# Patient Record
Sex: MALE | Race: WHITE | NOT HISPANIC OR LATINO
[De-identification: names, ages, dates, MRNs, and addresses within clinical notes are randomized per-mention and may not be internally consistent; named-entity substitution may affect disease eponyms.]

---

## 2017-05-26 ENCOUNTER — APPOINTMENT (OUTPATIENT)
Dept: PULMONOLOGY | Facility: CLINIC | Age: 77
End: 2017-05-26

## 2017-05-26 VITALS
HEART RATE: 75 BPM | WEIGHT: 174 LBS | HEIGHT: 65 IN | BODY MASS INDEX: 28.99 KG/M2 | DIASTOLIC BLOOD PRESSURE: 80 MMHG | OXYGEN SATURATION: 95 % | SYSTOLIC BLOOD PRESSURE: 134 MMHG

## 2017-05-26 DIAGNOSIS — R06.09 OTHER FORMS OF DYSPNEA: ICD-10-CM

## 2017-05-26 DIAGNOSIS — J84.9 INTERSTITIAL PULMONARY DISEASE, UNSPECIFIED: ICD-10-CM

## 2017-05-26 DIAGNOSIS — J98.4 OTHER DISORDERS OF LUNG: ICD-10-CM

## 2017-06-25 PROBLEM — R06.09 DOE (DYSPNEA ON EXERTION): Status: ACTIVE | Noted: 2017-06-25

## 2017-06-25 PROBLEM — J98.4 RESTRICTIVE LUNG DISEASE: Status: ACTIVE | Noted: 2017-06-25

## 2017-06-29 ENCOUNTER — OTHER (OUTPATIENT)
Age: 77
End: 2017-06-29

## 2017-08-22 ENCOUNTER — APPOINTMENT (OUTPATIENT)
Dept: PULMONOLOGY | Facility: CLINIC | Age: 77
End: 2017-08-22

## 2017-10-31 ENCOUNTER — INPATIENT (INPATIENT)
Facility: HOSPITAL | Age: 77
LOS: 34 days | Discharge: HOME | DRG: 207 | End: 2017-12-05
Attending: STUDENT IN AN ORGANIZED HEALTH CARE EDUCATION/TRAINING PROGRAM | Admitting: STUDENT IN AN ORGANIZED HEALTH CARE EDUCATION/TRAINING PROGRAM
Payer: MEDICARE

## 2017-10-31 VITALS
HEART RATE: 72 BPM | SYSTOLIC BLOOD PRESSURE: 104 MMHG | RESPIRATION RATE: 16 BRPM | TEMPERATURE: 98 F | OXYGEN SATURATION: 95 % | DIASTOLIC BLOOD PRESSURE: 69 MMHG

## 2017-10-31 DIAGNOSIS — Z98.89 OTHER SPECIFIED POSTPROCEDURAL STATES: Chronic | ICD-10-CM

## 2017-10-31 LAB
ALBUMIN SERPL ELPH-MCNC: 3.4 G/DL — SIGNIFICANT CHANGE UP (ref 3.3–5)
ALP SERPL-CCNC: 96 U/L — SIGNIFICANT CHANGE UP (ref 40–120)
ALT FLD-CCNC: 227 U/L RC — HIGH (ref 10–45)
ANION GAP SERPL CALC-SCNC: 16 MMOL/L — SIGNIFICANT CHANGE UP (ref 5–17)
ANISOCYTOSIS BLD QL: SLIGHT — SIGNIFICANT CHANGE UP
APPEARANCE UR: CLEAR — SIGNIFICANT CHANGE UP
AST SERPL-CCNC: 174 U/L — HIGH (ref 10–40)
BASE EXCESS BLDV CALC-SCNC: 1.7 MMOL/L — SIGNIFICANT CHANGE UP (ref -2–2)
BASE EXCESS BLDV CALC-SCNC: 2.4 MMOL/L — HIGH (ref -2–2)
BASO STIPL BLD QL SMEAR: SLIGHT — SIGNIFICANT CHANGE UP
BASOPHILS # BLD AUTO: 0 K/UL — SIGNIFICANT CHANGE UP (ref 0–0.2)
BASOPHILS NFR BLD AUTO: 1.4 % — SIGNIFICANT CHANGE UP (ref 0–2)
BILIRUB SERPL-MCNC: 1.2 MG/DL — SIGNIFICANT CHANGE UP (ref 0.2–1.2)
BILIRUB UR-MCNC: NEGATIVE — SIGNIFICANT CHANGE UP
BUN SERPL-MCNC: 26 MG/DL — HIGH (ref 7–23)
CA-I SERPL-SCNC: 1.12 MMOL/L — SIGNIFICANT CHANGE UP (ref 1.12–1.3)
CA-I SERPL-SCNC: 1.18 MMOL/L — SIGNIFICANT CHANGE UP (ref 1.12–1.3)
CALCIUM SERPL-MCNC: 9.6 MG/DL — SIGNIFICANT CHANGE UP (ref 8.4–10.5)
CHLORIDE BLDV-SCNC: 93 MMOL/L — LOW (ref 96–108)
CHLORIDE BLDV-SCNC: 98 MMOL/L — SIGNIFICANT CHANGE UP (ref 96–108)
CHLORIDE SERPL-SCNC: 91 MMOL/L — LOW (ref 96–108)
CO2 BLDV-SCNC: 29 MMOL/L — SIGNIFICANT CHANGE UP (ref 22–30)
CO2 BLDV-SCNC: 30 MMOL/L — SIGNIFICANT CHANGE UP (ref 22–30)
CO2 SERPL-SCNC: 28 MMOL/L — SIGNIFICANT CHANGE UP (ref 22–31)
COLOR SPEC: YELLOW — SIGNIFICANT CHANGE UP
CREAT SERPL-MCNC: 1.05 MG/DL — SIGNIFICANT CHANGE UP (ref 0.5–1.3)
DIFF PNL FLD: NEGATIVE — SIGNIFICANT CHANGE UP
EOSINOPHIL # BLD AUTO: 0 K/UL — SIGNIFICANT CHANGE UP (ref 0–0.5)
EOSINOPHIL NFR BLD AUTO: 1.1 % — SIGNIFICANT CHANGE UP (ref 0–6)
GAS PNL BLDV: 132 MMOL/L — LOW (ref 136–145)
GAS PNL BLDV: 135 MMOL/L — LOW (ref 136–145)
GAS PNL BLDV: SIGNIFICANT CHANGE UP
GLUCOSE BLDV-MCNC: 115 MG/DL — HIGH (ref 70–99)
GLUCOSE BLDV-MCNC: 190 MG/DL — HIGH (ref 70–99)
GLUCOSE SERPL-MCNC: 194 MG/DL — HIGH (ref 70–99)
GLUCOSE UR QL: NEGATIVE — SIGNIFICANT CHANGE UP
HCO3 BLDV-SCNC: 28 MMOL/L — SIGNIFICANT CHANGE UP (ref 21–29)
HCO3 BLDV-SCNC: 29 MMOL/L — SIGNIFICANT CHANGE UP (ref 21–29)
HCT VFR BLD CALC: 37 % — LOW (ref 39–50)
HCT VFR BLDA CALC: 33 % — LOW (ref 39–50)
HCT VFR BLDA CALC: 38 % — LOW (ref 39–50)
HGB BLD CALC-MCNC: 10.7 G/DL — LOW (ref 13–17)
HGB BLD CALC-MCNC: 12.4 G/DL — LOW (ref 13–17)
HGB BLD-MCNC: 12.3 G/DL — LOW (ref 13–17)
HYPOCHROMIA BLD QL: SLIGHT — SIGNIFICANT CHANGE UP
KETONES UR-MCNC: ABNORMAL
LACTATE BLDV-MCNC: 4.7 MMOL/L — CRITICAL HIGH (ref 0.7–2)
LACTATE BLDV-MCNC: 7.1 MMOL/L — CRITICAL HIGH (ref 0.7–2)
LEUKOCYTE ESTERASE UR-ACNC: NEGATIVE — SIGNIFICANT CHANGE UP
LYMPHOCYTES # BLD AUTO: 0.4 K/UL — LOW (ref 1–3.3)
LYMPHOCYTES # BLD AUTO: 25.4 % — SIGNIFICANT CHANGE UP (ref 13–44)
MACROCYTES BLD QL: SIGNIFICANT CHANGE UP
MCHC RBC-ENTMCNC: 33.3 GM/DL — SIGNIFICANT CHANGE UP (ref 32–36)
MCHC RBC-ENTMCNC: 36 PG — HIGH (ref 27–34)
MCV RBC AUTO: 108 FL — HIGH (ref 80–100)
MICROCYTES BLD QL: SLIGHT — SIGNIFICANT CHANGE UP
MONOCYTES # BLD AUTO: 0.1 K/UL — SIGNIFICANT CHANGE UP (ref 0–0.9)
MONOCYTES NFR BLD AUTO: 4.8 % — SIGNIFICANT CHANGE UP (ref 2–14)
NEUTROPHILS # BLD AUTO: 0.9 K/UL — LOW (ref 1.8–7.4)
NEUTROPHILS NFR BLD AUTO: 67.4 % — SIGNIFICANT CHANGE UP (ref 43–77)
NITRITE UR-MCNC: NEGATIVE — SIGNIFICANT CHANGE UP
NT-PROBNP SERPL-SCNC: 380 PG/ML — HIGH (ref 0–300)
PCO2 BLDV: 51 MMHG — HIGH (ref 35–50)
PCO2 BLDV: 55 MMHG — HIGH (ref 35–50)
PH BLDV: 7.34 — LOW (ref 7.35–7.45)
PH BLDV: 7.35 — SIGNIFICANT CHANGE UP (ref 7.35–7.45)
PH UR: 6.5 — SIGNIFICANT CHANGE UP (ref 5–8)
PLAT MORPH BLD: NORMAL — SIGNIFICANT CHANGE UP
PLATELET # BLD AUTO: 254 K/UL — SIGNIFICANT CHANGE UP (ref 150–400)
PO2 BLDV: 26 MMHG — SIGNIFICANT CHANGE UP (ref 25–45)
PO2 BLDV: 34 MMHG — SIGNIFICANT CHANGE UP (ref 25–45)
POIKILOCYTOSIS BLD QL AUTO: SLIGHT — SIGNIFICANT CHANGE UP
POLYCHROMASIA BLD QL SMEAR: SLIGHT — SIGNIFICANT CHANGE UP
POTASSIUM BLDV-SCNC: 4.5 MMOL/L — SIGNIFICANT CHANGE UP (ref 3.5–5)
POTASSIUM BLDV-SCNC: 5.3 MMOL/L — HIGH (ref 3.5–5)
POTASSIUM SERPL-MCNC: 5.6 MMOL/L — HIGH (ref 3.5–5.3)
POTASSIUM SERPL-SCNC: 5.6 MMOL/L — HIGH (ref 3.5–5.3)
PROT SERPL-MCNC: 6.9 G/DL — SIGNIFICANT CHANGE UP (ref 6–8.3)
PROT UR-MCNC: SIGNIFICANT CHANGE UP
RBC # BLD: 3.43 M/UL — LOW (ref 4.2–5.8)
RBC # FLD: 16 % — HIGH (ref 10.3–14.5)
RBC BLD AUTO: ABNORMAL
SAO2 % BLDV: 46 % — LOW (ref 67–88)
SAO2 % BLDV: 61 % — LOW (ref 67–88)
SODIUM SERPL-SCNC: 135 MMOL/L — SIGNIFICANT CHANGE UP (ref 135–145)
SP GR SPEC: >1.03 — HIGH (ref 1.01–1.02)
UROBILINOGEN FLD QL: 4
WBC # BLD: 1.4 K/UL — LOW (ref 3.8–10.5)
WBC # FLD AUTO: 1.4 K/UL — LOW (ref 3.8–10.5)
WBC UR QL: SIGNIFICANT CHANGE UP /HPF (ref 0–5)

## 2017-10-31 PROCEDURE — 71275 CT ANGIOGRAPHY CHEST: CPT | Mod: 26

## 2017-10-31 PROCEDURE — 99285 EMERGENCY DEPT VISIT HI MDM: CPT

## 2017-10-31 PROCEDURE — 74177 CT ABD & PELVIS W/CONTRAST: CPT | Mod: 26

## 2017-10-31 PROCEDURE — 71010: CPT | Mod: 26

## 2017-10-31 RX ORDER — SODIUM CHLORIDE 9 MG/ML
2000 INJECTION INTRAMUSCULAR; INTRAVENOUS; SUBCUTANEOUS ONCE
Qty: 0 | Refills: 0 | Status: COMPLETED | OUTPATIENT
Start: 2017-10-31 | End: 2017-10-31

## 2017-10-31 RX ORDER — VANCOMYCIN HCL 1 G
1000 VIAL (EA) INTRAVENOUS ONCE
Qty: 0 | Refills: 0 | Status: COMPLETED | OUTPATIENT
Start: 2017-10-31 | End: 2017-10-31

## 2017-10-31 RX ORDER — PIPERACILLIN AND TAZOBACTAM 4; .5 G/20ML; G/20ML
3.38 INJECTION, POWDER, LYOPHILIZED, FOR SOLUTION INTRAVENOUS ONCE
Qty: 0 | Refills: 0 | Status: COMPLETED | OUTPATIENT
Start: 2017-10-31 | End: 2017-11-01

## 2017-10-31 RX ADMIN — Medication 250 MILLIGRAM(S): at 23:59

## 2017-10-31 RX ADMIN — SODIUM CHLORIDE 1000 MILLILITER(S): 9 INJECTION INTRAMUSCULAR; INTRAVENOUS; SUBCUTANEOUS at 21:26

## 2017-10-31 NOTE — ED ADULT NURSE REASSESSMENT NOTE - NS ED NURSE REASSESS COMMENT FT1
pt assisted to bathroom  urine drawn and sent  fluids flowing as per MD orders  awaiting completion for repeat VBG

## 2017-10-31 NOTE — ED ADULT NURSE NOTE - OBJECTIVE STATEMENT
76 y/o male a+ox3 via EMS for shortness of breath. Pt states he has pmhx of pneumonitis s/p hospitalization for pneumonia in 8/2017; on home oxygen vis nc at 3lpm, increases to 5 with ambulation. Family states the pt has shown a slow progression of shortness of breath with exertion despite home oxygen. Pt also developed internal nasal burns from home oxygen use; prescribed percocet and topical cream as per family for discomfort; family reports burns have improved. Pt denies any productive cough, chest pain or discomfort, pleuritic pain, fever or chills. Pt also denies any headache, feeling lightheaded, dizziness, abdominal pain, N/V/D. Pt currently exhibiting non-labored breathing and states he feels comfortable in semi-jim position on nc at 5 lpm.

## 2017-10-31 NOTE — ED PROVIDER NOTE - OBJECTIVE STATEMENT
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 He is unable to take more than 10 steps without getting short of breath, and his home pulse-oximeter shows a drop in O2 saturation from 90 to 70%. Denies any leg swelling, dyspnea when laying flat, chest pain, fevers, chills. 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 He is unable to take more than 10 steps without getting short of breath, and his home pulse-oximeter shows a drop in O2 saturation from 90 to 70%. Denies any leg swelling, dyspnea when laying flat, chest pain, fevers, chills.    PMD: Deep Millan

## 2017-10-31 NOTE — ED ADULT NURSE REASSESSMENT NOTE - NS ED NURSE REASSESS COMMENT FT1
Pt returned from Ct scan  Pt resting comfortably with family at bedside.   Awaiting CT results, FS completed as per MD Ward verbal order, 158 MD aware  Safety maintained at all times, bed in lowest position, call bell in hand.  Will continue to monitor closely.

## 2017-10-31 NOTE — ED PROVIDER NOTE - FAMILY HISTORY
Family history of diabetes mellitus     Sibling  Still living? No  Family history of liver disease, Age at diagnosis: Age Unknown     Mother  Still living? Unknown  Family history of breast cancer, Age at diagnosis: Age Unknown

## 2017-10-31 NOTE — ED ADULT NURSE NOTE - PMH
Cataract    Diabetes mellitus    GERD (gastroesophageal reflux disease)    Hyperlipemia    Rotator cuff rupture, right

## 2017-10-31 NOTE — ED PROVIDER NOTE - ATTENDING CONTRIBUTION TO CARE
I performed a history and physical exam of the patient and discussed their management with the resident. I reviewed the resident's note and agree with the documented findings and plan of care.     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 Patient reports sob with changing position and decreased O2 sat with minimal steps. No fevers, no chest pain. + weakness.  VS noted  Gen. chronically ill  HEENT: EOMI, mmm, PERRL  Lungs: CTAB/L no C/ W /R   CVS: RRR   Abd; Soft non tender, non distended   Ext: no edema  Skin: no rash  Neuro AAOx3 non focal clear speech  a/p: sob - worsening interstitial lung disease, r/o PE, less likely pneumonia, will get labs, CE, CT Chest to r/o PE, Admit.

## 2017-10-31 NOTE — ED PROVIDER NOTE - MEDICAL DECISION MAKING DETAILS
77M p/w worsening REAGAN. R/o PE (CT angio) vs. malignancy (2/2 asbestos exposure) vs. worsening pneumonitis vs. PNA vs. cardiac cause (BNP). CBC, CMP, VBG.

## 2017-11-01 DIAGNOSIS — J34.89 OTHER SPECIFIED DISORDERS OF NOSE AND NASAL SINUSES: ICD-10-CM

## 2017-11-01 DIAGNOSIS — E78.5 HYPERLIPIDEMIA, UNSPECIFIED: ICD-10-CM

## 2017-11-01 DIAGNOSIS — E87.5 HYPERKALEMIA: ICD-10-CM

## 2017-11-01 DIAGNOSIS — K21.9 GASTRO-ESOPHAGEAL REFLUX DISEASE WITHOUT ESOPHAGITIS: ICD-10-CM

## 2017-11-01 DIAGNOSIS — E87.2 ACIDOSIS: ICD-10-CM

## 2017-11-01 DIAGNOSIS — B37.0 CANDIDAL STOMATITIS: ICD-10-CM

## 2017-11-01 DIAGNOSIS — R06.02 SHORTNESS OF BREATH: ICD-10-CM

## 2017-11-01 DIAGNOSIS — J84.112 IDIOPATHIC PULMONARY FIBROSIS: ICD-10-CM

## 2017-11-01 DIAGNOSIS — R74.0 NONSPECIFIC ELEVATION OF LEVELS OF TRANSAMINASE AND LACTIC ACID DEHYDROGENASE [LDH]: ICD-10-CM

## 2017-11-01 DIAGNOSIS — I25.10 ATHEROSCLEROTIC HEART DISEASE OF NATIVE CORONARY ARTERY WITHOUT ANGINA PECTORIS: ICD-10-CM

## 2017-11-01 DIAGNOSIS — Z95.1 PRESENCE OF AORTOCORONARY BYPASS GRAFT: Chronic | ICD-10-CM

## 2017-11-01 DIAGNOSIS — Z46.6 ENCOUNTER FOR FITTING AND ADJUSTMENT OF URINARY DEVICE: Chronic | ICD-10-CM

## 2017-11-01 DIAGNOSIS — E11.9 TYPE 2 DIABETES MELLITUS WITHOUT COMPLICATIONS: ICD-10-CM

## 2017-11-01 DIAGNOSIS — R06.2 WHEEZING: ICD-10-CM

## 2017-11-01 LAB
APPEARANCE UR: CLEAR — SIGNIFICANT CHANGE UP
BASE EXCESS BLDV CALC-SCNC: 2.2 MMOL/L — HIGH (ref -2–2)
BILIRUB UR-MCNC: NEGATIVE — SIGNIFICANT CHANGE UP
CA-I SERPL-SCNC: 1.11 MMOL/L — LOW (ref 1.12–1.3)
CHLORIDE BLDV-SCNC: 101 MMOL/L — SIGNIFICANT CHANGE UP (ref 96–108)
CO2 BLDV-SCNC: 29 MMOL/L — SIGNIFICANT CHANGE UP (ref 22–30)
COLOR SPEC: YELLOW — SIGNIFICANT CHANGE UP
DIFF PNL FLD: NEGATIVE — SIGNIFICANT CHANGE UP
FERRITIN SERPL-MCNC: 835 NG/ML — HIGH (ref 30–400)
FOLATE SERPL-MCNC: 16.6 NG/ML — SIGNIFICANT CHANGE UP (ref 4.8–24.2)
GAS PNL BLDV: 135 MMOL/L — LOW (ref 136–145)
GAS PNL BLDV: SIGNIFICANT CHANGE UP
GAS PNL BLDV: SIGNIFICANT CHANGE UP
GLUCOSE BLDC GLUCOMTR-MCNC: 132 MG/DL — HIGH (ref 70–99)
GLUCOSE BLDC GLUCOMTR-MCNC: 206 MG/DL — HIGH (ref 70–99)
GLUCOSE BLDC GLUCOMTR-MCNC: 323 MG/DL — HIGH (ref 70–99)
GLUCOSE BLDC GLUCOMTR-MCNC: 336 MG/DL — HIGH (ref 70–99)
GLUCOSE BLDC GLUCOMTR-MCNC: 456 MG/DL — CRITICAL HIGH (ref 70–99)
GLUCOSE BLDV-MCNC: 217 MG/DL — HIGH (ref 70–99)
GLUCOSE UR QL: NEGATIVE MG/DL — SIGNIFICANT CHANGE UP
HCO3 BLDV-SCNC: 27 MMOL/L — SIGNIFICANT CHANGE UP (ref 21–29)
HCT VFR BLDA CALC: 34 % — LOW (ref 39–50)
HGB BLD CALC-MCNC: 10.9 G/DL — LOW (ref 13–17)
IRON SATN MFR SERPL: 156 UG/DL — SIGNIFICANT CHANGE UP (ref 45–165)
IRON SATN MFR SERPL: 78 % — HIGH (ref 16–55)
KETONES UR-MCNC: ABNORMAL
LACTATE BLDV-MCNC: 5.1 MMOL/L — CRITICAL HIGH (ref 0.7–2)
LEUKOCYTE ESTERASE UR-ACNC: NEGATIVE — SIGNIFICANT CHANGE UP
MAGNESIUM SERPL-MCNC: 1.7 MG/DL — SIGNIFICANT CHANGE UP (ref 1.6–2.6)
MRSA PCR RESULT.: SIGNIFICANT CHANGE UP
NITRITE UR-MCNC: NEGATIVE — SIGNIFICANT CHANGE UP
PCO2 BLDV: 47 MMHG — SIGNIFICANT CHANGE UP (ref 35–50)
PH BLDV: 7.38 — SIGNIFICANT CHANGE UP (ref 7.35–7.45)
PH UR: 5.5 — SIGNIFICANT CHANGE UP (ref 5–8)
PHOSPHATE SERPL-MCNC: 1.8 MG/DL — LOW (ref 2.5–4.5)
PO2 BLDV: 34 MMHG — SIGNIFICANT CHANGE UP (ref 25–45)
POTASSIUM BLDV-SCNC: 4.1 MMOL/L — SIGNIFICANT CHANGE UP (ref 3.5–5)
PROT UR-MCNC: NEGATIVE MG/DL — SIGNIFICANT CHANGE UP
RAPID RVP RESULT: SIGNIFICANT CHANGE UP
S AUREUS DNA NOSE QL NAA+PROBE: DETECTED
SAO2 % BLDV: 67 % — SIGNIFICANT CHANGE UP (ref 67–88)
SP GR SPEC: 1.02 — SIGNIFICANT CHANGE UP (ref 1.01–1.02)
TIBC SERPL-MCNC: 200 UG/DL — LOW (ref 220–430)
TSH SERPL-MCNC: 1.06 UIU/ML — SIGNIFICANT CHANGE UP (ref 0.27–4.2)
UIBC SERPL-MCNC: 44 UG/DL — LOW (ref 110–370)
UROBILINOGEN FLD QL: 1 MG/DL — SIGNIFICANT CHANGE UP
VIT B12 SERPL-MCNC: >2000 PG/ML — HIGH (ref 243–894)

## 2017-11-01 PROCEDURE — 99223 1ST HOSP IP/OBS HIGH 75: CPT

## 2017-11-01 RX ORDER — PANTOPRAZOLE SODIUM 20 MG/1
40 TABLET, DELAYED RELEASE ORAL
Qty: 0 | Refills: 0 | Status: DISCONTINUED | OUTPATIENT
Start: 2017-11-01 | End: 2017-11-07

## 2017-11-01 RX ORDER — DEXTROSE 50 % IN WATER 50 %
12.5 SYRINGE (ML) INTRAVENOUS ONCE
Qty: 0 | Refills: 0 | Status: DISCONTINUED | OUTPATIENT
Start: 2017-11-01 | End: 2017-11-07

## 2017-11-01 RX ORDER — HEPARIN SODIUM 5000 [USP'U]/ML
5000 INJECTION INTRAVENOUS; SUBCUTANEOUS EVERY 12 HOURS
Qty: 0 | Refills: 0 | Status: DISCONTINUED | OUTPATIENT
Start: 2017-11-01 | End: 2017-11-07

## 2017-11-01 RX ORDER — FLUOCINONIDE/EMOLLIENT BASE 0.05 %
1 CREAM (GRAM) TOPICAL EVERY 12 HOURS
Qty: 0 | Refills: 0 | Status: DISCONTINUED | OUTPATIENT
Start: 2017-11-01 | End: 2017-11-07

## 2017-11-01 RX ORDER — SODIUM CHLORIDE 9 MG/ML
1000 INJECTION INTRAMUSCULAR; INTRAVENOUS; SUBCUTANEOUS ONCE
Qty: 0 | Refills: 0 | Status: COMPLETED | OUTPATIENT
Start: 2017-11-01 | End: 2017-11-01

## 2017-11-01 RX ORDER — INSULIN LISPRO 100/ML
VIAL (ML) SUBCUTANEOUS
Qty: 0 | Refills: 0 | Status: DISCONTINUED | OUTPATIENT
Start: 2017-11-01 | End: 2017-11-02

## 2017-11-01 RX ORDER — METOPROLOL TARTRATE 50 MG
25 TABLET ORAL
Qty: 0 | Refills: 0 | Status: DISCONTINUED | OUTPATIENT
Start: 2017-11-01 | End: 2017-11-07

## 2017-11-01 RX ORDER — DEXTROSE 50 % IN WATER 50 %
25 SYRINGE (ML) INTRAVENOUS ONCE
Qty: 0 | Refills: 0 | Status: DISCONTINUED | OUTPATIENT
Start: 2017-11-01 | End: 2017-11-07

## 2017-11-01 RX ORDER — PREGABALIN 225 MG/1
1000 CAPSULE ORAL DAILY
Qty: 0 | Refills: 0 | Status: DISCONTINUED | OUTPATIENT
Start: 2017-11-01 | End: 2017-11-07

## 2017-11-01 RX ORDER — ASPIRIN/CALCIUM CARB/MAGNESIUM 324 MG
81 TABLET ORAL DAILY
Qty: 0 | Refills: 0 | Status: DISCONTINUED | OUTPATIENT
Start: 2017-11-01 | End: 2017-11-07

## 2017-11-01 RX ORDER — GLUCAGON INJECTION, SOLUTION 0.5 MG/.1ML
1 INJECTION, SOLUTION SUBCUTANEOUS ONCE
Qty: 0 | Refills: 0 | Status: DISCONTINUED | OUTPATIENT
Start: 2017-11-01 | End: 2017-11-07

## 2017-11-01 RX ORDER — LOSARTAN POTASSIUM 100 MG/1
25 TABLET, FILM COATED ORAL DAILY
Qty: 0 | Refills: 0 | Status: DISCONTINUED | OUTPATIENT
Start: 2017-11-01 | End: 2017-11-07

## 2017-11-01 RX ORDER — AZATHIOPRINE 100 MG/1
150 TABLET ORAL DAILY
Qty: 0 | Refills: 0 | Status: DISCONTINUED | OUTPATIENT
Start: 2017-11-01 | End: 2017-11-07

## 2017-11-01 RX ORDER — DEXTROSE 50 % IN WATER 50 %
1 SYRINGE (ML) INTRAVENOUS ONCE
Qty: 0 | Refills: 0 | Status: DISCONTINUED | OUTPATIENT
Start: 2017-11-01 | End: 2017-11-07

## 2017-11-01 RX ORDER — MUPIROCIN 20 MG/G
1 OINTMENT TOPICAL EVERY 12 HOURS
Qty: 0 | Refills: 0 | Status: DISCONTINUED | OUTPATIENT
Start: 2017-11-01 | End: 2017-11-07

## 2017-11-01 RX ORDER — SODIUM CHLORIDE 9 MG/ML
1000 INJECTION, SOLUTION INTRAVENOUS
Qty: 0 | Refills: 0 | Status: DISCONTINUED | OUTPATIENT
Start: 2017-11-01 | End: 2017-11-07

## 2017-11-01 RX ORDER — IPRATROPIUM/ALBUTEROL SULFATE 18-103MCG
3 AEROSOL WITH ADAPTER (GRAM) INHALATION EVERY 6 HOURS
Qty: 0 | Refills: 0 | Status: DISCONTINUED | OUTPATIENT
Start: 2017-11-01 | End: 2017-11-07

## 2017-11-01 RX ORDER — INSULIN LISPRO 100/ML
VIAL (ML) SUBCUTANEOUS AT BEDTIME
Qty: 0 | Refills: 0 | Status: DISCONTINUED | OUTPATIENT
Start: 2017-11-01 | End: 2017-11-03

## 2017-11-01 RX ORDER — SODIUM CHLORIDE 9 MG/ML
1000 INJECTION INTRAMUSCULAR; INTRAVENOUS; SUBCUTANEOUS
Qty: 0 | Refills: 0 | Status: DISCONTINUED | OUTPATIENT
Start: 2017-11-01 | End: 2017-11-02

## 2017-11-01 RX ORDER — AZATHIOPRINE 100 MG/1
100 TABLET ORAL AT BEDTIME
Qty: 0 | Refills: 0 | Status: DISCONTINUED | OUTPATIENT
Start: 2017-11-01 | End: 2017-11-07

## 2017-11-01 RX ORDER — INFLUENZA VIRUS VACCINE 15; 15; 15; 15 UG/.5ML; UG/.5ML; UG/.5ML; UG/.5ML
0.5 SUSPENSION INTRAMUSCULAR ONCE
Qty: 0 | Refills: 0 | Status: DISCONTINUED | OUTPATIENT
Start: 2017-11-01 | End: 2017-12-05

## 2017-11-01 RX ADMIN — Medication 3 MILLILITER(S): at 18:07

## 2017-11-01 RX ADMIN — Medication 1 APPLICATION(S): at 18:39

## 2017-11-01 RX ADMIN — Medication 2: at 12:15

## 2017-11-01 RX ADMIN — Medication 3 MILLILITER(S): at 12:17

## 2017-11-01 RX ADMIN — Medication 25 MILLIGRAM(S): at 18:27

## 2017-11-01 RX ADMIN — Medication 30 MILLIGRAM(S): at 18:27

## 2017-11-01 RX ADMIN — SODIUM CHLORIDE 100 MILLILITER(S): 9 INJECTION INTRAMUSCULAR; INTRAVENOUS; SUBCUTANEOUS at 18:39

## 2017-11-01 RX ADMIN — HEPARIN SODIUM 5000 UNIT(S): 5000 INJECTION INTRAVENOUS; SUBCUTANEOUS at 18:28

## 2017-11-01 RX ADMIN — PIPERACILLIN AND TAZOBACTAM 200 GRAM(S): 4; .5 INJECTION, POWDER, LYOPHILIZED, FOR SOLUTION INTRAVENOUS at 00:00

## 2017-11-01 RX ADMIN — Medication 3: at 22:03

## 2017-11-01 RX ADMIN — Medication 4: at 18:25

## 2017-11-01 RX ADMIN — MUPIROCIN 1 APPLICATION(S): 20 OINTMENT TOPICAL at 18:39

## 2017-11-01 RX ADMIN — Medication 3 MILLILITER(S): at 23:25

## 2017-11-01 RX ADMIN — SODIUM CHLORIDE 2000 MILLILITER(S): 9 INJECTION INTRAMUSCULAR; INTRAVENOUS; SUBCUTANEOUS at 07:45

## 2017-11-01 RX ADMIN — AZATHIOPRINE 100 MILLIGRAM(S): 100 TABLET ORAL at 22:03

## 2017-11-01 NOTE — H&P ADULT - PROBLEM SELECTOR PLAN 5
-C/w ASA daily, losartan, and metoprolol.  -Hold statin in view of transaminitis.  -Obtain outside echo report (reportedly normal and done about a week ago) from patient's cardiologist.

## 2017-11-01 NOTE — H&P ADULT - ASSESSMENT
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 CT chest shows bilateral IPF with UIP pattern.

## 2017-11-01 NOTE — CONSULT NOTE ADULT - SUBJECTIVE AND OBJECTIVE BOX
PULMONARY CONSULT  Amauri Jackson MD  [phone]    Initial HPI on admission:  HPI: 77M admitted with progressive REAGAN over weeks PTA, markedly increased 1-2 weeks. Patient is s/p 4 vessel CABG at NS. Patient reportedly did well, was then w/u for dyspnea found to have pulmonary fibrosis - thought to possible be due to hypersensitivity pneumonitis. 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 Patient denies recent fever, chills, productive cough. He was admitted in Aug at a [facility] hospital in [place] for 6 days and treated with steroids and antibiotics for "pneumonia". He was discharged on home oxygen which he has used since. He developed a painful dermatitis on upper lip, presumably from nasal cannula. He is currently on prednisone 10 mg qd. Patient denies history of arthritis, rash, serositis. Last TTE with normal LVF.  CT chest in ER: bilateral reticular opacities with traction bronchiectasis. No focal consolidation to sugg pna.    PAST MEDICAL & SURGICAL HISTORY:  Cataract  Rotator cuff rupture, right  GERD (gastroesophageal reflux disease)  Hyperlipemia  Diabetes mellitus  S/P rotator cuff surgery  S/P lens implant: 2000 &amp; 2012  S/P appendectomy: over 50 years ago    Allergies    No Known Allergies    Intolerances    ROS:    EYES:  Negative  blurry vision or double vision  GASTROINTESTINAL:  Negative for nausea, vomiting, diarrhea  -otherwise negative except for subjective    FAMILY HISTORY:  Family history of liver disease (Sibling)  Family history of diabetes mellitus    Social history:       Medications:  MEDICATIONS  (STANDING):  ALBUTerol/ipratropium for Nebulization 3 milliLiter(s) Nebulizer every 6 hours  dextrose 5%. 1000 milliLiter(s) (50 mL/Hr) IV Continuous <Continuous>  dextrose 50% Injectable 12.5 Gram(s) IV Push once  dextrose 50% Injectable 25 Gram(s) IV Push once  dextrose 50% Injectable 25 Gram(s) IV Push once  insulin lispro (HumaLOG) corrective regimen sliding scale   SubCutaneous three times a day before meals  insulin lispro (HumaLOG) corrective regimen sliding scale   SubCutaneous at bedtime    MEDICATIONS  (PRN):  dextrose Gel 1 Dose(s) Oral once PRN Blood Glucose LESS THAN 70 milliGRAM(s)/deciliter  glucagon  Injectable 1 milliGRAM(s) IntraMuscular once PRN Glucose LESS THAN 70 milligrams/deciliter    Vital Signs Last 24 Hrs  T(C): 36.7 (01 Nov 2017 11:10), Max: 36.9 (31 Oct 2017 20:08)  T(F): 98 (01 Nov 2017 11:10), Max: 98.4 (31 Oct 2017 20:08)  HR: 83 (01 Nov 2017 11:10) (67 - 94)  BP: 125/71 (01 Nov 2017 11:10) (104/69 - 135/73)  BP(mean): --  RR: 20 (01 Nov 2017 11:10) (16 - 27)  SpO2: 93% (01 Nov 2017 11:10) (93% - 100%)    LABS:                        12.3   1.4   )-----------( 254      ( 31 Oct 2017 18:16 )             37.0   10-31    135  |  91<L>  |  26<H>  ----------------------------<  194<H>  5.6<H>   |  28  |  1.05    Ca    9.6      31 Oct 2017 18:16    TPro  6.9  /  Alb  3.4  /  TBili  1.2  /  DBili  x   /  AST  174<H>  /  ALT  227<H>  /  AlkPhos  96  10-31    Urinalysis Basic - ( 31 Oct 2017 22:21 )    Color: Yellow / Appearance: Clear / SG: >1.030 / pH: x  Gluc: x / Ketone: Trace  / Bili: Negative / Urobili: 4   Blood: x / Protein: Trace / Nitrite: Negative   Leuk Esterase: Negative / RBC: x / WBC 0-2 /HPF   Sq Epi: x / Non Sq Epi: x / Bacteria: x    Serum Pro-Brain Natriuretic Peptide: 380 pg/mL (10-31-17 @ 18:16)    CULTURES:        Physical Examination:    General: Non toxic, No acute distress.      HEENT: Pupils equal, reactive to light.  Symmetric.    PULM: Bibasilar crackles    CVS: Regular rate and rhythm, no murmurs, rubs, or gallops    ABD: Soft, nondistended, nontender, normoactive bowel sounds, no masses    EXT: No edema, nontender    SKIN: Warm and well perfused, no rashes noted.    NEURO: Alert, oriented, interactive, nonfocal    RADIOLOGY REVIEWED PERSONALLY  CXR:    CT chest:    TTE:      Assessment:    Plan:

## 2017-11-01 NOTE — CHART NOTE - NSCHARTNOTEFT_GEN_A_CORE
Lactate 5    Pulm fibrosis exacerbation seen by ID and Pulm. Hold off on abt for now  VSS, give IVF's and repeat lactate level

## 2017-11-01 NOTE — CONSULT NOTE ADULT - ASSESSMENT
78 yo male come in on chronic oxygen at home with increasing dyspnea but no consolidation on imaging and report of recent hospitalization and treatment of PNA.

## 2017-11-01 NOTE — H&P ADULT - NSHPLABSRESULTS_GEN_ALL_CORE
Labs:                         12.3   1.4   )-----------( 254      ( 31 Oct 2017 18:16 )             37.0       10-31    135  |  91<L>  |  26<H>  ----------------------------<  194<H>  5.6<H>   |  28  |  1.05    Ca    9.6      31 Oct 2017 18:16    TPro  6.9  /  Alb  3.4  /  TBili  1.2  /  DBili  x   /  AST  174<H>  /  ALT  227<H>  /  AlkPhos  96  10-31          POCT Blood Glucose.: 206 mg/dL (2017 12:12)      Lactate- 7.1>>5.1    Urinalysis Basic - ( 2017 15:03 )    Color: Yellow / Appearance: Clear / S.022 / pH: x  Gluc: x / Ketone: Trace  / Bili: Negative / Urobili: 1.0 mg/dL   Blood: x / Protein: Negative mg/dL / Nitrite: Negative   Leuk Esterase: Negative / RBC: x / WBC x   Sq Epi: x / Non Sq Epi: x / Bacteria: x      CXR reviewed:   < from: Xray Chest 1 View AP/PA (10.31.17 @ 19:57) >    IMPRESSION:  Bilateral reticular opacities consistent pulmonary fibrosis   as prominent on the left lung base. Underlying consolidation cannot be   excluded please correlate with CT is ordered at this time.    < end of copied text >      < from: CT Angio Chest w/ IV Cont (10.31.17 @ 20:00) >      IMPRESSION:   No pulmonary embolism.     Diffuse bilateral interstitial lung disease, most likely idiopathic   pulmonary fibrosis with a UIP pattern.    < end of copied text >

## 2017-11-01 NOTE — H&P ADULT - NSHPREVIEWOFSYSTEMS_GEN_ALL_CORE
REVIEW OF SYSTEMS:    CONSTITUTIONAL: Fatigue with ambulation.   ENMT:  No ear pain, No vertigo or throat pain  EYES: No visual changes  or photophobia  NECK: No pain or stiffness  RESPIRATORY: SOB/REAGAN present. Chronic dry cough.   CARDIOVASCULAR: No chest pain or palpitations  GASTROINTESTINAL: No abdominal or epigastric pain. No nausea, vomiting, or hematemesis; No diarrhea or constipation. No melena or hematochezia.  MUSCULOSKELETAL: No joint swelling  or warmth.  GENITOURINARY: No dysuria, frequency or hematuria  NEUROLOGICAL: No numbness or weakness  PSYCHIATRIC: No depression or anhedonia.  ENDOCRINE: No sx hypoglycemic episodes.  SKIN: No itching, burning, rashes, or lesions

## 2017-11-01 NOTE — CONSULT NOTE ADULT - SUBJECTIVE AND OBJECTIVE BOX
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 He is unable to take more than 10 steps without getting short of breath, and his home pulse-oximeter shows a drop in O2 saturation from 90 to 70%. Denies any leg swelling, dyspnea when laying flat, chest pain, fevers, chills.    Patient himself report no history of lung problems but then reports having a home fingr pulse ox as well as oxygen at home. According to patient all was good until he recently went to the [facility] where he reports being hospitalized for 6 days and treated with abx for PNA. PAtient reports hypoxemia with any ambulation but no fever, no chills, no swelling, no sig cough.      PAST MEDICAL & SURGICAL HISTORY:  Cataract  Rotator cuff rupture, right  GERD (gastroesophageal reflux disease)  Hyperlipemia  Diabetes mellitus  S/P rotator cuff surgery  S/P lens implant: 2000 &amp; 2012  S/P appendectomy: over 50 years ago      Antimicrobials      Immunological      Other  ALBUTerol/ipratropium for Nebulization 3 milliLiter(s) Nebulizer every 6 hours  dextrose 5%. 1000 milliLiter(s) IV Continuous <Continuous>  dextrose 50% Injectable 12.5 Gram(s) IV Push once  dextrose 50% Injectable 25 Gram(s) IV Push once  dextrose 50% Injectable 25 Gram(s) IV Push once  dextrose Gel 1 Dose(s) Oral once PRN  glucagon  Injectable 1 milliGRAM(s) IntraMuscular once PRN  insulin lispro (HumaLOG) corrective regimen sliding scale   SubCutaneous three times a day before meals  insulin lispro (HumaLOG) corrective regimen sliding scale   SubCutaneous at bedtime    Allergies    No Known Allergies    Intolerances    SOCIAL HISTORY: no history of tobacco use    FAMILY HISTORY:  Family history of liver disease (Sibling)  Family history of diabetes mellitus      ROS:    EYES:  Negative  blurry vision or double vision  GASTROINTESTINAL:  Negative for nausea, vomiting, diarrhea  -otherwise negative except for subjective    Vital Signs Last 24 Hrs  T(C): 36.7 (01 Nov 2017 11:10), Max: 36.9 (31 Oct 2017 20:08)  T(F): 98 (01 Nov 2017 11:10), Max: 98.4 (31 Oct 2017 20:08)  HR: 83 (01 Nov 2017 11:10) (67 - 94)  BP: 125/71 (01 Nov 2017 11:10) (104/69 - 135/73)  BP(mean): --  RR: 20 (01 Nov 2017 11:10) (16 - 27)  SpO2: 93% (01 Nov 2017 11:10) (93% - 100%)    PE:  WDWN in no distress  HEENT:  NC, PERRL, sclerae anicteric, conjunctivae clear, EOMI.  Sinuses nontender, no nasal exudate.  No buccal or pharyngeal lesions, erythema or exudate  Neck:  Supple, no adenopathy  Lungs:  No accessory muscle use, bilaterally clear to auscultation  Cor:  RRR, S1, S2, no murmur appreciated  Abd:  Symmetric, normoactive BS.  Soft, nontender, no masses, guarding or rebound.  Liver and spleen not enlarged  Extrem:  No cyanosis or edema  Skin:  No rashes.  Neuro: grossly intact  Musc: moving all limbs freely, no focal deficits    LABS:                        12.3   1.4   )-----------( 254      ( 31 Oct 2017 18:16 )             37.0     10-31    135  |  91<L>  |  26<H>  ----------------------------<  194<H>  5.6<H>   |  28  |  1.05    Ca    9.6      31 Oct 2017 18:16    TPro  6.9  /  Alb  3.4  /  TBili  1.2  /  DBili  x   /  AST  174<H>  /  ALT  227<H>  /  AlkPhos  96  10-31    Urinalysis Basic - ( 31 Oct 2017 22:21 )    Color: Yellow / Appearance: Clear / SG: >1.030 / pH: x  Gluc: x / Ketone: Trace  / Bili: Negative / Urobili: 4   Blood: x / Protein: Trace / Nitrite: Negative   Leuk Esterase: Negative / RBC: x / WBC 0-2 /HPF   Sq Epi: x / Non Sq Epi: x / Bacteria: x        MICROBIOLOGY:      RADIOLOGY & ADDITIONAL STUDIES:    --< from: CT Angio Chest w/ IV Cont (10.31.17 @ 20:00) >    EXAM:  CT ABDOMEN AND PELVIS IC                          EXAM:  CT ANGIO CHEST (W)AW IC                            PROCEDURE DATE:  10/31/2017            INTERPRETATION:  CLINICAL INFORMATION: Hypoxia and shortness of breath    COMPARISON: CT abdomen and pelvis 11/12/2011.    PROCEDURE:   CT Angiography of the Chest was performed followed by portal venous phase   imaging of the Abdomen and Pelvis.  90 ml of Omnipaque 350 was injected intravenously. 10 ml were discarded.  Sagittal and coronal reformats were performed as well as 3D (MIP)   reconstructions.    FINDINGS:    CHEST:     LUNGS AND LARGE AIRWAYS: Bilateral lower lobe predominant bronchiectasis,   architectural distortion, and fibrosis with honeycombing most consistent   with a UIP pattern of interstitial lung disease.   PLEURA: No pleural effusion.  VESSELS: No pulmonary embolus.  HEART: Heart size is normal. No pericardial effusion. Coronary   calcifications.  MEDIASTINUM AND BRANDON: Nonspecific subcentimeter paratracheal, AP window,   and perihilar lymph nodes.  CHEST WALL AND LOWER NECK: Within normal limits.    ABDOMEN AND PELVIS:    LIVER: Within normal limits.  BILE DUCTS: Normal caliber.  GALLBLADDER: Within normal limits.  SPLEEN: Within normal limits.  PANCREAS: Within normal limits.  ADRENALS: Within normal limits.  KIDNEYS/URETERS: Redemonstration of right renal malrotation with   unchanged right hydronephrosis to the level of the ureteropelvic   junction. A nonobstructing 7 mm right lower pole calculus is noted.   Subcentimeter left parapelvic hypodensity, otherwise unremarkable left   kidney.     BLADDER: Within normal limits.  REPRODUCTIVE ORGANS: Small prostatic calcifications.    BOWEL: Small hiatal hernia. Colonic diverticulosis without   diverticulitis. No bowel obstruction. Appendix the visualized  PERITONEUM: No ascites.  VESSELS:  Atherosclerotic change of the aorta and its branches. Maximum   abdominal aortic dimension is 2.7 cm.  RETROPERITONEUM: No lymphadenopathy.    ABDOMINAL WALL: Withinnormal limits.  BONES: Mild degenerative spinal changes. Minimal anterior wedging of T12.    IMPRESSION:   No pulmonary embolism.     Diffuse bilateral interstitial lung disease, most likely idiopathic   pulmonary fibrosis with a UIP pattern.

## 2017-11-01 NOTE — H&P ADULT - PROBLEM SELECTOR PLAN 1
-Likely an exacerbation causing the worsened REAGAN now. CT chest with UIP pattern, no PE seen.   -Solumedrol 30mg IV twice daily per pulmonary recs. Hold home dose of prednisone while on solumedrol.   -C/w azathioprine 150mg in am and 100mg in pm (home doses) for now, discuss with pulmonary regarding adjusting/changing. Leukopenia on CBC likely from azathioprine.   -Duonebs Q6H standing. Hold home dose of Qvar while on nebs.   -C/w O2 via NC to keep O2sat > 90%.  -Pulmonary recs appreciated.    -RVP negative. -Check urine legionella and fungitell. -ID recs appreciated.   -Hold off on abx for now.   -F/u cultures. Send sputum culture if sputum.   -Obtain outside echo report (reportedly normal and done about a week ago) from patient's cardiologist.

## 2017-11-01 NOTE — CONSULT NOTE ADULT - PROBLEM SELECTOR RECOMMENDATION 9
Really unclear picture at this point and some concerns about how reliable patient is as far as being an informant with the conflict between his report of no chronic lung issues and having oxygen and a pulse ox at home and wife's report.  -will order some additional testing but would recommend involvement of pulmonary as it is not clear that this is all being driven by an acute infectious process.

## 2017-11-01 NOTE — H&P ADULT - HISTORY OF PRESENT ILLNESS
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 The patient reports requiring more oxygen with ambulation and becoming very SOB and fatigued with ambulation, making it very difficult for him to walk. He reports chronic cough.   He reports that about 1-2 weeks ago he developed burning/skin irritation under his nose after using vaseline with the nasal cannula. He saw a dermatologist who prescribed mupirocin and mometasone topicals which helped. He was also prescribed clotrimazole lozenges for some ?tongue irritation. He hasn't started taking it yet.   He denies HA, dizziness, CP, N/V, diarrhea, fevers, sore throat, constipation, LE edema, or abdominal pain.   The patient reports that he went to his cardiologist about a week ago and reportedly an echocardiogram at that time was normal.   Received vancomycin and zosyn and 3LNS in the ED.

## 2017-11-01 NOTE — H&P ADULT - PROBLEM SELECTOR PLAN 2
-Possibly due to passive congestion of liver from right-sided heart failure. Although, recent echo reportedly normal.   -RUQ US to further evaluate.   -Trend LFT's.   -Hold statin.

## 2017-11-01 NOTE — H&P ADULT - NSHPPHYSICALEXAM_GEN_ALL_CORE
PHYSICAL EXAM:  Vital Signs Last 24 Hrs  T(C): 36.6 (11-01-17 @ 16:45)  T(F): 97.8 (11-01-17 @ 16:45), Max: 98.4 (10-31-17 @ 20:08)  HR: 100 (11-01-17 @ 16:45) (67 - 100)  BP: 121/76 (11-01-17 @ 16:45)  BP(mean): --  RR: 20 (11-01-17 @ 16:45) (20 - 27)  SpO2: 95% (11-01-17 @ 16:45) (93% - 100%)  Wt(kg): --    Constitutional: NAD, awake and alert  EYES: EOMI  ENT:  Normal Hearing, no tonsillar exudates. No clear signs of oral thrush.   Neck: Soft and supple, No JVD  Respiratory: Bibasilar dry crackles.   Cardiovascular: S1S2 normal, regular rate and rhythm, no Murmurs, gallops or rubs  Gastrointestinal: Bowel Sounds present, soft, overweight, nontender, nondistended, no guarding, no rebound  Extremities: No cyanosis or clubbing; warm to touch, no edema.   Neurological: AAO x 3, no focal deficits  Musculoskeletal: 5/5 strength b/l upper and lower extremities  Skin: No rashes  Psych: no depression or anhedonia  HEME: no bruises, no nose bleeds

## 2017-11-01 NOTE — PATIENT PROFILE ADULT. - FUNCTIONAL SCREEN CURRENT LEVEL: COMMUNICATION, MLM
(3) unable to speak (not related to language barrier) (0) understands/communicates without difficulty

## 2017-11-01 NOTE — CONSULT NOTE ADULT - ASSESSMENT
Progressive REAGAN secondary to evolving pulmonary fibrosis of uncertain etiology. CT not sugg of "UIP" given absence of honeycomnbing. Favor NSIP or chronic hypersensitivity pneumonitis. Given nl LVF, doubt CHF as contibutory or dominant cause of dyspnea. The patient has no clinical or radiographic evidence of pneumonia or infection. Prior CT's are not available and outpatient w/u to be reviewed once obtained.    REC    Solumedrol 30 IV bid to treat ILD exacerbation  f/u with Dr. Cramer/Dr Fitzgerald re: outpatient w/u including recent serologies  TTE unless recently done as outpatient: re check PAP, RV function  Observe off antibiotics Progressive REAGAN secondary to evolving pulmonary fibrosis of uncertain etiology. CT not sugg of "UIP" given absence of honeycomnbing. Favor NSIP or chronic hypersensitivity pneumonitis. Given nl LVF, doubt CHF as contibutory or dominant cause of dyspnea. The patient has no clinical or radiographic evidence of pneumonia or infection. Prior CT's are not available and outpatient w/u to be reviewed once obtained. Doubt opportunistic infection or PCP, but would obtain fungitell    REC    Solumedrol 30 IV bid to treat ILD exacerbation  f/u with Dr. Cramer/Dr Fitzgerald re: outpatient w/u including recent serologies  TTE unless recently done as outpatient: re check PAP, RV function  Observe off antibiotics  Fungitell

## 2017-11-01 NOTE — H&P ADULT - PROBLEM SELECTOR PLAN 3
-Trended down after IVF's. Unclear cause for this.   -Trend lactate.   -C/w IVF's for another liter at 100cc/hr.

## 2017-11-01 NOTE — H&P ADULT - PSH
S/P appendectomy  over 50 years ago  S/P CABG (coronary artery bypass graft)    S/P lens implant  2000 & 2012  S/P rotator cuff surgery Encounter for removal of ureteral stent  Had a ureteral stent for kidney stones. Now removed.  S/P appendectomy  over 50 years ago  S/P CABG (coronary artery bypass graft)    S/P lens implant  2000 & 2012  S/P rotator cuff surgery

## 2017-11-01 NOTE — H&P ADULT - PMH
Asbestos exposure    Cataract    Coronary artery disease involving native coronary artery of native heart, angina presence unspecified  S/p CABG 2016  Diabetes mellitus    GERD (gastroesophageal reflux disease)    Hyperlipemia    Kidney stones    Rotator cuff rupture, right

## 2017-11-01 NOTE — PATIENT PROFILE ADULT. - VISION (WITH CORRECTIVE LENSES IF THE PATIENT USUALLY WEARS THEM):
unable to assess at this time Normal vision: sees adequately in most situations; can see medication labels, newsprint

## 2017-11-01 NOTE — H&P ADULT - PROBLEM SELECTOR PLAN 9
-C/w mupirocin and mometasone (or in house equivalent) nasal creams for irritation from the nasal cannula.

## 2017-11-02 DIAGNOSIS — Z29.9 ENCOUNTER FOR PROPHYLACTIC MEASURES, UNSPECIFIED: ICD-10-CM

## 2017-11-02 LAB
ACETONE SERPL-MCNC: SIGNIFICANT CHANGE UP
ALBUMIN SERPL ELPH-MCNC: 3.2 G/DL — LOW (ref 3.3–5)
ALP SERPL-CCNC: 98 U/L — SIGNIFICANT CHANGE UP (ref 40–120)
ALT FLD-CCNC: 198 U/L RC — HIGH (ref 10–45)
ANION GAP SERPL CALC-SCNC: 17 MMOL/L — SIGNIFICANT CHANGE UP (ref 5–17)
ANION GAP SERPL CALC-SCNC: 19 MMOL/L — HIGH (ref 5–17)
AST SERPL-CCNC: 134 U/L — HIGH (ref 10–40)
B-OH-BUTYR SERPL-SCNC: 1.6 MMOL/L — HIGH
BASOPHILS # BLD AUTO: 0 K/UL — SIGNIFICANT CHANGE UP (ref 0–0.2)
BILIRUB DIRECT SERPL-MCNC: 0.6 MG/DL — HIGH (ref 0–0.2)
BILIRUB INDIRECT FLD-MCNC: 0.4 MG/DL — SIGNIFICANT CHANGE UP (ref 0.2–1)
BILIRUB SERPL-MCNC: 1 MG/DL — SIGNIFICANT CHANGE UP (ref 0.2–1.2)
BUN SERPL-MCNC: 17 MG/DL — SIGNIFICANT CHANGE UP (ref 7–23)
BUN SERPL-MCNC: 17 MG/DL — SIGNIFICANT CHANGE UP (ref 7–23)
CALCIUM SERPL-MCNC: 8.8 MG/DL — SIGNIFICANT CHANGE UP (ref 8.4–10.5)
CALCIUM SERPL-MCNC: 9 MG/DL — SIGNIFICANT CHANGE UP (ref 8.4–10.5)
CHLORIDE SERPL-SCNC: 97 MMOL/L — SIGNIFICANT CHANGE UP (ref 96–108)
CHLORIDE SERPL-SCNC: 99 MMOL/L — SIGNIFICANT CHANGE UP (ref 96–108)
CHOLEST SERPL-MCNC: 107 MG/DL — SIGNIFICANT CHANGE UP (ref 10–199)
CO2 SERPL-SCNC: 22 MMOL/L — SIGNIFICANT CHANGE UP (ref 22–31)
CO2 SERPL-SCNC: 24 MMOL/L — SIGNIFICANT CHANGE UP (ref 22–31)
CREAT SERPL-MCNC: 0.86 MG/DL — SIGNIFICANT CHANGE UP (ref 0.5–1.3)
CREAT SERPL-MCNC: 0.99 MG/DL — SIGNIFICANT CHANGE UP (ref 0.5–1.3)
EOSINOPHIL # BLD AUTO: 0 K/UL — SIGNIFICANT CHANGE UP (ref 0–0.5)
GLUCOSE BLDC GLUCOMTR-MCNC: 261 MG/DL — HIGH (ref 70–99)
GLUCOSE BLDC GLUCOMTR-MCNC: 283 MG/DL — HIGH (ref 70–99)
GLUCOSE BLDC GLUCOMTR-MCNC: 292 MG/DL — HIGH (ref 70–99)
GLUCOSE BLDC GLUCOMTR-MCNC: 347 MG/DL — HIGH (ref 70–99)
GLUCOSE SERPL-MCNC: 298 MG/DL — HIGH (ref 70–99)
GLUCOSE SERPL-MCNC: 327 MG/DL — HIGH (ref 70–99)
HBA1C BLD-MCNC: 7.5 % — HIGH (ref 4–5.6)
HCT VFR BLD CALC: 34.5 % — LOW (ref 39–50)
HDLC SERPL-MCNC: 26 MG/DL — LOW (ref 40–125)
HGB BLD-MCNC: 11.1 G/DL — LOW (ref 13–17)
LACTATE SERPL-SCNC: 6.5 MMOL/L — CRITICAL HIGH (ref 0.7–2)
LACTATE SERPL-SCNC: 6.5 MMOL/L — CRITICAL HIGH (ref 0.7–2)
LEGIONELLA AG UR QL: NEGATIVE — SIGNIFICANT CHANGE UP
LIPID PNL WITH DIRECT LDL SERPL: 60 MG/DL — SIGNIFICANT CHANGE UP
LYMPHOCYTES # BLD AUTO: 0.4 K/UL — LOW (ref 1–3.3)
LYMPHOCYTES # BLD AUTO: 14 % — SIGNIFICANT CHANGE UP (ref 13–44)
M PNEUMO IGG SER IA-ACNC: 1.44 INDEX — HIGH
M PNEUMO IGG SER IA-ACNC: POSITIVE
MCHC RBC-ENTMCNC: 32.2 GM/DL — SIGNIFICANT CHANGE UP (ref 32–36)
MCHC RBC-ENTMCNC: 34.6 PG — HIGH (ref 27–34)
MCV RBC AUTO: 108 FL — HIGH (ref 80–100)
MONOCYTES # BLD AUTO: 0 K/UL — SIGNIFICANT CHANGE UP (ref 0–0.9)
MONOCYTES NFR BLD AUTO: 6 % — SIGNIFICANT CHANGE UP (ref 2–14)
NEUTROPHILS # BLD AUTO: 1.3 K/UL — LOW (ref 1.8–7.4)
NEUTROPHILS NFR BLD AUTO: 80 % — HIGH (ref 43–77)
PLAT MORPH BLD: NORMAL — SIGNIFICANT CHANGE UP
PLATELET # BLD AUTO: 227 K/UL — SIGNIFICANT CHANGE UP (ref 150–400)
POTASSIUM SERPL-MCNC: 4.8 MMOL/L — SIGNIFICANT CHANGE UP (ref 3.5–5.3)
POTASSIUM SERPL-MCNC: 5.5 MMOL/L — HIGH (ref 3.5–5.3)
POTASSIUM SERPL-SCNC: 4.8 MMOL/L — SIGNIFICANT CHANGE UP (ref 3.5–5.3)
POTASSIUM SERPL-SCNC: 5.5 MMOL/L — HIGH (ref 3.5–5.3)
PROT SERPL-MCNC: 6.9 G/DL — SIGNIFICANT CHANGE UP (ref 6–8.3)
RBC # BLD: 3.2 M/UL — LOW (ref 4.2–5.8)
RBC # FLD: 16.6 % — HIGH (ref 10.3–14.5)
RBC BLD AUTO: SIGNIFICANT CHANGE UP
RHEUMATOID FACT SERPL-ACNC: 11 IU/ML — SIGNIFICANT CHANGE UP (ref 0–13.9)
SODIUM SERPL-SCNC: 138 MMOL/L — SIGNIFICANT CHANGE UP (ref 135–145)
SODIUM SERPL-SCNC: 140 MMOL/L — SIGNIFICANT CHANGE UP (ref 135–145)
TOTAL CHOLESTEROL/HDL RATIO MEASUREMENT: 4.1 RATIO — SIGNIFICANT CHANGE UP (ref 3.4–9.6)
TRIGL SERPL-MCNC: 103 MG/DL — SIGNIFICANT CHANGE UP (ref 10–149)
WBC # BLD: 1.4 K/UL — LOW (ref 3.8–10.5)
WBC # FLD AUTO: 1.4 K/UL — LOW (ref 3.8–10.5)

## 2017-11-02 PROCEDURE — 76705 ECHO EXAM OF ABDOMEN: CPT | Mod: 26,RT

## 2017-11-02 RX ORDER — INSULIN LISPRO 100/ML
3 VIAL (ML) SUBCUTANEOUS
Qty: 0 | Refills: 0 | Status: DISCONTINUED | OUTPATIENT
Start: 2017-11-02 | End: 2017-11-03

## 2017-11-02 RX ORDER — INSULIN LISPRO 100/ML
VIAL (ML) SUBCUTANEOUS AT BEDTIME
Qty: 0 | Refills: 0 | Status: DISCONTINUED | OUTPATIENT
Start: 2017-11-02 | End: 2017-11-07

## 2017-11-02 RX ORDER — INSULIN LISPRO 100/ML
VIAL (ML) SUBCUTANEOUS
Qty: 0 | Refills: 0 | Status: DISCONTINUED | OUTPATIENT
Start: 2017-11-02 | End: 2017-11-07

## 2017-11-02 RX ORDER — INSULIN GLARGINE 100 [IU]/ML
9 INJECTION, SOLUTION SUBCUTANEOUS AT BEDTIME
Qty: 0 | Refills: 0 | Status: DISCONTINUED | OUTPATIENT
Start: 2017-11-02 | End: 2017-11-03

## 2017-11-02 RX ORDER — SODIUM CHLORIDE 9 MG/ML
1000 INJECTION INTRAMUSCULAR; INTRAVENOUS; SUBCUTANEOUS
Qty: 0 | Refills: 0 | Status: DISCONTINUED | OUTPATIENT
Start: 2017-11-02 | End: 2017-11-03

## 2017-11-02 RX ADMIN — Medication 3 MILLILITER(S): at 23:11

## 2017-11-02 RX ADMIN — Medication 3 MILLILITER(S): at 17:47

## 2017-11-02 RX ADMIN — MUPIROCIN 1 APPLICATION(S): 20 OINTMENT TOPICAL at 05:26

## 2017-11-02 RX ADMIN — Medication 25 MILLIGRAM(S): at 05:26

## 2017-11-02 RX ADMIN — Medication 3 UNIT(S): at 18:06

## 2017-11-02 RX ADMIN — LOSARTAN POTASSIUM 25 MILLIGRAM(S): 100 TABLET, FILM COATED ORAL at 05:26

## 2017-11-02 RX ADMIN — Medication 3 MILLILITER(S): at 11:32

## 2017-11-02 RX ADMIN — Medication 1 TABLET(S): at 13:34

## 2017-11-02 RX ADMIN — AZATHIOPRINE 100 MILLIGRAM(S): 100 TABLET ORAL at 21:45

## 2017-11-02 RX ADMIN — AZATHIOPRINE 150 MILLIGRAM(S): 100 TABLET ORAL at 13:39

## 2017-11-02 RX ADMIN — PANTOPRAZOLE SODIUM 40 MILLIGRAM(S): 20 TABLET, DELAYED RELEASE ORAL at 05:27

## 2017-11-02 RX ADMIN — Medication 1 APPLICATION(S): at 18:06

## 2017-11-02 RX ADMIN — Medication 1: at 21:46

## 2017-11-02 RX ADMIN — Medication 8: at 13:38

## 2017-11-02 RX ADMIN — MUPIROCIN 1 APPLICATION(S): 20 OINTMENT TOPICAL at 18:06

## 2017-11-02 RX ADMIN — INSULIN GLARGINE 9 UNIT(S): 100 INJECTION, SOLUTION SUBCUTANEOUS at 21:45

## 2017-11-02 RX ADMIN — Medication 2: at 21:46

## 2017-11-02 RX ADMIN — Medication 1 APPLICATION(S): at 05:26

## 2017-11-02 RX ADMIN — Medication 3: at 10:30

## 2017-11-02 RX ADMIN — PREGABALIN 1000 MICROGRAM(S): 225 CAPSULE ORAL at 13:34

## 2017-11-02 RX ADMIN — Medication 6: at 18:07

## 2017-11-02 RX ADMIN — Medication 3 MILLILITER(S): at 05:44

## 2017-11-02 RX ADMIN — SODIUM CHLORIDE 125 MILLILITER(S): 9 INJECTION INTRAMUSCULAR; INTRAVENOUS; SUBCUTANEOUS at 14:00

## 2017-11-02 RX ADMIN — SODIUM CHLORIDE 125 MILLILITER(S): 9 INJECTION INTRAMUSCULAR; INTRAVENOUS; SUBCUTANEOUS at 23:47

## 2017-11-02 RX ADMIN — HEPARIN SODIUM 5000 UNIT(S): 5000 INJECTION INTRAVENOUS; SUBCUTANEOUS at 05:26

## 2017-11-02 RX ADMIN — Medication 30 MILLIGRAM(S): at 05:26

## 2017-11-02 RX ADMIN — Medication 25 MILLIGRAM(S): at 18:18

## 2017-11-02 RX ADMIN — Medication 3 UNIT(S): at 13:29

## 2017-11-02 RX ADMIN — HEPARIN SODIUM 5000 UNIT(S): 5000 INJECTION INTRAVENOUS; SUBCUTANEOUS at 18:04

## 2017-11-02 RX ADMIN — Medication 30 MILLIGRAM(S): at 18:05

## 2017-11-02 RX ADMIN — Medication 81 MILLIGRAM(S): at 13:39

## 2017-11-02 NOTE — PROGRESS NOTE ADULT - SUBJECTIVE AND OBJECTIVE BOX
Follow-up Pulm Progress Note  Amauri Jackson MD  [phone]    No new respiratory events overnight.   Feels somewhat better - less SOB  WBC persistently low: 1.2  AFebrile off antibiotics    Medications:  Vital Signs Last 24 Hrs  T(C): 36.7 (02 Nov 2017 04:50), Max: 36.9 (01 Nov 2017 15:30)  T(F): 98.1 (02 Nov 2017 04:50), Max: 98.4 (01 Nov 2017 15:30)  HR: 76 (02 Nov 2017 11:35) (76 - 100)  BP: 118/72 (02 Nov 2017 04:50) (118/72 - 126/74)  BP(mean): --  RR: 20 (02 Nov 2017 04:50) (20 - 20)  SpO2: 95% (02 Nov 2017 11:35) (95% - 98%)      11-01 @ 07:01  -  11-02 @ 07:00  --------------------------------------------------------  IN: 1040 mL / OUT: 800 mL / NET: 240 mL        LABS:                        11.1   1.4   )-----------( 227      ( 02 Nov 2017 11:30 )             34.5     11-02    138  |  97  |  17  ----------------------------<  327<H>  4.8   |  22  |  0.99    Ca    8.8      02 Nov 2017 11:30  Phos  1.8     11-01  Mg     1.7     11-01    TPro  6.9  /  Alb  3.2<L>  /  TBili  1.0  /  DBili  0.6<H>  /  AST  134<H>  /  ALT  198<H>  /  AlkPhos  98  11-02    Serum Pro-Brain Natriuretic Peptide: 380 pg/mL (10-31-17 @ 18:16)    CULTURES:      Physical Examination:  PULM: scattered bibasilar crackles; now wheeze  CVS: Regular rate and rhythm, no murmurs, rubs, or gallops  ABD: Soft, non-tender  EXT:  No clubbing, cyanosis, or edema    RADIOLOGY REVIEWED  CXR:    CT chest:    TTE:

## 2017-11-02 NOTE — PROGRESS NOTE ADULT - SUBJECTIVE AND OBJECTIVE BOX
infectious diseases progress note:    MADDIE MALLOY is a 77y y. o. Male patient    Patient reports: no new issues    ROS:    EYES:  Negative  blurry vision or double vision  GASTROINTESTINAL:  Negative for nausea, vomiting, diarrhea  -otherwise negative except for subjective    Allergies    No Known Allergies    Intolerances        ANTIBIOTICS/RELEVANT:  antimicrobials    immunologic:  azaTHIOprine 150 milliGRAM(s) Oral daily  azaTHIOprine 100 milliGRAM(s) Oral at bedtime  influenza   Vaccine 0.5 milliLiter(s) IntraMuscular once    OTHER:  ALBUTerol/ipratropium for Nebulization 3 milliLiter(s) Nebulizer every 6 hours  aspirin enteric coated 81 milliGRAM(s) Oral daily  cyanocobalamin 1000 MICROGram(s) Oral daily  dextrose 5%. 1000 milliLiter(s) IV Continuous <Continuous>  dextrose 50% Injectable 12.5 Gram(s) IV Push once  dextrose 50% Injectable 25 Gram(s) IV Push once  dextrose 50% Injectable 25 Gram(s) IV Push once  dextrose Gel 1 Dose(s) Oral once PRN  fluocinonide 0.05% Cream 1 Application(s) Topical every 12 hours  glucagon  Injectable 1 milliGRAM(s) IntraMuscular once PRN  heparin  Injectable 5000 Unit(s) SubCutaneous every 12 hours  insulin glargine Injectable (LANTUS) 9 Unit(s) SubCutaneous at bedtime  insulin lispro (HumaLOG) corrective regimen sliding scale   SubCutaneous three times a day before meals  insulin lispro (HumaLOG) corrective regimen sliding scale   SubCutaneous at bedtime  insulin lispro Injectable (HumaLOG) 3 Unit(s) SubCutaneous three times a day before meals  losartan 25 milliGRAM(s) Oral daily  methylPREDNISolone sodium succinate Injectable 30 milliGRAM(s) IV Push two times a day  metoprolol     tartrate 25 milliGRAM(s) Oral two times a day  multivitamin 1 Tablet(s) Oral daily  mupirocin 2% Ointment 1 Application(s) Topical every 12 hours  pantoprazole    Tablet 40 milliGRAM(s) Oral before breakfast  sodium chloride 0.9%. 1000 milliLiter(s) IV Continuous <Continuous>      Objective:  Last 24-Vital Signs Last 24 Hrs  T(C): 36.7 (2017 04:50), Max: 36.9 (2017 15:30)  T(F): 98.1 (2017 04:50), Max: 98.4 (2017 15:30)  HR: 81 (2017 05:44) (77 - 100)  BP: 118/72 (2017 04:50) (118/72 - 126/74)  BP(mean): --  RR: 20 (2017 04:50) (20 - 20)  SpO2: 98% (2017 05:44) (95% - 98%)    T(C): 36.7 (17 @ 04:50), Max: 36.9 (10-31-17 @ 20:08)  T(F): 98.1 (17 @ 04:50), Max: 98.4 (10-31-17 @ 20:08)  T(C): 36.7 (17 @ 04:50), Max: 36.9 (10-31-17 @ 20:08)  T(F): 98.1 (17 @ 04:50), Max: 98.4 (10-31-17 @ 20:08)  T(C): 36.7 (17 @ 04:50), Max: 36.9 (10-31-17 @ 20:08)  T(F): 98.1 (17 @ 04:50), Max: 98.4 (10-31-17 @ 20:08)    PHYSICAL EXAM:  Constitutional:Well-developed, well nourished  Eyes:PERRLA, EOMI  Ear/Nose/Throat: oropharynx normal	  Neck:no JVD, no lymphadenopathy, supple  Respiratory: no accessory muscle use, lung fields bilaterally clear  Cardiovascular:RRR, normal S1, S2 no m/r/g  Gastrointestinal:soft, NT, no HSM, BS-normal  Extremities:no clubbing, no cyanosis, edema absent  Neuro-patient alert, oriented and appropriate  Skin-no sig lesions      LABS:                        12.3   1.4   )-----------( 254      ( 31 Oct 2017 18:16 )             37.0       WBC 1.4  10-31 @ 18:16      10-31    135  |  91<L>  |  26<H>  ----------------------------<  194<H>  5.6<H>   |  28  |  1.05    Ca    9.6      31 Oct 2017 18:16  Phos  1.8       Mg     1.7         TPro  6.9  /  Alb  3.4  /  TBili  1.2  /  DBili  x   /  AST  174<H>  /  ALT  227<H>  /  AlkPhos  96  10-31      Urinalysis Basic - ( 2017 15:03 )    Color: Yellow / Appearance: Clear / S.022 / pH: x  Gluc: x / Ketone: Trace  / Bili: Negative / Urobili: 1.0 mg/dL   Blood: x / Protein: Negative mg/dL / Nitrite: Negative   Leuk Esterase: Negative / RBC: x / WBC x   Sq Epi: x / Non Sq Epi: x / Bacteria: x          MICROBIOLOGY:    MRSA/MSSA PCR (17 @ 13:36)    MRSA PCR Result.: NotDetec: MSSA/MRSA PCR assay is a qualitative in vitro diagnostic test for the  direct detection and differentiation of methicillin susceptible  Staphylococcus aureus (SA) and methicillin-resistant Staphylococcus  aureus (MRSA) DNA from nasal swabs in patients at risk for nasal  colonization. It is not intended to diagnose MRSA or SA infections nor  guide or monitor treatment for MRSA/SA infections. A negative result does  not preclude nasal colonization. The assay is FDA-approved and its  performance hasbeen established by [employer], [place] and the [facility], [place], [place]    MSSA PCR Result.: Detected    Rapid Respiratory Viral Panel (17 @ 13:21)    Rapid RVP Result: NotDetec: The [employer] RVP Rapid uses polymerase chain reaction (PCR) and melt  curve analysis to screen for adenovirus; coronavirus HKU1, NL63, 229E,  OC43; human metapneumovirus (hMPV); human enterovirus/rhinovirus  (Entero/RV); influenza A; influenza A/H1;influenza A/H3; influenza  A/H1-2009; influenza B; parainfluenza viruses 1, 2, 3, 4; respiratory  syncytial virus; Bordetella pertussis; Mycoplasma pneumoniae; and  Chlamydophila pneumoniae.        RADIOLOGY & ADDITIONAL STUDIES:

## 2017-11-02 NOTE — CONSULT NOTE ADULT - SUBJECTIVE AND OBJECTIVE BOX
The pt is a 77 year old male with asbestos related lung disease. 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 He is now being treated for Idiopathic Pulmonary Fibrosis for the last 8 months with azathioprine 150mg in the morning and 100 mg in the evening. The pt has a past that includes CABG DM GERD and kidney stones. He was having about 2 to 3 weeks of cough and REAGAN without fevers. He presented to the ER and was started on vancomycin and zosyn. He does not have a smoking history. At the time of my visit the wife stated that he was to be taken off antibiotics today. I was called by Dr Gibbons as the pt had a cbc that showed a white cell count of 1.4 hemoglobin 11.1  and MCHC 32.2 and platelet count of [id number]. the differential count was 80 polys and 14 lymphs and the ANC was about 1120. While here the pt did have a ct angio and no clots were found. The pt was seen by the pulmonary team and the recommendation was to continue the azathioprine.

## 2017-11-02 NOTE — CONSULT NOTE ADULT - ASSESSMENT
At this time the pt has a ANC of 1120 and the pulmonary team wishes to continue this agent. I will discuss with all involved if possible, as the pt might need to have the drug azathioprine stopped until the counts have come up some. The thinking may be to continue the drug to toxicity to maximize the effects. This drug causes macrocytosis as it is a chemo drug. we will await the results of the cbc in the morning. The pt has slightly elevated transaminases and this is a known side effect ot the drug as well.

## 2017-11-02 NOTE — PROGRESS NOTE ADULT - ATTENDING COMMENTS
above note reviewed and revised  vitals, labs and radiology reviewed  agree with above HPI, physical exam and A/P  will treat as ILD flare given lack of other diagnosis and no signs of infection  c/w steroids per pulmonology  hematology eval noted, will await counts tomorrow, may need to stop azathioprine  rest as per above note

## 2017-11-02 NOTE — PROGRESS NOTE ADULT - ASSESSMENT
IPF - acute exacerbation  Etiology unclear: possible chronic hypersensitivity pneumonitis  No evidence PNA  Neutropenia  Hypoxemic repsiratory failure    REC    Observe off antibiotics  Continue Solumedrol 30 q 12  Continue imuran  TTE for PA pressure  Heme eval  Will repeat vasculitis/CV serologies

## 2017-11-02 NOTE — PROGRESS NOTE ADULT - ASSESSMENT
76 yo male come in on chronic oxygen at home with increasing dyspnea but no consolidation on imaging and report of recent hospitalization and treatment of PNA. On immunosuppressives per [facility] pulmonologists

## 2017-11-02 NOTE — PROGRESS NOTE ADULT - SUBJECTIVE AND OBJECTIVE BOX
Patient is a 77y old  Male who presents with a chief complaint of SOB (2017 13:52)      SUBJECTIVE / OVERNIGHT EVENTS:  Symptomatically significantly improved from admission - patient is able to walk (with assistance) to the restroom which is much improved from his functional status at admission.       MEDICATIONS  (STANDING):  ALBUTerol/ipratropium for Nebulization 3 milliLiter(s) Nebulizer every 6 hours  aspirin enteric coated 81 milliGRAM(s) Oral daily  azaTHIOprine 150 milliGRAM(s) Oral daily  azaTHIOprine 100 milliGRAM(s) Oral at bedtime  cyanocobalamin 1000 MICROGram(s) Oral daily  dextrose 5%. 1000 milliLiter(s) (50 mL/Hr) IV Continuous <Continuous>  dextrose 50% Injectable 12.5 Gram(s) IV Push once  dextrose 50% Injectable 25 Gram(s) IV Push once  dextrose 50% Injectable 25 Gram(s) IV Push once  fluocinonide 0.05% Cream 1 Application(s) Topical every 12 hours  heparin  Injectable 5000 Unit(s) SubCutaneous every 12 hours  influenza   Vaccine 0.5 milliLiter(s) IntraMuscular once  insulin glargine Injectable (LANTUS) 9 Unit(s) SubCutaneous at bedtime  insulin lispro (HumaLOG) corrective regimen sliding scale   SubCutaneous three times a day before meals  insulin lispro (HumaLOG) corrective regimen sliding scale   SubCutaneous at bedtime  insulin lispro (HumaLOG) corrective regimen sliding scale   SubCutaneous at bedtime  insulin lispro Injectable (HumaLOG) 3 Unit(s) SubCutaneous three times a day before meals  losartan 25 milliGRAM(s) Oral daily  methylPREDNISolone sodium succinate Injectable 30 milliGRAM(s) IV Push two times a day  metoprolol     tartrate 25 milliGRAM(s) Oral two times a day  multivitamin 1 Tablet(s) Oral daily  mupirocin 2% Ointment 1 Application(s) Topical every 12 hours  pantoprazole    Tablet 40 milliGRAM(s) Oral before breakfast  sodium chloride 0.9%. 1000 milliLiter(s) (125 mL/Hr) IV Continuous <Continuous>      MEDICATIONS  (PRN):  dextrose Gel 1 Dose(s) Oral once PRN Blood Glucose LESS THAN 70 milliGRAM(s)/deciliter  glucagon  Injectable 1 milliGRAM(s) IntraMuscular once PRN Glucose LESS THAN 70 milligrams/deciliter      Vital Signs Last 24 Hrs  T(C): 36.7 (2017 04:50), Max: 36.9 (2017 15:30)  T(F): 98.1 (2017 04:50), Max: 98.4 (2017 15:30)  HR: 76 (2017 11:35) (76 - 100)  BP: 118/72 (2017 04:50) (118/72 - 126/74)  BP(mean): --  RR: 20 (2017 04:50) (20 - 20)  SpO2: 95% (2017 11:35) (95% - 98%)      CAPILLARY BLOOD GLUCOSE  POCT Blood Glucose.: 347 mg/dL (2017 12:59)  POCT Blood Glucose.: 261 mg/dL (2017 10:19)  POCT Blood Glucose.: 323 mg/dL (2017 21:44)  POCT Blood Glucose.: 456 mg/dL (2017 21:43)  POCT Blood Glucose.: 336 mg/dL (2017 17:34)      I&O's Summary    2017 07:01  -  2017 07:00  --------------------------------------------------------  IN: 1040 mL / OUT: 800 mL / NET: 240 mL      PHYSICAL EXAM:  GENERAL:   HEAD:    EYES:   ENMT:   NECK:   CHEST/LUNG:   HEART:   ABDOMEN:   EXTREMITIES:    PSYCH:   NEUROLOGY:   SKIN:       LABS:                        11.1   1.4   )-----------( 227      ( 2017 11:30 )             34.5         138  |  97  |  17  ----------------------------<  327<H>  4.8   |  22  |  0.99    Ca    8.8      2017 11:30  Phos  1.8       Mg     1.7         TPro  6.9  /  Alb  3.2<L>  /  TBili  1.0  /  DBili  0.6<H>  /  AST  134<H>  /  ALT  198<H>  /  AlkPhos  98  11-02    LIVER FUNCTIONS - ( 2017 11:30 )  Alb: 3.2 g/dL / Pro: 6.9 g/dL / ALK PHOS: 98 U/L / ALT: 198 U/L RC / AST: 134 U/L / GGT: x           Urinalysis Basic - ( 2017 15:03 )    Color: Yellow / Appearance: Clear / S.022 / pH: x  Gluc: x / Ketone: Trace  / Bili: Negative / Urobili: 1.0 mg/dL   Blood: x / Protein: Negative mg/dL / Nitrite: Negative   Leuk Esterase: Negative / RBC: x / WBC x   Sq Epi: x / Non Sq Epi: x / Bacteria: x      RADIOLOGY & ADDITIONAL TESTS:  Imaging Personally Reviewed: YES    Consultant(s) Notes Reviewed: YES    Care Discussed with Consultants/Other Providers: YES      Nomi Vergara MD PGY-1  Department of Medicine  [phone] Patient is a 77y old  Male who presents with a chief complaint of SOB (2017 13:52)      SUBJECTIVE / OVERNIGHT EVENTS:  Symptomatically significantly improved from admission - patient is able to walk (with assistance) to the restroom which is much improved from his functional status at admission.       MEDICATIONS  (STANDING):  ALBUTerol/ipratropium for Nebulization 3 milliLiter(s) Nebulizer every 6 hours  aspirin enteric coated 81 milliGRAM(s) Oral daily  azaTHIOprine 150 milliGRAM(s) Oral daily  azaTHIOprine 100 milliGRAM(s) Oral at bedtime  cyanocobalamin 1000 MICROGram(s) Oral daily  dextrose 5%. 1000 milliLiter(s) (50 mL/Hr) IV Continuous <Continuous>  dextrose 50% Injectable 12.5 Gram(s) IV Push once  dextrose 50% Injectable 25 Gram(s) IV Push once  dextrose 50% Injectable 25 Gram(s) IV Push once  fluocinonide 0.05% Cream 1 Application(s) Topical every 12 hours  heparin  Injectable 5000 Unit(s) SubCutaneous every 12 hours  influenza   Vaccine 0.5 milliLiter(s) IntraMuscular once  insulin glargine Injectable (LANTUS) 9 Unit(s) SubCutaneous at bedtime  insulin lispro (HumaLOG) corrective regimen sliding scale   SubCutaneous three times a day before meals  insulin lispro (HumaLOG) corrective regimen sliding scale   SubCutaneous at bedtime  insulin lispro (HumaLOG) corrective regimen sliding scale   SubCutaneous at bedtime  insulin lispro Injectable (HumaLOG) 3 Unit(s) SubCutaneous three times a day before meals  losartan 25 milliGRAM(s) Oral daily  methylPREDNISolone sodium succinate Injectable 30 milliGRAM(s) IV Push two times a day  metoprolol     tartrate 25 milliGRAM(s) Oral two times a day  multivitamin 1 Tablet(s) Oral daily  mupirocin 2% Ointment 1 Application(s) Topical every 12 hours  pantoprazole    Tablet 40 milliGRAM(s) Oral before breakfast  sodium chloride 0.9%. 1000 milliLiter(s) (125 mL/Hr) IV Continuous <Continuous>      MEDICATIONS  (PRN):  dextrose Gel 1 Dose(s) Oral once PRN Blood Glucose LESS THAN 70 milliGRAM(s)/deciliter  glucagon  Injectable 1 milliGRAM(s) IntraMuscular once PRN Glucose LESS THAN 70 milligrams/deciliter      Vital Signs Last 24 Hrs  T(C): 36.7 (2017 04:50), Max: 36.9 (2017 15:30)  T(F): 98.1 (2017 04:50), Max: 98.4 (2017 15:30)  HR: 76 (2017 11:35) (76 - 100)  BP: 118/72 (2017 04:50) (118/72 - 126/74)  BP(mean): --  RR: 20 (2017 04:50) (20 - 20)  SpO2: 95% (2017 11:35) (95% - 98%)      CAPILLARY BLOOD GLUCOSE  POCT Blood Glucose.: 347 mg/dL (2017 12:59)  POCT Blood Glucose.: 261 mg/dL (2017 10:19)  POCT Blood Glucose.: 323 mg/dL (2017 21:44)  POCT Blood Glucose.: 456 mg/dL (2017 21:43)  POCT Blood Glucose.: 336 mg/dL (2017 17:34)      I&O's Summary    2017 07:01  -  2017 07:00  --------------------------------------------------------  IN: 1040 mL / OUT: 800 mL / NET: 240 mL      PHYSICAL EXAM:  GENERAL:  Well-appearing, sitting up in the chair, on O2  EYES:  EOMI, PERRLA  ENMT:  MMM, dentition generally intact  NECK:  ROM intact, no appreciated JVP  CHEST/LUNG:  Dry crackles at the lung bases which are significantly improved from admission, otherwise clear to auscultation  HEART:  Grade 2/6 holosystolic murmur, RRR  ABDOMEN:  +BS, NT/D  EXTREMITIES:  No appreciated peripheral edema  PSYCH: A&Ox3, euthymic  NEUROLOGY:  No focal deficits  SKIN: Intact to gross observation      LABS:                        11.1   1.4   )-----------( 227      ( 2017 11:30 )             34.5     11-02    138  |  97  |  17  ----------------------------<  327<H>  4.8   |  22  |  0.99    Ca    8.8      2017 11:30  Phos  1.8     -  Mg     1.7     -    TPro  6.9  /  Alb  3.2<L>  /  TBili  1.0  /  DBili  0.6<H>  /  AST  134<H>  /  ALT  198<H>  /  AlkPhos  98  11-02    LIVER FUNCTIONS - ( 2017 11:30 )  Alb: 3.2 g/dL / Pro: 6.9 g/dL / ALK PHOS: 98 U/L / ALT: 198 U/L RC / AST: 134 U/L / GGT: x           Urinalysis Basic - ( 2017 15:03 )    Color: Yellow / Appearance: Clear / S.022 / pH: x  Gluc: x / Ketone: Trace  / Bili: Negative / Urobili: 1.0 mg/dL   Blood: x / Protein: Negative mg/dL / Nitrite: Negative   Leuk Esterase: Negative / RBC: x / WBC x   Sq Epi: x / Non Sq Epi: x / Bacteria: x      RADIOLOGY & ADDITIONAL TESTS:  Imaging Personally Reviewed: YES    Consultant(s) Notes Reviewed: YES    Care Discussed with Consultants/Other Providers: YES      Nomi Vergara MD PGY-1  Department of Medicine  [phone]

## 2017-11-03 DIAGNOSIS — D70.9 NEUTROPENIA, UNSPECIFIED: ICD-10-CM

## 2017-11-03 LAB
ALBUMIN SERPL ELPH-MCNC: 3 G/DL — LOW (ref 3.3–5)
ALP SERPL-CCNC: 82 U/L — SIGNIFICANT CHANGE UP (ref 40–120)
ALT FLD-CCNC: 157 U/L RC — HIGH (ref 10–45)
ANION GAP SERPL CALC-SCNC: 13 MMOL/L — SIGNIFICANT CHANGE UP (ref 5–17)
AST SERPL-CCNC: 99 U/L — HIGH (ref 10–40)
BASOPHILS # BLD AUTO: 0 K/UL — SIGNIFICANT CHANGE UP (ref 0–0.2)
BASOPHILS NFR BLD AUTO: 0 % — SIGNIFICANT CHANGE UP (ref 0–2)
BILIRUB SERPL-MCNC: 1 MG/DL — SIGNIFICANT CHANGE UP (ref 0.2–1.2)
BUN SERPL-MCNC: 17 MG/DL — SIGNIFICANT CHANGE UP (ref 7–23)
C-ANCA SER-ACNC: NEGATIVE — SIGNIFICANT CHANGE UP
CALCIUM SERPL-MCNC: 8.5 MG/DL — SIGNIFICANT CHANGE UP (ref 8.4–10.5)
CHLORIDE SERPL-SCNC: 102 MMOL/L — SIGNIFICANT CHANGE UP (ref 96–108)
CO2 SERPL-SCNC: 25 MMOL/L — SIGNIFICANT CHANGE UP (ref 22–31)
CREAT SERPL-MCNC: 0.82 MG/DL — SIGNIFICANT CHANGE UP (ref 0.5–1.3)
ENA SCL70 AB SER-ACNC: <0.2 AI — SIGNIFICANT CHANGE UP
EOSINOPHIL # BLD AUTO: 0 K/UL — SIGNIFICANT CHANGE UP (ref 0–0.5)
EOSINOPHIL NFR BLD AUTO: 0.5 % — SIGNIFICANT CHANGE UP (ref 0–6)
GLUCOSE BLDC GLUCOMTR-MCNC: 236 MG/DL — HIGH (ref 70–99)
GLUCOSE BLDC GLUCOMTR-MCNC: 236 MG/DL — HIGH (ref 70–99)
GLUCOSE BLDC GLUCOMTR-MCNC: 277 MG/DL — HIGH (ref 70–99)
GLUCOSE BLDC GLUCOMTR-MCNC: 303 MG/DL — HIGH (ref 70–99)
GLUCOSE BLDC GLUCOMTR-MCNC: 347 MG/DL — HIGH (ref 70–99)
GLUCOSE BLDC GLUCOMTR-MCNC: 369 MG/DL — HIGH (ref 70–99)
GLUCOSE SERPL-MCNC: 246 MG/DL — HIGH (ref 70–99)
HCT VFR BLD CALC: 29.6 % — LOW (ref 39–50)
HGB BLD-MCNC: 9.9 G/DL — LOW (ref 13–17)
LACTATE SERPL-SCNC: 4.2 MMOL/L — CRITICAL HIGH (ref 0.7–2)
LYMPHOCYTES # BLD AUTO: 0.4 K/UL — LOW (ref 1–3.3)
LYMPHOCYTES # BLD AUTO: 33.2 % — SIGNIFICANT CHANGE UP (ref 13–44)
M PNEUMO IGM SER-ACNC: 225 UNITS/ML — SIGNIFICANT CHANGE UP
MAGNESIUM SERPL-MCNC: 1.8 MG/DL — SIGNIFICANT CHANGE UP (ref 1.6–2.6)
MCHC RBC-ENTMCNC: 33.3 GM/DL — SIGNIFICANT CHANGE UP (ref 32–36)
MCHC RBC-ENTMCNC: 35.8 PG — HIGH (ref 27–34)
MCV RBC AUTO: 108 FL — HIGH (ref 80–100)
MONOCYTES # BLD AUTO: 0.1 K/UL — SIGNIFICANT CHANGE UP (ref 0–0.9)
MONOCYTES NFR BLD AUTO: 5.6 % — SIGNIFICANT CHANGE UP (ref 2–14)
MYCOPLASMA AG SPEC QL: NEGATIVE — SIGNIFICANT CHANGE UP
NEUTROPHILS # BLD AUTO: 0.8 K/UL — LOW (ref 1.8–7.4)
NEUTROPHILS NFR BLD AUTO: 60.7 % — SIGNIFICANT CHANGE UP (ref 43–77)
P-ANCA SER-ACNC: NEGATIVE — SIGNIFICANT CHANGE UP
PLATELET # BLD AUTO: 187 K/UL — SIGNIFICANT CHANGE UP (ref 150–400)
POTASSIUM SERPL-MCNC: 4.8 MMOL/L — SIGNIFICANT CHANGE UP (ref 3.5–5.3)
POTASSIUM SERPL-SCNC: 4.8 MMOL/L — SIGNIFICANT CHANGE UP (ref 3.5–5.3)
PROT SERPL-MCNC: 5.9 G/DL — LOW (ref 6–8.3)
RBC # BLD: 2.75 M/UL — LOW (ref 4.2–5.8)
RBC # FLD: 16.5 % — HIGH (ref 10.3–14.5)
SODIUM SERPL-SCNC: 140 MMOL/L — SIGNIFICANT CHANGE UP (ref 135–145)
WBC # BLD: 1.3 K/UL — LOW (ref 3.8–10.5)
WBC # FLD AUTO: 1.3 K/UL — LOW (ref 3.8–10.5)

## 2017-11-03 PROCEDURE — 71010: CPT | Mod: 26

## 2017-11-03 RX ORDER — INSULIN LISPRO 100/ML
6 VIAL (ML) SUBCUTANEOUS
Qty: 0 | Refills: 0 | Status: DISCONTINUED | OUTPATIENT
Start: 2017-11-03 | End: 2017-11-04

## 2017-11-03 RX ORDER — INSULIN GLARGINE 100 [IU]/ML
17 INJECTION, SOLUTION SUBCUTANEOUS AT BEDTIME
Qty: 0 | Refills: 0 | Status: DISCONTINUED | OUTPATIENT
Start: 2017-11-03 | End: 2017-11-04

## 2017-11-03 RX ADMIN — Medication 81 MILLIGRAM(S): at 13:00

## 2017-11-03 RX ADMIN — Medication 6: at 17:59

## 2017-11-03 RX ADMIN — HEPARIN SODIUM 5000 UNIT(S): 5000 INJECTION INTRAVENOUS; SUBCUTANEOUS at 17:58

## 2017-11-03 RX ADMIN — HEPARIN SODIUM 5000 UNIT(S): 5000 INJECTION INTRAVENOUS; SUBCUTANEOUS at 05:08

## 2017-11-03 RX ADMIN — Medication 30 MILLIGRAM(S): at 17:58

## 2017-11-03 RX ADMIN — PANTOPRAZOLE SODIUM 40 MILLIGRAM(S): 20 TABLET, DELAYED RELEASE ORAL at 05:09

## 2017-11-03 RX ADMIN — Medication 3 MILLILITER(S): at 05:22

## 2017-11-03 RX ADMIN — MUPIROCIN 1 APPLICATION(S): 20 OINTMENT TOPICAL at 05:09

## 2017-11-03 RX ADMIN — LOSARTAN POTASSIUM 25 MILLIGRAM(S): 100 TABLET, FILM COATED ORAL at 05:08

## 2017-11-03 RX ADMIN — INSULIN GLARGINE 17 UNIT(S): 100 INJECTION, SOLUTION SUBCUTANEOUS at 23:07

## 2017-11-03 RX ADMIN — Medication 4: at 23:07

## 2017-11-03 RX ADMIN — AZATHIOPRINE 100 MILLIGRAM(S): 100 TABLET ORAL at 23:06

## 2017-11-03 RX ADMIN — Medication 3 MILLILITER(S): at 20:04

## 2017-11-03 RX ADMIN — Medication: at 13:44

## 2017-11-03 RX ADMIN — Medication 6 UNIT(S): at 13:45

## 2017-11-03 RX ADMIN — PREGABALIN 1000 MICROGRAM(S): 225 CAPSULE ORAL at 13:01

## 2017-11-03 RX ADMIN — Medication 1 APPLICATION(S): at 18:00

## 2017-11-03 RX ADMIN — Medication 1 APPLICATION(S): at 05:09

## 2017-11-03 RX ADMIN — MUPIROCIN 1 APPLICATION(S): 20 OINTMENT TOPICAL at 18:00

## 2017-11-03 RX ADMIN — Medication 30 MILLIGRAM(S): at 05:08

## 2017-11-03 RX ADMIN — AZATHIOPRINE 150 MILLIGRAM(S): 100 TABLET ORAL at 13:01

## 2017-11-03 RX ADMIN — Medication 25 MILLIGRAM(S): at 05:07

## 2017-11-03 RX ADMIN — SODIUM CHLORIDE 125 MILLILITER(S): 9 INJECTION INTRAMUSCULAR; INTRAVENOUS; SUBCUTANEOUS at 06:44

## 2017-11-03 RX ADMIN — Medication 25 MILLIGRAM(S): at 18:01

## 2017-11-03 RX ADMIN — Medication 1 TABLET(S): at 13:01

## 2017-11-03 RX ADMIN — Medication 4: at 08:56

## 2017-11-03 RX ADMIN — Medication 6 UNIT(S): at 17:59

## 2017-11-03 NOTE — PROGRESS NOTE ADULT - ATTENDING COMMENTS
above note reviewed and revised  vitals, labs and radiology reviewed  agree with above HPI, physical exam and A/P  will continue to treat as ILD flare given lack of other diagnosis and no signs of infection  worsening sob today likely from recent IVF, they have been stopped today  c/w steroids / imuran per pulmonology  hematology eval noted, will continue to monitor counts and LFTs closely  rest as per above note

## 2017-11-03 NOTE — DIETITIAN INITIAL EVALUATION ADULT. - ENERGY NEEDS
Pt seen for consult for HgbA1C 7.5%. Pt with PMH including type 2 DM on metformin, "asbestos related lung disease" CAD S/P CABG now admitted with worsening dyspna on exertion found to have ideopathic pulmonary fibrosis likely exacerbation-pulmonary following, pt with hyperglycemia likely steroid related per MD.    Ht: 5'8" , Wt: 165 lbs (pt stated), BMI: 25.1 kg/m2, IBW: 154 lbs +/- 10%, %IBW: 107%  No edema, Skin noted with stage 2 pressure injury to back of dominique ears

## 2017-11-03 NOTE — PROGRESS NOTE ADULT - ASSESSMENT
as per pulmonary we will try to push the dose of the imuran for now and kyler white cell count was 1.3 would stop the drug in am if the white cell drops further.

## 2017-11-03 NOTE — PROGRESS NOTE ADULT - SUBJECTIVE AND OBJECTIVE BOX
Patient is a 77y old  Male who presents with a chief complaint of SOB (2017 13:52)      SUBJECTIVE / OVERNIGHT EVENTS:  Overnight no acute events; patient is able to ambulate to bathroom with some SOB (improved from his baseline).       MEDICATIONS  (STANDING):  ALBUTerol/ipratropium for Nebulization 3 milliLiter(s) Nebulizer every 6 hours  aspirin enteric coated 81 milliGRAM(s) Oral daily  azaTHIOprine 150 milliGRAM(s) Oral daily  azaTHIOprine 100 milliGRAM(s) Oral at bedtime  cyanocobalamin 1000 MICROGram(s) Oral daily  dextrose 5%. 1000 milliLiter(s) (50 mL/Hr) IV Continuous <Continuous>  dextrose 50% Injectable 12.5 Gram(s) IV Push once  dextrose 50% Injectable 25 Gram(s) IV Push once  dextrose 50% Injectable 25 Gram(s) IV Push once  fluocinonide 0.05% Cream 1 Application(s) Topical every 12 hours  heparin  Injectable 5000 Unit(s) SubCutaneous every 12 hours  influenza   Vaccine 0.5 milliLiter(s) IntraMuscular once  insulin glargine Injectable (LANTUS) 17 Unit(s) SubCutaneous at bedtime  insulin lispro (HumaLOG) corrective regimen sliding scale   SubCutaneous three times a day before meals  insulin lispro (HumaLOG) corrective regimen sliding scale   SubCutaneous at bedtime  insulin lispro (HumaLOG) corrective regimen sliding scale   SubCutaneous at bedtime  insulin lispro Injectable (HumaLOG) 6 Unit(s) SubCutaneous three times a day before meals  losartan 25 milliGRAM(s) Oral daily  methylPREDNISolone sodium succinate Injectable 30 milliGRAM(s) IV Push two times a day  metoprolol     tartrate 25 milliGRAM(s) Oral two times a day  multivitamin 1 Tablet(s) Oral daily  mupirocin 2% Ointment 1 Application(s) Topical every 12 hours  pantoprazole    Tablet 40 milliGRAM(s) Oral before breakfast      MEDICATIONS  (PRN):  dextrose Gel 1 Dose(s) Oral once PRN Blood Glucose LESS THAN 70 milliGRAM(s)/deciliter  glucagon  Injectable 1 milliGRAM(s) IntraMuscular once PRN Glucose LESS THAN 70 milligrams/deciliter      Vital Signs Last 24 Hrs  T(C): 36.4 (2017 09:37), Max: 36.7 (2017 14:15)  T(F): 97.5 (2017 09:37), Max: 98.1 (2017 04:54)  HR: 70 (2017 11:00) (68 - 85)  BP: 112/69 (2017 11:00) (107/63 - 132/70)  BP(mean): --  RR: 18 (2017 11:00) (18 - 22)  SpO2: 92% (2017 11:00) (92% - 98%)      CAPILLARY BLOOD GLUCOSE  POCT Blood Glucose.: 236 mg/dL (2017 08:51)  POCT Blood Glucose.: 303 mg/dL (2017 01:15)  POCT Blood Glucose.: 283 mg/dL (2017 21:22)  POCT Blood Glucose.: 292 mg/dL (2017 17:43)  POCT Blood Glucose.: 347 mg/dL (2017 12:59)      I&O's Summary    2017 07:01  -  2017 07:00  --------------------------------------------------------  IN: 1585 mL / OUT: 1750 mL / NET: -165 mL      PHYSICAL EXAM:  GENERAL:  Well-appearing, sitting up in the chair, on O2  EYES:  EOMI, PERRLA  ENMT:  MMM, dentition generally intact  NECK:  ROM intact, no appreciated JVP  CHEST/LUNG:  Dry crackles at the lung bases which may be increased since yesterday  HEART:  Grade 2/6 holosystolic murmur, RRR  ABDOMEN:  +BS, NT/D  EXTREMITIES:  No appreciated peripheral edema  PSYCH: A&Ox3, euthymic  NEUROLOGY:  No focal deficits  SKIN: Intact to gross observation      LABS:                        9.9    1.3   )-----------( 187      ( 2017 07:15 )             29.6     11-03    140  |  102  |  17  ----------------------------<  246<H>  4.8   |  25  |  0.82    Ca    8.5      2017 07:15  Phos  1.8     11-01  Mg     1.8     11-03    TPro  5.9<L>  /  Alb  3.0<L>  /  TBili  1.0  /  DBili  x   /  AST  99<H>  /  ALT  157<H>  /  AlkPhos  82      LIVER FUNCTIONS - ( 2017 07:15 )  Alb: 3.0 g/dL / Pro: 5.9 g/dL / ALK PHOS: 82 U/L / ALT: 157 U/L RC / AST: 99 U/L / GGT: x           Urinalysis Basic - ( 2017 15:03 )    Color: Yellow / Appearance: Clear / S.022 / pH: x  Gluc: x / Ketone: Trace  / Bili: Negative / Urobili: 1.0 mg/dL   Blood: x / Protein: Negative mg/dL / Nitrite: Negative   Leuk Esterase: Negative / RBC: x / WBC x   Sq Epi: x / Non Sq Epi: x / Bacteria: x      RADIOLOGY & ADDITIONAL TESTS:  Imaging Personally Reviewed: YES    Consultant(s) Notes Reviewed: YES    Care Discussed with Consultants/Other Providers: YES      Nomi Vergara MD PGY-1  Department of Medicine  [phone]

## 2017-11-03 NOTE — DIETITIAN INITIAL EVALUATION ADULT. - ADHERENCE
Pt reports following a general healthy diet, low in salt and low in sugar. Pt will try to watch his portion sizes and choose more whole grains. Pt checks his blood glucose 3 x daily with values usually 134-170, recently pt states his blood glucose has been much higher which he attributes to steroids. Pt states he will often wake up in the morning <70, pt had been trying to eat a snack in the evening to correct this, and will eat chocolate to bring it up or wife will supply juice. Pt reports recent drastic reduction in HgbA1C from 12% to 7.5%, pt unable to recall timeframe but stated a few weeks, ? accuracy of pt report. Pt reports following a general healthy diet, low in salt and low in sugar. Pt will try to watch his portion sizes and choose more whole grains. Pt checks his blood glucose 3 x daily with values usually 134-170, recently pt states his blood glucose has been much higher which he attributes to steroids. Pt states he will often wake up in the morning <70, pt had been trying to eat a snack in the evening to correct this, and will eat chocolate to bring it up or wife will supply juice-pt stated he mentioned this to his doctor. Pt reports recent drastic reduction in HgbA1C from 12% to 7.5%, pt unable to recall timeframe but stated a few weeks, ? accuracy of pt report.

## 2017-11-03 NOTE — PROVIDER CONTACT NOTE (OTHER) - BACKGROUND
Pt admitted for SOB w/ PMH of DM2, receiving steroids & Blood glucose has been increasing in the past few days.

## 2017-11-03 NOTE — DIETITIAN INITIAL EVALUATION ADULT. - NS AS NUTRI INTERV ED CONTENT
Reviewed consistent carbohydrate/DASH diet including portion sizes using my plate method, limiting concentrated sweets with the exception of hypoglycemia, reviewed hypoglycemia protocol, not adding additional salt to meals, choosing more whole grains/lean meats-pt receptive to education and accepted written materials

## 2017-11-03 NOTE — PROGRESS NOTE ADULT - SUBJECTIVE AND OBJECTIVE BOX
Follow-up Pulm Progress Note  Amauri Jackson MD  [phone]    No new respiratory events overnight.   Dyspnea at rest unchanged vs 10/2, though marked REAGAN with ambulation in room and desaturation to 80's  Heme eval noted  WBC persistently low: 1.3  AFebrile off antibiotics  NS at 125 ml/hour started for incr lactate attributed to impending DKA per medical team. Todaym, AG is normal and HCO# is 25.  TTE pending    CULTURES:      Physical Examination:  PULM: scattered bibasilar crackles - increased since 11/2; no wheeze  CVS: Regular rate and rhythm, no murmurs, rubs, or gallops  ABD: Soft, non-tender  EXT:  No clubbing, cyanosis, or edema    RADIOLOGY REVIEWED  CXR: pending    CT chest:    TTE: pending

## 2017-11-03 NOTE — DIETITIAN INITIAL EVALUATION ADULT. - PERTINENT MEDS FT
NaCl @ 125 mL/h x 24 h, lantus, Humalog pre-meal insulin, Humalog corrective scale insulin, cyanocobalamin, multivitamin, protonix

## 2017-11-03 NOTE — PROGRESS NOTE ADULT - SUBJECTIVE AND OBJECTIVE BOX
I saw the pt on this date and discussed the case with both Dr alicea and pulmonary. The thinking in treating this disorder is to continue the imuran for now to try to get maximal benefit in controlling the disease  the white cell count was 1.3 and he still feels weak at this time. Tried to meet with the wife but she was not available as she had brought in several documents for review.                 PE the pt was weak appearing and was slightly short of breath and there was no evidence for fever      heent clear. lungs decreased breath sounds at the bases. heart normal abdomen soft and extremities no edema.

## 2017-11-03 NOTE — DIETITIAN INITIAL EVALUATION ADULT. - PERTINENT LABORATORY DATA
11/3: glucose: 246, HgbA1C 11/2: 7.5%-demonstrates good control for age, point of care testing blood glucose: 11/2: 261-347, 11/3: 313, 236

## 2017-11-03 NOTE — PROGRESS NOTE ADULT - ASSESSMENT
IPF - acute exacerbation  Etiology unclear: possible chronic hypersensitivity pneumonitis  No evidence PNA  Neutropenia due to azothiprine  Hypoxemic repsiratory failure - r/o superimposed LV dysfunction, possible exacerbated by NS  Gluscose intolerance on solumedrol    REC    Observe off antibiotics  Continue Solumedrol 30 q 12  No objection to holding imuran if heme would preferr  TTE for PA pressure and LV/RV function  DC NS  check CXR today

## 2017-11-03 NOTE — PHYSICAL THERAPY INITIAL EVALUATION ADULT - ADDITIONAL COMMENTS
lives w/ lives w/wife in private home 3 steps to enter w/ handrail, flight to bedroom and was recently since August using 3 liters nasal cannula

## 2017-11-03 NOTE — DIETITIAN INITIAL EVALUATION ADULT. - ORAL INTAKE PTA
good/Pt reports eating well with good appetite consuming 3 meals per day prepared by wife, pt stated he had 2 days PTA where he had lost his appetite but this returned in house. Diet recall: breakfast: omelette, toast, coffee or cereal, Lunch: usually leftovers from the night before-protein, salad, starch, Dinner: protein, starch, veggie prepared by wife.

## 2017-11-03 NOTE — DIETITIAN INITIAL EVALUATION ADULT. - OTHER INFO
Pt reports recently making active efforts to reduce portion sizes, states because of this he had lost a small amount of weight over the past 3 months from UBW in low 170s to ~165 lbs-weight at home prior to admission. Noted pt with current admit weight 177.6 lbs (11/2), pt feels this weight is too high and that he weighs himself regularly at home via standing scale with recent weight of 165 lbs, will continue to monitor. Pt with no food allergies, takes a multivitamin, vitamin B12, and Coq10. No N+V, last BM today. Pt with no difficulty chewing/swallowing. In house pt reports eating well with good appetite, had 100% of breakfast meal today.

## 2017-11-03 NOTE — DIETITIAN INITIAL EVALUATION ADULT. - NS AS NUTRI INTERV MEALS SNACK
1. Continue DASH, consistent carbohydrate diet with evening snack, 2. Review diet education as needed, 3. Monitor PO intake, diet tolerance, weight trends, labs, and skin integrity, 4. RD to remain available as needed

## 2017-11-04 ENCOUNTER — TRANSCRIPTION ENCOUNTER (OUTPATIENT)
Age: 77
End: 2017-11-04

## 2017-11-04 LAB
ALBUMIN SERPL ELPH-MCNC: 3 G/DL — LOW (ref 3.3–5)
ALP SERPL-CCNC: 87 U/L — SIGNIFICANT CHANGE UP (ref 40–120)
ALT FLD-CCNC: 166 U/L RC — HIGH (ref 10–45)
ANA TITR SER: NEGATIVE — SIGNIFICANT CHANGE UP
ANION GAP SERPL CALC-SCNC: 13 MMOL/L — SIGNIFICANT CHANGE UP (ref 5–17)
AST SERPL-CCNC: 102 U/L — HIGH (ref 10–40)
AUTO DIFF PNL BLD: ABNORMAL
BILIRUB SERPL-MCNC: 1.2 MG/DL — SIGNIFICANT CHANGE UP (ref 0.2–1.2)
BUN SERPL-MCNC: 19 MG/DL — SIGNIFICANT CHANGE UP (ref 7–23)
CALCIUM SERPL-MCNC: 8.7 MG/DL — SIGNIFICANT CHANGE UP (ref 8.4–10.5)
CHLORIDE SERPL-SCNC: 99 MMOL/L — SIGNIFICANT CHANGE UP (ref 96–108)
CO2 SERPL-SCNC: 26 MMOL/L — SIGNIFICANT CHANGE UP (ref 22–31)
CREAT SERPL-MCNC: 0.84 MG/DL — SIGNIFICANT CHANGE UP (ref 0.5–1.3)
GLUCOSE BLDC GLUCOMTR-MCNC: 230 MG/DL — HIGH (ref 70–99)
GLUCOSE BLDC GLUCOMTR-MCNC: 246 MG/DL — HIGH (ref 70–99)
GLUCOSE BLDC GLUCOMTR-MCNC: 248 MG/DL — HIGH (ref 70–99)
GLUCOSE BLDC GLUCOMTR-MCNC: 330 MG/DL — HIGH (ref 70–99)
GLUCOSE SERPL-MCNC: 275 MG/DL — HIGH (ref 70–99)
HCT VFR BLD CALC: 27.7 % — LOW (ref 39–50)
HGB BLD-MCNC: 9.4 G/DL — LOW (ref 13–17)
MCHC RBC-ENTMCNC: 33.9 GM/DL — SIGNIFICANT CHANGE UP (ref 32–36)
MCHC RBC-ENTMCNC: 36.3 PG — HIGH (ref 27–34)
MCV RBC AUTO: 107 FL — HIGH (ref 80–100)
PLATELET # BLD AUTO: 188 K/UL — SIGNIFICANT CHANGE UP (ref 150–400)
POTASSIUM SERPL-MCNC: 4.7 MMOL/L — SIGNIFICANT CHANGE UP (ref 3.5–5.3)
POTASSIUM SERPL-SCNC: 4.7 MMOL/L — SIGNIFICANT CHANGE UP (ref 3.5–5.3)
PROT SERPL-MCNC: 5.8 G/DL — LOW (ref 6–8.3)
RBC # BLD: 2.6 M/UL — LOW (ref 4.2–5.8)
RBC # FLD: 16.6 % — HIGH (ref 10.3–14.5)
SODIUM SERPL-SCNC: 138 MMOL/L — SIGNIFICANT CHANGE UP (ref 135–145)
WBC # BLD: 1.4 K/UL — LOW (ref 3.8–10.5)
WBC # FLD AUTO: 1.4 K/UL — LOW (ref 3.8–10.5)

## 2017-11-04 RX ORDER — INSULIN GLARGINE 100 [IU]/ML
27 INJECTION, SOLUTION SUBCUTANEOUS AT BEDTIME
Qty: 0 | Refills: 0 | Status: DISCONTINUED | OUTPATIENT
Start: 2017-11-04 | End: 2017-11-05

## 2017-11-04 RX ORDER — INSULIN LISPRO 100/ML
9 VIAL (ML) SUBCUTANEOUS
Qty: 0 | Refills: 0 | Status: DISCONTINUED | OUTPATIENT
Start: 2017-11-04 | End: 2017-11-05

## 2017-11-04 RX ORDER — FUROSEMIDE 40 MG
20 TABLET ORAL ONCE
Qty: 0 | Refills: 0 | Status: COMPLETED | OUTPATIENT
Start: 2017-11-04 | End: 2017-11-04

## 2017-11-04 RX ADMIN — Medication 25 MILLIGRAM(S): at 05:53

## 2017-11-04 RX ADMIN — AZATHIOPRINE 150 MILLIGRAM(S): 100 TABLET ORAL at 11:23

## 2017-11-04 RX ADMIN — Medication 9 UNIT(S): at 09:07

## 2017-11-04 RX ADMIN — AZATHIOPRINE 100 MILLIGRAM(S): 100 TABLET ORAL at 22:42

## 2017-11-04 RX ADMIN — Medication 81 MILLIGRAM(S): at 11:23

## 2017-11-04 RX ADMIN — HEPARIN SODIUM 5000 UNIT(S): 5000 INJECTION INTRAVENOUS; SUBCUTANEOUS at 05:52

## 2017-11-04 RX ADMIN — Medication 1 APPLICATION(S): at 05:52

## 2017-11-04 RX ADMIN — Medication 4: at 18:18

## 2017-11-04 RX ADMIN — Medication 100 MILLIGRAM(S): at 20:42

## 2017-11-04 RX ADMIN — MUPIROCIN 1 APPLICATION(S): 20 OINTMENT TOPICAL at 05:52

## 2017-11-04 RX ADMIN — Medication 3 MILLILITER(S): at 06:17

## 2017-11-04 RX ADMIN — Medication 9 UNIT(S): at 18:18

## 2017-11-04 RX ADMIN — INSULIN GLARGINE 27 UNIT(S): 100 INJECTION, SOLUTION SUBCUTANEOUS at 22:41

## 2017-11-04 RX ADMIN — Medication 8: at 13:33

## 2017-11-04 RX ADMIN — LOSARTAN POTASSIUM 25 MILLIGRAM(S): 100 TABLET, FILM COATED ORAL at 05:53

## 2017-11-04 RX ADMIN — MUPIROCIN 1 APPLICATION(S): 20 OINTMENT TOPICAL at 17:07

## 2017-11-04 RX ADMIN — Medication 4: at 09:06

## 2017-11-04 RX ADMIN — PANTOPRAZOLE SODIUM 40 MILLIGRAM(S): 20 TABLET, DELAYED RELEASE ORAL at 05:55

## 2017-11-04 RX ADMIN — Medication 20 MILLIGRAM(S): at 14:46

## 2017-11-04 RX ADMIN — PREGABALIN 1000 MICROGRAM(S): 225 CAPSULE ORAL at 11:23

## 2017-11-04 RX ADMIN — Medication 30 MILLIGRAM(S): at 17:02

## 2017-11-04 RX ADMIN — Medication 25 MILLIGRAM(S): at 17:03

## 2017-11-04 RX ADMIN — HEPARIN SODIUM 5000 UNIT(S): 5000 INJECTION INTRAVENOUS; SUBCUTANEOUS at 17:04

## 2017-11-04 RX ADMIN — Medication 1 TABLET(S): at 11:23

## 2017-11-04 RX ADMIN — Medication 9 UNIT(S): at 13:36

## 2017-11-04 RX ADMIN — Medication 30 MILLIGRAM(S): at 05:51

## 2017-11-04 NOTE — PROGRESS NOTE ADULT - SUBJECTIVE AND OBJECTIVE BOX
Follow-up Pulm Progress Note  Amauri Jackson MD  [phone]    No new respiratory events overnight.   Feels much better today with improved dyspnea  AFebrile and hemodynamically stable  TTE pending    Vital Signs Last 24 Hrs  T(C): 36.4 (04 Nov 2017 10:00), Max: 36.6 (03 Nov 2017 18:06)  T(F): 97.5 (04 Nov 2017 10:00), Max: 97.9 (03 Nov 2017 18:06)  HR: 63 (04 Nov 2017 10:00) (62 - 96)  BP: 136/81 (04 Nov 2017 10:00) (116/74 - 150/96)  BP(mean): --  RR: 20 (04 Nov 2017 10:00) (18 - 20)  SpO2: 90% (04 Nov 2017 10:00) (81% - 97%)                         9.4    1.4   )-----------( 188      ( 04 Nov 2017 06:06 )             27.7       11-04    138  |  99  |  19  ----------------------------<  275<H>  4.7   |  26  |  0.84    Ca    8.7      04 Nov 2017 06:06  Mg     1.8     11-03    TPro  5.8<L>  /  Alb  3.0<L>  /  TBili  1.2  /  DBili  x   /  AST  102<H>  /  ALT  166<H>  /  AlkPhos  87  11-04    Physical Examination:  PULM: L>R crackles  CVS: Regular rate and rhythm, no murmurs, rubs, or gallops  ABD: Soft, non-tender  EXT:  No clubbing, cyanosis, or edema    RADIOLOGY REVIEWED  CXR: pending    CT chest:    TTE: pending

## 2017-11-04 NOTE — PROGRESS NOTE ADULT - ASSESSMENT
Upon review of the paperwork brought in by the wife the pt had normal liver function studies 6 weeks ago and the cbc was normal. We know the drug imuran causes drop in the hemoglobin levels and macrocytosis and increased liver function tests. Will continue to check these counts while here with the understanding we may have to modify or stop the imuran depending on the levels.

## 2017-11-04 NOTE — DISCHARGE NOTE ADULT - CARE PLAN
Principal Discharge DX:	Idiopathic pulmonary fibrosis  Goal:	Resolution of symptoms/prevention of further flairs  Instructions for follow-up, activity and diet:	You were admitted to the hospital with severe shortness of breath on exertion.  With IV steroids, your condition significantly improved.  Please follow-up closely with your outpatient pulmonologist on how to best manage your condition.  Secondary Diagnosis:	Type 2 diabetes mellitus without complication, without long-term current use of insulin  Instructions for follow-up, activity and diet:	During this hospital admission you had severely increased blood sugars, which is most likely a consequence of taking steroids for your lung disease.  You were started on insulin, which was titrated upwards to control your blood sugars.  Please follow-up with your endocrinologist to manage your diabetes.  This is critically important, since you will be on steroids as an outpatient.  Secondary Diagnosis:	Transaminitis  Instructions for follow-up, activity and diet:	You had a transaminitis, which is an elevation of liver enzymes in your blood.  Most likely, this was a side-effect of one of the medications you were taking (azathioprine/imuran).  Continue to follow-up with your pulmonologist to manage your medications, and ensure that you do not develop severe side-effects.

## 2017-11-04 NOTE — DISCHARGE NOTE ADULT - PLAN OF CARE
Resolution of symptoms/prevention of further flairs You were admitted to the hospital with severe shortness of breath on exertion.  With IV steroids, your condition significantly improved.  Please follow-up closely with your outpatient pulmonologist on how to best manage your condition. During this hospital admission you had severely increased blood sugars, which is most likely a consequence of taking steroids for your lung disease.  You were started on insulin, which was titrated upwards to control your blood sugars.  Please follow-up with your endocrinologist to manage your diabetes.  This is critically important, since you will be on steroids as an outpatient. You had a transaminitis, which is an elevation of liver enzymes in your blood.  Most likely, this was a side-effect of one of the medications you were taking (azathioprine/imuran).  Continue to follow-up with your pulmonologist to manage your medications, and ensure that you do not develop severe side-effects.

## 2017-11-04 NOTE — DISCHARGE NOTE ADULT - PATIENT PORTAL LINK FT
“You can access the Follow[facility] Patient Portal, offered by [facility], by registering with the following website: [email or web address]”

## 2017-11-04 NOTE — DISCHARGE NOTE ADULT - SECONDARY DIAGNOSIS.
Type 2 diabetes mellitus without complication, without long-term current use of insulin Transaminitis

## 2017-11-04 NOTE — PROGRESS NOTE ADULT - SUBJECTIVE AND OBJECTIVE BOX
I came by again and met with the pt and Mrs Toribio and she had in her possession, paperwork from the last several office visits at the pulmonary office. Interestingly, the pt in the early part of september of this year had normal counts on the cbc and the liver tests were normal as well. The pt with the steroids is feeling better and the repeat cbc on this date was stable with a white cell count of 1.4.              PE the lungs showed decreased breath sounds bilaterally with very few rhonchi or rales.    He visibly looked better and was able to walk to the bathroom on this date with more ease.    Abdomen soft and non tender extremities were normal

## 2017-11-04 NOTE — DISCHARGE NOTE ADULT - MEDICATION SUMMARY - MEDICATIONS TO TAKE
I will START or STAY ON the medications listed below when I get home from the hospital:    predniSONE 10 mg oral tablet  -- 1 tab(s) by mouth once a day  -- Indication: For Steriods    aspirin 81 mg oral tablet, chewable  -- 1 tab(s) by mouth once a day  -- Indication: For CAD    digoxin 125 mcg (0.125 mg) oral tablet  -- 1 tab(s) by mouth once a day  -- Indication: For HTN    metFORMIN 500 mg oral tablet  -- 2 tab(s) by mouth 2 times a day (with meals)  -- Indication: For DIABETES    Prandin 1 mg oral tablet  -- 1 tab(s) by mouth 3 times a day (before meals) ** see internal joslyn**  -- Indication: For DIABETES    ondansetron 4 mg oral tablet, disintegrating  -- 1 tab(s) by mouth every 6 hours, As needed, Nausea and/or Vomiting  -- Indication: For NaUSEA/VOMITING    chlorhexidine 0.12% mucous membrane liquid  -- 15  mucous membrane 4 times a day  -- Indication: For MOUTH WASH    metoprolol tartrate 25 mg oral tablet  -- 1 tab(s) by mouth 2 times a day  -- Indication: For HTN    bacitracin 500 units/g topical ointment  -- 1 application on skin 3 times a day  -- Indication: For Lip lesion    lidocaine 5% topical film  -- 1 patch by transdermal patch once a day; 12 hours on/12hours off  -- Indication: For PAIN    bisacodyl 10 mg rectal suppository  -- 1 suppository(ies) rectally once a day, As needed, Constipation  -- Indication: For CoNSTIPATION    polyethylene glycol 3350 oral powder for reconstitution  -- 17 gram(s) by mouth 2 times a day, As needed, Constipation  -- Indication: For CoNSTIPATION    senna 8.8 mg/5 mL oral syrup  -- 10 milliliter(s) by mouth once a day (at bedtime), As needed, Constipation  -- Indication: For CoNSTIPATION    saliva substitutes oral spray  -- 1 spray(s) by mouth 3 times a day  -- Indication: For DRY MOUTH    baclofen 10 mg oral tablet  -- 1 tab(s) by mouth every 8 hours, As needed, pain  -- Indication: For PAIN    sodium chloride 0.65% nasal spray  -- 1 spray(s) into nose 3 times a day  -- Indication: For Nasal discomfort    omeprazole 20 mg oral delayed release capsule  -- 1 cap(s) by mouth once a day MDD:1  -- Indication: For GERD    Qvar 80 mcg/inh inhalation aerosol  -- 1 puff(s) inhaled 2 times a day  -- Indication: For PPX    Multiple Vitamins oral tablet  -- 1 tab(s) by mouth once a day  -- Indication: For SUPPLEMENT

## 2017-11-04 NOTE — DISCHARGE NOTE ADULT - MEDICATION SUMMARY - MEDICATIONS TO STOP TAKING
I will STOP taking the medications listed below when I get home from the hospital:    simvastatin 40 mg oral tablet  -- 1 tab(s) by mouth once a day (at bedtime) MDD:1  -- Avoid grapefruit and grapefruit juice while taking this medication.  Do not take this drug if you are pregnant.  It is very important that you take or use this exactly as directed.  Do not skip doses or discontinue unless directed by your doctor.  Obtain medical advice before taking any non-prescription drugs as some may affect the action of this medication.  Take with food or milk.    azaTHIOprine 50 mg oral tablet  -- 3 tabs in the morning   2 tabs in the evening

## 2017-11-04 NOTE — PROGRESS NOTE ADULT - ATTENDING COMMENTS
above note reviewed and revised vitals, labs and radiology reviewed agree with above HPI, physical exam and A/P    Pt respiratory status improving c/w supplement oxygen humidification added as notes nasal discomfort.   WBC count 1.4 improved from yesterday c/w imuran.  if drops DC med as per heme.  minor elevation in LFT but improved overoall.  monitor counts and LFTs closely  will continue to treat as ILD flare given lack of other diagnosis and no signs of infection  c/w steroids / imuran per pulmonology    rest as per above note .

## 2017-11-04 NOTE — PROGRESS NOTE ADULT - SUBJECTIVE AND OBJECTIVE BOX
Patient is a 77y old  Male who presents with a chief complaint of SOB (01 Nov 2017 13:52)      SUBJECTIVE / OVERNIGHT EVENTS:  No events overnight.  Patient continues to urinate a substantial amount.  He thinks that his REAGAN has improved since yesterday, although he is still SOB for several minutes after coming back from the bathroom.  This is improved from admission.      MEDICATIONS  (STANDING):  ALBUTerol/ipratropium for Nebulization 3 milliLiter(s) Nebulizer every 6 hours  aspirin enteric coated 81 milliGRAM(s) Oral daily  azaTHIOprine 150 milliGRAM(s) Oral daily  azaTHIOprine 100 milliGRAM(s) Oral at bedtime  cyanocobalamin 1000 MICROGram(s) Oral daily  dextrose 5%. 1000 milliLiter(s) (50 mL/Hr) IV Continuous <Continuous>  dextrose 50% Injectable 12.5 Gram(s) IV Push once  dextrose 50% Injectable 25 Gram(s) IV Push once  dextrose 50% Injectable 25 Gram(s) IV Push once  fluocinonide 0.05% Cream 1 Application(s) Topical every 12 hours  heparin  Injectable 5000 Unit(s) SubCutaneous every 12 hours  influenza   Vaccine 0.5 milliLiter(s) IntraMuscular once  insulin glargine Injectable (LANTUS) 27 Unit(s) SubCutaneous at bedtime  insulin lispro (HumaLOG) corrective regimen sliding scale   SubCutaneous three times a day before meals  insulin lispro (HumaLOG) corrective regimen sliding scale   SubCutaneous at bedtime  insulin lispro Injectable (HumaLOG) 9 Unit(s) SubCutaneous three times a day before meals  losartan 25 milliGRAM(s) Oral daily  methylPREDNISolone sodium succinate Injectable 30 milliGRAM(s) IV Push two times a day  metoprolol     tartrate 25 milliGRAM(s) Oral two times a day  multivitamin 1 Tablet(s) Oral daily  mupirocin 2% Ointment 1 Application(s) Topical every 12 hours  pantoprazole    Tablet 40 milliGRAM(s) Oral before breakfast      MEDICATIONS  (PRN):  dextrose Gel 1 Dose(s) Oral once PRN Blood Glucose LESS THAN 70 milliGRAM(s)/deciliter  glucagon  Injectable 1 milliGRAM(s) IntraMuscular once PRN Glucose LESS THAN 70 milligrams/deciliter      Vital Signs Last 24 Hrs  T(C): 36.5 (04 Nov 2017 06:09), Max: 36.6 (03 Nov 2017 18:06)  T(F): 97.7 (04 Nov 2017 06:09), Max: 97.9 (03 Nov 2017 18:06)  HR: 75 (04 Nov 2017 06:17) (62 - 96)  BP: 150/96 (04 Nov 2017 06:09) (112/69 - 150/96)  BP(mean): --  RR: 18 (04 Nov 2017 06:09) (18 - 20)  SpO2: 97% (04 Nov 2017 06:09) (81% - 97%)      CAPILLARY BLOOD GLUCOSE  POCT Blood Glucose.: 248 mg/dL (04 Nov 2017 08:44)  POCT Blood Glucose.: 347 mg/dL (03 Nov 2017 22:58)  POCT Blood Glucose.: 369 mg/dL (03 Nov 2017 22:49)  POCT Blood Glucose.: 277 mg/dL (03 Nov 2017 17:35)  POCT Blood Glucose.: 236 mg/dL (03 Nov 2017 12:58)      I&O's Summary    03 Nov 2017 07:01  -  04 Nov 2017 07:00  --------------------------------------------------------  IN: 1518 mL / OUT: 625 mL / NET: 893 mL      PHYSICAL EXAM:  GENERAL:  Well-appearing, sitting up in the chair, on O2, mildly tachypneic  EYES:  EOMI, PERRLA  ENMT:  MMM, dentition generally intact  NECK:  ROM intact, no appreciated JVP  CHEST/LUNG:  Dry crackles at the lung bases which are improved from yesterday  HEART:  Grade 2/6 holosystolic murmur, RRR  ABDOMEN:  +BS, NT/D  EXTREMITIES:  No appreciated peripheral edema  PSYCH: A&Ox3, euthymic  NEUROLOGY:  No focal deficits  SKIN: Intact to gross observation    LABS:                        9.4    1.4   )-----------( 188      ( 04 Nov 2017 06:06 )             27.7     11-04    138  |  99  |  19  ----------------------------<  275<H>  4.7   |  26  |  0.84    Ca    8.7      04 Nov 2017 06:06  Mg     1.8     11-03    TPro  5.8<L>  /  Alb  3.0<L>  /  TBili  1.2  /  DBili  x   /  AST  102<H>  /  ALT  166<H>  /  AlkPhos  87  11-04    LIVER FUNCTIONS - ( 04 Nov 2017 06:06 )  Alb: 3.0 g/dL / Pro: 5.8 g/dL / ALK PHOS: 87 U/L / ALT: 166 U/L RC / AST: 102 U/L / GGT: x             RADIOLOGY & ADDITIONAL TESTS:  Imaging Personally Reviewed: YES    Consultant(s) Notes Reviewed: YES    Care Discussed with Consultants/Other Providers: YES      Nomi Vergara MD PGY-1  Department of Medicine  [phone]

## 2017-11-04 NOTE — DISCHARGE NOTE ADULT - CARE PROVIDER_API CALL
Ju Davis), Internal Medicine; Nephrology  [address] [address]  Phone: [phone]  Fax: [phone]

## 2017-11-04 NOTE — DISCHARGE NOTE ADULT - OTHER SIGNIFICANT FINDINGS
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 CT chest shows bilateral IPF with UIP pattern. Patient with unknown eitiology ILD and being treated with steroids,Imuran and nebs. sp intubation for acute hypoxic reiratory failure likely 2/2 worsening ILD. TTE negative. extubated on the floor.  family refusing Imuran. Patient failed FEEST x2 but still wants to eat. Started on feeds and doing okay so far. was planning on d/c planning to rehab but patient developed AMS likely 2/2 baclofen. started on iv ceftriaxone but urine neg. evaluated by ID and neuro. MRI neg for acute pathology. ordered for MRI. IV Tylenol given for pain as holding opioids. baclofen resumed TID PRN. discharged to rehab.

## 2017-11-04 NOTE — DISCHARGE NOTE ADULT - HOSPITAL COURSE
Patient is a 77M h/o DM2, GERD, CABG (2016) and interstitial lung disease (unsure if hypersensitivity pneumonitis UIP vs IPF) on 3L home O2, p/w increasing REAGAN.  Patient was started on IV steroids which improved his symptoms, but caused significant hyperglycemia.  His hyperglycemia was managed with increased insulin doses.  He was also noted to have leukopenia, which was secondary to azathioprine (he takes for his lung disease). Patient is a 77M h/o DM2, GERD, CABG (2016) and interstitial lung disease (unsure if hypersensitivity pneumonitis UIP vs IPF) on 3L home O2, p/w increasing REAGAN. CT chest shows bilateral IPF  treated for Pulmonary fibrosis / Intersitial pulmonary dz exacerbation.  Pt developed ARF,  transferred to MICU w/ acute hypoxic respiratory failure sp intubation w/ septic shock 2/2 to pseudomonal bacteremia resolve.  Pt extubated, downgraded to floor. patient developed AMS. possible 2/2 delirium vs. infectious UTI.  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

## 2017-11-05 LAB
ANION GAP SERPL CALC-SCNC: 14 MMOL/L — SIGNIFICANT CHANGE UP (ref 5–17)
BUN SERPL-MCNC: 28 MG/DL — HIGH (ref 7–23)
CALCIUM SERPL-MCNC: 9.2 MG/DL — SIGNIFICANT CHANGE UP (ref 8.4–10.5)
CHLORIDE SERPL-SCNC: 97 MMOL/L — SIGNIFICANT CHANGE UP (ref 96–108)
CO2 SERPL-SCNC: 27 MMOL/L — SIGNIFICANT CHANGE UP (ref 22–31)
CREAT SERPL-MCNC: 1.01 MG/DL — SIGNIFICANT CHANGE UP (ref 0.5–1.3)
GLUCOSE BLDC GLUCOMTR-MCNC: 188 MG/DL — HIGH (ref 70–99)
GLUCOSE BLDC GLUCOMTR-MCNC: 238 MG/DL — HIGH (ref 70–99)
GLUCOSE BLDC GLUCOMTR-MCNC: 256 MG/DL — HIGH (ref 70–99)
GLUCOSE BLDC GLUCOMTR-MCNC: 262 MG/DL — HIGH (ref 70–99)
GLUCOSE SERPL-MCNC: 217 MG/DL — HIGH (ref 70–99)
HCT VFR BLD CALC: 30.7 % — LOW (ref 39–50)
HGB BLD-MCNC: 10.3 G/DL — LOW (ref 13–17)
MCHC RBC-ENTMCNC: 33.6 GM/DL — SIGNIFICANT CHANGE UP (ref 32–36)
MCHC RBC-ENTMCNC: 36.1 PG — HIGH (ref 27–34)
MCV RBC AUTO: 108 FL — HIGH (ref 80–100)
PLATELET # BLD AUTO: 232 K/UL — SIGNIFICANT CHANGE UP (ref 150–400)
POTASSIUM SERPL-MCNC: 4.6 MMOL/L — SIGNIFICANT CHANGE UP (ref 3.5–5.3)
POTASSIUM SERPL-SCNC: 4.6 MMOL/L — SIGNIFICANT CHANGE UP (ref 3.5–5.3)
RBC # BLD: 2.86 M/UL — LOW (ref 4.2–5.8)
RBC # FLD: 17.2 % — HIGH (ref 10.3–14.5)
SODIUM SERPL-SCNC: 138 MMOL/L — SIGNIFICANT CHANGE UP (ref 135–145)
WBC # BLD: 2.2 K/UL — LOW (ref 3.8–10.5)
WBC # FLD AUTO: 2.2 K/UL — LOW (ref 3.8–10.5)

## 2017-11-05 RX ORDER — DIPHENHYDRAMINE HYDROCHLORIDE AND LIDOCAINE HYDROCHLORIDE AND ALUMINUM HYDROXIDE AND MAGNESIUM HYDRO
10 KIT EVERY 6 HOURS
Qty: 0 | Refills: 0 | Status: DISCONTINUED | OUTPATIENT
Start: 2017-11-05 | End: 2017-11-07

## 2017-11-05 RX ORDER — INSULIN LISPRO 100/ML
12 VIAL (ML) SUBCUTANEOUS
Qty: 0 | Refills: 0 | Status: DISCONTINUED | OUTPATIENT
Start: 2017-11-05 | End: 2017-11-06

## 2017-11-05 RX ORDER — FUROSEMIDE 40 MG
40 TABLET ORAL ONCE
Qty: 0 | Refills: 0 | Status: COMPLETED | OUTPATIENT
Start: 2017-11-05 | End: 2017-11-05

## 2017-11-05 RX ORDER — INSULIN GLARGINE 100 [IU]/ML
35 INJECTION, SOLUTION SUBCUTANEOUS AT BEDTIME
Qty: 0 | Refills: 0 | Status: DISCONTINUED | OUTPATIENT
Start: 2017-11-05 | End: 2017-11-06

## 2017-11-05 RX ADMIN — HEPARIN SODIUM 5000 UNIT(S): 5000 INJECTION INTRAVENOUS; SUBCUTANEOUS at 17:11

## 2017-11-05 RX ADMIN — Medication 81 MILLIGRAM(S): at 11:04

## 2017-11-05 RX ADMIN — Medication 25 MILLIGRAM(S): at 17:14

## 2017-11-05 RX ADMIN — Medication 30 MILLIGRAM(S): at 17:12

## 2017-11-05 RX ADMIN — Medication 100 MILLIGRAM(S): at 04:55

## 2017-11-05 RX ADMIN — Medication 100 MILLIGRAM(S): at 22:14

## 2017-11-05 RX ADMIN — PANTOPRAZOLE SODIUM 40 MILLIGRAM(S): 20 TABLET, DELAYED RELEASE ORAL at 06:33

## 2017-11-05 RX ADMIN — AZATHIOPRINE 150 MILLIGRAM(S): 100 TABLET ORAL at 11:04

## 2017-11-05 RX ADMIN — AZATHIOPRINE 100 MILLIGRAM(S): 100 TABLET ORAL at 21:27

## 2017-11-05 RX ADMIN — LOSARTAN POTASSIUM 25 MILLIGRAM(S): 100 TABLET, FILM COATED ORAL at 05:07

## 2017-11-05 RX ADMIN — DIPHENHYDRAMINE HYDROCHLORIDE AND LIDOCAINE HYDROCHLORIDE AND ALUMINUM HYDROXIDE AND MAGNESIUM HYDRO 10 MILLILITER(S): KIT at 15:27

## 2017-11-05 RX ADMIN — Medication 100 MILLIGRAM(S): at 08:41

## 2017-11-05 RX ADMIN — PREGABALIN 1000 MICROGRAM(S): 225 CAPSULE ORAL at 11:04

## 2017-11-05 RX ADMIN — Medication 100 MILLIGRAM(S): at 10:51

## 2017-11-05 RX ADMIN — Medication 12 UNIT(S): at 13:16

## 2017-11-05 RX ADMIN — HEPARIN SODIUM 5000 UNIT(S): 5000 INJECTION INTRAVENOUS; SUBCUTANEOUS at 05:07

## 2017-11-05 RX ADMIN — Medication 2: at 08:41

## 2017-11-05 RX ADMIN — Medication 6: at 13:19

## 2017-11-05 RX ADMIN — Medication 1 APPLICATION(S): at 05:06

## 2017-11-05 RX ADMIN — Medication 30 MILLIGRAM(S): at 05:08

## 2017-11-05 RX ADMIN — INSULIN GLARGINE 35 UNIT(S): 100 INJECTION, SOLUTION SUBCUTANEOUS at 22:14

## 2017-11-05 RX ADMIN — Medication 12 UNIT(S): at 18:31

## 2017-11-05 RX ADMIN — Medication 25 MILLIGRAM(S): at 05:07

## 2017-11-05 RX ADMIN — Medication 1 TABLET(S): at 11:05

## 2017-11-05 RX ADMIN — Medication 12 UNIT(S): at 08:42

## 2017-11-05 RX ADMIN — Medication 6: at 18:31

## 2017-11-05 RX ADMIN — Medication 3 MILLILITER(S): at 05:38

## 2017-11-05 RX ADMIN — MUPIROCIN 1 APPLICATION(S): 20 OINTMENT TOPICAL at 17:17

## 2017-11-05 RX ADMIN — Medication 3 MILLILITER(S): at 18:03

## 2017-11-05 RX ADMIN — Medication 3 MILLILITER(S): at 23:37

## 2017-11-05 RX ADMIN — Medication 40 MILLIGRAM(S): at 13:15

## 2017-11-05 RX ADMIN — DIPHENHYDRAMINE HYDROCHLORIDE AND LIDOCAINE HYDROCHLORIDE AND ALUMINUM HYDROXIDE AND MAGNESIUM HYDRO 10 MILLILITER(S): KIT at 21:29

## 2017-11-05 NOTE — PROVIDER CONTACT NOTE (OTHER) - ASSESSMENT
Patient's pulse ox 79 and heart rate 110, on 3 liters NC Patient's pulse ox 79 and heart rate 110, on 3 liters NC. Patient was re-assessed a few minutes later and verbalized, "I feel back to normal now."

## 2017-11-05 NOTE — PROGRESS NOTE ADULT - SUBJECTIVE AND OBJECTIVE BOX
I came by to check the white cell counts on the imuran and on this date the count was up to 2.2. The pt was looking better and was stronger. Still needs to get stronger and needs to walk more often. He is afebrile and is able to eat.      PE looking stronger and not pale      VSS and no fevers      mouth clear no thrush      lungs decreased breath sounds bilaterally and there were only few crackles and rhonchi bilaterally.      heart RR      abdomen soft non tender      extremities no edema

## 2017-11-05 NOTE — PROGRESS NOTE ADULT - SUBJECTIVE AND OBJECTIVE BOX
Patient is a 77y old  Male who presents with a chief complaint of SOB (04 Nov 2017 14:33)      SUBJECTIVE / OVERNIGHT EVENTS:    pt resting in bed states sob worse today.  pt states  sob worse with activity alleviated by rest.  pt notes humidification nasal discomfort.   denies cp abd pain n/v/d fever chills     Vital Signs Last 24 Hrs  T(C): 36.4 (05 Nov 2017 08:06), Max: 36.9 (05 Nov 2017 04:00)  T(F): 97.5 (05 Nov 2017 08:06), Max: 98.5 (05 Nov 2017 04:00)  HR: 65 (05 Nov 2017 08:06) (63 - 80)  BP: 136/76 (05 Nov 2017 08:06) (113/67 - 146/79)  BP(mean): --  RR: 20 (05 Nov 2017 08:06) (20 - 20)  SpO2: 90% (05 Nov 2017 08:06) (90% - 98%)  I&O's Summary    04 Nov 2017 08:01  -  05 Nov 2017 07:00  --------------------------------------------------------  IN: 960 mL / OUT: 1900 mL / NET: -940 mL    05 Nov 2017 07:01  -  05 Nov 2017 12:05  --------------------------------------------------------  IN: 360 mL / OUT: 225 mL / NET: 135 mL    GENERAL:  Well-appearing, sitting up in the chair, on O2, mildly tachypneic  EYES:  EOMI, PERRLA  ENMT:  MMM, dentition generally intact  NECK:  ROM intact, no appreciated JVP  CHEST/LUNG:  Dry crackles at the lung bases which are improved from yesterday  HEART:  Grade 2/6 holosystolic murmur, RRR  ABDOMEN:  +BS, NT/ND  EXTREMITIES:  No appreciated peripheral edema  PSYCH: A&Ox3, euthymic  NEUROLOGY:  No focal deficits  SKIN: Intact to gross observation  LABS:                        10.3   2.2   )-----------( 232      ( 05 Nov 2017 06:44 )             30.7     11-05    138  |  97  |  28<H>  ----------------------------<  217<H>  4.6   |  27  |  1.01    Ca    9.2      05 Nov 2017 06:44    TPro  5.8<L>  /  Alb  3.0<L>  /  TBili  1.2  /  DBili  x   /  AST  102<H>  /  ALT  166<H>  /  AlkPhos  87  11-04      CAPILLARY BLOOD GLUCOSE      POCT Blood Glucose.: 188 mg/dL (05 Nov 2017 08:37)  POCT Blood Glucose.: 246 mg/dL (04 Nov 2017 22:09)  POCT Blood Glucose.: 230 mg/dL (04 Nov 2017 18:15)  POCT Blood Glucose.: 330 mg/dL (04 Nov 2017 13:13)            RADIOLOGY & ADDITIONAL TESTS:    Imaging Personally Reviewed:  [x] YES  [ ] NO    Consultant(s) Notes Reviewed:  [x] YES  [ ] NO      MEDICATIONS  (STANDING):  ALBUTerol/ipratropium for Nebulization 3 milliLiter(s) Nebulizer every 6 hours  aspirin enteric coated 81 milliGRAM(s) Oral daily  azaTHIOprine 150 milliGRAM(s) Oral daily  azaTHIOprine 100 milliGRAM(s) Oral at bedtime  cyanocobalamin 1000 MICROGram(s) Oral daily  dextrose 5%. 1000 milliLiter(s) (50 mL/Hr) IV Continuous <Continuous>  dextrose 50% Injectable 12.5 Gram(s) IV Push once  dextrose 50% Injectable 25 Gram(s) IV Push once  dextrose 50% Injectable 25 Gram(s) IV Push once  fluocinonide 0.05% Cream 1 Application(s) Topical every 12 hours  heparin  Injectable 5000 Unit(s) SubCutaneous every 12 hours  influenza   Vaccine 0.5 milliLiter(s) IntraMuscular once  insulin glargine Injectable (LANTUS) 35 Unit(s) SubCutaneous at bedtime  insulin lispro (HumaLOG) corrective regimen sliding scale   SubCutaneous three times a day before meals  insulin lispro (HumaLOG) corrective regimen sliding scale   SubCutaneous at bedtime  insulin lispro Injectable (HumaLOG) 12 Unit(s) SubCutaneous three times a day before meals  losartan 25 milliGRAM(s) Oral daily  methylPREDNISolone sodium succinate Injectable 30 milliGRAM(s) IV Push two times a day  metoprolol     tartrate 25 milliGRAM(s) Oral two times a day  multivitamin 1 Tablet(s) Oral daily  mupirocin 2% Ointment 1 Application(s) Topical every 12 hours  pantoprazole    Tablet 40 milliGRAM(s) Oral before breakfast    MEDICATIONS  (PRN):  benzonatate 100 milliGRAM(s) Oral every 8 hours PRN Cough  dextrose Gel 1 Dose(s) Oral once PRN Blood Glucose LESS THAN 70 milliGRAM(s)/deciliter  glucagon  Injectable 1 milliGRAM(s) IntraMuscular once PRN Glucose LESS THAN 70 milligrams/deciliter  guaiFENesin   Syrup  (Sugar-Free) 100 milliGRAM(s) Oral every 6 hours PRN Cough      Care Discussed with Consultants/Other Providers [x] YES  [ ] NO    HEALTH ISSUES - PROBLEM Dx:  Neutropenia: Neutropenia  Need for prophylactic measure: Need for prophylactic measure  Oral thrush: Oral thrush  Nasal discomfort: Nasal discomfort  Gastroesophageal reflux disease without esophagitis: Gastroesophageal reflux disease without esophagitis  Hyperlipidemia, unspecified hyperlipidemia type: Hyperlipidemia, unspecified hyperlipidemia type  Type 2 diabetes mellitus without complication, without long-term current use of insulin: Type 2 diabetes mellitus without complication, without long-term current use of insulin  Coronary artery disease involving native coronary artery of native heart, angina presence unspecified: Coronary artery disease involving native coronary artery of native heart, angina presence unspecified  Hyperkalemia: Hyperkalemia  Lactic acidosis: Lactic acidosis  Transaminitis: Transaminitis  Idiopathic pulmonary fibrosis: Idiopathic pulmonary fibrosis  Shortness of breath: Shortness of breath

## 2017-11-05 NOTE — PROVIDER CONTACT NOTE (OTHER) - SITUATION
Patient felt tightness in the chest while walking back to bed from the bathroom. Lasix po was given earlier. Patient felt tightness in the chest while walking back to bed from the bathroom. Lasix po was given earlier. Patient refused Neb. treatment at noon because of coughing too much.

## 2017-11-05 NOTE — PROGRESS NOTE ADULT - SUBJECTIVE AND OBJECTIVE BOX
Patient is a 77y old  Male who presents with a chief complaint of SOB (04 Nov 2017 14:33)      SUBJECTIVE / OVERNIGHT EVENTS:    pt resting in bed states sob worse today.  pt states  sob worse with activity alleviated by rest.  pt notes humidification nasal discomfort.   denies cp abd pain n/v/d fever chills     Vital Signs Last 24 Hrs  T(C): 36.4 (05 Nov 2017 08:06), Max: 36.9 (05 Nov 2017 04:00)  T(F): 97.5 (05 Nov 2017 08:06), Max: 98.5 (05 Nov 2017 04:00)  HR: 65 (05 Nov 2017 08:06) (63 - 80)  BP: 136/76 (05 Nov 2017 08:06) (113/67 - 146/79)  BP(mean): --  RR: 20 (05 Nov 2017 08:06) (20 - 20)  SpO2: 90% (05 Nov 2017 08:06) (90% - 98%)  I&O's Summary    04 Nov 2017 08:01  -  05 Nov 2017 07:00  --------------------------------------------------------  IN: 960 mL / OUT: 1900 mL / NET: -940 mL    05 Nov 2017 07:01  -  05 Nov 2017 12:05  --------------------------------------------------------  IN: 360 mL / OUT: 225 mL / NET: 135 mL            LABS:                        10.3   2.2   )-----------( 232      ( 05 Nov 2017 06:44 )             30.7     11-05    138  |  97  |  28<H>  ----------------------------<  217<H>  4.6   |  27  |  1.01    Ca    9.2      05 Nov 2017 06:44    TPro  5.8<L>  /  Alb  3.0<L>  /  TBili  1.2  /  DBili  x   /  AST  102<H>  /  ALT  166<H>  /  AlkPhos  87  11-04      CAPILLARY BLOOD GLUCOSE      POCT Blood Glucose.: 188 mg/dL (05 Nov 2017 08:37)  POCT Blood Glucose.: 246 mg/dL (04 Nov 2017 22:09)  POCT Blood Glucose.: 230 mg/dL (04 Nov 2017 18:15)  POCT Blood Glucose.: 330 mg/dL (04 Nov 2017 13:13)            RADIOLOGY & ADDITIONAL TESTS:    Imaging Personally Reviewed:  [x] YES  [ ] NO    Consultant(s) Notes Reviewed:  [x] YES  [ ] NO      MEDICATIONS  (STANDING):  ALBUTerol/ipratropium for Nebulization 3 milliLiter(s) Nebulizer every 6 hours  aspirin enteric coated 81 milliGRAM(s) Oral daily  azaTHIOprine 150 milliGRAM(s) Oral daily  azaTHIOprine 100 milliGRAM(s) Oral at bedtime  cyanocobalamin 1000 MICROGram(s) Oral daily  dextrose 5%. 1000 milliLiter(s) (50 mL/Hr) IV Continuous <Continuous>  dextrose 50% Injectable 12.5 Gram(s) IV Push once  dextrose 50% Injectable 25 Gram(s) IV Push once  dextrose 50% Injectable 25 Gram(s) IV Push once  fluocinonide 0.05% Cream 1 Application(s) Topical every 12 hours  heparin  Injectable 5000 Unit(s) SubCutaneous every 12 hours  influenza   Vaccine 0.5 milliLiter(s) IntraMuscular once  insulin glargine Injectable (LANTUS) 35 Unit(s) SubCutaneous at bedtime  insulin lispro (HumaLOG) corrective regimen sliding scale   SubCutaneous three times a day before meals  insulin lispro (HumaLOG) corrective regimen sliding scale   SubCutaneous at bedtime  insulin lispro Injectable (HumaLOG) 12 Unit(s) SubCutaneous three times a day before meals  losartan 25 milliGRAM(s) Oral daily  methylPREDNISolone sodium succinate Injectable 30 milliGRAM(s) IV Push two times a day  metoprolol     tartrate 25 milliGRAM(s) Oral two times a day  multivitamin 1 Tablet(s) Oral daily  mupirocin 2% Ointment 1 Application(s) Topical every 12 hours  pantoprazole    Tablet 40 milliGRAM(s) Oral before breakfast    MEDICATIONS  (PRN):  benzonatate 100 milliGRAM(s) Oral every 8 hours PRN Cough  dextrose Gel 1 Dose(s) Oral once PRN Blood Glucose LESS THAN 70 milliGRAM(s)/deciliter  glucagon  Injectable 1 milliGRAM(s) IntraMuscular once PRN Glucose LESS THAN 70 milligrams/deciliter  guaiFENesin   Syrup  (Sugar-Free) 100 milliGRAM(s) Oral every 6 hours PRN Cough      Care Discussed with Consultants/Other Providers [x] YES  [ ] NO    HEALTH ISSUES - PROBLEM Dx:  Neutropenia: Neutropenia  Need for prophylactic measure: Need for prophylactic measure  Oral thrush: Oral thrush  Nasal discomfort: Nasal discomfort  Gastroesophageal reflux disease without esophagitis: Gastroesophageal reflux disease without esophagitis  Hyperlipidemia, unspecified hyperlipidemia type: Hyperlipidemia, unspecified hyperlipidemia type  Type 2 diabetes mellitus without complication, without long-term current use of insulin: Type 2 diabetes mellitus without complication, without long-term current use of insulin  Coronary artery disease involving native coronary artery of native heart, angina presence unspecified: Coronary artery disease involving native coronary artery of native heart, angina presence unspecified  Hyperkalemia: Hyperkalemia  Lactic acidosis: Lactic acidosis  Transaminitis: Transaminitis  Idiopathic pulmonary fibrosis: Idiopathic pulmonary fibrosis  Shortness of breath: Shortness of breath

## 2017-11-05 NOTE — PROGRESS NOTE ADULT - ASSESSMENT
IPF - acute exacerbation  Etiology unclear: possible chronic hypersensitivity pneumonitis  No evidence PNA  Transient increase dyspnea assoc with IV NS - improved SOB today possible due to fluid DC vs steroid response  Gluscose intolerance on solumedrol    REC    Observe off antibiotics  Continue Solumedrol 30 q 12  Continue imuran - no objection to dose reduction or hold per heme  TTE for PA pressure and LV/RV function  Lasix 40 mg orally - addtnl dose today

## 2017-11-05 NOTE — PROGRESS NOTE ADULT - ASSESSMENT
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 CT chest shows bilateral IPF with UIP pattern.  treating for Pulmonay fibrosis / Intersitial pulmonary dz exacerbation.  respiratory status remain precarious Pulm f/u pending.  c/w supplement oxygen.

## 2017-11-05 NOTE — PROGRESS NOTE ADULT - SUBJECTIVE AND OBJECTIVE BOX
Follow-up Pulm Progress Note  Amauri Jackson MD  [phone]    c/o REAGAN in room  WBC incr slightly - stable  AFebrile and hemodynamically stable  TTE pending    Vital Signs Last 24 Hrs  T(C): 36.4 (05 Nov 2017 08:06), Max: 36.9 (05 Nov 2017 04:00)  T(F): 97.5 (05 Nov 2017 08:06), Max: 98.5 (05 Nov 2017 04:00)  HR: 65 (05 Nov 2017 08:06) (63 - 80)  BP: 136/76 (05 Nov 2017 08:06) (113/67 - 146/79)  BP(mean): --  RR: 20 (05 Nov 2017 08:06) (20 - 20)  SpO2: 90% (05 Nov 2017 08:06) (90% - 98%)                          10.3   2.2   )-----------( 232      ( 05 Nov 2017 06:44 )             30.7       11-05    138  |  97  |  28<H>  ----------------------------<  217<H>  4.6   |  27  |  1.01    Ca    9.2      05 Nov 2017 06:44    TPro  5.8<L>  /  Alb  3.0<L>  /  TBili  1.2  /  DBili  x   /  AST  102<H>  /  ALT  166<H>  /  AlkPhos  87  11-04      Physical Examination:  PULM: Bilateral crackles 1/3; no wheeze  CVS: Regular rate and rhythm, no murmurs, rubs, or gallops  ABD: Soft, non-tender  EXT:  Trace LE edema    RADIOLOGY REVIEWED  CXR: pending    CT chest:    TTE: pending

## 2017-11-06 LAB
ANION GAP SERPL CALC-SCNC: 14 MMOL/L — SIGNIFICANT CHANGE UP (ref 5–17)
BUN SERPL-MCNC: 28 MG/DL — HIGH (ref 7–23)
CALCIUM SERPL-MCNC: 9.3 MG/DL — SIGNIFICANT CHANGE UP (ref 8.4–10.5)
CHLORIDE SERPL-SCNC: 95 MMOL/L — LOW (ref 96–108)
CO2 SERPL-SCNC: 28 MMOL/L — SIGNIFICANT CHANGE UP (ref 22–31)
CREAT SERPL-MCNC: 1.1 MG/DL — SIGNIFICANT CHANGE UP (ref 0.5–1.3)
CULTURE RESULTS: SIGNIFICANT CHANGE UP
CULTURE RESULTS: SIGNIFICANT CHANGE UP
FUNGITELL: <31 PG/ML — SIGNIFICANT CHANGE UP (ref 0–59)
GLUCOSE BLDC GLUCOMTR-MCNC: 172 MG/DL — HIGH (ref 70–99)
GLUCOSE BLDC GLUCOMTR-MCNC: 172 MG/DL — HIGH (ref 70–99)
GLUCOSE BLDC GLUCOMTR-MCNC: 186 MG/DL — HIGH (ref 70–99)
GLUCOSE BLDC GLUCOMTR-MCNC: 324 MG/DL — HIGH (ref 70–99)
GLUCOSE SERPL-MCNC: 208 MG/DL — HIGH (ref 70–99)
HCT VFR BLD CALC: 28.1 % — LOW (ref 39–50)
HGB BLD-MCNC: 9.7 G/DL — LOW (ref 13–17)
MCHC RBC-ENTMCNC: 34.5 GM/DL — SIGNIFICANT CHANGE UP (ref 32–36)
MCHC RBC-ENTMCNC: 36.8 PG — HIGH (ref 27–34)
MCV RBC AUTO: 107 FL — HIGH (ref 80–100)
PLATELET # BLD AUTO: 219 K/UL — SIGNIFICANT CHANGE UP (ref 150–400)
POTASSIUM SERPL-MCNC: 4.4 MMOL/L — SIGNIFICANT CHANGE UP (ref 3.5–5.3)
POTASSIUM SERPL-SCNC: 4.4 MMOL/L — SIGNIFICANT CHANGE UP (ref 3.5–5.3)
RBC # BLD: 2.64 M/UL — LOW (ref 4.2–5.8)
RBC # FLD: 16.9 % — HIGH (ref 10.3–14.5)
SODIUM SERPL-SCNC: 137 MMOL/L — SIGNIFICANT CHANGE UP (ref 135–145)
SPECIMEN SOURCE: SIGNIFICANT CHANGE UP
SPECIMEN SOURCE: SIGNIFICANT CHANGE UP
WBC # BLD: 1.8 K/UL — LOW (ref 3.8–10.5)
WBC # FLD AUTO: 1.8 K/UL — LOW (ref 3.8–10.5)

## 2017-11-06 PROCEDURE — 71010: CPT | Mod: 26

## 2017-11-06 PROCEDURE — 93306 TTE W/DOPPLER COMPLETE: CPT | Mod: 26

## 2017-11-06 RX ORDER — INSULIN GLARGINE 100 [IU]/ML
40 INJECTION, SOLUTION SUBCUTANEOUS AT BEDTIME
Qty: 0 | Refills: 0 | Status: DISCONTINUED | OUTPATIENT
Start: 2017-11-06 | End: 2017-11-07

## 2017-11-06 RX ORDER — INSULIN LISPRO 100/ML
15 VIAL (ML) SUBCUTANEOUS
Qty: 0 | Refills: 0 | Status: DISCONTINUED | OUTPATIENT
Start: 2017-11-06 | End: 2017-11-07

## 2017-11-06 RX ADMIN — Medication 100 MILLIGRAM(S): at 12:33

## 2017-11-06 RX ADMIN — MUPIROCIN 1 APPLICATION(S): 20 OINTMENT TOPICAL at 06:49

## 2017-11-06 RX ADMIN — Medication 3 MILLILITER(S): at 17:24

## 2017-11-06 RX ADMIN — Medication 1 TABLET(S): at 12:35

## 2017-11-06 RX ADMIN — Medication 30 MILLIGRAM(S): at 17:37

## 2017-11-06 RX ADMIN — Medication 100 MILLIGRAM(S): at 07:08

## 2017-11-06 RX ADMIN — Medication 25 MILLIGRAM(S): at 06:48

## 2017-11-06 RX ADMIN — Medication 81 MILLIGRAM(S): at 12:37

## 2017-11-06 RX ADMIN — PREGABALIN 1000 MICROGRAM(S): 225 CAPSULE ORAL at 12:36

## 2017-11-06 RX ADMIN — HEPARIN SODIUM 5000 UNIT(S): 5000 INJECTION INTRAVENOUS; SUBCUTANEOUS at 06:48

## 2017-11-06 RX ADMIN — AZATHIOPRINE 150 MILLIGRAM(S): 100 TABLET ORAL at 12:36

## 2017-11-06 RX ADMIN — LOSARTAN POTASSIUM 25 MILLIGRAM(S): 100 TABLET, FILM COATED ORAL at 06:48

## 2017-11-06 RX ADMIN — Medication 2: at 17:38

## 2017-11-06 RX ADMIN — Medication 15 UNIT(S): at 09:19

## 2017-11-06 RX ADMIN — INSULIN GLARGINE 40 UNIT(S): 100 INJECTION, SOLUTION SUBCUTANEOUS at 23:04

## 2017-11-06 RX ADMIN — Medication 15 UNIT(S): at 17:39

## 2017-11-06 RX ADMIN — AZATHIOPRINE 100 MILLIGRAM(S): 100 TABLET ORAL at 23:04

## 2017-11-06 RX ADMIN — Medication 30 MILLIGRAM(S): at 06:48

## 2017-11-06 RX ADMIN — Medication 3 MILLILITER(S): at 12:11

## 2017-11-06 RX ADMIN — Medication 15 UNIT(S): at 13:40

## 2017-11-06 RX ADMIN — Medication 2: at 09:19

## 2017-11-06 RX ADMIN — Medication 100 MILLIGRAM(S): at 02:34

## 2017-11-06 RX ADMIN — Medication 3 MILLILITER(S): at 05:09

## 2017-11-06 RX ADMIN — Medication 8: at 13:39

## 2017-11-06 RX ADMIN — HEPARIN SODIUM 5000 UNIT(S): 5000 INJECTION INTRAVENOUS; SUBCUTANEOUS at 17:38

## 2017-11-06 RX ADMIN — Medication 25 MILLIGRAM(S): at 17:51

## 2017-11-06 RX ADMIN — PANTOPRAZOLE SODIUM 40 MILLIGRAM(S): 20 TABLET, DELAYED RELEASE ORAL at 06:48

## 2017-11-06 RX ADMIN — Medication 3 MILLILITER(S): at 23:53

## 2017-11-06 NOTE — PROGRESS NOTE ADULT - PROBLEM SELECTOR PROBLEM 6
Hyperkalemia
Type 2 diabetes mellitus without complication, without long-term current use of insulin
Hyperkalemia

## 2017-11-06 NOTE — PROGRESS NOTE ADULT - PROBLEM SELECTOR PLAN 6
-Downtrending  -Monitor BMP closely.
-Downtrending  -Monitor BMP closely.
resolved
-HgB A1C 7.5; reportedly improved from last admission  -Blood glucose significantly hyperglycemic (456 last night), likely secondary to IV steroids  -Anion gap increased, but likely this is due to lactic acidosis and not to DKA.  Acetone trace only  -Started on 3U insulin with meals and 9U lantus qhs  -Will continue to closely monitor
-resolved
resolved

## 2017-11-06 NOTE — PROGRESS NOTE ADULT - PROBLEM SELECTOR PLAN 9
-C/w mupirocin and mometasone (or in house equivalent) nasal creams for irritation from the nasal cannula.
-C/w mupirocin and mometasone (or in house equivalent) nasal creams for irritation from the nasal cannula.
improved w/ hydration of oxygen.
-C/w mupirocin and mometasone (or in house equivalent) nasal creams for irritation from the nasal cannula.
-improved w/ hydration of oxygen
improved w/ hydration of oxygen.

## 2017-11-06 NOTE — PROGRESS NOTE ADULT - SUBJECTIVE AND OBJECTIVE BOX
Follow-up Pulm Progress Note  Amauri Jackson MD  [phone]    No change in clinical status: remains dyspneic and hypoxemic with minimal activity  AFebrile and hemodynamically stable  TTE pending    Vital Signs Last 24 Hrs  T(C): 36.6 (06 Nov 2017 04:49), Max: 36.8 (05 Nov 2017 21:24)  T(F): 97.9 (06 Nov 2017 04:49), Max: 98.2 (05 Nov 2017 21:24)  HR: 81 (06 Nov 2017 05:10) (60 - 110)  BP: 118/75 (06 Nov 2017 04:49) (102/64 - 148/81)  BP(mean): --  RR: 20 (06 Nov 2017 04:49) (18 - 20)  SpO2: 91% (06 Nov 2017 05:10) (79% - 100%)                        9.7    1.8   )-----------( 219      ( 06 Nov 2017 07:08 )             28.1       11-06    137  |  95<L>  |  28<H>  ----------------------------<  208<H>  4.4   |  28  |  1.10    Ca    9.3      06 Nov 2017 07:08      Physical Examination:  PULM: Bilateral crackles 1/3; no wheeze  CVS: Regular rate and rhythm, no murmurs, rubs, or gallops  ABD: Soft, non-tender  EXT:  Trace LE edema    RADIOLOGY REVIEWED  CXR: pending    CT chest:    TTE: pending

## 2017-11-06 NOTE — PROGRESS NOTE ADULT - PROBLEM SELECTOR PLAN 7
-C/w ASA daily, losartan, and metoprolol.  -Hold statin in view of transaminitis.  -Per outpatient cardiologist, last echocardiogram EF 50%  -Awaiting repeat echocardiogram
-C/w ASA daily, losartan, and metoprolol.  -Hold statin in view of transaminitis.  -Per outpatient cardiologist, last echocardiogram EF 50%
-Will hold statin in view of transaminitis.
-C/w ASA daily, losartan, and metoprolol.  -Hold statin in view of transaminitis.  -Per outpatient cardiologist, last echocardiogram EF 50%

## 2017-11-06 NOTE — PROGRESS NOTE ADULT - ATTENDING COMMENTS
above note reviewed and revised  vitals, labs and radiology reviewed  agree with above HPI, physical exam and A/P  had long discussion with patient and wife about likely poor long term prognosis  they are hesitant to get palliative involved given lack of second or third opinions  will allow time for setup to rehab  will give patient list of ILD specialists in the area

## 2017-11-06 NOTE — PROGRESS NOTE ADULT - SUBJECTIVE AND OBJECTIVE BOX
Patient is a 77y old  Male who presents with a chief complaint of SOB (04 Nov 2017 14:33)        SUBJECTIVE / OVERNIGHT EVENTS:  Still significant REAGAN, which has improved slightly from admission but not significantly changed in the last two days.        MEDICATIONS  (STANDING):  ALBUTerol/ipratropium for Nebulization 3 milliLiter(s) Nebulizer every 6 hours  aspirin enteric coated 81 milliGRAM(s) Oral daily  azaTHIOprine 150 milliGRAM(s) Oral daily  azaTHIOprine 100 milliGRAM(s) Oral at bedtime  cyanocobalamin 1000 MICROGram(s) Oral daily  dextrose 5%. 1000 milliLiter(s) (50 mL/Hr) IV Continuous <Continuous>  dextrose 50% Injectable 12.5 Gram(s) IV Push once  dextrose 50% Injectable 25 Gram(s) IV Push once  dextrose 50% Injectable 25 Gram(s) IV Push once  fluocinonide 0.05% Cream 1 Application(s) Topical every 12 hours  heparin  Injectable 5000 Unit(s) SubCutaneous every 12 hours  influenza   Vaccine 0.5 milliLiter(s) IntraMuscular once  insulin glargine Injectable (LANTUS) 40 Unit(s) SubCutaneous at bedtime  insulin lispro (HumaLOG) corrective regimen sliding scale   SubCutaneous three times a day before meals  insulin lispro (HumaLOG) corrective regimen sliding scale   SubCutaneous at bedtime  insulin lispro Injectable (HumaLOG) 15 Unit(s) SubCutaneous three times a day before meals  losartan 25 milliGRAM(s) Oral daily  methylPREDNISolone sodium succinate Injectable 30 milliGRAM(s) IV Push two times a day  metoprolol     tartrate 25 milliGRAM(s) Oral two times a day  multivitamin 1 Tablet(s) Oral daily  mupirocin 2% Ointment 1 Application(s) Topical every 12 hours  pantoprazole    Tablet 40 milliGRAM(s) Oral before breakfast      MEDICATIONS  (PRN):  benzonatate 100 milliGRAM(s) Oral every 8 hours PRN Cough  dextrose Gel 1 Dose(s) Oral once PRN Blood Glucose LESS THAN 70 milliGRAM(s)/deciliter  FIRST- Mouthwash  BLM 10 milliLiter(s) Swish and Spit every 6 hours PRN Mouth Care  glucagon  Injectable 1 milliGRAM(s) IntraMuscular once PRN Glucose LESS THAN 70 milligrams/deciliter  guaiFENesin   Syrup  (Sugar-Free) 100 milliGRAM(s) Oral every 6 hours PRN Cough      Vital Signs Last 24 Hrs  T(C): 36.6 (06 Nov 2017 04:49), Max: 36.8 (05 Nov 2017 21:24)  T(F): 97.9 (06 Nov 2017 04:49), Max: 98.2 (05 Nov 2017 21:24)  HR: 67 (06 Nov 2017 12:15) (60 - 110)  BP: 118/75 (06 Nov 2017 04:49) (102/64 - 148/81)  BP(mean): --  RR: 20 (06 Nov 2017 04:49) (18 - 20)  SpO2: 98% (06 Nov 2017 12:15) (79% - 100%)      CAPILLARY BLOOD GLUCOSE  POCT Blood Glucose.: 172 mg/dL (06 Nov 2017 08:28)  POCT Blood Glucose.: 238 mg/dL (05 Nov 2017 22:07)  POCT Blood Glucose.: 256 mg/dL (05 Nov 2017 17:58)  POCT Blood Glucose.: 262 mg/dL (05 Nov 2017 12:51)      I&O's Summary    05 Nov 2017 07:01  -  06 Nov 2017 07:00  --------------------------------------------------------  IN: 780 mL / OUT: 1375 mL / NET: -595 mL      PHYSICAL EXAM:  GENERAL:  Well-appearing, sitting up in the chair, on O2, mildly tachypneic  EYES:  EOMI, PERRLA  ENMT:  MMM, dentition generally intact  NECK:  ROM intact, no appreciated JVP  CHEST/LUNG:  Dry crackles at the lung bases  HEART:  Grade 2/6 holosystolic murmur, RRR  ABDOMEN:  +BS, NT/ND  EXTREMITIES:  No appreciated peripheral edema  PSYCH: A&Ox3, euthymic  NEUROLOGY:  No focal deficits  SKIN: Intact to gross observation      LABS:                        9.7    1.8   )-----------( 219      ( 06 Nov 2017 07:08 )             28.1     11-06    137  |  95<L>  |  28<H>  ----------------------------<  208<H>  4.4   |  28  |  1.10    Ca    9.3      06 Nov 2017 07:08      RADIOLOGY & ADDITIONAL TESTS:  Imaging Personally Reviewed: YES    Consultant(s) Notes Reviewed: YES    Care Discussed with Consultants/Other Providers: YES      Nomi Vergara MD PGY-1  Department of Medicine  [phone]

## 2017-11-06 NOTE — PROGRESS NOTE ADULT - PROBLEM SELECTOR PROBLEM 7
Coronary artery disease involving native coronary artery of native heart, angina presence unspecified
Hyperlipidemia, unspecified hyperlipidemia type
Coronary artery disease involving native coronary artery of native heart, angina presence unspecified

## 2017-11-06 NOTE — PROGRESS NOTE ADULT - PROBLEM SELECTOR PLAN 10
-heparin subQ q12  -full code, PT greyson Vergara MD PGY-1  Department of Medicine  [phone]
-heparin subQ q12    Nomi Vergara MD PGY-1  Department of Medicine  [phone]
-heparin subQ q12    Nomi Vergara MD PGY-1  Department of Medicine  [phone]
-heparin subQ q12  -full code, PT greyson
-heparin subQ q12    Nomi Vergara MD PGY-1  Department of Medicine  [phone]
-heparin subQ q12

## 2017-11-07 LAB
ALBUMIN SERPL ELPH-MCNC: 2.2 G/DL — LOW (ref 3.3–5)
ALBUMIN SERPL ELPH-MCNC: 2.3 G/DL — LOW (ref 3.3–5)
ALBUMIN SERPL ELPH-MCNC: 2.4 G/DL — LOW (ref 3.3–5)
ALP SERPL-CCNC: 104 U/L — SIGNIFICANT CHANGE UP (ref 40–120)
ALP SERPL-CCNC: 71 U/L — SIGNIFICANT CHANGE UP (ref 40–120)
ALP SERPL-CCNC: 87 U/L — SIGNIFICANT CHANGE UP (ref 40–120)
ALT FLD-CCNC: 162 U/L RC — HIGH (ref 10–45)
ALT FLD-CCNC: 167 U/L RC — HIGH (ref 10–45)
ALT FLD-CCNC: 170 U/L RC — HIGH (ref 10–45)
AMYLASE P1 CFR SERPL: 129 U/L — HIGH (ref 25–125)
AMYLASE P1 CFR SERPL: 132 U/L — HIGH (ref 25–125)
ANION GAP SERPL CALC-SCNC: 17 MMOL/L — SIGNIFICANT CHANGE UP (ref 5–17)
ANION GAP SERPL CALC-SCNC: 19 MMOL/L — HIGH (ref 5–17)
ANION GAP SERPL CALC-SCNC: 19 MMOL/L — HIGH (ref 5–17)
APPEARANCE UR: ABNORMAL
APTT BLD: 33.8 SEC — SIGNIFICANT CHANGE UP (ref 27.5–37.4)
AST SERPL-CCNC: 119 U/L — HIGH (ref 10–40)
AST SERPL-CCNC: 129 U/L — HIGH (ref 10–40)
AST SERPL-CCNC: 141 U/L — HIGH (ref 10–40)
BACTERIA # UR AUTO: ABNORMAL /HPF
BASE EXCESS BLDA CALC-SCNC: -5.2 MMOL/L — LOW (ref -2–2)
BASE EXCESS BLDV CALC-SCNC: -3 MMOL/L — LOW (ref -2–2)
BASOPHILS # BLD AUTO: 0 K/UL — SIGNIFICANT CHANGE UP (ref 0–0.2)
BILIRUB DIRECT SERPL-MCNC: 1.3 MG/DL — HIGH (ref 0–0.2)
BILIRUB INDIRECT FLD-MCNC: 0.9 MG/DL — SIGNIFICANT CHANGE UP (ref 0.2–1)
BILIRUB SERPL-MCNC: 2.1 MG/DL — HIGH (ref 0.2–1.2)
BILIRUB SERPL-MCNC: 2.1 MG/DL — HIGH (ref 0.2–1.2)
BILIRUB SERPL-MCNC: 2.2 MG/DL — HIGH (ref 0.2–1.2)
BILIRUB UR-MCNC: NEGATIVE — SIGNIFICANT CHANGE UP
BUN SERPL-MCNC: 22 MG/DL — SIGNIFICANT CHANGE UP (ref 7–23)
BUN SERPL-MCNC: 26 MG/DL — HIGH (ref 7–23)
BUN SERPL-MCNC: 29 MG/DL — HIGH (ref 7–23)
C DIFF GDH STL QL: NEGATIVE — SIGNIFICANT CHANGE UP
C DIFF GDH STL QL: SIGNIFICANT CHANGE UP
CALCIUM SERPL-MCNC: 7.8 MG/DL — LOW (ref 8.4–10.5)
CALCIUM SERPL-MCNC: 8 MG/DL — LOW (ref 8.4–10.5)
CALCIUM SERPL-MCNC: 8.7 MG/DL — SIGNIFICANT CHANGE UP (ref 8.4–10.5)
CHLORIDE SERPL-SCNC: 100 MMOL/L — SIGNIFICANT CHANGE UP (ref 96–108)
CHLORIDE SERPL-SCNC: 102 MMOL/L — SIGNIFICANT CHANGE UP (ref 96–108)
CHLORIDE SERPL-SCNC: 102 MMOL/L — SIGNIFICANT CHANGE UP (ref 96–108)
CK MB BLD-MCNC: 0.6 % — SIGNIFICANT CHANGE UP (ref 0–3.5)
CK MB BLD-MCNC: 0.7 % — SIGNIFICANT CHANGE UP (ref 0–3.5)
CK MB BLD-MCNC: 1.3 % — SIGNIFICANT CHANGE UP (ref 0–3.5)
CK MB CFR SERPL CALC: 1.4 NG/ML — SIGNIFICANT CHANGE UP (ref 0–6.7)
CK MB CFR SERPL CALC: 2.4 NG/ML — SIGNIFICANT CHANGE UP (ref 0–6.7)
CK MB CFR SERPL CALC: 3.1 NG/ML — SIGNIFICANT CHANGE UP (ref 0–6.7)
CK SERPL-CCNC: 236 U/L — HIGH (ref 30–200)
CK SERPL-CCNC: 239 U/L — HIGH (ref 30–200)
CK SERPL-CCNC: 364 U/L — HIGH (ref 30–200)
CO2 BLDA-SCNC: 22 MMOL/L — SIGNIFICANT CHANGE UP (ref 22–30)
CO2 BLDV-SCNC: 24 MMOL/L — SIGNIFICANT CHANGE UP (ref 22–30)
CO2 SERPL-SCNC: 17 MMOL/L — LOW (ref 22–31)
CO2 SERPL-SCNC: 22 MMOL/L — SIGNIFICANT CHANGE UP (ref 22–31)
CO2 SERPL-SCNC: 22 MMOL/L — SIGNIFICANT CHANGE UP (ref 22–31)
COLOR SPEC: YELLOW — SIGNIFICANT CHANGE UP
CREAT SERPL-MCNC: 1.33 MG/DL — HIGH (ref 0.5–1.3)
CREAT SERPL-MCNC: 1.36 MG/DL — HIGH (ref 0.5–1.3)
CREAT SERPL-MCNC: 1.4 MG/DL — HIGH (ref 0.5–1.3)
DIFF PNL FLD: ABNORMAL
EOSINOPHIL # BLD AUTO: 0 K/UL — SIGNIFICANT CHANGE UP (ref 0–0.5)
EPI CELLS # UR: SIGNIFICANT CHANGE UP /HPF
GAS PNL BLDA: SIGNIFICANT CHANGE UP
GAS PNL BLDV: SIGNIFICANT CHANGE UP
GAS PNL BLDV: SIGNIFICANT CHANGE UP
GLUCOSE BLDC GLUCOMTR-MCNC: 104 MG/DL — HIGH (ref 70–99)
GLUCOSE BLDC GLUCOMTR-MCNC: 203 MG/DL — HIGH (ref 70–99)
GLUCOSE BLDC GLUCOMTR-MCNC: 41 MG/DL — CRITICAL LOW (ref 70–99)
GLUCOSE BLDC GLUCOMTR-MCNC: 41 MG/DL — CRITICAL LOW (ref 70–99)
GLUCOSE BLDC GLUCOMTR-MCNC: 71 MG/DL — SIGNIFICANT CHANGE UP (ref 70–99)
GLUCOSE SERPL-MCNC: 110 MG/DL — HIGH (ref 70–99)
GLUCOSE SERPL-MCNC: 130 MG/DL — HIGH (ref 70–99)
GLUCOSE SERPL-MCNC: 65 MG/DL — LOW (ref 70–99)
GLUCOSE UR QL: NEGATIVE — SIGNIFICANT CHANGE UP
GRAM STN FLD: SIGNIFICANT CHANGE UP
HCO3 BLDA-SCNC: 21 MMOL/L — SIGNIFICANT CHANGE UP (ref 21–29)
HCO3 BLDV-SCNC: 23 MMOL/L — SIGNIFICANT CHANGE UP (ref 21–29)
HCT VFR BLD CALC: 26.4 % — LOW (ref 39–50)
HCT VFR BLD CALC: 27.9 % — LOW (ref 39–50)
HCT VFR BLD CALC: 28.1 % — LOW (ref 39–50)
HGB BLD-MCNC: 8.9 G/DL — LOW (ref 13–17)
HGB BLD-MCNC: 9 G/DL — LOW (ref 13–17)
HGB BLD-MCNC: 9.6 G/DL — LOW (ref 13–17)
HOROWITZ INDEX BLDA+IHG-RTO: 40 — SIGNIFICANT CHANGE UP
INR BLD: 1.22 RATIO — HIGH (ref 0.88–1.16)
INTUBATED: SIGNIFICANT CHANGE UP
KETONES UR-MCNC: NEGATIVE — SIGNIFICANT CHANGE UP
LEUKOCYTE ESTERASE UR-ACNC: NEGATIVE — SIGNIFICANT CHANGE UP
LIDOCAIN IGE QN: 16 U/L — SIGNIFICANT CHANGE UP (ref 7–60)
LIDOCAIN IGE QN: 19 U/L — SIGNIFICANT CHANGE UP (ref 7–60)
LYMPHOCYTES # BLD AUTO: 0.7 K/UL — LOW (ref 1–3.3)
LYMPHOCYTES # BLD AUTO: 51 % — HIGH (ref 13–44)
MAGNESIUM SERPL-MCNC: 1.6 MG/DL — SIGNIFICANT CHANGE UP (ref 1.6–2.6)
MAGNESIUM SERPL-MCNC: 1.6 MG/DL — SIGNIFICANT CHANGE UP (ref 1.6–2.6)
MCHC RBC-ENTMCNC: 31.6 GM/DL — LOW (ref 32–36)
MCHC RBC-ENTMCNC: 34.1 GM/DL — SIGNIFICANT CHANGE UP (ref 32–36)
MCHC RBC-ENTMCNC: 34.2 GM/DL — SIGNIFICANT CHANGE UP (ref 32–36)
MCHC RBC-ENTMCNC: 35.1 PG — HIGH (ref 27–34)
MCHC RBC-ENTMCNC: 36.2 PG — HIGH (ref 27–34)
MCHC RBC-ENTMCNC: 36.7 PG — HIGH (ref 27–34)
MCV RBC AUTO: 106 FL — HIGH (ref 80–100)
MCV RBC AUTO: 108 FL — HIGH (ref 80–100)
MCV RBC AUTO: 111 FL — HIGH (ref 80–100)
MONOCYTES # BLD AUTO: 0 K/UL — SIGNIFICANT CHANGE UP (ref 0–0.9)
MONOCYTES NFR BLD AUTO: 7 % — SIGNIFICANT CHANGE UP (ref 2–14)
NEUTROPHILS # BLD AUTO: 0.3 K/UL — LOW (ref 1.8–7.4)
NEUTROPHILS NFR BLD AUTO: 36 % — LOW (ref 43–77)
NITRITE UR-MCNC: NEGATIVE — SIGNIFICANT CHANGE UP
PCO2 BLDA: 48 MMHG — HIGH (ref 32–46)
PCO2 BLDV: 50 MMHG — SIGNIFICANT CHANGE UP (ref 35–50)
PH BLDA: 7.27 — LOW (ref 7.35–7.45)
PH BLDV: 7.29 — LOW (ref 7.35–7.45)
PH UR: 6.5 — SIGNIFICANT CHANGE UP (ref 5–8)
PHOSPHATE SERPL-MCNC: 1.1 MG/DL — LOW (ref 2.5–4.5)
PHOSPHATE SERPL-MCNC: 4.7 MG/DL — HIGH (ref 2.5–4.5)
PLATELET # BLD AUTO: 185 K/UL — SIGNIFICANT CHANGE UP (ref 150–400)
PLATELET # BLD AUTO: 198 K/UL — SIGNIFICANT CHANGE UP (ref 150–400)
PLATELET # BLD AUTO: 200 K/UL — SIGNIFICANT CHANGE UP (ref 150–400)
PO2 BLDA: 96 MMHG — SIGNIFICANT CHANGE UP (ref 74–108)
PO2 BLDV: 38 MMHG — SIGNIFICANT CHANGE UP (ref 25–45)
POTASSIUM SERPL-MCNC: 3.6 MMOL/L — SIGNIFICANT CHANGE UP (ref 3.5–5.3)
POTASSIUM SERPL-MCNC: 4.2 MMOL/L — SIGNIFICANT CHANGE UP (ref 3.5–5.3)
POTASSIUM SERPL-MCNC: 4.9 MMOL/L — SIGNIFICANT CHANGE UP (ref 3.5–5.3)
POTASSIUM SERPL-SCNC: 3.6 MMOL/L — SIGNIFICANT CHANGE UP (ref 3.5–5.3)
POTASSIUM SERPL-SCNC: 4.2 MMOL/L — SIGNIFICANT CHANGE UP (ref 3.5–5.3)
POTASSIUM SERPL-SCNC: 4.9 MMOL/L — SIGNIFICANT CHANGE UP (ref 3.5–5.3)
PROCALCITONIN SERPL-MCNC: 42.59 NG/ML — HIGH (ref 0–0.04)
PROT SERPL-MCNC: 5.3 G/DL — LOW (ref 6–8.3)
PROT SERPL-MCNC: 5.6 G/DL — LOW (ref 6–8.3)
PROT SERPL-MCNC: 5.7 G/DL — LOW (ref 6–8.3)
PROT UR-MCNC: 30 MG/DL
PROTHROM AB SERPL-ACNC: 13.3 SEC — HIGH (ref 9.8–12.7)
RBC # BLD: 2.45 M/UL — LOW (ref 4.2–5.8)
RBC # BLD: 2.53 M/UL — LOW (ref 4.2–5.8)
RBC # BLD: 2.64 M/UL — LOW (ref 4.2–5.8)
RBC # FLD: 17.1 % — HIGH (ref 10.3–14.5)
RBC # FLD: 17.2 % — HIGH (ref 10.3–14.5)
RBC # FLD: 17.5 % — HIGH (ref 10.3–14.5)
RBC CASTS # UR COMP ASSIST: SIGNIFICANT CHANGE UP /HPF (ref 0–2)
SAO2 % BLDA: 97 % — HIGH (ref 92–96)
SAO2 % BLDV: 62 % — LOW (ref 67–88)
SODIUM SERPL-SCNC: 138 MMOL/L — SIGNIFICANT CHANGE UP (ref 135–145)
SODIUM SERPL-SCNC: 141 MMOL/L — SIGNIFICANT CHANGE UP (ref 135–145)
SODIUM SERPL-SCNC: 141 MMOL/L — SIGNIFICANT CHANGE UP (ref 135–145)
SP GR SPEC: 1.02 — SIGNIFICANT CHANGE UP (ref 1.01–1.02)
SPECIMEN SOURCE: SIGNIFICANT CHANGE UP
TROPONIN T SERPL-MCNC: 0.01 NG/ML — SIGNIFICANT CHANGE UP (ref 0–0.06)
TROPONIN T SERPL-MCNC: 0.02 NG/ML — SIGNIFICANT CHANGE UP (ref 0–0.06)
TROPONIN T SERPL-MCNC: 0.02 NG/ML — SIGNIFICANT CHANGE UP (ref 0–0.06)
UROBILINOGEN FLD QL: NEGATIVE — SIGNIFICANT CHANGE UP
WBC # BLD: 1.1 K/UL — LOW (ref 3.8–10.5)
WBC # BLD: 1.4 K/UL — LOW (ref 3.8–10.5)
WBC # BLD: 2.4 K/UL — LOW (ref 3.8–10.5)
WBC # FLD AUTO: 1.1 K/UL — LOW (ref 3.8–10.5)
WBC # FLD AUTO: 1.4 K/UL — LOW (ref 3.8–10.5)
WBC # FLD AUTO: 2.4 K/UL — LOW (ref 3.8–10.5)
WBC UR QL: NEGATIVE /HPF — SIGNIFICANT CHANGE UP (ref 0–5)

## 2017-11-07 PROCEDURE — 93010 ELECTROCARDIOGRAM REPORT: CPT

## 2017-11-07 PROCEDURE — 71010: CPT | Mod: 26

## 2017-11-07 RX ORDER — PROPOFOL 10 MG/ML
5 INJECTION, EMULSION INTRAVENOUS
Qty: 500 | Refills: 0 | Status: DISCONTINUED | OUTPATIENT
Start: 2017-11-07 | End: 2017-11-10

## 2017-11-07 RX ORDER — MUPIROCIN 20 MG/G
1 OINTMENT TOPICAL EVERY 12 HOURS
Qty: 0 | Refills: 0 | Status: DISCONTINUED | OUTPATIENT
Start: 2017-11-07 | End: 2017-11-09

## 2017-11-07 RX ORDER — HYDROCORTISONE 20 MG
100 TABLET ORAL EVERY 8 HOURS
Qty: 0 | Refills: 0 | Status: DISCONTINUED | OUTPATIENT
Start: 2017-11-07 | End: 2017-11-10

## 2017-11-07 RX ORDER — HEPARIN SODIUM 5000 [USP'U]/ML
5000 INJECTION INTRAVENOUS; SUBCUTANEOUS EVERY 12 HOURS
Qty: 0 | Refills: 0 | Status: DISCONTINUED | OUTPATIENT
Start: 2017-11-07 | End: 2017-11-09

## 2017-11-07 RX ORDER — AZITHROMYCIN 500 MG/1
TABLET, FILM COATED ORAL
Qty: 0 | Refills: 0 | Status: DISCONTINUED | OUTPATIENT
Start: 2017-11-07 | End: 2017-11-07

## 2017-11-07 RX ORDER — VASOPRESSIN 20 [USP'U]/ML
0.04 INJECTION INTRAVENOUS
Qty: 100 | Refills: 0 | Status: DISCONTINUED | OUTPATIENT
Start: 2017-11-07 | End: 2017-11-07

## 2017-11-07 RX ORDER — PANTOPRAZOLE SODIUM 20 MG/1
40 TABLET, DELAYED RELEASE ORAL DAILY
Qty: 0 | Refills: 0 | Status: DISCONTINUED | OUTPATIENT
Start: 2017-11-07 | End: 2017-11-14

## 2017-11-07 RX ORDER — SENNA PLUS 8.6 MG/1
10 TABLET ORAL AT BEDTIME
Qty: 0 | Refills: 0 | Status: DISCONTINUED | OUTPATIENT
Start: 2017-11-07 | End: 2017-11-15

## 2017-11-07 RX ORDER — AZITHROMYCIN 500 MG/1
500 TABLET, FILM COATED ORAL ONCE
Qty: 0 | Refills: 0 | Status: COMPLETED | OUTPATIENT
Start: 2017-11-07 | End: 2017-11-07

## 2017-11-07 RX ORDER — FENTANYL CITRATE 50 UG/ML
1 INJECTION INTRAVENOUS
Qty: 2500 | Refills: 0 | Status: DISCONTINUED | OUTPATIENT
Start: 2017-11-07 | End: 2017-11-07

## 2017-11-07 RX ORDER — PHENYLEPHRINE HYDROCHLORIDE 10 MG/ML
0.4 INJECTION INTRAVENOUS
Qty: 80 | Refills: 0 | Status: DISCONTINUED | OUTPATIENT
Start: 2017-11-07 | End: 2017-11-10

## 2017-11-07 RX ORDER — AZITHROMYCIN 500 MG/1
500 TABLET, FILM COATED ORAL ONCE
Qty: 0 | Refills: 0 | Status: DISCONTINUED | OUTPATIENT
Start: 2017-11-07 | End: 2017-11-07

## 2017-11-07 RX ORDER — VANCOMYCIN HCL 1 G
1000 VIAL (EA) INTRAVENOUS ONCE
Qty: 0 | Refills: 0 | Status: COMPLETED | OUTPATIENT
Start: 2017-11-07 | End: 2017-11-07

## 2017-11-07 RX ORDER — VANCOMYCIN HCL 1 G
125 VIAL (EA) INTRAVENOUS EVERY 6 HOURS
Qty: 0 | Refills: 0 | Status: DISCONTINUED | OUTPATIENT
Start: 2017-11-07 | End: 2017-11-07

## 2017-11-07 RX ORDER — MAGNESIUM SULFATE 500 MG/ML
2 VIAL (ML) INJECTION ONCE
Qty: 0 | Refills: 0 | Status: DISCONTINUED | OUTPATIENT
Start: 2017-11-07 | End: 2017-11-07

## 2017-11-07 RX ORDER — MIDAZOLAM HYDROCHLORIDE 1 MG/ML
1 INJECTION, SOLUTION INTRAMUSCULAR; INTRAVENOUS
Qty: 100 | Refills: 0 | Status: DISCONTINUED | OUTPATIENT
Start: 2017-11-07 | End: 2017-11-07

## 2017-11-07 RX ORDER — DEXTROSE 50 % IN WATER 50 %
25 SYRINGE (ML) INTRAVENOUS ONCE
Qty: 0 | Refills: 0 | Status: COMPLETED | OUTPATIENT
Start: 2017-11-07 | End: 2017-11-07

## 2017-11-07 RX ORDER — VANCOMYCIN HCL 1 G
1000 VIAL (EA) INTRAVENOUS EVERY 12 HOURS
Qty: 0 | Refills: 0 | Status: DISCONTINUED | OUTPATIENT
Start: 2017-11-07 | End: 2017-11-09

## 2017-11-07 RX ORDER — HYDROCORTISONE 20 MG
100 TABLET ORAL ONCE
Qty: 0 | Refills: 0 | Status: DISCONTINUED | OUTPATIENT
Start: 2017-11-07 | End: 2017-11-07

## 2017-11-07 RX ORDER — SODIUM,POTASSIUM PHOSPHATES 278-250MG
1 POWDER IN PACKET (EA) ORAL
Qty: 0 | Refills: 0 | Status: DISCONTINUED | OUTPATIENT
Start: 2017-11-07 | End: 2017-11-07

## 2017-11-07 RX ORDER — SODIUM CHLORIDE 9 MG/ML
1000 INJECTION INTRAMUSCULAR; INTRAVENOUS; SUBCUTANEOUS ONCE
Qty: 0 | Refills: 0 | Status: DISCONTINUED | OUTPATIENT
Start: 2017-11-07 | End: 2017-11-07

## 2017-11-07 RX ORDER — POTASSIUM PHOSPHATE, MONOBASIC POTASSIUM PHOSPHATE, DIBASIC 236; 224 MG/ML; MG/ML
15 INJECTION, SOLUTION INTRAVENOUS ONCE
Qty: 0 | Refills: 0 | Status: DISCONTINUED | OUTPATIENT
Start: 2017-11-07 | End: 2017-11-07

## 2017-11-07 RX ORDER — FENTANYL CITRATE 50 UG/ML
1 INJECTION INTRAVENOUS
Qty: 5000 | Refills: 0 | Status: DISCONTINUED | OUTPATIENT
Start: 2017-11-07 | End: 2017-11-10

## 2017-11-07 RX ORDER — CALCIUM GLUCONATE 100 MG/ML
1 VIAL (ML) INTRAVENOUS ONCE
Qty: 0 | Refills: 0 | Status: DISCONTINUED | OUTPATIENT
Start: 2017-11-07 | End: 2017-11-07

## 2017-11-07 RX ORDER — NOREPINEPHRINE BITARTRATE/D5W 8 MG/250ML
0.01 PLASTIC BAG, INJECTION (ML) INTRAVENOUS
Qty: 8 | Refills: 0 | Status: DISCONTINUED | OUTPATIENT
Start: 2017-11-07 | End: 2017-11-14

## 2017-11-07 RX ORDER — ACETAMINOPHEN 500 MG
1000 TABLET ORAL ONCE
Qty: 0 | Refills: 0 | Status: DISCONTINUED | OUTPATIENT
Start: 2017-11-07 | End: 2017-11-07

## 2017-11-07 RX ORDER — VANCOMYCIN HCL 1 G
1000 VIAL (EA) INTRAVENOUS EVERY 12 HOURS
Qty: 0 | Refills: 0 | Status: DISCONTINUED | OUTPATIENT
Start: 2017-11-07 | End: 2017-11-07

## 2017-11-07 RX ORDER — FLUOCINONIDE/EMOLLIENT BASE 0.05 %
1 CREAM (GRAM) TOPICAL EVERY 12 HOURS
Qty: 0 | Refills: 0 | Status: DISCONTINUED | OUTPATIENT
Start: 2017-11-07 | End: 2017-11-09

## 2017-11-07 RX ORDER — ASPIRIN/CALCIUM CARB/MAGNESIUM 324 MG
81 TABLET ORAL DAILY
Qty: 0 | Refills: 0 | Status: DISCONTINUED | OUTPATIENT
Start: 2017-11-07 | End: 2017-11-07

## 2017-11-07 RX ORDER — CALCIUM GLUCONATE 100 MG/ML
2 VIAL (ML) INTRAVENOUS ONCE
Qty: 0 | Refills: 0 | Status: COMPLETED | OUTPATIENT
Start: 2017-11-07 | End: 2017-11-07

## 2017-11-07 RX ORDER — PIPERACILLIN AND TAZOBACTAM 4; .5 G/20ML; G/20ML
3.38 INJECTION, POWDER, LYOPHILIZED, FOR SOLUTION INTRAVENOUS EVERY 8 HOURS
Qty: 0 | Refills: 0 | Status: DISCONTINUED | OUTPATIENT
Start: 2017-11-07 | End: 2017-11-08

## 2017-11-07 RX ORDER — METRONIDAZOLE 500 MG
500 TABLET ORAL EVERY 8 HOURS
Qty: 0 | Refills: 0 | Status: DISCONTINUED | OUTPATIENT
Start: 2017-11-07 | End: 2017-11-07

## 2017-11-07 RX ORDER — HUMAN INSULIN 100 [IU]/ML
6 INJECTION, SUSPENSION SUBCUTANEOUS EVERY 6 HOURS
Qty: 0 | Refills: 0 | Status: DISCONTINUED | OUTPATIENT
Start: 2017-11-07 | End: 2017-11-07

## 2017-11-07 RX ORDER — IPRATROPIUM/ALBUTEROL SULFATE 18-103MCG
3 AEROSOL WITH ADAPTER (GRAM) INHALATION EVERY 6 HOURS
Qty: 0 | Refills: 0 | Status: DISCONTINUED | OUTPATIENT
Start: 2017-11-07 | End: 2017-11-15

## 2017-11-07 RX ORDER — ASPIRIN/CALCIUM CARB/MAGNESIUM 324 MG
81 TABLET ORAL DAILY
Qty: 0 | Refills: 0 | Status: DISCONTINUED | OUTPATIENT
Start: 2017-11-07 | End: 2017-12-05

## 2017-11-07 RX ORDER — AZITHROMYCIN 500 MG/1
500 TABLET, FILM COATED ORAL EVERY 24 HOURS
Qty: 0 | Refills: 0 | Status: DISCONTINUED | OUTPATIENT
Start: 2017-11-07 | End: 2017-11-13

## 2017-11-07 RX ORDER — FENTANYL CITRATE 50 UG/ML
50 INJECTION INTRAVENOUS ONCE
Qty: 0 | Refills: 0 | Status: DISCONTINUED | OUTPATIENT
Start: 2017-11-07 | End: 2017-11-07

## 2017-11-07 RX ORDER — PIPERACILLIN AND TAZOBACTAM 4; .5 G/20ML; G/20ML
3.38 INJECTION, POWDER, LYOPHILIZED, FOR SOLUTION INTRAVENOUS EVERY 8 HOURS
Qty: 0 | Refills: 0 | Status: DISCONTINUED | OUTPATIENT
Start: 2017-11-07 | End: 2017-11-07

## 2017-11-07 RX ORDER — PIPERACILLIN AND TAZOBACTAM 4; .5 G/20ML; G/20ML
3.38 INJECTION, POWDER, LYOPHILIZED, FOR SOLUTION INTRAVENOUS ONCE
Qty: 0 | Refills: 0 | Status: COMPLETED | OUTPATIENT
Start: 2017-11-07 | End: 2017-11-07

## 2017-11-07 RX ORDER — INSULIN LISPRO 100/ML
VIAL (ML) SUBCUTANEOUS EVERY 6 HOURS
Qty: 0 | Refills: 0 | Status: DISCONTINUED | OUTPATIENT
Start: 2017-11-07 | End: 2017-11-10

## 2017-11-07 RX ADMIN — HEPARIN SODIUM 5000 UNIT(S): 5000 INJECTION INTRAVENOUS; SUBCUTANEOUS at 18:44

## 2017-11-07 RX ADMIN — Medication 3 MILLILITER(S): at 18:57

## 2017-11-07 RX ADMIN — Medication 3 MILLILITER(S): at 06:03

## 2017-11-07 RX ADMIN — AZITHROMYCIN 250 MILLIGRAM(S): 500 TABLET, FILM COATED ORAL at 11:48

## 2017-11-07 RX ADMIN — Medication 100 MILLIGRAM(S): at 22:53

## 2017-11-07 RX ADMIN — PROPOFOL 2.42 MICROGRAM(S)/KG/MIN: 10 INJECTION, EMULSION INTRAVENOUS at 09:58

## 2017-11-07 RX ADMIN — Medication 250 MILLIGRAM(S): at 18:44

## 2017-11-07 RX ADMIN — PHENYLEPHRINE HYDROCHLORIDE 12.09 MICROGRAM(S)/KG/MIN: 10 INJECTION INTRAVENOUS at 16:32

## 2017-11-07 RX ADMIN — Medication 25 MILLILITER(S): at 09:58

## 2017-11-07 RX ADMIN — Medication 81 MILLIGRAM(S): at 16:32

## 2017-11-07 RX ADMIN — Medication 1.51 MICROGRAM(S)/KG/MIN: at 09:58

## 2017-11-07 RX ADMIN — Medication 1.51 MICROGRAM(S)/KG/MIN: at 16:31

## 2017-11-07 RX ADMIN — PANTOPRAZOLE SODIUM 40 MILLIGRAM(S): 20 TABLET, DELAYED RELEASE ORAL at 11:48

## 2017-11-07 RX ADMIN — Medication 100 MILLIGRAM(S): at 14:27

## 2017-11-07 RX ADMIN — Medication 400 GRAM(S): at 23:00

## 2017-11-07 RX ADMIN — PHENYLEPHRINE HYDROCHLORIDE 12.09 MICROGRAM(S)/KG/MIN: 10 INJECTION INTRAVENOUS at 09:58

## 2017-11-07 RX ADMIN — PIPERACILLIN AND TAZOBACTAM 25 GRAM(S): 4; .5 INJECTION, POWDER, LYOPHILIZED, FOR SOLUTION INTRAVENOUS at 14:27

## 2017-11-07 RX ADMIN — FENTANYL CITRATE 50 MICROGRAM(S): 50 INJECTION INTRAVENOUS at 10:38

## 2017-11-07 RX ADMIN — PROPOFOL 2.42 MICROGRAM(S)/KG/MIN: 10 INJECTION, EMULSION INTRAVENOUS at 16:31

## 2017-11-07 RX ADMIN — Medication 400 GRAM(S): at 20:07

## 2017-11-07 NOTE — CONSULT NOTE ADULT - ATTENDING COMMENTS
77M h/o DM2, GERD, CABG (2016) and interstitial lung disease (UIP) on 3L home O2, p/w increasing REAGAN on 11/1/17. The patient was started on treatment for a suspected ILD flare w/ solumedrol 30 q12. He was also continued on home doses of Azathioprine. The patient is on chronic steroids at home.  There was some concern for fluid overload, so TTE was done which showed no signs of systolic heart failure. CT chest was done and showed no signs of PNA. on 11/7 pt started to develop worsening hypoxia and increased WOB. BiPAP was started. ABG was done which showed Respiratory alkalosis. About 4 hours later RRT was called for hypotension to the 70s. Pt was found to be febrile and was having rigors. 77M h/o DM2, GERD, CABG (2016) and interstitial lung disease (UIP) on 3L home O2, p/w increasing REAGAN on 11/1/17. The patient was started on treatment for a suspected ILD flare w/ solumedrol 30 q12. He was also continued on home doses of Azathioprine. The patient is on chronic steroids at home.  There was some concern for fluid overload, so TTE was done which showed no signs of systolic heart failure. CT chest was done and showed no signs of PNA. on 11/7 pt started to develop worsening hypoxia and increased WOB. BiPAP was started. ABG was done which showed Respiratory alkalosis. About 4 hours later RRT was called for hypotension to the 70s. Pt was found to be febrile and was having rigors. Patient now upgraded to the MICU with-  1. Acute on chronic hypoxemic respiratory failure- multimodal- HCAP in the setting of advance UIP - MV with DSPT once clinically stable, HOB >30, pulmonary toilet. UIP care as per rheumatological/ pulmonary team   2. Septic shock- PNA also with diarrhea ( ischemic or infectious colitis), MSSA positive PCR- repeat all cultures including UA, broad spectrum abx including c. diff tx pending confirmatory testing the de-escalate according to c/s/and clinical response, would hold immunomodulator in the setting of neutropenic sepsis.  3. Diarrhea abdominal pain with elevated lactate- infectious vs ischemic will need repeat abdominal imaging once clinically stable,  serial abdominal exams, trend UOP and lactate. Post repeat imaging surgical c/s. Please send LFT's, as well as amylase and lipase.  4. AMS- multifactorial- transient cerebral hypoperfusion as well as metabolic foci (sepsis, uremia, hypoxia and hypoglycemia)- will need HCT r/o acute ICP , neuro checks Q 1 hour, correct reversible factors.  5. Acute renal failure- contrast nephropathy, prior known right hydronephrosis with nonobstructive stone, as well as ATN- Villagran, repeat renal imaging to r/o obstructive foci, IVF, check Ulytes, avoid nephrotoxins, nephrology c/s  6. severe electrolyte abnormalities- hypophosphatemia, hypocalcemia- monitor and optimize, likely a refeeding component  7. Megaloblastic anemia, neutropenia - nutritional , azathioprine induced bone marrow suppression  Care and treatment as above unless otherwise stated. Case discussed extensively with wife and son at the bedside, as well as staff and team on board. Prognosis guarded. Will need to re-address patient's acute and subacute goals of care. 77M h/o DM2, GERD, CABG (2016) and interstitial lung disease (UIP) on 3L home O2, p/w increasing REAGAN on 11/1/17. The patient was started on treatment for a suspected ILD flare w/ solumedrol 30 q12. He was also continued on home doses of Azathioprine. The patient is on chronic steroids at home.  There was some concern for fluid overload, so TTE was done which showed no signs of systolic heart failure. CT chest was done and showed no signs of PNA. on 11/7 pt started to develop worsening hypoxia and increased WOB. BiPAP was started. ABG was done which showed Respiratory alkalosis. About 4 hours later RRT was called for hypotension to the 70s. Pt was found to be febrile and was having rigors. Patient now upgraded to the MICU with-  1. Acute on chronic hypoxemic respiratory failure- multimodal- HCAP in the setting of advance UIP - MV with DSPT once clinically stable, HOB >30, pulmonary toilet. UIP care as per rheumatological/ pulmonary team   2. Septic shock- PNA also with diarrhea ( ischemic or infectious colitis), MSSA positive PCR- repeat all cultures including UA, broad spectrum abx including c. diff tx pending confirmatory testing the de-escalate according to c/s/and clinical response, would hold immunomodulator in the setting of neutropenic sepsis.  IVF with vasopressor support to keep MAP > 65mmhg  3. Diarrhea abdominal pain with elevated lactate- infectious vs ischemic will need repeat abdominal imaging once clinically stable,  serial abdominal exams, trend UOP and lactate. Post repeat imaging surgical c/s. Please send LFT's, as well as amylase and lipase.  4. AMS- multifactorial- transient cerebral hypoperfusion as well as metabolic foci (sepsis, uremia, hypoxia and hypoglycemia)- will need HCT r/o acute ICP , neuro checks Q 1 hour, correct reversible factors.  5. Acute renal failure- contrast nephropathy, prior known right hydronephrosis with nonobstructive stone, as well as ATN- Villagran, repeat renal imaging to r/o obstructive foci, IVF, check Ulytes, avoid nephrotoxins, nephrology c/s  6. severe electrolyte abnormalities- hypophosphatemia, hypocalcemia- monitor and optimize, likely a refeeding component  7. Megaloblastic anemia, neutropenia - nutritional , azathioprine induced bone marrow suppression  8. SVT- will get 12 lead, optimize electrolytes, rate control as well as cardiology f/u  Care and treatment as above unless otherwise stated. Case discussed extensively with wife and son at the bedside, as well as staff and team on board. Prognosis guarded. Will need to re-address patient's acute and subacute goals of care. 77M h/o DM2, GERD, CABG (2016) and interstitial lung disease (UIP) on 3L home O2, p/w increasing REAGAN on 11/1/17. The patient was started on treatment for a suspected ILD flare w/ solumedrol 30 q12. He was also continued on home doses of Azathioprine. The patient is on chronic steroids at home.  There was some concern for fluid overload, so TTE was done which showed no signs of systolic heart failure. CT chest was done and showed no signs of PNA. on 11/7 pt started to develop worsening hypoxia and increased WOB. BiPAP was started. ABG was done which showed Respiratory alkalosis. About 4 hours later RRT was called for hypotension to the 70s. Pt was found to be febrile and was having rigors. Patient now upgraded to the MICU with-  1. Acute on chronic hypoxemic respiratory failure- multimodal- HCAP in the setting of advance UIP - MV with DSPT once clinically stable, HOB >30, pulmonary toilet. UIP care as per rheumatological/ pulmonary team   2. Septic shock- PNA also with diarrhea ( ischemic or infectious colitis), MSSA positive PCR- repeat all cultures including UA, broad spectrum abx including c. diff tx pending confirmatory testing the de-escalate according to c/s/and clinical response, would hold immunomodulator in the setting of neutropenic sepsis.  IVF with vasopressor support to keep MAP > 65mmhg  3. Diarrhea abdominal pain with elevated lactate- infectious vs ischemic will need repeat abdominal imaging once clinically stable,  serial abdominal exams, trend UOP and lactate. Post repeat imaging surgical c/s. Please send LFT's, as well as amylase and lipase.  4. AMS- multifactorial- transient cerebral hypoperfusion as well as metabolic foci (sepsis, uremia, hypoxia and hypoglycemia)- will need HCT r/o acute ICP , neuro checks Q 1 hour, correct reversible factors.  5. Acute renal failure- contrast nephropathy, prior known right hydronephrosis with nonobstructive stone, as well as ATN- Villagran, repeat renal imaging to r/o obstructive foci, IVF, check Ulytes, avoid nephrotoxins, nephrology c/s  6. severe electrolyte abnormalities- hypophosphatemia, hypocalcemia- monitor and optimize, likely a refeeding component  7. Megaloblastic anemia, neutropenia - nutritional , azathioprine induced bone marrow suppression  8. SVT- will get 12 lead, optimize electrolytes, rate control as well as cardiology f/u  9. hypoglycemia with elevated lactate in the setting of metformin use and CHRISTIANE, will hold and aggressively hydrate, as increase risk for toxic effect  glucose support    Care and treatment as above unless otherwise stated. Case discussed extensively with wife and son at the bedside, as well as staff and team on board. Prognosis guarded. Will need to re-address patient's acute and subacute goals of care.

## 2017-11-07 NOTE — PROGRESS NOTE ADULT - SUBJECTIVE AND OBJECTIVE BOX
Follow-up Pulm Progress Note  Amauri Jackson MD  [phone]    Events noted  INtubated in MICU after episode of increasing dyspnea, tachycardia, hypoxemia  Reportedly febrile to 101+ last night prior  Currently on CMV and low dose vasopressors  CXR: unchaged reticular opacities: no gross new pulm edema  TTE: Mild AS, hyperdynamic LV, PA 40-50  WBC: 1.1  C Diff neg  Lactate >7    Physical Examination:  PULM: Bilateral crackles CVS: Regular rate and rhythm, no murmurs, rubs, or gallops  ABD: Soft, non-tender  EXT:  Trace LE edema    RADIOLOGY REVIEWED  CXR: pending    CT chest:    TTE: pending

## 2017-11-07 NOTE — AIRWAY PLACEMENT NOTE ADULT - POST AIRWAY PLACEMENT ASSESSMENT:
chest excursion noted/breath sounds bilateral/CXR pending/breath sounds equal/positive end tidal CO2 noted/skin color improved

## 2017-11-07 NOTE — PROCEDURE NOTE - NSPROCDETAILS_GEN_ALL_CORE
location identified, draped/prepped, sterile technique used, needle inserted/introduced/sutured in place/connected to a pressurized flush line/positive blood return obtained via catheter/Seldinger technique/all materials/supplies accounted for at end of procedure

## 2017-11-07 NOTE — CHART NOTE - NSCHARTNOTEFT_GEN_A_CORE
Called to patient's bedside around 11:30 PM regarding hypoxia following going to the commode. Seen to be hypoxic to the high 70s. Started on non-rebreather with improvement to the 90s and given a duonebs. Patient attempted to move to commode 15 minutes later and found to desat again and was tachycardic in the 130s. Urged back into the bed, oxygenation returned to 90s and heart rate down to 110s. On examination, patient is complaining of increased WOB / fatigue despite saturating well on non-rebreather.  Pulmonary exam revealed tachypnea but clear lungs. Cardiac exam revealed tachycardia. Previously noted murmur difficult to appreciate during heavy breathing. He is A&Ox3.    Patient's breathing is labored and he is desaturating dramatically on transfers. Likely worsening of underlying lung disease  - start BiPap 10/5, 40% FiO2  - initial abg, repeat after on BiPap for one hour  - continue to monitor saturation  - strict bed rest, please use bedpan and not commode for now  - pulmonary recommendations appreciated. Patient has poor long term prognosis    Joss Phelan MD PhD Called to patient's bedside around 11:30 PM regarding hypoxia following going to the commode. Seen to be hypoxic to the high 70s. Started on non-rebreather with improvement to the 90s and given a duonebs. Patient attempted to move to [facility] 15 minutes later and found to desat again and was tachycardic in the 130s. Urged back into the bed, oxygenation returned to 90s and heart rate down to 110s. On examination, patient is complaining of increased WOB / fatigue despite saturating well on non-rebreather.  Pulmonary exam revealed tachypnea but clear lungs. Cardiac exam revealed tachycardia. Previously noted murmur difficult to appreciate during heavy breathing. He is A&Ox3.    Patient's breathing is labored and he is desaturating dramatically on transfers. Likely 2/2 to exacerbation of underlying lung disease  - start BiPap 10/5, 40% FiO2  - initial abg, repeat after on BiPap for one hour  - continue to monitor saturation  - strict bed rest, please use bedpan and not commode for now  - pulmonary recommendations appreciated. Patient has poor long term prognosis  - Would consider lasix 40 mg x1 if not improving.        - However, TTE showed normal LVEF and not currently on fluids, so unlikely fluid overload contributing       - Hypoxia likely 2/2 to poor recruitment and increased parenchymal diffusion distance (which is worsened by tachycardia induced increase in capillary flow velocity)    Joss Phelan MD PhD Called to patient's bedside around 11:30 PM regarding hypoxia following going to the commode. Seen to be hypoxic to the high 70s. Started on non-rebreather with improvement to the 90s and given a duonebs. Patient attempted to move to [facility] 15 minutes later and found to desat again and was tachycardic in the 130s. Urged back into the bed, oxygenation returned to 90s and heart rate down to 110s. On examination, patient is complaining of increased WOB / fatigue despite saturating well on non-rebreather.  Pulmonary exam revealed tachypnea but clear lungs. Cardiac exam revealed tachycardia. Previously noted murmur difficult to appreciate during heavy breathing. He is A&Ox3.    Patient's breathing is labored and he is desaturating dramatically on transfers. Likely 2/2 to exacerbation of underlying lung disease  - start BiPap 10/5, 40% FiO2  - initial abg, repeat after on BiPap for one hour  - continue to monitor saturation  - strict bed rest, please use bedpan and not commode for now  - pulmonary recommendations appreciated. Patient has poor long term prognosis  - Would consider lasix 40 mg x1 if not improving.        - However, TTE showed normal LVEF and not currently on fluids, so unlikely fluid overload contributing       - Hypoxia likely 2/2 to poor recruitment and increased parenchymal diffusion distance (which limits oxygen exchange and is worsened by tachycardia induced increase in capillary flow velocity)    Joss Phelan MD PhD Called to patient's bedside around 11:30 PM regarding hypoxia following going to the commode. Seen to be hypoxic to the high 70s. Started on non-rebreather with improvement to the 90s and given a duonebs. Patient attempted to move to [facility] 15 minutes later and found to desat again and was tachycardic in the 130s. Urged back into the bed, oxygenation returned to 90s and heart rate down to 110s. On examination, patient is complaining of increased WOB / fatigue despite saturating well on non-rebreather.  Pulmonary exam revealed tachypnea but clear lungs. Cardiac exam revealed tachycardia. Previously noted murmur difficult to appreciate during heavy breathing. He is A&Ox3.    Patient's breathing is labored and he is desaturating dramatically on transfers. Likely 2/2 to exacerbation of underlying lung disease  - start BiPap 10/5, 40% FiO2  - initial abg, repeat after on BiPap for one hour  - continue to monitor saturation  - strict bed rest, please use bedpan and not commode for now  - pulmonary recommendations appreciated. Patient has poor long term prognosis  - Would consider lasix 40 mg x1 if not improving.        - However, TTE showed normal LVEF and not currently on fluids, so unlikely fluid overload contributing       - Hypoxia likely 2/2 to poor recruitment and increased parenchymal diffusion distance (which limits oxygen exchange and is worsened by tachycardia induced increase in capillary flow velocity)    Addendum: Pt. remains tachycardic at 2 AM despite good oxygenation and respiratory alkalosis on ABG (likely 2/2 tachypnea). MAR (Bethany Ibrahim) contacted to assist in further evaluation. Patient complaining of being cold, remains alert and oriented. Patient not tolerating BiPAP and demanded it be removed. Continues to saturated well on venturi mask. On exam, tachycardic to the 120s and tachypnic. Left basilar crackles appreciated. There is no pedal edema but there is tenderness over the right medial thigh. EKG demonstrated sinus tachycardia. POCUS suggestive performed by MAR demonstrates possible pleural effusion.    Given this sudden onset tachycardia that was not resolved by normalizing oxygenation and right medical thigh tenderness, would be concerned for PE. However, there appears to be a component of anxiety at play that may also explain this tachycardia. However, I would be cautious about sedating this patient with anxiolytics.  - Encourage BiPAP  - At a minimum, assure patient keeps venturi mask one  - CTA chest to r/o pulmonary embolism    Plan d/w Dr. Enrrique Phelan MD PhD Called to patient's bedside around 11:30 PM regarding hypoxia following going to the commode. Seen to be hypoxic to the high 70s. Started on non-rebreather with improvement to the 90s and given a duonebs. Patient attempted to move to [facility] 15 minutes later and found to desat again and was tachycardic in the 130s. Urged back into the bed, oxygenation returned to 90s and heart rate down to 110s. On examination, patient is complaining of increased WOB / fatigue despite saturating well on non-rebreather.  Pulmonary exam revealed tachypnea but clear lungs. Cardiac exam revealed tachycardia. Previously noted murmur difficult to appreciate during heavy breathing. He is A&Ox3.    Patient's breathing is labored and he is desaturating dramatically on transfers. Likely 2/2 to exacerbation of underlying lung disease  - start BiPap 10/5, 40% FiO2  - initial abg, repeat after on BiPap for one hour  - continue to monitor saturation  - strict bed rest, please use bedpan and not commode for now  - pulmonary recommendations appreciated. Patient has poor long term prognosis  - Would consider lasix 40 mg x1 if not improving.        - However, TTE showed normal LVEF and not currently on fluids, so unlikely fluid overload contributing       - Hypoxia likely 2/2 to poor recruitment and increased parenchymal diffusion distance (which limits oxygen exchange and is worsened by tachycardia induced increase in capillary flow velocity)    Addendum: Pt. remains tachycardic at 2 AM despite good oxygenation and respiratory alkalosis on ABG (likely 2/2 tachypnea). MAR (Bethany Ibrahim) contacted to assist in further evaluation. Patient complaining of being cold, remains alert and oriented. Patient not tolerating BiPAP and demanded it be removed. Continues to saturated well on venturi mask. On exam, tachycardic to the 120s and tachypnic. Left basilar crackles appreciated. There is no pedal edema but there is tenderness over the right medial thigh. EKG demonstrated sinus tachycardia. POCUS suggestive performed by MAR demonstrates possible pleural effusion.    Given this sudden onset tachycardia that was not resolved by normalizing oxygenation and right medical thigh tenderness, would be concerned for PE. Alternatively, patient is neutropenic and is feeling cold. May be mounting a fever at this time, so would also consider infection (which would also explain tachycardia). There also appears to be a component of anxiety at play that may also explain this tachycardia. However, I would be cautious about sedating this patient with anxiolytics.  - Encourage BiPAP  - At a minimum, assure patient keeps venturi mask one  - CTA chest to r/o pulmonary embolism  - Blood cultures (r/o infection)    Plan d/w Dr. Enrrique Phelan MD PhD Called to patient's bedside around 11:30 PM regarding hypoxia following going to the commode. Seen to be hypoxic to the high 70s. Started on non-rebreather with improvement to the 90s and given a duonebs. Patient attempted to move to [facility] 15 minutes later and found to desat again and was tachycardic in the 130s. Urged back into the bed, oxygenation returned to 90s and heart rate down to 110s. On examination, patient is complaining of increased WOB / fatigue despite saturating well on non-rebreather.  Pulmonary exam revealed tachypnea but clear lungs. Cardiac exam revealed tachycardia. Previously noted murmur difficult to appreciate during heavy breathing. He is A&Ox3.    Patient's breathing is labored and he is desaturating dramatically on transfers. Likely 2/2 to exacerbation of underlying lung disease  - start BiPap 10/5, 40% FiO2  - initial abg, repeat after on BiPap for one hour  - continue to monitor saturation  - strict bed rest, please use bedpan and not commode for now  - pulmonary recommendations appreciated. Patient has poor long term prognosis  - Would consider lasix 40 mg x1 if not improving.        - However, TTE showed normal LVEF and not currently on fluids, so unlikely fluid overload contributing       - Hypoxia likely 2/2 to poor recruitment and increased parenchymal diffusion distance (which limits oxygen exchange and is worsened by tachycardia induced increase in capillary flow velocity)    Addendum: Pt. remains tachycardic at 2 AM despite good oxygenation and respiratory alkalosis on ABG (likely 2/2 tachypnea). MAR (Bethany Ibrahim) contacted to assist in further evaluation. Patient complaining of being cold, remains alert and oriented. Patient not tolerating BiPAP and demanded it be removed. Continues to saturated well on venturi mask. On exam, tachycardic to the 120s and tachypnic. Left basilar crackles appreciated. There is no pedal edema but there is tenderness over the right medial thigh. EKG demonstrated sinus tachycardia. POCUS suggestive performed by MAR demonstrates possible pleural effusion.    Given this sudden onset tachycardia that was not resolved by normalizing oxygenation and right medical thigh tenderness, would be concerned for PE. Alternatively, patient is neutropenic and is feeling cold. May be mounting a fever at this time, so would also consider infection (which would also explain tachycardia). There also appears to be a component of anxiety at play that may also explain this tachycardia. However, I would be cautious about sedating this patient with anxiolytics.  - Encourage BiPAP  - At a minimum, assure patient keeps venturi mask on  - CTA chest to r/o pulmonary embolism  - Blood cultures (r/o infection)    Plan d/w Dr. Enrrique Phelan MD PhD Called to patient's bedside around 11:30 PM regarding hypoxia following going to the commode. Seen to be hypoxic to the high 70s. Started on non-rebreather with improvement to the 90s and given a duonebs. Patient attempted to move to [facility] 15 minutes later and found to desat again and was tachycardic in the 130s. Urged back into the bed, oxygenation returned to 90s and heart rate down to 110s. On examination, patient is complaining of increased WOB / fatigue despite saturating well on non-rebreather.  Pulmonary exam revealed tachypnea but clear lungs. Cardiac exam revealed tachycardia. Previously noted murmur difficult to appreciate during heavy breathing. He is A&Ox3.    Patient's breathing is labored and he is desaturating dramatically on transfers. Likely 2/2 to exacerbation of underlying lung disease  - start BiPap 10/5, 40% FiO2  - initial abg, repeat after on BiPap for one hour  - continue to monitor saturation  - strict bed rest, please use bedpan and not commode for now  - pulmonary recommendations appreciated. Patient has poor long term prognosis  - Would consider lasix 40 mg x1 if not improving.        - However, TTE showed normal LVEF and not currently on fluids, so unlikely fluid overload contributing       - Hypoxia likely 2/2 to poor recruitment and increased parenchymal diffusion distance (which limits oxygen exchange and is worsened by tachycardia induced increase in capillary flow velocity)    Addendum: Pt. remains tachycardic at 2 AM despite good oxygenation and respiratory alkalosis on ABG (likely 2/2 tachypnea). MAR (Bethany Ibrahim) contacted to assist in further evaluation. Patient complaining of being cold, remains alert and oriented. Patient not tolerating BiPAP and demanded it be removed. Continues to saturated well on venturi mask. On exam, tachycardic to the 120s and tachypnic. Left basilar crackles appreciated. There is no pedal edema but there is tenderness over the right medial thigh. EKG demonstrated sinus tachycardia. POCUS suggestive performed by MAR demonstrates possible pleural effusion.    Given this sudden onset tachycardia that was not resolved by normalizing oxygenation and right medial thigh tenderness, would be concerned for PE. Alternatively, patient is neutropenic and is feeling cold. May be mounting a fever at this time, so would also consider infection (which would also explain tachycardia). There also appears to be a component of anxiety at play that may also explain this tachycardia. However, I would be cautious about sedating this patient with anxiolytics.  - Encourage BiPAP  - At a minimum, assure patient keeps venturi mask on  - CTA chest to r/o pulmonary embolism  - Blood cultures (r/o infection)    Plan d/w Dr. Enrrique Phelan MD PhD Called to patient's bedside around 11:30 PM regarding hypoxia following going to the commode. Seen to be hypoxic to the high 70s. Started on non-rebreather with improvement to the 90s and given a duonebs. Patient attempted to move to [facility] 15 minutes later and found to desat again and was tachycardic in the 130s. Urged back into the bed, oxygenation returned to 90s and heart rate down to 110s. On examination, patient is complaining of increased WOB / fatigue despite saturating well on non-rebreather.  Pulmonary exam revealed tachypnea but clear lungs. Cardiac exam revealed tachycardia. Previously noted murmur difficult to appreciate during heavy breathing. He is A&Ox3.    Patient's breathing is labored and he is desaturating dramatically on transfers. Likely 2/2 to exacerbation of underlying lung disease  - start BiPap 10/5, 40% FiO2  - initial abg, repeat after on BiPap for one hour  - continue to monitor saturation  - strict bed rest, please use bedpan and not commode for now  - pulmonary recommendations appreciated. Patient has poor long term prognosis  - Would consider lasix 40 mg x1 if not improving.        - However, TTE showed normal LVEF and not currently on fluids, so unlikely fluid overload contributing       - Hypoxia likely 2/2 to poor recruitment and increased parenchymal diffusion distance (which limits oxygen exchange and is worsened by tachycardia induced increase in capillary flow velocity)    Addendum: Pt. remains tachycardic at 2 AM despite good oxygenation and respiratory alkalosis on ABG (likely 2/2 tachypnea). MAR (Bethany Ibrahim) contacted to assist in further evaluation. Patient complaining of being cold, remains alert and oriented. Patient not tolerating BiPAP and demanded it be removed. Continues to saturated well on venturi mask. On exam, tachycardic to the 120s and tachypnic. Left basilar crackles appreciated. There is no pedal edema but there is tenderness over the right medial thigh. EKG demonstrated sinus tachycardia, no evidence of right heart strain. POCUS suggestive performed by MAR demonstrates possible pleural effusion.    Given this sudden onset tachycardia that was not resolved by normalizing oxygenation and right medial thigh tenderness, would be concerned for PE. Alternatively, patient is neutropenic and is feeling cold. May be mounting a fever at this time, so would also consider infection (which would also explain tachycardia). There also appears to be a component of anxiety at play that may also explain this tachycardia. However, I would be cautious about sedating this patient with anxiolytics.  - Encourage BiPAP  - At a minimum, assure patient keeps venturi mask on  - CTA chest to r/o pulmonary embolism  - Blood cultures (r/o infection)    Plan d/w Dr. Enrrique Phelan MD PhD Called to patient's bedside around 11:30 PM regarding hypoxia following going to the commode. Seen to be hypoxic to the high 70s. Started on non-rebreather with improvement to the 90s and given a duonebs. Patient attempted to move to [facility] 15 minutes later and found to desat again and was tachycardic in the 130s. Urged back into the bed, oxygenation returned to 90s and heart rate down to 110s. On examination, patient is complaining of increased WOB / fatigue despite saturating well on non-rebreather.  Pulmonary exam revealed tachypnea but clear lungs. Cardiac exam revealed tachycardia. Previously noted murmur difficult to appreciate during heavy breathing. He is A&Ox3.    Patient's breathing is labored and he is desaturating dramatically on transfers. Likely 2/2 to exacerbation of underlying lung disease  - start BiPap 10/5, 40% FiO2  - initial abg, repeat after on BiPap for one hour  - continue to monitor saturation  - strict bed rest, please use bedpan and not commode for now  - pulmonary recommendations appreciated. Patient has poor long term prognosis  - Would consider lasix 40 mg x1 if not improving.        - However, TTE showed normal LVEF and not currently on fluids, so unlikely fluid overload contributing       - Hypoxia likely 2/2 to poor recruitment and increased parenchymal diffusion distance (which limits oxygen exchange and is worsened by tachycardia induced increase in capillary flow velocity)    Addendum: Pt. remains tachycardic at 2 AM despite good oxygenation and respiratory alkalosis on ABG (likely 2/2 tachypnea). MAR (Bethany Ibrahim) contacted to assist in further evaluation. Patient complaining of being cold, remains alert and oriented. Patient not tolerating BiPAP and demanded it be removed. Continues to saturated well on venturi mask. On exam, tachycardic to the 120s and tachypnic. Left basilar crackles appreciated. There is no pedal edema but there is tenderness over the right medial thigh. EKG demonstrated sinus tachycardia, no evidence of right heart strain. POCUS suggestive performed by MAR demonstrates possible pleural effusion.    Given this sudden onset tachycardia that was not resolved by normalizing oxygenation and right medial thigh tenderness, would be concerned for PE. Alternatively, patient is neutropenic and is feeling cold. May be mounting a fever at this time, so would also consider infection (which would also explain tachycardia). There also appears to be a component of anxiety at play that may also explain this tachycardia. However, I would be cautious about sedating this patient with anxiolytics.  - Encourage BiPAP  - At a minimum, assure patient keeps venturi mask on  - CTA chest to r/o pulmonary embolism  - Blood cultures (r/o infection)    Plan d/w Dr. Osuna      Addendum: Repeated vitals signs at      Joss Phelan MD PhD Called to patient's bedside around 11:30 PM regarding hypoxia following going to the commode. Seen to be hypoxic to the high 70s. Started on non-rebreather with improvement to the 90s and given a duonebs. Patient attempted to move to [facility] 15 minutes later and found to desat again and was tachycardic in the 130s. Urged back into the bed, oxygenation returned to 90s and heart rate down to 110s. On examination, patient is complaining of increased WOB / fatigue despite saturating well on non-rebreather.  Pulmonary exam revealed tachypnea but clear lungs. Cardiac exam revealed tachycardia. Previously noted murmur difficult to appreciate during heavy breathing. He is A&Ox3.    Patient's breathing is labored and he is desaturating dramatically on transfers. Likely 2/2 to exacerbation of underlying lung disease  - start BiPap 10/5, 40% FiO2  - initial abg, repeat after on BiPap for one hour  - continue to monitor saturation  - strict bed rest, please use bedpan and not commode for now  - pulmonary recommendations appreciated. Patient has poor long term prognosis  - Would consider lasix 40 mg x1 if not improving.        - However, TTE showed normal LVEF and not currently on fluids, so unlikely fluid overload contributing       - Hypoxia likely 2/2 to poor recruitment and increased parenchymal diffusion distance (which limits oxygen exchange and is worsened by tachycardia induced increase in capillary flow velocity)    Addendum: Pt. remains tachycardic at 2 AM despite good oxygenation and respiratory alkalosis on ABG (likely 2/2 tachypnea). MAR (Bethany Ibrahim) contacted to assist in further evaluation. Patient complaining of being cold, remains alert and oriented. Patient not tolerating BiPAP and demanded it be removed. Continues to saturated well on venturi mask. On exam, tachycardic to the 120s and tachypnic. Left basilar crackles appreciated. There is no pedal edema but there is tenderness over the right medial thigh. EKG demonstrated sinus tachycardia, no evidence of right heart strain. POCUS suggestive performed by MAR demonstrates possible pleural effusion.    Given this sudden onset tachycardia that was not resolved by normalizing oxygenation and right medial thigh tenderness, would be concerned for PE. Alternatively, patient is neutropenic and is feeling cold. May be mounting a fever at this time, so would also consider infection (which would also explain tachycardia). There also appears to be a component of anxiety at play that may also explain this tachycardia. However, I would be cautious about sedating this patient with anxiolytics.  - Encourage BiPAP  - At a minimum, assure patient keeps venturi mask on  - CTA chest to r/o pulmonary embolism  - Blood cultures (r/o infection)    Plan d/w Dr. Osuna      Addendum: Repeated vitals signs at 4 AM. Noted fever 104.4, SBP 72.   Now SIRS 4/4. Most likely sepsis.   Ordered 1 L bolus normal saline, Vanc/Zosyn, Tylenol 1 g.   Evaluated patient and found to have altered mental status, no longer able to answer questions.  Called rapid response     Contacted family to update on status. Wife will come to hospital    Joss Phelan MD PhD Called to patient's bedside around 11:30 PM regarding hypoxia following going to the commode. Seen to be hypoxic to the high 70s. Started on non-rebreather with improvement to the 90s and given a duonebs. Patient attempted to move to [facility] 15 minutes later and found to desat again and was tachycardic in the 130s. Urged back into the bed, oxygenation returned to 90s and heart rate down to 110s. On examination, patient is complaining of increased WOB / fatigue despite saturating well on non-rebreather.  Pulmonary exam revealed tachypnea but clear lungs. Cardiac exam revealed tachycardia. Previously noted murmur difficult to appreciate during heavy breathing. He is A&Ox3.    Patient's breathing is labored and he is desaturating dramatically on transfers. Likely 2/2 to exacerbation of underlying lung disease  - start BiPap 10/5, 40% FiO2  - initial abg, repeat after on BiPap for one hour  - continue to monitor saturation  - strict bed rest, please use bedpan and not commode for now  - pulmonary recommendations appreciated. Patient has poor long term prognosis  - Would consider lasix 40 mg x1 if not improving.        - However, TTE showed normal LVEF and not currently on fluids, so unlikely fluid overload contributing       - Hypoxia likely 2/2 to poor recruitment and increased parenchymal diffusion distance (which limits oxygen exchange and is worsened by tachycardia induced increase in capillary flow velocity)    Addendum: Pt. remains tachycardic at 2 AM despite good oxygenation and respiratory alkalosis on ABG (likely 2/2 tachypnea). MAR (Bethany Ibrahim) contacted to assist in further evaluation. Patient complaining of being cold, remains alert and oriented. Patient not tolerating BiPAP and demanded it be removed. Continues to saturated well on venturi mask. On exam, tachycardic to the 120s and tachypnic. Left basilar crackles appreciated. There is no pedal edema but there is tenderness over the right medial thigh. EKG demonstrated sinus tachycardia, no evidence of right heart strain. POCUS suggestive performed by MAR demonstrates possible pleural effusion.    Given this sudden onset tachycardia that was not resolved by normalizing oxygenation and right medial thigh tenderness, would be concerned for PE. Alternatively, patient is neutropenic and is feeling cold. May be mounting a fever at this time, so would also consider infection (which would also explain tachycardia). There also appears to be a component of anxiety at play that may also explain this tachycardia. However, I would be cautious about sedating this patient with anxiolytics.  - Encourage BiPAP  - At a minimum, assure patient keeps venturi mask on  - CTA chest to r/o pulmonary embolism  - Blood cultures (r/o infection)    Plan d/w Dr. Osuna      Addendum: Repeated vitals signs at 4 AM. Noted fever 104.4, SBP 72.   Now SIRS 4/4. Most likely sepsis.   Ordered 1 L bolus normal saline, Vanc/Zosyn, Tylenol 1 g.   Evaluated patient and found to have altered mental status, no longer able to answer questions.  Called rapid response     Contacted family to update on status. Wife will come to hospital    Addendum:  Following RRT, negative C. Diff. Noted MRSA PCR was positive on admission.  - c/w Vanc and Zosyn  - s/p Azithromycin x1 (suspicion for legionella but seen to be negative on chart review)  - c/w hydrocortisone 100 mg q8h  - mag and phos repleted  - recommend hold imuran  - CTA pending    Joss Phelan MD PhD

## 2017-11-07 NOTE — CONSULT NOTE ADULT - SUBJECTIVE AND OBJECTIVE BOX
CHIEF COMPLAINT: SOB    HPI: 77M h/o DM2, GERD, CABG (2016) and interstitial lung disease (UIP) on 3L home O2, p/w increasing REAGAN on 11/1/17. The patient was started on treatment for a suspected ILD flare w/ solumedrol 30 q12. He was also continued on home doses of Azathioprine. The patient is on chronic steroids at home.  There was some concern for fluid overload, so TTE was done which showed no signs of systolic heart failure. CT chest was done and showed no signs of PNA. on 11/7 pt started to develop worsening hypoxia and increased WOB. BiPAP was started. ABG was done which showed Respiratory alkalosis. About 4 hours later RRT was called for hypotension to the 70s. Pt was found to be febrile and was having rigors. Blood cultures, ABG, and labs were done. Pt was noted to be having diarrhea as well. Cdiff PCR was sent. Pt was started on IVF, zosyn, Vanc, azithro, and PO vanc. BP improved to 110s. Pts lactate on labs was elevated from prior labs, and increased WOB was thought to be related to sepsis/lactate. Pt was also given 100 mg of IV hydrocortisone. After meds were administered pts mental status, resp status, and hemodynamics improved. He was placed on a venti mask. MICU was consulted.     PAST MEDICAL & SURGICAL HISTORY:  Asbestos exposure  Kidney stones  Coronary artery disease involving native coronary artery of native heart, angina presence unspecified: S/p CABG 2016  Cataract  Rotator cuff rupture, right  GERD (gastroesophageal reflux disease)  Hyperlipemia  Diabetes mellitus  Encounter for removal of ureteral stent: Had a ureteral stent for kidney stones. Now removed.  S/P CABG (coronary artery bypass graft)  S/P rotator cuff surgery  S/P lens implant: 2000 &amp; 2012  S/P appendectomy: over 50 years ago      FAMILY HISTORY:  Family history of breast cancer (Mother)  Family history of liver disease (Sibling)  Family history of diabetes mellitus      SOCIAL HISTORY:  Smoking: never  EtOH Use: no  Marital Status:   Occupation: retired    Recent Travel: no  Advance Directives: no    Allergies    No Known Allergies    Intolerances        HOME MEDICATIONS:  Aspirin Enteric Coated 81 mg oral delayed release tablet: 1 tab(s) orally once a day, Last Dose Taken:    · 	metFORMIN 500 mg oral tablet: 2 tab(s) orally 2 times a day (with meals), Last Dose Taken:    · 	Metoprolol Tartrate 25 mg oral tablet: 1 tab(s) orally 2 times a day, Last Dose Taken:    · 	Prandin 1 mg oral tablet: 1 tab(s) orally 3 times a day (before meals) ** see internal joslyn**, Last Dose Taken: 26-Oct-2017   · 	azaTHIOprine 50 mg oral tablet: 3 tabs in the morning   	2 tabs in the evening , Last Dose Taken:    · 	Cozaar 25 mg oral tablet: 1 tab(s) orally once a day, Last Dose Taken:    · 	Vitamin B12 1000 mcg oral tablet: Last Dose Taken:    · 	Multiple Vitamins oral tablet: 1 tab(s) orally once a day, Last Dose Taken:    · 	predniSONE 10 mg oral tablet: 1 tab(s) orally once a day, Last Dose Taken:    · 	Qvar 80 mcg/inh inhalation aerosol: 1 puff(s) inhaled 2 times a day, Last Dose Taken:    · 	clotrimazole 10 mg oral lozenge: 1 lozenge orally every 6 hours, Last Dose Taken:    · 	mupirocin 2% nasal ointment: 1 application nasal 2 times a day, Last Dose Taken:    · 	mometasone nasal: 1 application nasal 2 times a day, Last Dose Taken:    · 	simvastatin 40 mg oral tablet: 1 tab(s) orally once a day (at bedtime) MDD:1, Last Dose Taken:    · 	omeprazole 20 mg oral delayed release capsule: 1 cap(s) orally once a day MDD:1, Last Dose Taken:    · 	Co Q-10 100 mg oral capsule: 1 cap(s) orally once a day, Last Dose Taken:        REVIEW OF SYSTEMS:  [x] Unable to assess ROS because pt was on Bipap.     OBJECTIVE:  ICU Vital Signs Last 24 Hrs  T(C): 36.9 (06 Nov 2017 20:37), Max: 37 (06 Nov 2017 13:29)  T(F): 98.4 (06 Nov 2017 20:37), Max: 98.6 (06 Nov 2017 13:29)  HR: 128 (07 Nov 2017 00:57) (66 - 128)  BP: 158/81 (06 Nov 2017 20:37) (111/69 - 158/81)  BP(mean): --  ABP: --  ABP(mean): --  RR: 18 (06 Nov 2017 20:37) (18 - 20)  SpO2: 96% (07 Nov 2017 00:57) (91% - 99%)        11-05 @ 07:01  -  11-06 @ 07:00  --------------------------------------------------------  IN: 780 mL / OUT: 1375 mL / NET: -595 mL    11-06 @ 07:01  -  11-07 @ 05:08  --------------------------------------------------------  IN: 900 mL / OUT: 1800 mL / NET: -900 mL      CAPILLARY BLOOD GLUCOSE      POCT Blood Glucose.: 71 mg/dL (07 Nov 2017 04:13)    PHYSICAL EXAM:  GENERAL: moderate resp distress  HEAD:  Atraumatic, Normocephalic  EYES: EOMI, PERRLA, conjunctiva and sclera clear  NECK: Supple, No JVD  CHEST/LUNG: b/l course BS  HEART: tachycardic; No murmurs, rubs, or gallops  ABDOMEN: Soft, Nontender, Nondistended; Bowel sounds present  EXTREMITIES:  2+ Peripheral Pulses, No clubbing, cyanosis, or edema  NEUROLOGY: non-focal  SKIN: No rashes or lesions    HOSPITAL MEDICATIONS:  MEDICATIONS  (STANDING):  acetaminophen  IVPB 1000 milliGRAM(s) IV Intermittent once  ALBUTerol/ipratropium for Nebulization 3 milliLiter(s) Nebulizer every 6 hours  aspirin enteric coated 81 milliGRAM(s) Oral daily  azaTHIOprine 150 milliGRAM(s) Oral daily  azaTHIOprine 100 milliGRAM(s) Oral at bedtime  azithromycin  IVPB      azithromycin  IVPB 500 milliGRAM(s) IV Intermittent once  cyanocobalamin 1000 MICROGram(s) Oral daily  dextrose 5%. 1000 milliLiter(s) (50 mL/Hr) IV Continuous <Continuous>  dextrose 50% Injectable 12.5 Gram(s) IV Push once  dextrose 50% Injectable 25 Gram(s) IV Push once  dextrose 50% Injectable 25 Gram(s) IV Push once  fluocinonide 0.05% Cream 1 Application(s) Topical every 12 hours  heparin  Injectable 5000 Unit(s) SubCutaneous every 12 hours  hydrocortisone sodium succinate Injectable 100 milliGRAM(s) IV Push once  influenza   Vaccine 0.5 milliLiter(s) IntraMuscular once  insulin glargine Injectable (LANTUS) 40 Unit(s) SubCutaneous at bedtime  insulin lispro (HumaLOG) corrective regimen sliding scale   SubCutaneous three times a day before meals  insulin lispro (HumaLOG) corrective regimen sliding scale   SubCutaneous at bedtime  insulin lispro Injectable (HumaLOG) 15 Unit(s) SubCutaneous three times a day before meals  losartan 25 milliGRAM(s) Oral daily  methylPREDNISolone sodium succinate Injectable 30 milliGRAM(s) IV Push two times a day  metoprolol     tartrate 25 milliGRAM(s) Oral two times a day  multivitamin 1 Tablet(s) Oral daily  mupirocin 2% Ointment 1 Application(s) Topical every 12 hours  pantoprazole    Tablet 40 milliGRAM(s) Oral before breakfast  piperacillin/tazobactam IVPB. 3.375 Gram(s) IV Intermittent once  sodium chloride 0.9% Bolus 1000 milliLiter(s) IV Bolus once  vancomycin    Solution 125 milliGRAM(s) Oral every 6 hours  vancomycin  IVPB 1000 milliGRAM(s) IV Intermittent once    MEDICATIONS  (PRN):  benzonatate 100 milliGRAM(s) Oral every 8 hours PRN Cough  dextrose Gel 1 Dose(s) Oral once PRN Blood Glucose LESS THAN 70 milliGRAM(s)/deciliter  FIRST- Mouthwash  BLM 10 milliLiter(s) Swish and Spit every 6 hours PRN Mouth Care  glucagon  Injectable 1 milliGRAM(s) IntraMuscular once PRN Glucose LESS THAN 70 milligrams/deciliter  guaiFENesin   Syrup  (Sugar-Free) 100 milliGRAM(s) Oral every 6 hours PRN Cough      LABS:                        9.6    1.1   )-----------( 200      ( 07 Nov 2017 04:43 )             27.9     11-07    141  |  100  |  29<H>  ----------------------------<  65<L>  4.2   |  22  |  1.33<H>    Ca    8.7      07 Nov 2017 04:44  Phos  1.1     11-07  Mg     1.6     11-07          Arterial Blood Gas:  11-07 @ 04:34  7.54/27/72/23/96/1.4  ABG lactate: --  Arterial Blood Gas:  11-07 @ 01:25  7.54/31/160/26/100/4.2  ABG lactate: --        MICROBIOLOGY:     RADIOLOGY:  [x] Reviewed and interpreted by me: CT chest: Diffuse bilateral interstitial lung disease, most likely idiopathic pulmonary fibrosis with a UIP pattern.      EKG: Sinus Tach

## 2017-11-07 NOTE — CHART NOTE - NSCHARTNOTEFT_GEN_A_CORE
Called to bedside for acute altered mental status in setting of SBP 77 with fever 104.4F at 4:12a.  77M with severe ILD on azathioprine 2/2 asbestos exposure a/w SOB presumed to be 2/2 ILD exacerbation.  Patient noted to be newly neutropenic on admission CBC with macrocytosis and mild transaminitis; prior labs to admission without these lab abnormalities.  Patient was evaluated by Pulmonology and Hematology with latter findings likely due to azathioprine.     At 12:30a, patient started to have persistent hypoxia on NC 3L with tachycardia into 130s after ambulating to commode.  ABG with respiratory alkalosis.  Patient initially improved with BiPAP but was unable to tolerate after an hour.  Patient was then transitioned to NRB with SpO2 in high 90s, so Venti 55% was initiated with good effect.  Patient with normal temperature at that time, but with chills and rigors.  Exam also pertinent for tachycardia and L basilar crackles.  As such, preparations were made to panculture patient with vancomycin 1g and Zosyn 3.375g pending administration once cultures obtained.  CT PA also emergently ordered to r/o other etiology of acute hypoxia and tachycardia.  EKG demonstrated sinus tachycardia at 120s with morphology otherwise grossly akin to baseline.    At 4:10a, patient was found to have low systolic BP of 77 and shortly thereafter, began demonstrating AMS with lethargy.  Patient was arousable to loud voice and noxious stimuli but was not at baseline mental status.  Temperature was found to be 104.4 with patient breathing into high 30s and HR up to 150s.  Bolus of 1L NS started with improvement of SBP to sustained 110s.  All repeat labs, including previously ordered cultures, sent stat with repeat ABG demonstrating decreased pCO2 and respiratory alkalosis.    Ofirmev was given with improved tachypnea and tachycardia.  Empirically, azithromycin x1 was given with retrospective chart review demonstrating urine Legionella Ag negative on 11/2/17.  Hydrocortisone 100 IVP also given; primary team to f/u with MICU regarding further stress dose steroids in setting of neutropenia and azathioprine use.  BiPAP trialed yet again with patient unable to tolerate and was able to be deescalated to Venti mask 55% again.  With patient comfortable, RR low 20s and HR 100s.    Patient previously with multiple loose stools but were soft and not liquid in character.  However, during rapid, patient was noted to have watery stools and so C diff was sent.  Pending C diff PCR results, PO vancomycin also started during rapid.    Wife called by FARA Phelan and informed of patient's tenuous status and she stated she was on her way in.    MICU resident present at bedside during rapid and formal consult placed.    Primary team to notify attending of record.    d/w FARA Phelan with Hospitalist present  -[facility], PGY-3 [phone]  MAR [id number] Called to bedside for acute altered mental status in setting of SBP 77 with fever 104.4F at 4:12a.  77M with severe ILD on azathioprine 2/2 asbestos exposure a/w SOB presumed to be 2/2 ILD exacerbation.  Patient noted to be newly neutropenic on admission CBC with macrocytosis and mild transaminitis; prior labs to admission without these lab abnormalities.  Patient was evaluated by Pulmonology and Hematology with latter findings likely due to azathioprine.     At 12:30a, patient started to have persistent hypoxia on NC 3L with tachycardia into 130s after ambulating to commode.  ABG with respiratory alkalosis.  Patient initially improved with BiPAP but was unable to tolerate after an hour.  Patient was then transitioned to NRB with SpO2 in high 90s, so Venti 55% was initiated with good effect.  Patient with normal temperature at that time, but with chills and rigors.  Exam also pertinent for tachycardia and L basilar crackles.  As such, preparations were made to panculture patient with vancomycin 1g and Zosyn 3.375g pending administration once cultures obtained.  CT PA also emergently ordered to r/o other etiology of acute hypoxia and tachycardia.  EKG demonstrated sinus tachycardia at 120s with morphology otherwise grossly akin to baseline.    At 4:10a, patient was found to have low systolic BP of 77 and shortly thereafter, began demonstrating AMS with lethargy.  Patient was arousable to loud voice and noxious stimuli but was not at baseline mental status.  Temperature was found to be 104.4 with patient breathing into high 30s and HR up to 150s.  Bolus of 1L NS started with improvement of SBP to sustained 110s.  All repeat labs, including previously ordered cultures, sent stat with repeat ABG demonstrating decreased pCO2 and respiratory alkalosis.    Ofirmev was given with improved tachypnea and tachycardia.  Empirically, azithromycin x1 was given with retrospective chart review demonstrating urine Legionella Ag negative on 11/2/17.  Hydrocortisone 100 IVP also given; primary team to f/u with MICU regarding further stress dose steroids in setting of neutropenia and azathioprine use.  BiPAP trialed yet again with patient unable to tolerate and was able to be deescalated to Venti mask 55% again.  With patient comfortable, RR low 20s and HR 100s.    Patient previously with multiple loose stools but were soft and not liquid in character.  However, during rapid, patient was noted to have watery stools and so C diff was sent.  Pending C diff PCR results, PO vancomycin also started during rapid.    Wife called by NF Dr Phelan and informed of patient's tenuous status and she stated she was on her way in.    MICU resident present at bedside during rapid and formal consult placed.    Primary team to notify attending of record.    d/w FARA Phelan with Hospitalist present  -[facility], PGY-3 [phone]  MAR [id number]        ADDENDUM 6:39a    RRT called for rapid heart rate 170s.  Patient had just been seen by MICU attending.  MICU resident was at bedside and had [facility] conversation with family with intubation specifically addressed.  Family agreed to a trial of intubation.  Anesthesia was called and intubated via Glidescope with 7.5 ETT.  BP was supported with phenylephrine with patient given fentanyl 100 IVP x1, etomidate 20 IVP, and propofol 40 IVP.    FS initially 41 and confirmed with repeat with D50 x1 given; FS appropriately responded to 203.    Patient was transferred immediately the MICU.    d/w primary team and MICU team with Hospitalist present  -[facility], PGY-3 [phone]  MAR [id number]

## 2017-11-07 NOTE — CONSULT NOTE ADULT - NSHPATTENDINGPLANDISCUSS_GEN_ALL_CORE
Patient, family
Case discussed extensively with wife and son at the bedside, as well as staff and team on board.

## 2017-11-07 NOTE — CONSULT NOTE ADULT - ASSESSMENT
77M h/o DM2, GERD, CABG (2016) and interstitial lung disease (UIP) on 3L home O2, p/w increasing REAGAN thought to be ILD flare. Pt developed fevers and hypotension likely related to sepsis. MICU was consulted for dyspnea and hypotension 2/2 sepsis in the setting of advanced ILD    #Neuro  Metabolic Encephalopathy 2/2 sepsis   - Pt now started on treatment for sepsis.   - Would expect pts mental status to improve once septic state has been resolved.     #Pulm  Respiratory Distress 2/2 elevated lactic acid / Sepsis  - Pt maintaining airway and tolerating venti mask  - respiratory status improved after O2, IVF, and abx  - Would consider high flow nasal cannula     ILD  - Given pts advanced disease would try and avoid intubation.   - Would increased steroid dose to stress dose steroids (hydrocortisone  q8)  - Would address GOC with pt and family  - Given septic picture would consider holding pts imuran for now     #Cardio  Hypotension 2/2 Sepsis  - Pt responded well to IVF and has good LV systolic function  - Would bolus another L of NS and start standing fluids.   HTN  - Given Pts septic picture will D/c all BP meds at this time.     #GI  - Given Resp status would keep NPO for now.   GERD  - c/w Protonix    #Renal  CHRISTIANE likely prerenal in the setting of sepsis  - c/w IVF and monitor Cr  - Losartan D/c'd   Respiratory alkalosis likely in response to lactic acidemia   - right now pt is compensated, however, if pts is getting tired or if he becomes lethargic would repeat ABG and start BiPAP    #ID  Sepsis  - Previous blood cultures came back + for MSSA  - Pt was recultured after vanc was given, so pts negative blood cultures may be negative because of that  - repeat blood cultures sent. would check UA and urine cx    #Endo  DM2  - C/w insulin regimen and FS  - Pt will likely need adjustment of insulin once on stress dose steroids and NPO    #Heme  Neutropenia   - recheck CBC w/ Diff to d/u ANC    #DVT ppx: HSQ    #GOC: Given pts advanced lung disease would suggest having a goals of care conversation with the pt and pts family as his respiratory status is tenuous.     At this time pts BP and HR have improved with treatment of sepsis. Respiratory status is tenuous but stable. Will continue to monitor, however, at this time pt does not need MICU admission 77M h/o DM2, GERD, CABG (2016) and interstitial lung disease (UIP) on 3L home O2, p/w increasing REAGAN thought to be ILD flare. Pt developed fevers and hypotension likely related to sepsis. MICU was consulted for dyspnea and hypotension 2/2 sepsis in the setting of advanced ILD    #Neuro  Metabolic Encephalopathy 2/2 sepsis   - Pt now started on treatment for sepsis.   - Would expect pts mental status to improve once septic state has been resolved.     #Pulm  Respiratory Distress 2/2 elevated lactic acid / Sepsis  - Pt maintaining airway and tolerating venti mask  - respiratory status improved after O2, IVF, and abx  - Would consider high flow nasal cannula     ILD  - Given pts advanced disease would try and avoid intubation.   - Would increased steroid dose to stress dose steroids (hydrocortisone  q8)  - Would address GOC with pt and family  - Given septic picture would consider holding pts imuran for now     #Cardio  Hypotension 2/2 Sepsis  - Pt responded well to IVF and has good LV systolic function  - Would bolus another L of NS and start standing fluids.   HTN  - Given Pts septic picture will D/c all BP meds at this time.     #GI  - Given Resp status would keep NPO for now.   GERD  - c/w Protonix    #Renal  CHRISTIANE likely prerenal in the setting of sepsis  - c/w IVF and monitor Cr  - Losartan D/c'd   Respiratory alkalosis likely in response to lactic acidemia   - right now pt is compensated, however, if pts is getting tired or if he becomes lethargic would repeat ABG and start BiPAP    #ID  Sepsis  - Previous blood cultures came back + for MSSA  - Pt was recultured after vanc was given, so pts negative blood cultures may be negative because of that  - repeat blood cultures sent. would check UA and urine cx    #Endo  DM2  - C/w insulin regimen and FS  - Pt will likely need adjustment of insulin once on stress dose steroids and NPO    #Heme  Neutropenia   - recheck CBC w/ Diff to d/u ANC    #DVT ppx: HSQ    #GOC: Given pts advanced lung disease would suggest having a goals of care conversation with the pt and pts family as his respiratory status is tenuous.     Will accept to MICU at this time

## 2017-11-07 NOTE — PROGRESS NOTE ADULT - ASSESSMENT
Acute on chronic hypoxemic respiratory failure  Progressive ILD: unlikely UIP, poss chronic hypersens/NSIP  r/o new infection with septic shock and lactic acidosis: cannot exclude low flow state with or without low SVR    REC    Broad spectrum abx  Repeat CT imaging when stable  BAL/Combicath  COntinue IV solumedrol: hold imuran  ECHO derived hemodynamics including CO  Vasopressors titrate to MAP >60

## 2017-11-07 NOTE — PROVIDER CONTACT NOTE (CRITICAL VALUE NOTIFICATION) - ACTION/TREATMENT ORDERED:
PA to assess patient.
Provider notified no further change in treatment
MD presley-MD states will assess pt lab. Will monitor pt closely.

## 2017-11-08 LAB
ALBUMIN SERPL ELPH-MCNC: 2.2 G/DL — LOW (ref 3.3–5)
ALBUMIN SERPL ELPH-MCNC: 2.3 G/DL — LOW (ref 3.3–5)
ALP SERPL-CCNC: 48 U/L — SIGNIFICANT CHANGE UP (ref 40–120)
ALP SERPL-CCNC: 64 U/L — SIGNIFICANT CHANGE UP (ref 40–120)
ALT FLD-CCNC: 188 U/L RC — HIGH (ref 10–45)
ALT FLD-CCNC: 261 U/L RC — HIGH (ref 10–45)
ANION GAP SERPL CALC-SCNC: 20 MMOL/L — HIGH (ref 5–17)
ANION GAP SERPL CALC-SCNC: 20 MMOL/L — HIGH (ref 5–17)
ANION GAP SERPL CALC-SCNC: 24 MMOL/L — HIGH (ref 5–17)
APTT BLD: 43.8 SEC — HIGH (ref 27.5–37.4)
APTT BLD: 50.8 SEC — HIGH (ref 27.5–37.4)
AST SERPL-CCNC: 191 U/L — HIGH (ref 10–40)
AST SERPL-CCNC: 272 U/L — HIGH (ref 10–40)
BASE EXCESS BLDA CALC-SCNC: -11.8 MMOL/L — LOW (ref -2–2)
BILIRUB SERPL-MCNC: 2.1 MG/DL — HIGH (ref 0.2–1.2)
BILIRUB SERPL-MCNC: 2.6 MG/DL — HIGH (ref 0.2–1.2)
BLD GP AB SCN SERPL QL: NEGATIVE — SIGNIFICANT CHANGE UP
BUN SERPL-MCNC: 22 MG/DL — SIGNIFICANT CHANGE UP (ref 7–23)
BUN SERPL-MCNC: 22 MG/DL — SIGNIFICANT CHANGE UP (ref 7–23)
BUN SERPL-MCNC: 27 MG/DL — HIGH (ref 7–23)
CALCIUM SERPL-MCNC: 7.9 MG/DL — LOW (ref 8.4–10.5)
CALCIUM SERPL-MCNC: 8.2 MG/DL — LOW (ref 8.4–10.5)
CALCIUM SERPL-MCNC: 8.4 MG/DL — SIGNIFICANT CHANGE UP (ref 8.4–10.5)
CHLORIDE SERPL-SCNC: 102 MMOL/L — SIGNIFICANT CHANGE UP (ref 96–108)
CHLORIDE SERPL-SCNC: 104 MMOL/L — SIGNIFICANT CHANGE UP (ref 96–108)
CHLORIDE SERPL-SCNC: 104 MMOL/L — SIGNIFICANT CHANGE UP (ref 96–108)
CO2 BLDA-SCNC: 17 MMOL/L — LOW (ref 22–30)
CO2 SERPL-SCNC: 14 MMOL/L — LOW (ref 22–31)
CO2 SERPL-SCNC: 17 MMOL/L — LOW (ref 22–31)
CO2 SERPL-SCNC: 17 MMOL/L — LOW (ref 22–31)
CREAT SERPL-MCNC: 1.34 MG/DL — HIGH (ref 0.5–1.3)
CREAT SERPL-MCNC: 1.39 MG/DL — HIGH (ref 0.5–1.3)
CREAT SERPL-MCNC: 1.39 MG/DL — HIGH (ref 0.5–1.3)
CULTURE RESULTS: NO GROWTH — SIGNIFICANT CHANGE UP
GAS PNL BLDA: SIGNIFICANT CHANGE UP
GLUCOSE BLDC GLUCOMTR-MCNC: 119 MG/DL — HIGH (ref 70–99)
GLUCOSE BLDC GLUCOMTR-MCNC: 142 MG/DL — HIGH (ref 70–99)
GLUCOSE BLDC GLUCOMTR-MCNC: 96 MG/DL — SIGNIFICANT CHANGE UP (ref 70–99)
GLUCOSE SERPL-MCNC: 107 MG/DL — HIGH (ref 70–99)
GLUCOSE SERPL-MCNC: 136 MG/DL — HIGH (ref 70–99)
GLUCOSE SERPL-MCNC: 167 MG/DL — HIGH (ref 70–99)
GRAM STN FLD: SIGNIFICANT CHANGE UP
HCO3 BLDA-SCNC: 16 MMOL/L — LOW (ref 21–29)
HCT VFR BLD CALC: 24.9 % — LOW (ref 39–50)
HCT VFR BLD CALC: 26.6 % — LOW (ref 39–50)
HGB BLD-MCNC: 8.3 G/DL — LOW (ref 13–17)
HGB BLD-MCNC: 8.7 G/DL — LOW (ref 13–17)
HOROWITZ INDEX BLDA+IHG-RTO: 40 — SIGNIFICANT CHANGE UP
INR BLD: 1.28 RATIO — HIGH (ref 0.88–1.16)
INR BLD: 1.46 RATIO — HIGH (ref 0.88–1.16)
INTUBATED: SIGNIFICANT CHANGE UP
LACTATE SERPL-SCNC: 9.6 MMOL/L — CRITICAL HIGH (ref 0.7–2)
MAGNESIUM SERPL-MCNC: 1.7 MG/DL — SIGNIFICANT CHANGE UP (ref 1.6–2.6)
MAGNESIUM SERPL-MCNC: 1.9 MG/DL — SIGNIFICANT CHANGE UP (ref 1.6–2.6)
MCHC RBC-ENTMCNC: 32.6 GM/DL — SIGNIFICANT CHANGE UP (ref 32–36)
MCHC RBC-ENTMCNC: 33.2 GM/DL — SIGNIFICANT CHANGE UP (ref 32–36)
MCHC RBC-ENTMCNC: 36.2 PG — HIGH (ref 27–34)
MCHC RBC-ENTMCNC: 37 PG — HIGH (ref 27–34)
MCV RBC AUTO: 111 FL — HIGH (ref 80–100)
MCV RBC AUTO: 111 FL — HIGH (ref 80–100)
METHOD TYPE: SIGNIFICANT CHANGE UP
P AERUGINOSA DNA BLD POS NAA+NON-PROBE: SIGNIFICANT CHANGE UP
PCO2 BLDA: 48 MMHG — HIGH (ref 32–46)
PH BLDA: 7.15 — CRITICAL LOW (ref 7.35–7.45)
PHOSPHATE SERPL-MCNC: 5.7 MG/DL — HIGH (ref 2.5–4.5)
PHOSPHATE SERPL-MCNC: 6 MG/DL — HIGH (ref 2.5–4.5)
PLATELET # BLD AUTO: 117 K/UL — LOW (ref 150–400)
PLATELET # BLD AUTO: 152 K/UL — SIGNIFICANT CHANGE UP (ref 150–400)
PO2 BLDA: 157 MMHG — HIGH (ref 74–108)
POTASSIUM SERPL-MCNC: 5.2 MMOL/L — SIGNIFICANT CHANGE UP (ref 3.5–5.3)
POTASSIUM SERPL-SCNC: 5.2 MMOL/L — SIGNIFICANT CHANGE UP (ref 3.5–5.3)
PROT SERPL-MCNC: 5.5 G/DL — LOW (ref 6–8.3)
PROT SERPL-MCNC: 5.7 G/DL — LOW (ref 6–8.3)
PROTHROM AB SERPL-ACNC: 13.9 SEC — HIGH (ref 9.8–12.7)
PROTHROM AB SERPL-ACNC: 16 SEC — HIGH (ref 9.8–12.7)
RBC # BLD: 2.23 M/UL — LOW (ref 4.2–5.8)
RBC # BLD: 2.39 M/UL — LOW (ref 4.2–5.8)
RBC # FLD: 17 % — HIGH (ref 10.3–14.5)
RBC # FLD: 17.7 % — HIGH (ref 10.3–14.5)
RH IG SCN BLD-IMP: POSITIVE — SIGNIFICANT CHANGE UP
SAO2 % BLDA: 99 % — HIGH (ref 92–96)
SODIUM SERPL-SCNC: 139 MMOL/L — SIGNIFICANT CHANGE UP (ref 135–145)
SODIUM SERPL-SCNC: 141 MMOL/L — SIGNIFICANT CHANGE UP (ref 135–145)
SODIUM SERPL-SCNC: 142 MMOL/L — SIGNIFICANT CHANGE UP (ref 135–145)
SPECIMEN SOURCE: SIGNIFICANT CHANGE UP
SPECIMEN SOURCE: SIGNIFICANT CHANGE UP
TROPONIN T SERPL-MCNC: <0.01 NG/ML — SIGNIFICANT CHANGE UP (ref 0–0.06)
WBC # BLD: 0.9 K/UL — CRITICAL LOW (ref 3.8–10.5)
WBC # BLD: 1.4 K/UL — LOW (ref 3.8–10.5)
WBC # FLD AUTO: 0.9 K/UL — CRITICAL LOW (ref 3.8–10.5)
WBC # FLD AUTO: 1.4 K/UL — LOW (ref 3.8–10.5)

## 2017-11-08 PROCEDURE — 93010 ELECTROCARDIOGRAM REPORT: CPT | Mod: 76

## 2017-11-08 PROCEDURE — 99291 CRITICAL CARE FIRST HOUR: CPT

## 2017-11-08 RX ORDER — CALCIUM GLUCONATE 100 MG/ML
2 VIAL (ML) INTRAVENOUS ONCE
Qty: 0 | Refills: 0 | Status: COMPLETED | OUTPATIENT
Start: 2017-11-08 | End: 2017-11-08

## 2017-11-08 RX ORDER — AMIODARONE HYDROCHLORIDE 400 MG/1
150 TABLET ORAL ONCE
Qty: 0 | Refills: 0 | Status: COMPLETED | OUTPATIENT
Start: 2017-11-08 | End: 2017-11-08

## 2017-11-08 RX ORDER — DIGOXIN 250 MCG
0.25 TABLET ORAL EVERY 8 HOURS
Qty: 0 | Refills: 0 | Status: COMPLETED | OUTPATIENT
Start: 2017-11-08 | End: 2017-11-09

## 2017-11-08 RX ORDER — DIGOXIN 250 MCG
0.25 TABLET ORAL ONCE
Qty: 0 | Refills: 0 | Status: DISCONTINUED | OUTPATIENT
Start: 2017-11-08 | End: 2017-11-08

## 2017-11-08 RX ORDER — SODIUM BICARBONATE 1 MEQ/ML
50 SYRINGE (ML) INTRAVENOUS ONCE
Qty: 0 | Refills: 0 | Status: COMPLETED | OUTPATIENT
Start: 2017-11-08 | End: 2017-11-08

## 2017-11-08 RX ORDER — MEROPENEM 1 G/30ML
500 INJECTION INTRAVENOUS ONCE
Qty: 0 | Refills: 0 | Status: COMPLETED | OUTPATIENT
Start: 2017-11-08 | End: 2017-11-08

## 2017-11-08 RX ORDER — ADENOSINE 3 MG/ML
6 INJECTION INTRAVENOUS ONCE
Qty: 0 | Refills: 0 | Status: COMPLETED | OUTPATIENT
Start: 2017-11-08 | End: 2017-11-08

## 2017-11-08 RX ORDER — MIDAZOLAM HYDROCHLORIDE 1 MG/ML
2 INJECTION, SOLUTION INTRAMUSCULAR; INTRAVENOUS ONCE
Qty: 0 | Refills: 0 | Status: DISCONTINUED | OUTPATIENT
Start: 2017-11-08 | End: 2017-11-08

## 2017-11-08 RX ORDER — SODIUM BICARBONATE 1 MEQ/ML
0.19 SYRINGE (ML) INTRAVENOUS
Qty: 150 | Refills: 0 | Status: DISCONTINUED | OUTPATIENT
Start: 2017-11-08 | End: 2017-11-09

## 2017-11-08 RX ORDER — DOBUTAMINE HCL 250MG/20ML
2.5 VIAL (ML) INTRAVENOUS
Qty: 500 | Refills: 0 | Status: DISCONTINUED | OUTPATIENT
Start: 2017-11-08 | End: 2017-11-08

## 2017-11-08 RX ORDER — MEROPENEM 1 G/30ML
500 INJECTION INTRAVENOUS EVERY 8 HOURS
Qty: 0 | Refills: 0 | Status: DISCONTINUED | OUTPATIENT
Start: 2017-11-09 | End: 2017-11-09

## 2017-11-08 RX ORDER — MEROPENEM 1 G/30ML
INJECTION INTRAVENOUS
Qty: 0 | Refills: 0 | Status: DISCONTINUED | OUTPATIENT
Start: 2017-11-08 | End: 2017-11-09

## 2017-11-08 RX ORDER — GENTAMICIN SULFATE 40 MG/ML
400 VIAL (ML) INJECTION ONCE
Qty: 0 | Refills: 0 | Status: COMPLETED | OUTPATIENT
Start: 2017-11-08 | End: 2017-11-08

## 2017-11-08 RX ORDER — DIGOXIN 250 MCG
0.25 TABLET ORAL EVERY 8 HOURS
Qty: 0 | Refills: 0 | Status: DISCONTINUED | OUTPATIENT
Start: 2017-11-08 | End: 2017-11-08

## 2017-11-08 RX ORDER — LEVALBUTEROL 1.25 MG/.5ML
0.63 SOLUTION, CONCENTRATE RESPIRATORY (INHALATION) ONCE
Qty: 0 | Refills: 0 | Status: COMPLETED | OUTPATIENT
Start: 2017-11-08 | End: 2017-11-08

## 2017-11-08 RX ADMIN — PANTOPRAZOLE SODIUM 40 MILLIGRAM(S): 20 TABLET, DELAYED RELEASE ORAL at 11:52

## 2017-11-08 RX ADMIN — MEROPENEM 200 MILLIGRAM(S): 1 INJECTION INTRAVENOUS at 21:41

## 2017-11-08 RX ADMIN — SENNA PLUS 10 MILLILITER(S): 8.6 TABLET ORAL at 21:05

## 2017-11-08 RX ADMIN — LEVALBUTEROL 0.63 MILLIGRAM(S): 1.25 SOLUTION, CONCENTRATE RESPIRATORY (INHALATION) at 20:58

## 2017-11-08 RX ADMIN — Medication 1.51 MICROGRAM(S)/KG/MIN: at 05:25

## 2017-11-08 RX ADMIN — AMIODARONE HYDROCHLORIDE 600 MILLIGRAM(S): 400 TABLET ORAL at 14:45

## 2017-11-08 RX ADMIN — Medication 100 MILLIGRAM(S): at 05:24

## 2017-11-08 RX ADMIN — ADENOSINE 6 MILLIGRAM(S): 3 INJECTION INTRAVENOUS at 14:20

## 2017-11-08 RX ADMIN — Medication 50 MILLIEQUIVALENT(S): at 22:42

## 2017-11-08 RX ADMIN — HEPARIN SODIUM 5000 UNIT(S): 5000 INJECTION INTRAVENOUS; SUBCUTANEOUS at 05:24

## 2017-11-08 RX ADMIN — Medication 100 MILLIGRAM(S): at 14:02

## 2017-11-08 RX ADMIN — PIPERACILLIN AND TAZOBACTAM 25 GRAM(S): 4; .5 INJECTION, POWDER, LYOPHILIZED, FOR SOLUTION INTRAVENOUS at 18:10

## 2017-11-08 RX ADMIN — MIDAZOLAM HYDROCHLORIDE 2 MILLIGRAM(S): 1 INJECTION, SOLUTION INTRAMUSCULAR; INTRAVENOUS at 14:45

## 2017-11-08 RX ADMIN — Medication 250 MILLIGRAM(S): at 18:18

## 2017-11-08 RX ADMIN — AZITHROMYCIN 250 MILLIGRAM(S): 500 TABLET, FILM COATED ORAL at 11:04

## 2017-11-08 RX ADMIN — HEPARIN SODIUM 5000 UNIT(S): 5000 INJECTION INTRAVENOUS; SUBCUTANEOUS at 18:18

## 2017-11-08 RX ADMIN — Medication 400 GRAM(S): at 22:59

## 2017-11-08 RX ADMIN — Medication 1 APPLICATION(S): at 18:31

## 2017-11-08 RX ADMIN — FENTANYL CITRATE 4.03 MICROGRAM(S)/KG/HR: 50 INJECTION INTRAVENOUS at 12:15

## 2017-11-08 RX ADMIN — PIPERACILLIN AND TAZOBACTAM 25 GRAM(S): 4; .5 INJECTION, POWDER, LYOPHILIZED, FOR SOLUTION INTRAVENOUS at 00:19

## 2017-11-08 RX ADMIN — Medication 1.51 MICROGRAM(S)/KG/MIN: at 12:30

## 2017-11-08 RX ADMIN — Medication 1 APPLICATION(S): at 06:14

## 2017-11-08 RX ADMIN — Medication 81 MILLIGRAM(S): at 11:52

## 2017-11-08 RX ADMIN — Medication 3 MILLILITER(S): at 17:51

## 2017-11-08 RX ADMIN — Medication 3 MILLILITER(S): at 11:46

## 2017-11-08 RX ADMIN — Medication 3 MILLILITER(S): at 00:14

## 2017-11-08 RX ADMIN — Medication 50 MILLIEQUIVALENT(S): at 22:59

## 2017-11-08 RX ADMIN — PROPOFOL 2.42 MICROGRAM(S)/KG/MIN: 10 INJECTION, EMULSION INTRAVENOUS at 05:25

## 2017-11-08 RX ADMIN — Medication 0.25 MILLIGRAM(S): at 20:54

## 2017-11-08 RX ADMIN — Medication 100 MILLIGRAM(S): at 21:05

## 2017-11-08 RX ADMIN — Medication 6.04 MICROGRAM(S)/KG/MIN: at 12:30

## 2017-11-08 RX ADMIN — Medication 250 MILLIGRAM(S): at 05:44

## 2017-11-08 RX ADMIN — Medication 3 MILLILITER(S): at 05:45

## 2017-11-08 RX ADMIN — PIPERACILLIN AND TAZOBACTAM 25 GRAM(S): 4; .5 INJECTION, POWDER, LYOPHILIZED, FOR SOLUTION INTRAVENOUS at 07:28

## 2017-11-08 RX ADMIN — FENTANYL CITRATE 4.03 MICROGRAM(S)/KG/HR: 50 INJECTION INTRAVENOUS at 12:29

## 2017-11-08 RX ADMIN — Medication 500 MILLIGRAM(S): at 05:45

## 2017-11-08 NOTE — PROGRESS NOTE ADULT - ATTENDING COMMENTS
Agree with above. Patient seen and examined. Case discussed with family at bedside  -Acute hypoxemic respiratory failure - on mechanical ventilation with permissive hypercpania and low tidal volume ventilation. Maintain O2 sat > 90  -Septic shock - with GNR sepsis - bacteremia and sputum cultures with pseudomonas - given Gent x 1 overnight - followup sensitivities. continue Zosyn for now. Vanco for MSSA in nares and Azithro for ?mycoplasma  -Shock - continue pressors - maintain MAP > 65  -Sedation to maintain ventilator coordination  -Oropharyngeal dysphagia - OGT feeds  -ILD with chronic hypoxemia    Critical Care Time 40minutes Agree with above. Patient seen and examined. Case discussed with family at bedside  -Acute hypoxemic respiratory failure - on mechanical ventilation with permissive hypercpania and low tidal volume ventilation. Will increase RR with aim to get pH to 7.3. Maintain O2 sat > 90  -Septic shock - with GNR sepsis - bacteremia and sputum cultures with pseudomonas - given Gent x 1 overnight - followup sensitivities. continue Zosyn for now. Vanco for MSSA in nares and Azithro for ?mycoplasma  -Shock - continue pressors - maintain MAP > 65. Will check VTI to evaluate cardiac output - if low will start dobutamine  -Sedation to maintain ventilator coordination  -Oropharyngeal dysphagia - OGT feeds  -ILD with chronic hypoxemia    Critical Care Time 40minutes

## 2017-11-08 NOTE — PROGRESS NOTE ADULT - ASSESSMENT
Assessment and Recommendation:   · Assessment		  77M h/o DM2, GERD, CABG (2016) and interstitial lung disease (UIP) on 3L home O2, p/w increasing REAGAN thought to be ILD flare. Pt developed fevers and hypotension likely related to sepsis. MICU was consulted for dyspnea and hypotension 2/2 sepsis in the setting of advanced ILD    #Neuro  - Sedated on propofol 11/7, fentanyl 11/7  Metabolic Encephalopathy 2/2 sepsis   - Pt now started on treatment for sepsis.       #Pulm  Respiratory Distress 2/2 elevated lactic acid / Sepsis  - Intubated 11/7 for hypox resp failure and increased WOB, AC/CMV: , RR 22, FiO2 40, PEEP 5  - Duonebs q6  - respiratory status improved after O2, IVF, and abx      ILD  - Pt. is intubated for hypoxic resp failure and increased WOB  - Would increased steroid dose to stress dose steroids (solu-cortef  q8)  - Given septic picture would consider holding pts imuran for now     #Cardio  Hypotension 2/2 Sepsis  - Pt responded well to IVF and has good LV systolic function  - On levophed 11/7 and phenylephrine 11/7 for pressure support   HTN  - Given Pts septic picture all anti-hypertensive dced at this time    #GI  - NPO w tube feeds Glucerna goal 60 cc/hr  GERD  - c/w Protonix    #Renal  CHRISTIANE likely prerenal in the setting of sepsis  - c/w IVF and monitor Cr  - Losartan D/c'd   Respiratory alkalosis likely in response to lactic acidemia   - right now pt is compensated, however, if pts is getting tired or if he becomes lethargic would repeat ABG and start BiPAP    #ID  Sepsis  - Previous blood cultures came back + for MSSA  - Pt was recultured after vanc was given, so pts negative blood cultures 11/1 may be negative because of that  - Bcx 11/7 growing Pseudomonas gram (-) rods; sputum cx 11/7 shows numerous GNR and few GPC in pairs  - On Azithromycin 11/7, Zosyn 11/7. Vancomycin 11/7    #Endo  DM2  - Currently on small ISS  - Pt will likely need adjustment of insulin once on stress dose steroids and NPO    #Heme  Neutropenia   - recheck CBC w/ Diff to d/u ANC    #DVT ppx: HSQ

## 2017-11-08 NOTE — PROGRESS NOTE ADULT - SUBJECTIVE AND OBJECTIVE BOX
CHIEF COMPLAINT:  77M h/o DM2, GERD, CABG (2016) and interstitial lung disease (UIP) on 3L home O2, p/w increasing REAGAN on 17. The patient was started on treatment for a suspected ILD flare w/ solumedrol 30 q12. He was also continued on home doses of Azathioprine. The patient is on chronic steroids at home.  There was some concern for fluid overload, so TTE was done which showed no signs of systolic heart failure. CT chest was done and showed no signs of PNA. on  pt started to develop worsening hypoxia and increased WOB. BiPAP was started. ABG was done which showed Respiratory alkalosis. About 4 hours later RRT was called for hypotension to the 70s. Pt was found to be febrile and was having rigors. Blood cultures, ABG, and labs were done. Pt was noted to be having diarrhea as well. Cdiff PCR was sent. Pt was started on IVF, zosyn, Vanc, azithro, and PO vanc. BP improved to 110s. Pts lactate on labs was elevated from prior labs, and increased WOB was thought to be related to sepsis/lactate. Pt was also given 100 mg of IV hydrocortisone. After meds were administered pts mental status, resp status, and hemodynamics improved. Pt was upgraded to the MICU for acute on chronic hypoxemic respiratory failure and septic shock, and intubated  and started on vasopressors.      Interval Events: Trending lactate, not showing much improvement, continues to remain in 4-5 range. Bcx  grew Psuedomonas gram (-) rods, gave one time dose gentamycin on top of current broad spectrum Abx. Went up on levophed and down on shea overnight.. Near max on levo now.     REVIEW OF SYSTEMS:  Constitutional: [ ] negative [ ] fevers [ ] chills [ ] weight loss [ ] weight gain  HEENT: [ ] negative [ ] dry eyes [ ] eye irritation [ ] postnasal drip [ ] nasal congestion  CV: [ ] negative  [ ] chest pain [ ] orthopnea [ ] palpitations [ ] murmur  Resp: [ ] negative [ ] cough [ ] shortness of breath [ ] dyspnea [ ] wheezing [ ] sputum [ ] hemoptysis  GI: [ ] negative [ ] nausea [ ] vomiting [ ] diarrhea [ ] constipation [ ] abd pain [ ] dysphagia   : [ ] negative [ ] dysuria [ ] nocturia [ ] hematuria [ ] increased urinary frequency  Musculoskeletal: [ ] negative [ ] back pain [ ] myalgias [ ] arthralgias [ ] fracture  Skin: [ ] negative [ ] rash [ ] itch  Neurological: [ ] negative [ ] headache [ ] dizziness [ ] syncope [ ] weakness [ ] numbness  Psychiatric: [ ] negative [ ] anxiety [ ] depression  Endocrine: [ ] negative [ ] diabetes [ ] thyroid problem  Hematologic/Lymphatic: [ ] negative [ ] anemia [ ] bleeding problem  Allergic/Immunologic: [ ] negative [ ] itchy eyes [ ] nasal discharge [ ] hives [ ] angioedema  [ ] All other systems negative  [ ] Unable to assess ROS because ________    OBJECTIVE:  ICU Vital Signs Last 24 Hrs  T(C): 37.4 (2017 04:00), Max: 37.9 (2017 20:00)  T(F): 99.3 (2017 04:00), Max: 100.2 (2017 20:00)  HR: 79 (2017 06:45) (68 - 120)  BP: 120/56 (2017 06:30) (75/44 - 150/85)  BP(mean): 81 (2017 06:30) (55 - 112)  ABP: 119/49 (2017 06:45) (78/64 - 137/61)  ABP(mean): 71 (2017 06:45) (61 - 87)  RR: 22 (2017 06:45) (12 - 48)  SpO2: 97% (2017 06:45) (92% - 100%)    Mode: AC/ CMV (Assist Control/ Continuous Mandatory Ventilation), RR (machine): 22, TV (machine): 450, FiO2: 40, PEEP: 5, ITime: 1, MAP: 16, PIP: 50     @ :  -   @ 07:00  --------------------------------------------------------  IN: 900 mL / OUT: 1800 mL / NET: -900 mL     @ 07: @ 06:47  --------------------------------------------------------  IN: 3035.1 mL / OUT: 1580 mL / NET: 1455.1 mL      CAPILLARY BLOOD GLUCOSE      POCT Blood Glucose.: 96 mg/dL (2017 05:21)      PHYSICAL EXAM:  General:   HEENT:   Lymph Nodes:  Neck:   Respiratory:   Cardiovascular:   Abdomen:   Extremities:   Skin:   Neurological:  Psychiatry:    LINES:    HOSPITAL MEDICATIONS:  Standing Meds:  ALBUTerol/ipratropium for Nebulization 3 milliLiter(s) Nebulizer every 6 hours  aspirin  chewable 81 milliGRAM(s) Oral daily  azithromycin  IVPB 500 milliGRAM(s) IV Intermittent every 24 hours  fentaNYL   Infusion 1 MICROgram(s)/kG/Hr IV Continuous <Continuous>  fluocinonide 0.05% Cream 1 Application(s) Topical every 12 hours  heparin  Injectable 5000 Unit(s) SubCutaneous every 12 hours  hydrocortisone sodium succinate Injectable 100 milliGRAM(s) IV Push every 8 hours  influenza   Vaccine 0.5 milliLiter(s) IntraMuscular once  insulin lispro (HumaLOG) corrective regimen sliding scale   SubCutaneous every 6 hours  mupirocin 2% Ointment 1 Application(s) Topical every 12 hours  norepinephrine Infusion 0.01 MICROgram(s)/kG/Min IV Continuous <Continuous>  pantoprazole  Injectable 40 milliGRAM(s) IV Push daily  phenylephrine    Infusion 0.4 MICROgram(s)/kG/Min IV Continuous <Continuous>  piperacillin/tazobactam IVPB. 3.375 Gram(s) IV Intermittent every 8 hours  propofol Infusion 5 MICROgram(s)/kG/Min IV Continuous <Continuous>  senna Syrup 10 milliLiter(s) Oral at bedtime  vancomycin  IVPB 1000 milliGRAM(s) IV Intermittent every 12 hours      PRN Meds:      LABS:                        8.7    1.4   )-----------( 152      ( 2017 02:46 )             26.6     Hgb Trend: 8.7<--, 8.9<--, 9.0<--, 9.6<--, 9.7<--      139  |  102  |  22  ----------------------------<  107<H>  5.2   |  17<L>  |  1.39<H>    Ca    8.4      2017 02:46  Phos  6.0       Mg     1.7         TPro  5.5<L>  /  Alb  2.3<L>  /  TBili  2.1<H>  /  DBili  x   /  AST  191<H>  /  ALT  188<H>  /  AlkPhos  64      Creatinine Trend: 1.39<--, 1.36<--, 1.40<--, 1.33<--, 1.10<--, 1.01<--  PT/INR - ( 2017 02:46 )   PT: 13.9 sec;   INR: 1.28 ratio         PTT - ( 2017 02:46 )  PTT:43.8 sec  Urinalysis Basic - ( 2017 09:04 )    Color: Yellow / Appearance: SL Turbid / S.016 / pH: x  Gluc: x / Ketone: Negative  / Bili: Negative / Urobili: Negative   Blood: x / Protein: 30 mg/dL / Nitrite: Negative   Leuk Esterase: Negative / RBC: 0-2 /HPF / WBC Negative /HPF   Sq Epi: x / Non Sq Epi: Occasional /HPF / Bacteria: Few /HPF      Arterial Blood Gas:   @ 02:45  7.21/50/86/19/96/-7.8  ABG lactate: --  Arterial Blood Gas:   @ 20:22  7.23/46/100//97/-7.9  ABG lactate: --  Arterial Blood Gas:   @ 18:31  7.24/47/89/20/96/-7.0  ABG lactate: --  Arterial Blood Gas:   @ 16:11  7.25/47/104/20/98/-6.3  ABG lactate: --  Arterial Blood Gas:   @ 12:08  7.27/48/96//97/-5.2  ABG lactate: --  Arterial Blood Gas:   @ 10:40  7.28/48/104//97/-4.2  ABG lactate: --  Arterial Blood Gas:   @ 09:02  7.34/40/163/21/99/-3.7  ABG lactate: --  Arterial Blood Gas:   @ 04:34  7.54/27/72/23/96/1.4  ABG lactate: --  Arterial Blood Gas:   @ 01:25  7.54/31/160/26/100/4.2  ABG lactate: --    Venous Blood Gas:   @ 08:51  7.29/50/38/23/62  VBG Lactate: --  Venous Blood Gas:   @ 07:00  7.46/35/45/24/81  VBG Lactate: 6.5      MICROBIOLOGY:     Culture - Sputum (collected 2017 10:54)  Source: .Sputum Sputum  Gram Stain (2017 15:00):    Few polymorphonuclear leukocytes per low power field    Few Squamous epithelial cells per low power field    Numerous Gram Negative Rods per oil power field    Few Gram positive cocci in pairs per oil power field    Culture - Blood (collected 2017 08:47)  Source: .Blood Blood-Venous  Gram Stain (2017 01:29):    Growth in aerobic bottle: Gram Negative Rods  Preliminary Report (2017 01:30):    Growth in aerobic bottle: Gram Negative Rods    ***Blood Panel PCR results on this specimen are available    approximately 3 hours after the Gram stain result.***    Gram stain, PCR, and/or culture results may not always    correspond due to difference in methodologies.    ************************************************************    This PCR assay was performed using [employer].    The following targets are tested for: Enterococcus,    vancomycin resistant enterococci, Listeria monocytogenes,    coagulase negative staphylococci, S. aureus,    methicillin resistant S. aureus, Streptococcus agalactiae    (Group B), S. pneumoniae, S. pyogenes (Group A),    Acinetobacter baumannii, Enterobacter cloacae, E. coli,    Klebsiella oxytoca, K. pneumoniae, Proteus sp.,    Serratia marcescens, Haemophilus influenzae,    Neisseria meningitidis, Pseudomonas aeruginosa, Candida    albicans, C. glabrata, C krusei, C parapsilosis,    C. tropicalis and the KPC resistance gene.  Organism: Blood Culture PCR (2017 03:12)  Organism: Blood Culture PCR (2017 03:12)      RADIOLOGY:  [ ] Reviewed and interpreted by me    EKG:

## 2017-11-08 NOTE — PROGRESS NOTE ADULT - SUBJECTIVE AND OBJECTIVE BOX
Follow-up Pulm Progress Note  Amauri Jackson MD  [phone]    Events noted  Blood/sputum cultures pos for P. Aeruginosa  Intubated CMV on 40% FIO2  Hypotensive  - now on full dose NE for septic shock  pH 7.2 and PCO2 50 on CMV  CXR: unchaged reticular opacities: no gross new pulm edema  TTE: Mild AS, hyperdynamic LV, PA 40-50  WBC: 1.1  C Diff neg  Lactate >7    Physical Examination:  PULM: Bilateral crackles CVS: Regular rate and rhythm, no murmurs, rubs, or gallops  ABD: Soft, non-tender  EXT:  Trace LE edema    RADIOLOGY REVIEWED  CXR: No change in bilateral reticular opacities    CT chest:    TTE: pending

## 2017-11-08 NOTE — PROGRESS NOTE ADULT - ASSESSMENT
Acute on chronic hypoxemic respiratory failure  Progressive ILD: unlikely UIP, poss chronic hypersens/NSIP  Psudomonoas bacteremia with pneumonia  Septic Shock  Stable neutropenia    REC    Continue abx for pseudomonas, adjust per sensitivties  \Increase VE to target pH >/= 7.3  COntinue IV solumedrol: DC imuran  ECHO derived hemodynamics including CO to determine need for inotropes  Sedate with full CMV  Vasopressors titrate to MAP >60

## 2017-11-08 NOTE — CHART NOTE - NSCHARTNOTEFT_GEN_A_CORE
Patient's Dobutamine was increased from 2.5 to 5 mg in an attempt to increase his cardiac output; shortly after, he became tachycardic up to 160s and his Dobutamine was decreased back to 2.5 mg. However, his HR remained elevated in the 160s and he developed atrial flutter, visualized on EKG rhythm strip. Dobutamine was shut off, Levophed was increased, and Phenylephrine, which had been shut off earlier in the day, was restarted for pressure support. Carotid massage was applied with no decrease in heart rate. 6mg of Adenosine was then given; his HR decreased briefly to 120s, and his BP dipped briefly to SBP in 60s. Given the lability of his BP response it was decided not to give the next dose of 12 mg Adenosine. Pads for cardioversion were attached to patient in case of the need to shock; he was then given 150 mg of Amiodarone. Initially his HR was unresponsive to the Amiodarone administration, and 2 mg of Versed were given in anticipation of synchronized cardioversion. However, his HR then began to decreased to 120s and cardioversion was not performed. Several minutes later, the atrial fibrillation broke and he returned to sinus tachycardia. HR decreased to 60s soon after.

## 2017-11-09 LAB
-  AMIKACIN: SIGNIFICANT CHANGE UP
-  AMIKACIN: SIGNIFICANT CHANGE UP
-  AZTREONAM: SIGNIFICANT CHANGE UP
-  AZTREONAM: SIGNIFICANT CHANGE UP
-  CEFEPIME: SIGNIFICANT CHANGE UP
-  CEFEPIME: SIGNIFICANT CHANGE UP
-  CEFTAZIDIME: SIGNIFICANT CHANGE UP
-  CEFTAZIDIME: SIGNIFICANT CHANGE UP
-  CIPROFLOXACIN: SIGNIFICANT CHANGE UP
-  CIPROFLOXACIN: SIGNIFICANT CHANGE UP
-  GENTAMICIN: SIGNIFICANT CHANGE UP
-  GENTAMICIN: SIGNIFICANT CHANGE UP
-  IMIPENEM: SIGNIFICANT CHANGE UP
-  IMIPENEM: SIGNIFICANT CHANGE UP
-  LEVOFLOXACIN: SIGNIFICANT CHANGE UP
-  LEVOFLOXACIN: SIGNIFICANT CHANGE UP
-  MEROPENEM: SIGNIFICANT CHANGE UP
-  MEROPENEM: SIGNIFICANT CHANGE UP
-  PIPERACILLIN/TAZOBACTAM: SIGNIFICANT CHANGE UP
-  PIPERACILLIN/TAZOBACTAM: SIGNIFICANT CHANGE UP
-  TOBRAMYCIN: SIGNIFICANT CHANGE UP
-  TOBRAMYCIN: SIGNIFICANT CHANGE UP
ALBUMIN SERPL ELPH-MCNC: 2 G/DL — LOW (ref 3.3–5)
ALBUMIN SERPL ELPH-MCNC: 2.2 G/DL — LOW (ref 3.3–5)
ALP SERPL-CCNC: 42 U/L — SIGNIFICANT CHANGE UP (ref 40–120)
ALP SERPL-CCNC: 54 U/L — SIGNIFICANT CHANGE UP (ref 40–120)
ALT FLD-CCNC: 250 U/L RC — HIGH (ref 10–45)
ALT FLD-CCNC: 265 U/L RC — HIGH (ref 10–45)
AMMONIA BLD-MCNC: 46 UMOL/L — SIGNIFICANT CHANGE UP (ref 11–55)
ANION GAP SERPL CALC-SCNC: 14 MMOL/L — SIGNIFICANT CHANGE UP (ref 5–17)
ANION GAP SERPL CALC-SCNC: 22 MMOL/L — HIGH (ref 5–17)
ANISOCYTOSIS BLD QL: SLIGHT — SIGNIFICANT CHANGE UP
APTT BLD: 139.4 SEC — CRITICAL HIGH (ref 27.5–37.4)
APTT BLD: 47.5 SEC — HIGH (ref 27.5–37.4)
AST SERPL-CCNC: 200 U/L — HIGH (ref 10–40)
AST SERPL-CCNC: 233 U/L — HIGH (ref 10–40)
BASOPHILS # BLD AUTO: 0 K/UL — SIGNIFICANT CHANGE UP (ref 0–0.2)
BILIRUB SERPL-MCNC: 2.6 MG/DL — HIGH (ref 0.2–1.2)
BILIRUB SERPL-MCNC: 3 MG/DL — HIGH (ref 0.2–1.2)
BUN SERPL-MCNC: 29 MG/DL — HIGH (ref 7–23)
BUN SERPL-MCNC: 35 MG/DL — HIGH (ref 7–23)
CALCIUM SERPL-MCNC: 8.1 MG/DL — LOW (ref 8.4–10.5)
CALCIUM SERPL-MCNC: 8.2 MG/DL — LOW (ref 8.4–10.5)
CHLORIDE SERPL-SCNC: 100 MMOL/L — SIGNIFICANT CHANGE UP (ref 96–108)
CHLORIDE SERPL-SCNC: 103 MMOL/L — SIGNIFICANT CHANGE UP (ref 96–108)
CK SERPL-CCNC: 78 U/L — SIGNIFICANT CHANGE UP (ref 30–200)
CO2 SERPL-SCNC: 20 MMOL/L — LOW (ref 22–31)
CO2 SERPL-SCNC: 28 MMOL/L — SIGNIFICANT CHANGE UP (ref 22–31)
CREAT SERPL-MCNC: 0.98 MG/DL — SIGNIFICANT CHANGE UP (ref 0.5–1.3)
CREAT SERPL-MCNC: 1.22 MG/DL — SIGNIFICANT CHANGE UP (ref 0.5–1.3)
CULTURE RESULTS: SIGNIFICANT CHANGE UP
EOSINOPHIL # BLD AUTO: 0 K/UL — SIGNIFICANT CHANGE UP (ref 0–0.5)
EOSINOPHIL NFR URNS MANUAL: NEGATIVE — SIGNIFICANT CHANGE UP
GAS PNL BLDA: SIGNIFICANT CHANGE UP
GLUCOSE BLDC GLUCOMTR-MCNC: 256 MG/DL — HIGH (ref 70–99)
GLUCOSE BLDC GLUCOMTR-MCNC: 278 MG/DL — HIGH (ref 70–99)
GLUCOSE BLDC GLUCOMTR-MCNC: 288 MG/DL — HIGH (ref 70–99)
GLUCOSE SERPL-MCNC: 220 MG/DL — HIGH (ref 70–99)
GLUCOSE SERPL-MCNC: 297 MG/DL — HIGH (ref 70–99)
HCT VFR BLD CALC: 23.2 % — LOW (ref 39–50)
HCT VFR BLD CALC: 23.7 % — LOW (ref 39–50)
HGB BLD-MCNC: 7.6 G/DL — LOW (ref 13–17)
HGB BLD-MCNC: 7.7 G/DL — LOW (ref 13–17)
HYPOCHROMIA BLD QL: SLIGHT — SIGNIFICANT CHANGE UP
INR BLD: 1.42 RATIO — HIGH (ref 0.88–1.16)
INR BLD: 1.51 RATIO — HIGH (ref 0.88–1.16)
LYMPHOCYTES # BLD AUTO: 0.2 K/UL — LOW (ref 1–3.3)
LYMPHOCYTES # BLD AUTO: 12 % — LOW (ref 13–44)
MACROCYTES BLD QL: SIGNIFICANT CHANGE UP
MAGNESIUM SERPL-MCNC: 1.7 MG/DL — SIGNIFICANT CHANGE UP (ref 1.6–2.6)
MCHC RBC-ENTMCNC: 32.2 GM/DL — SIGNIFICANT CHANGE UP (ref 32–36)
MCHC RBC-ENTMCNC: 33.4 GM/DL — SIGNIFICANT CHANGE UP (ref 32–36)
MCHC RBC-ENTMCNC: 35.9 PG — HIGH (ref 27–34)
MCHC RBC-ENTMCNC: 36.1 PG — HIGH (ref 27–34)
MCV RBC AUTO: 108 FL — HIGH (ref 80–100)
MCV RBC AUTO: 112 FL — HIGH (ref 80–100)
METHOD TYPE: SIGNIFICANT CHANGE UP
METHOD TYPE: SIGNIFICANT CHANGE UP
MONOCYTES # BLD AUTO: 0.1 K/UL — SIGNIFICANT CHANGE UP (ref 0–0.9)
MONOCYTES NFR BLD AUTO: 8 % — SIGNIFICANT CHANGE UP (ref 2–14)
NEUTROPHILS # BLD AUTO: 0.7 K/UL — LOW (ref 1.8–7.4)
NEUTROPHILS NFR BLD AUTO: 76 % — SIGNIFICANT CHANGE UP (ref 43–77)
NEUTS BAND # BLD: 4 % — SIGNIFICANT CHANGE UP (ref 0–8)
NRBC # BLD: 20 /100 — HIGH (ref 0–0)
ORGANISM # SPEC MICROSCOPIC CNT: SIGNIFICANT CHANGE UP
PF4 HEPARIN CMPLX AB SER-ACNC: NEGATIVE — SIGNIFICANT CHANGE UP
PF4 HEPARIN CMPLX AB SERPL QL IA: 0.1 ABS — SIGNIFICANT CHANGE UP
PHOSPHATE SERPL-MCNC: 3.3 MG/DL — SIGNIFICANT CHANGE UP (ref 2.5–4.5)
PLAT MORPH BLD: NORMAL — SIGNIFICANT CHANGE UP
PLATELET # BLD AUTO: 101 K/UL — LOW (ref 150–400)
PLATELET # BLD AUTO: 98 K/UL — LOW (ref 150–400)
POIKILOCYTOSIS BLD QL AUTO: SLIGHT — SIGNIFICANT CHANGE UP
POLYCHROMASIA BLD QL SMEAR: SLIGHT — SIGNIFICANT CHANGE UP
POTASSIUM SERPL-MCNC: 4.5 MMOL/L — SIGNIFICANT CHANGE UP (ref 3.5–5.3)
POTASSIUM SERPL-MCNC: 5.3 MMOL/L — SIGNIFICANT CHANGE UP (ref 3.5–5.3)
POTASSIUM SERPL-SCNC: 4.5 MMOL/L — SIGNIFICANT CHANGE UP (ref 3.5–5.3)
POTASSIUM SERPL-SCNC: 5.3 MMOL/L — SIGNIFICANT CHANGE UP (ref 3.5–5.3)
PROT SERPL-MCNC: 4.9 G/DL — LOW (ref 6–8.3)
PROT SERPL-MCNC: 4.9 G/DL — LOW (ref 6–8.3)
PROTHROM AB SERPL-ACNC: 15.6 SEC — HIGH (ref 9.8–12.7)
PROTHROM AB SERPL-ACNC: 16.6 SEC — HIGH (ref 9.8–12.7)
RBC # BLD: 2.12 M/UL — LOW (ref 4.2–5.8)
RBC # BLD: 2.14 M/UL — LOW (ref 4.2–5.8)
RBC # FLD: 17.8 % — HIGH (ref 10.3–14.5)
RBC # FLD: 17.8 % — HIGH (ref 10.3–14.5)
RBC BLD AUTO: ABNORMAL
SCHISTOCYTES BLD QL AUTO: SLIGHT — SIGNIFICANT CHANGE UP
SODIUM SERPL-SCNC: 142 MMOL/L — SIGNIFICANT CHANGE UP (ref 135–145)
SODIUM SERPL-SCNC: 145 MMOL/L — SIGNIFICANT CHANGE UP (ref 135–145)
SPECIMEN SOURCE: SIGNIFICANT CHANGE UP
TRIGL SERPL-MCNC: 182 MG/DL — HIGH (ref 10–149)
VANCOMYCIN TROUGH SERPL-MCNC: 17.5 UG/ML — SIGNIFICANT CHANGE UP (ref 10–20)
WBC # BLD: 0.9 K/UL — CRITICAL LOW (ref 3.8–10.5)
WBC # BLD: 1.4 K/UL — LOW (ref 3.8–10.5)
WBC # FLD AUTO: 0.9 K/UL — CRITICAL LOW (ref 3.8–10.5)
WBC # FLD AUTO: 1.4 K/UL — LOW (ref 3.8–10.5)

## 2017-11-09 PROCEDURE — 74177 CT ABD & PELVIS W/CONTRAST: CPT | Mod: 26

## 2017-11-09 PROCEDURE — 99223 1ST HOSP IP/OBS HIGH 75: CPT

## 2017-11-09 PROCEDURE — 71260 CT THORAX DX C+: CPT | Mod: 26

## 2017-11-09 PROCEDURE — 99291 CRITICAL CARE FIRST HOUR: CPT

## 2017-11-09 RX ORDER — MIDAZOLAM HYDROCHLORIDE 1 MG/ML
1 INJECTION, SOLUTION INTRAMUSCULAR; INTRAVENOUS
Qty: 100 | Refills: 0 | Status: DISCONTINUED | OUTPATIENT
Start: 2017-11-09 | End: 2017-11-10

## 2017-11-09 RX ORDER — MIDAZOLAM HYDROCHLORIDE 1 MG/ML
2 INJECTION, SOLUTION INTRAMUSCULAR; INTRAVENOUS ONCE
Qty: 0 | Refills: 0 | Status: DISCONTINUED | OUTPATIENT
Start: 2017-11-09 | End: 2017-11-09

## 2017-11-09 RX ORDER — MEROPENEM 1 G/30ML
1000 INJECTION INTRAVENOUS EVERY 8 HOURS
Qty: 0 | Refills: 0 | Status: DISCONTINUED | OUTPATIENT
Start: 2017-11-09 | End: 2017-11-18

## 2017-11-09 RX ORDER — CALCIUM GLUCONATE 100 MG/ML
2 VIAL (ML) INTRAVENOUS ONCE
Qty: 0 | Refills: 0 | Status: COMPLETED | OUTPATIENT
Start: 2017-11-09 | End: 2017-11-09

## 2017-11-09 RX ORDER — POLYETHYLENE GLYCOL 3350 17 G/17G
17 POWDER, FOR SOLUTION ORAL
Qty: 0 | Refills: 0 | Status: DISCONTINUED | OUTPATIENT
Start: 2017-11-09 | End: 2017-11-14

## 2017-11-09 RX ORDER — SODIUM CHLORIDE 9 MG/ML
1000 INJECTION INTRAMUSCULAR; INTRAVENOUS; SUBCUTANEOUS
Qty: 0 | Refills: 0 | Status: DISCONTINUED | OUTPATIENT
Start: 2017-11-09 | End: 2017-11-10

## 2017-11-09 RX ADMIN — Medication 100 MILLIGRAM(S): at 21:21

## 2017-11-09 RX ADMIN — MEROPENEM 200 MILLIGRAM(S): 1 INJECTION INTRAVENOUS at 21:23

## 2017-11-09 RX ADMIN — Medication 0.25 MILLIGRAM(S): at 21:21

## 2017-11-09 RX ADMIN — Medication 2: at 05:18

## 2017-11-09 RX ADMIN — MIDAZOLAM HYDROCHLORIDE 1 MG/HR: 1 INJECTION, SOLUTION INTRAMUSCULAR; INTRAVENOUS at 04:12

## 2017-11-09 RX ADMIN — Medication 3 MILLILITER(S): at 17:04

## 2017-11-09 RX ADMIN — Medication 100 MILLIGRAM(S): at 13:50

## 2017-11-09 RX ADMIN — AZITHROMYCIN 250 MILLIGRAM(S): 500 TABLET, FILM COATED ORAL at 12:19

## 2017-11-09 RX ADMIN — Medication 0.25 MILLIGRAM(S): at 13:50

## 2017-11-09 RX ADMIN — Medication 100 MEQ/KG/HR: at 00:07

## 2017-11-09 RX ADMIN — Medication 3: at 17:29

## 2017-11-09 RX ADMIN — MEROPENEM 200 MILLIGRAM(S): 1 INJECTION INTRAVENOUS at 14:00

## 2017-11-09 RX ADMIN — Medication 3 MILLILITER(S): at 05:44

## 2017-11-09 RX ADMIN — MIDAZOLAM HYDROCHLORIDE 2 MILLIGRAM(S): 1 INJECTION, SOLUTION INTRAMUSCULAR; INTRAVENOUS at 04:35

## 2017-11-09 RX ADMIN — MEROPENEM 200 MILLIGRAM(S): 1 INJECTION INTRAVENOUS at 06:01

## 2017-11-09 RX ADMIN — Medication 100 MILLIGRAM(S): at 05:07

## 2017-11-09 RX ADMIN — PHENYLEPHRINE HYDROCHLORIDE 12.09 MICROGRAM(S)/KG/MIN: 10 INJECTION INTRAVENOUS at 00:08

## 2017-11-09 RX ADMIN — Medication 400 GRAM(S): at 21:23

## 2017-11-09 RX ADMIN — Medication 3 MILLILITER(S): at 11:47

## 2017-11-09 RX ADMIN — SODIUM CHLORIDE 125 MILLILITER(S): 9 INJECTION INTRAMUSCULAR; INTRAVENOUS; SUBCUTANEOUS at 21:30

## 2017-11-09 RX ADMIN — Medication 3 MILLILITER(S): at 00:57

## 2017-11-09 RX ADMIN — Medication 1.51 MICROGRAM(S)/KG/MIN: at 15:26

## 2017-11-09 RX ADMIN — PANTOPRAZOLE SODIUM 40 MILLIGRAM(S): 20 TABLET, DELAYED RELEASE ORAL at 12:55

## 2017-11-09 RX ADMIN — Medication 1.51 MICROGRAM(S)/KG/MIN: at 00:08

## 2017-11-09 RX ADMIN — Medication 3: at 13:00

## 2017-11-09 RX ADMIN — Medication 250 MILLIGRAM(S): at 06:00

## 2017-11-09 RX ADMIN — Medication 0.25 MILLIGRAM(S): at 06:01

## 2017-11-09 RX ADMIN — SENNA PLUS 10 MILLILITER(S): 8.6 TABLET ORAL at 21:21

## 2017-11-09 RX ADMIN — Medication 81 MILLIGRAM(S): at 13:50

## 2017-11-09 RX ADMIN — Medication 1: at 00:58

## 2017-11-09 NOTE — PROGRESS NOTE ADULT - ASSESSMENT
77M h/o DM2, GERD, CABG (2016) and interstitial lung disease (UIP) on 3L home O2, p/w increasing REAGAN thought to be ILD flare. Pt developed fevers and hypotension likely related to sepsis. MICU was consulted for dyspnea and hypotension 2/2 sepsis in the setting of advanced ILD    #Neuro  - Sedated on versed and fentanyl  Metabolic Encephalopathy 2/2 sepsis   - Pt now started on treatment for sepsis.       #Pulm  Respiratory Distress 2/2 elevated lactic acid / Sepsis  - Intubated 11/7 for hypox resp failure and increased WOB, AC/CMV: , RR 22, FiO2 40, PEEP 5  - Duonebs q6  - respiratory status improved after O2, IVF, and abx      ILD  - Pt. is intubated for hypoxic resp failure and increased WOB  - Would increased steroid dose to stress dose steroids (solu-cortef  q8)  - Given septic picture would consider holding pts imuran for now     #Cardio  Hypotension 2/2 Sepsis  - Pt responded well to IVF and has good LV systolic function  - On levophed 11/7 and phenylephrine 11/7 for pressure support   Afib/aflutter  -s/p digoxin load with HR better controlled. Avoid amio in the setting of ILD. Cards consulted    #GI  - NPO w tube feeds Glucerna goal 60 cc/hr  GERD  - c/w Protonix    #Renal  CHRISTIANE likely prerenal in the setting of sepsis  -resolved  - c/w IVF and monitor Cr in the setting of CT a/p with contrast  - Losartan D/c'd     #ID  Sepsis  - Bcx 11/7 growing Pseudomonas gram (-) rods; sputum cx 11/7 shows numerous GNR and few GPC in pairs  - On Azithromycin 11/7, Zosyn 11/7.   Concern for gut translocation given uptrending lactate and clear   #Endo  DM2  - Currently on small ISS  - Pt will likely need adjustment of insulin once on stress dose steroids and NPO    #Heme  Neutropenia   - recheck CBC w/ Diff to d/u ANC    #DVT ppx: HSQ 77M h/o DM2, GERD, CABG (2016) and interstitial lung disease (UIP) on 3L home O2, p/w increasing REAGAN thought to be ILD flare. Pt developed fevers and hypotension likely related to sepsis. MICU was consulted for dyspnea and hypotension 2/2 sepsis in the setting of advanced ILD    #Neuro  - Sedated on versed and fentanyl  Metabolic Encephalopathy 2/2 sepsis     #Pulm  Respiratory Distress 2/2 elevated lactic acid / Sepsis  - Intubated 11/7 for hypox resp failure and increased WOB, AC/CMV: , RR 22, FiO2 40, PEEP 5  - Duonebs q6  - respiratory status improved after O2, IVF, and abx      ILD  - Pt. is intubated for hypoxic resp failure and increased WOB  - Would increased steroid dose to stress dose steroids (solu-cortef  q8)  - Given septic picture holding pts imuran for now     #Cardio  Hypotension 2/2 Sepsis  - Pt responded well to IVF and has good LV systolic function  - On levophed 11/7 and phenylephrine 11/7 for pressure support   Afib/aflutter  -s/p digoxin load with HR better controlled. Avoid amio in the setting of ILD. Cards consulted    #GI  - Holding off feeds concern for ischemic colitis   GERD  - c/w Protonix    #Renal  CHRISTIANE likely prerenal in the setting of sepsis  -resolved  - c/w IVF and monitor Cr in the setting of CT a/p with contrast  - Losartan D/c'd     #ID  Sepsis  - Bcx 11/7 growing Pseudomonas gram (-) rods; sputum cx 11/7 shows numerous GNR and few GPC in pairs  - On Azithromycin 11/7, meropenem  Concern for gut translocation given uptrending lactate     #Endo  DM2  - Currently on low ISS  - Pt will likely need adjustment of insulin  if FS remain elevated. Continue to trend    #Heme  Thrombocytopenia   Likely 2/2 to zosyn, med induced, less likely HIT. HIT panel negative. Continue to trend platelets.   Neutropenia   - 1/1 sepsis vs medication induced. Continue to trend    #DVT ppx: SCDs given concern for HIT

## 2017-11-09 NOTE — CONSULT NOTE ADULT - ASSESSMENT
77 M with CAD h/o CABG, NL LV systolic function, ILD on home O2 with hypoxic resp failure, septic shock on pressors with presumed PNA. In this setting, new onset AF now rate controlled.   ·	Rates better controlled with changing sedation. Patient also in process of dig load. Complete dig load then determine maintenance based on crt and if BP allows addition of another agent.   ·	Wean pressors as tolerated.    ·	Vent management as per MICU.   ·	Will follow.

## 2017-11-09 NOTE — PROGRESS NOTE ADULT - ASSESSMENT
Acute on chronic hypoxemic respiratory failure  Progressive ILD: unlikely UIP, poss chronic hypersens/NSIP  Psudomonoas bacteremia with pneumonia  Septic Shock - clinically improved  persistent lactic acidosis - doubt due to bowel infarct given shock resolution  Anemia    REC    Continue abx for pseudomonas, adjust per sensitivties  Maintain VE to target pH >/= 7.3  COntinue IV solumedrol: DC imuran  Transufse to Hb 9 for oxygen delivery: will also likely eliminate pressor requirement  ECHO derived hemodynamics including CO to determine need for inotropes  Sedate with full CMV  Vasopressors titrate to MAP >60

## 2017-11-09 NOTE — PROGRESS NOTE ADULT - SUBJECTIVE AND OBJECTIVE BOX
Follow-up Pulm Progress Note  Amauri Jackson MD  [phone]    Events noted  Blood/sputum cultures pos for P. Aeruginosa  Intubated CMV on 40% FIO2  Pressors markely reduced  Hb decr to 7.3  Lactate persistently elevated    CXR: unchaged reticular opacities: no gross new pulm edema  TTE: Mild AS, hyperdynamic LV, PA 40-50  Physical Examination:  PULM: Bilateral crackles CVS: Regular rate and rhythm, no murmurs, rubs, or gallops  ABD: Soft, non-tender  EXT:  Trace LE edema    RADIOLOGY REVIEWED  CXR: No change in bilateral reticular opacities    CT chest:    TTE: pending

## 2017-11-09 NOTE — PROGRESS NOTE ADULT - ATTENDING COMMENTS
77 year old man with hypoxic respiratory failure, gram negative bacteremia, distributive shock, ATN, a-fib/flutter with RVR and elevated lactate    remains sedated on ventilator, better ventilator coordination on midazolam  pseudomonas in sputum and blood, sensitivities pending  continue antibiotics adjusted for creatinine clearance  lactate remains despite improvement in perfusion will check CT to rule out ischemic colitis    case discussed at length at bedside with patient wife    prognosis guarded  Critical Care Time 40minutes

## 2017-11-09 NOTE — PROGRESS NOTE ADULT - SUBJECTIVE AND OBJECTIVE BOX
CHIEF COMPLAINT: hypoxic respiratory failure 2/2 PNA    Interval Events:    REVIEW OF SYSTEMS:  Constitutional: [ ] negative [ ] fevers [ ] chills [ ] weight loss [ ] weight gain  HEENT: [ ] negative [ ] dry eyes [ ] eye irritation [ ] postnasal drip [ ] nasal congestion  CV: [ ] negative  [ ] chest pain [ ] orthopnea [ ] palpitations [ ] murmur  Resp: [ ] negative [ ] cough [ ] shortness of breath [ ] dyspnea [ ] wheezing [ ] sputum [ ] hemoptysis  GI: [ ] negative [ ] nausea [ ] vomiting [ ] diarrhea [ ] constipation [ ] abd pain [ ] dysphagia   : [ ] negative [ ] dysuria [ ] nocturia [ ] hematuria [ ] increased urinary frequency  Musculoskeletal: [ ] negative [ ] back pain [ ] myalgias [ ] arthralgias [ ] fracture  Skin: [ ] negative [ ] rash [ ] itch  Neurological: [ ] negative [ ] headache [ ] dizziness [ ] syncope [ ] weakness [ ] numbness  Psychiatric: [ ] negative [ ] anxiety [ ] depression  Endocrine: [ ] negative [ ] diabetes [ ] thyroid problem  Hematologic/Lymphatic: [ ] negative [ ] anemia [ ] bleeding problem  Allergic/Immunologic: [ ] negative [ ] itchy eyes [ ] nasal discharge [ ] hives [ ] angioedema  [ ] All other systems negative  [ ] Unable to assess ROS because ________    OBJECTIVE:  ICU Vital Signs Last 24 Hrs  T(C): 36.7 (2017 04:00), Max: 36.9 (2017 08:00)  T(F): 98.1 (2017 04:00), Max: 98.4 (2017 08:00)  HR: 96 (2017 07:00) (61 - 170)  BP: --  BP(mean): --  ABP: 124/69 (2017 07:00) (90/43 - 143/62)  ABP(mean): 88 (2017 07:00) (59 - 97)  RR: 28 (2017 07:00) (19 - 49)  SpO2: 100% (2017 07:00) (94% - 100%)    Mode: AC/ CMV (Assist Control/ Continuous Mandatory Ventilation), RR (machine): 28, TV (machine): 470, FiO2: 40, PEEP: 5, ITime: 1, MAP: 17, PIP: 38     @ 07:01  -   @ 07:00  --------------------------------------------------------  IN: 3805.3 mL / OUT: 2085 mL / NET: 1720.3 mL      CAPILLARY BLOOD GLUCOSE      POCT Blood Glucose.: 119 mg/dL (2017 18:28)      PHYSICAL EXAM:  GENERAL: moderate resp distress  HEAD:  Atraumatic, Normocephalic  EYES: EOMI, PERRLA, conjunctiva and sclera clear  NECK: Supple, No JVD  CHEST/LUNG: b/l course BS  HEART: tachycardic; No murmurs, rubs, or gallops  ABDOMEN: Soft, Nontender, Nondistended; Bowel sounds present  EXTREMITIES:  2+ Peripheral Pulses, No clubbing, cyanosis, or edema  NEUROLOGY: non-focal  SKIN: No rashes or lesions        HOSPITAL MEDICATIONS:  aspirin  chewable 81 milliGRAM(s) Oral daily    azithromycin  IVPB 500 milliGRAM(s) IV Intermittent every 24 hours  meropenem IVPB      vancomycin  IVPB 1000 milliGRAM(s) IV Intermittent every 12 hours    digoxin  Injectable 0.25 milliGRAM(s) IV Push every 8 hours  norepinephrine Infusion 0.01 MICROgram(s)/kG/Min IV Continuous <Continuous>  phenylephrine    Infusion 0.4 MICROgram(s)/kG/Min IV Continuous <Continuous>    hydrocortisone sodium succinate Injectable 100 milliGRAM(s) IV Push every 8 hours  insulin lispro (HumaLOG) corrective regimen sliding scale   SubCutaneous every 6 hours    ALBUTerol/ipratropium for Nebulization 3 milliLiter(s) Nebulizer every 6 hours    fentaNYL   Infusion 1 MICROgram(s)/kG/Hr IV Continuous <Continuous>  midazolam Infusion 1 mG/Hr IV Continuous <Continuous>  propofol Infusion 5 MICROgram(s)/kG/Min IV Continuous <Continuous>    pantoprazole  Injectable 40 milliGRAM(s) IV Push daily  senna Syrup 10 milliLiter(s) Oral at bedtime        sodium bicarbonate  Infusion 0.186 mEq/kG/Hr IV Continuous <Continuous>    influenza   Vaccine 0.5 milliLiter(s) IntraMuscular once    fluocinonide 0.05% Cream 1 Application(s) Topical every 12 hours  mupirocin 2% Ointment 1 Application(s) Topical every 12 hours        LABS:                        7.6    0.9   )-----------( 98       ( 2017 04:35 )             23.7     Hgb Trend: 7.6<--, 8.3<--, 8.7<--, 8.9<--, 9.0<--      145  |  103  |  29<H>  ----------------------------<  220<H>  5.3   |  20<L>  |  1.22    Ca    8.2<L>      2017 04:35  Phos  3.3       Mg     1.7         TPro  4.9<L>  /  Alb  2.0<L>  /  TBili  2.6<H>  /  DBili  x   /  AST  233<H>  /  ALT  250<H>  /  AlkPhos  42      Creatinine Trend: 1.22<--, 1.39<--, 1.34<--, 1.39<--, 1.36<--, 1.40<--  PT/INR - ( 2017 05:58 )   PT: 16.6 sec;   INR: 1.51 ratio         PTT - ( 2017 05:58 )  PTT:47.5 sec  Urinalysis Basic - ( 2017 09:04 )    Color: Yellow / Appearance: SL Turbid / S.016 / pH: x  Gluc: x / Ketone: Negative  / Bili: Negative / Urobili: Negative   Blood: x / Protein: 30 mg/dL / Nitrite: Negative   Leuk Esterase: Negative / RBC: 0-2 /HPF / WBC Negative /HPF   Sq Epi: x / Non Sq Epi: Occasional /HPF / Bacteria: Few /HPF      Arterial Blood Gas:   @ 04:27  7.35/38/187/20/100/-4.6  ABG lactate: --  Arterial Blood Gas:   @ 00:39  7.29/38/190/18/99/-7.8  ABG lactate: --  Arterial Blood Gas:   @ 21:50  7.17/42/191/15/99/-12.7  ABG lactate: --  Arterial Blood Gas:   @ 20:11  7.16/41/197/14/99/-13.4  ABG lactate: --  Arterial Blood Gas:   @ 16:17  7.15/48/157/16/99/-11.8  ABG lactate: --  Arterial Blood Gas:   @ 14:14  7.19/49/139/18/99/-9.5  ABG lactate: --  Arterial Blood Gas:   @ 02:45  7.21/50/86/19/96/-7.8  ABG lactate: --  Arterial Blood Gas:   @ 20:22  7.23/46/100/19/97/-7.9  ABG lactate: --  Arterial Blood Gas:   @ 18:31  7.24/47/89/20/96/-7.0  ABG lactate: --  Arterial Blood Gas:   @ 16:11  7.25/47/104/20/98/-6.3  ABG lactate: --  Arterial Blood Gas:   @ 12:08  7.27/48/96/21/97/-5.2  ABG lactate: --  Arterial Blood Gas:   @ 10:40  7.28/48/104/22/97/-4.2  ABG lactate: --  Arterial Blood Gas:   @ 09:02  7.34/40/163/21/99/-3.7  ABG lactate: --    Venous Blood Gas:   @ 08:51  7.29/50/38/23/62  VBG Lactate: --    Blood    MICROBIOLOGY:     RADIOLOGY:  [ ] Reviewed and interpreted by me    EKG: CHIEF COMPLAINT: hypoxic respiratory failure 2/2 PNA    Interval Events: Added on versed for dys-coordination with vent. HR better controlled after digoxin loaded.     REVIEW OF SYSTEMS:  Constitutional: [ ] negative [ ] fevers [ ] chills [ ] weight loss [ ] weight gain  HEENT: [ ] negative [ ] dry eyes [ ] eye irritation [ ] postnasal drip [ ] nasal congestion  CV: [ ] negative  [ ] chest pain [ ] orthopnea [ ] palpitations [ ] murmur  Resp: [ ] negative [ ] cough [ ] shortness of breath [ ] dyspnea [ ] wheezing [ ] sputum [ ] hemoptysis  GI: [ ] negative [ ] nausea [ ] vomiting [ ] diarrhea [ ] constipation [ ] abd pain [ ] dysphagia   : [ ] negative [ ] dysuria [ ] nocturia [ ] hematuria [ ] increased urinary frequency  Musculoskeletal: [ ] negative [ ] back pain [ ] myalgias [ ] arthralgias [ ] fracture  Skin: [ ] negative [ ] rash [ ] itch  Neurological: [ ] negative [ ] headache [ ] dizziness [ ] syncope [ ] weakness [ ] numbness  Psychiatric: [ ] negative [ ] anxiety [ ] depression  Endocrine: [ ] negative [ ] diabetes [ ] thyroid problem  Hematologic/Lymphatic: [ ] negative [ ] anemia [ ] bleeding problem  Allergic/Immunologic: [ ] negative [ ] itchy eyes [ ] nasal discharge [ ] hives [ ] angioedema  [ ] All other systems negative  [ ] Unable to assess ROS because ________    OBJECTIVE:  ICU Vital Signs Last 24 Hrs  T(C): 36.7 (2017 04:00), Max: 36.9 (2017 08:00)  T(F): 98.1 (2017 04:00), Max: 98.4 (2017 08:00)  HR: 96 (2017 07:00) (61 - 170)  BP: --  BP(mean): --  ABP: 124/69 (2017 07:00) (90/43 - 143/62)  ABP(mean): 88 (2017 07:00) (59 - 97)  RR: 28 (2017 07:00) (19 - 49)  SpO2: 100% (2017 07:00) (94% - 100%)    Mode: AC/ CMV (Assist Control/ Continuous Mandatory Ventilation), RR (machine): 28, TV (machine): 470, FiO2: 40, PEEP: 5, ITime: 1, MAP: 17, PIP: 38     @ 07:01  -   @ 07:00  --------------------------------------------------------  IN: 3805.3 mL / OUT: 2085 mL / NET: 1720.3 mL      CAPILLARY BLOOD GLUCOSE      POCT Blood Glucose.: 119 mg/dL (2017 18:28)      PHYSICAL EXAM:  GENERAL: moderate resp distress  HEAD:  Atraumatic, Normocephalic  EYES: EOMI, PERRLA, conjunctiva and sclera clear  NECK: Supple, No JVD  CHEST/LUNG: b/l course BS  HEART: tachycardic; No murmurs, rubs, or gallops  ABDOMEN: Soft, Nontender, Nondistended; Bowel sounds present  EXTREMITIES:  2+ Peripheral Pulses, No clubbing, cyanosis, or edema  NEUROLOGY: non-focal  SKIN: No rashes or lesions        HOSPITAL MEDICATIONS:  aspirin  chewable 81 milliGRAM(s) Oral daily    azithromycin  IVPB 500 milliGRAM(s) IV Intermittent every 24 hours  meropenem IVPB      vancomycin  IVPB 1000 milliGRAM(s) IV Intermittent every 12 hours    digoxin  Injectable 0.25 milliGRAM(s) IV Push every 8 hours  norepinephrine Infusion 0.01 MICROgram(s)/kG/Min IV Continuous <Continuous>  phenylephrine    Infusion 0.4 MICROgram(s)/kG/Min IV Continuous <Continuous>    hydrocortisone sodium succinate Injectable 100 milliGRAM(s) IV Push every 8 hours  insulin lispro (HumaLOG) corrective regimen sliding scale   SubCutaneous every 6 hours    ALBUTerol/ipratropium for Nebulization 3 milliLiter(s) Nebulizer every 6 hours    fentaNYL   Infusion 1 MICROgram(s)/kG/Hr IV Continuous <Continuous>  midazolam Infusion 1 mG/Hr IV Continuous <Continuous>  propofol Infusion 5 MICROgram(s)/kG/Min IV Continuous <Continuous>    pantoprazole  Injectable 40 milliGRAM(s) IV Push daily  senna Syrup 10 milliLiter(s) Oral at bedtime        sodium bicarbonate  Infusion 0.186 mEq/kG/Hr IV Continuous <Continuous>    influenza   Vaccine 0.5 milliLiter(s) IntraMuscular once    fluocinonide 0.05% Cream 1 Application(s) Topical every 12 hours  mupirocin 2% Ointment 1 Application(s) Topical every 12 hours        LABS:                        7.6    0.9   )-----------( 98       ( 2017 04:35 )             23.7     Hgb Trend: 7.6<--, 8.3<--, 8.7<--, 8.9<--, 9.0<--      145  |  103  |  29<H>  ----------------------------<  220<H>  5.3   |  20<L>  |  1.22    Ca    8.2<L>      2017 04:35  Phos  3.3       Mg     1.7         TPro  4.9<L>  /  Alb  2.0<L>  /  TBili  2.6<H>  /  DBili  x   /  AST  233<H>  /  ALT  250<H>  /  AlkPhos  42      Creatinine Trend: 1.22<--, 1.39<--, 1.34<--, 1.39<--, 1.36<--, 1.40<--  PT/INR - ( 2017 05:58 )   PT: 16.6 sec;   INR: 1.51 ratio         PTT - ( 2017 05:58 )  PTT:47.5 sec  Urinalysis Basic - ( 2017 09:04 )    Color: Yellow / Appearance: SL Turbid / S.016 / pH: x  Gluc: x / Ketone: Negative  / Bili: Negative / Urobili: Negative   Blood: x / Protein: 30 mg/dL / Nitrite: Negative   Leuk Esterase: Negative / RBC: 0-2 /HPF / WBC Negative /HPF   Sq Epi: x / Non Sq Epi: Occasional /HPF / Bacteria: Few /HPF      Arterial Blood Gas:   @ 04:27  7.35/38/187/20/100/-4.6  ABG lactate: --  Arterial Blood Gas:   @ 00:39  7.29/38/190/18/99/-7.8  ABG lactate: --  Arterial Blood Gas:   @ 21:50  7.17/42/191/15/99/-12.7  ABG lactate: --  Arterial Blood Gas:   @ 20:11  7.16/41/197/14/99/-13.4  ABG lactate: --  Arterial Blood Gas:   @ 16:17  7.15/48/157/16/99/-11.8  ABG lactate: --  Arterial Blood Gas:   @ 14:14  7.19/49/139/18/99/-9.5  ABG lactate: --  Arterial Blood Gas:   @ 02:45  7.21/50/86/19/96/-7.8  ABG lactate: --  Arterial Blood Gas:   @ 20:22  7.23/46/100/19/97/-7.9  ABG lactate: --  Arterial Blood Gas:   @ 18:31  7.24/47/89/20/96/-7.0  ABG lactate: --  Arterial Blood Gas:   @ 16:11  7.25/47/104/20/98/-6.3  ABG lactate: --  Arterial Blood Gas:   @ 12:08  7.27/48/96/21/97/-5.2  ABG lactate: --  Arterial Blood Gas:   @ 10:40  7.28/48/104/22/97/-4.2  ABG lactate: --  Arterial Blood Gas:   @ 09:02  7.34/40/163/21/99/-3.7  ABG lactate: --    Venous Blood Gas:   @ 08:51  7.29/50/38/23/62  VBG Lactate: --    Blood    MICROBIOLOGY:     RADIOLOGY:  [ ] Reviewed and interpreted by me    EKG:

## 2017-11-10 LAB
-  AMPICILLIN/SULBACTAM: SIGNIFICANT CHANGE UP
-  CEFAZOLIN: SIGNIFICANT CHANGE UP
-  CIPROFLOXACIN: SIGNIFICANT CHANGE UP
-  CLINDAMYCIN: SIGNIFICANT CHANGE UP
-  COAGULASE NEGATIVE STAPHYLOCOCCUS: SIGNIFICANT CHANGE UP
-  ERYTHROMYCIN: SIGNIFICANT CHANGE UP
-  GENTAMICIN: SIGNIFICANT CHANGE UP
-  LEVOFLOXACIN: SIGNIFICANT CHANGE UP
-  MOXIFLOXACIN(AEROBIC): SIGNIFICANT CHANGE UP
-  OXACILLIN: SIGNIFICANT CHANGE UP
-  PENICILLIN: SIGNIFICANT CHANGE UP
-  RIFAMPIN: SIGNIFICANT CHANGE UP
-  TETRACYCLINE: SIGNIFICANT CHANGE UP
-  TRIMETHOPRIM/SULFAMETHOXAZOLE: SIGNIFICANT CHANGE UP
-  VANCOMYCIN: SIGNIFICANT CHANGE UP
ALBUMIN SERPL ELPH-MCNC: 2 G/DL — LOW (ref 3.3–5)
ALP SERPL-CCNC: 55 U/L — SIGNIFICANT CHANGE UP (ref 40–120)
ALT FLD-CCNC: 247 U/L RC — HIGH (ref 10–45)
AMMONIA BLD-MCNC: 23 UMOL/L — SIGNIFICANT CHANGE UP (ref 11–55)
ANION GAP SERPL CALC-SCNC: 11 MMOL/L — SIGNIFICANT CHANGE UP (ref 5–17)
ANION GAP SERPL CALC-SCNC: 9 MMOL/L — SIGNIFICANT CHANGE UP (ref 5–17)
APTT BLD: 36.8 SEC — SIGNIFICANT CHANGE UP (ref 27.5–37.4)
AST SERPL-CCNC: 171 U/L — HIGH (ref 10–40)
BILIRUB SERPL-MCNC: 2.6 MG/DL — HIGH (ref 0.2–1.2)
BUN SERPL-MCNC: 37 MG/DL — HIGH (ref 7–23)
BUN SERPL-MCNC: 42 MG/DL — HIGH (ref 7–23)
CALCIUM SERPL-MCNC: 7.6 MG/DL — LOW (ref 8.4–10.5)
CALCIUM SERPL-MCNC: 8.3 MG/DL — LOW (ref 8.4–10.5)
CHLORIDE SERPL-SCNC: 101 MMOL/L — SIGNIFICANT CHANGE UP (ref 96–108)
CHLORIDE SERPL-SCNC: 104 MMOL/L — SIGNIFICANT CHANGE UP (ref 96–108)
CO2 SERPL-SCNC: 30 MMOL/L — SIGNIFICANT CHANGE UP (ref 22–31)
CO2 SERPL-SCNC: 30 MMOL/L — SIGNIFICANT CHANGE UP (ref 22–31)
CREAT SERPL-MCNC: 0.91 MG/DL — SIGNIFICANT CHANGE UP (ref 0.5–1.3)
CREAT SERPL-MCNC: 0.97 MG/DL — SIGNIFICANT CHANGE UP (ref 0.5–1.3)
CULTURE RESULTS: SIGNIFICANT CHANGE UP
DIGOXIN SERPL-MCNC: 1.4 NG/ML — SIGNIFICANT CHANGE UP (ref 0.8–2)
GAS PNL BLDA: SIGNIFICANT CHANGE UP
GLUCOSE BLDC GLUCOMTR-MCNC: 298 MG/DL — HIGH (ref 70–99)
GLUCOSE BLDC GLUCOMTR-MCNC: 325 MG/DL — HIGH (ref 70–99)
GLUCOSE SERPL-MCNC: 264 MG/DL — HIGH (ref 70–99)
GLUCOSE SERPL-MCNC: 376 MG/DL — HIGH (ref 70–99)
GRAM STN FLD: SIGNIFICANT CHANGE UP
HAV IGM SER-ACNC: SIGNIFICANT CHANGE UP
HBV CORE IGM SER-ACNC: SIGNIFICANT CHANGE UP
HBV SURFACE AG SER-ACNC: SIGNIFICANT CHANGE UP
HCT VFR BLD CALC: 23.1 % — LOW (ref 39–50)
HCV AB S/CO SERPL IA: 0.16 S/CO — SIGNIFICANT CHANGE UP
HCV AB SERPL-IMP: SIGNIFICANT CHANGE UP
HGB BLD-MCNC: 7.8 G/DL — LOW (ref 13–17)
INR BLD: 1.27 RATIO — HIGH (ref 0.88–1.16)
MAGNESIUM SERPL-MCNC: 1.9 MG/DL — SIGNIFICANT CHANGE UP (ref 1.6–2.6)
MAGNESIUM SERPL-MCNC: 2 MG/DL — SIGNIFICANT CHANGE UP (ref 1.6–2.6)
MCHC RBC-ENTMCNC: 33.6 GM/DL — SIGNIFICANT CHANGE UP (ref 32–36)
MCHC RBC-ENTMCNC: 36.5 PG — HIGH (ref 27–34)
MCV RBC AUTO: 108 FL — HIGH (ref 80–100)
METHOD TYPE: SIGNIFICANT CHANGE UP
METHOD TYPE: SIGNIFICANT CHANGE UP
ORGANISM # SPEC MICROSCOPIC CNT: SIGNIFICANT CHANGE UP
PHOSPHATE SERPL-MCNC: 1.1 MG/DL — LOW (ref 2.5–4.5)
PHOSPHATE SERPL-MCNC: 2.6 MG/DL — SIGNIFICANT CHANGE UP (ref 2.5–4.5)
PLATELET # BLD AUTO: 98 K/UL — LOW (ref 150–400)
POTASSIUM SERPL-MCNC: 4.6 MMOL/L — SIGNIFICANT CHANGE UP (ref 3.5–5.3)
POTASSIUM SERPL-MCNC: 4.8 MMOL/L — SIGNIFICANT CHANGE UP (ref 3.5–5.3)
POTASSIUM SERPL-SCNC: 4.6 MMOL/L — SIGNIFICANT CHANGE UP (ref 3.5–5.3)
POTASSIUM SERPL-SCNC: 4.8 MMOL/L — SIGNIFICANT CHANGE UP (ref 3.5–5.3)
PROT SERPL-MCNC: 4.7 G/DL — LOW (ref 6–8.3)
PROTHROM AB SERPL-ACNC: 13.8 SEC — HIGH (ref 9.8–12.7)
RBC # BLD: 2.13 M/UL — LOW (ref 4.2–5.8)
RBC # FLD: 17.8 % — HIGH (ref 10.3–14.5)
SODIUM SERPL-SCNC: 142 MMOL/L — SIGNIFICANT CHANGE UP (ref 135–145)
SODIUM SERPL-SCNC: 143 MMOL/L — SIGNIFICANT CHANGE UP (ref 135–145)
SPECIMEN SOURCE: SIGNIFICANT CHANGE UP
SPECIMEN SOURCE: SIGNIFICANT CHANGE UP
WBC # BLD: 1.9 K/UL — LOW (ref 3.8–10.5)
WBC # FLD AUTO: 1.9 K/UL — LOW (ref 3.8–10.5)

## 2017-11-10 PROCEDURE — 95951: CPT | Mod: 26

## 2017-11-10 PROCEDURE — 95819 EEG AWAKE AND ASLEEP: CPT | Mod: 26

## 2017-11-10 PROCEDURE — 99291 CRITICAL CARE FIRST HOUR: CPT

## 2017-11-10 RX ORDER — INSULIN LISPRO 100/ML
VIAL (ML) SUBCUTANEOUS EVERY 6 HOURS
Qty: 0 | Refills: 0 | Status: DISCONTINUED | OUTPATIENT
Start: 2017-11-10 | End: 2017-11-30

## 2017-11-10 RX ORDER — DEXMEDETOMIDINE HYDROCHLORIDE IN 0.9% SODIUM CHLORIDE 4 UG/ML
0.2 INJECTION INTRAVENOUS
Qty: 200 | Refills: 0 | Status: DISCONTINUED | OUTPATIENT
Start: 2017-11-10 | End: 2017-11-11

## 2017-11-10 RX ORDER — AMIKACIN SULFATE 250 MG/ML
INJECTION, SOLUTION INTRAMUSCULAR; INTRAVENOUS
Qty: 0 | Refills: 0 | Status: DISCONTINUED | OUTPATIENT
Start: 2017-11-10 | End: 2017-11-10

## 2017-11-10 RX ORDER — HUMAN INSULIN 100 [IU]/ML
5 INJECTION, SUSPENSION SUBCUTANEOUS EVERY 6 HOURS
Qty: 0 | Refills: 0 | Status: DISCONTINUED | OUTPATIENT
Start: 2017-11-10 | End: 2017-11-10

## 2017-11-10 RX ORDER — AMIKACIN SULFATE 250 MG/ML
400 INJECTION, SOLUTION INTRAMUSCULAR; INTRAVENOUS ONCE
Qty: 0 | Refills: 0 | Status: COMPLETED | OUTPATIENT
Start: 2017-11-10 | End: 2017-11-10

## 2017-11-10 RX ORDER — DIGOXIN 250 MCG
0.12 TABLET ORAL DAILY
Qty: 0 | Refills: 0 | Status: DISCONTINUED | OUTPATIENT
Start: 2017-11-10 | End: 2017-11-11

## 2017-11-10 RX ORDER — AMIKACIN SULFATE 250 MG/ML
400 INJECTION, SOLUTION INTRAMUSCULAR; INTRAVENOUS EVERY 8 HOURS
Qty: 0 | Refills: 0 | Status: DISCONTINUED | OUTPATIENT
Start: 2017-11-10 | End: 2017-11-10

## 2017-11-10 RX ORDER — HUMAN INSULIN 100 [IU]/ML
7 INJECTION, SUSPENSION SUBCUTANEOUS EVERY 6 HOURS
Qty: 0 | Refills: 0 | Status: DISCONTINUED | OUTPATIENT
Start: 2017-11-10 | End: 2017-11-11

## 2017-11-10 RX ORDER — POTASSIUM PHOSPHATE, MONOBASIC POTASSIUM PHOSPHATE, DIBASIC 236; 224 MG/ML; MG/ML
15 INJECTION, SOLUTION INTRAVENOUS ONCE
Qty: 0 | Refills: 0 | Status: COMPLETED | OUTPATIENT
Start: 2017-11-10 | End: 2017-11-10

## 2017-11-10 RX ORDER — INSULIN LISPRO 100/ML
10 VIAL (ML) SUBCUTANEOUS ONCE
Qty: 0 | Refills: 0 | Status: COMPLETED | OUTPATIENT
Start: 2017-11-10 | End: 2017-11-10

## 2017-11-10 RX ORDER — VANCOMYCIN HCL 1 G
1250 VIAL (EA) INTRAVENOUS EVERY 12 HOURS
Qty: 0 | Refills: 0 | Status: DISCONTINUED | OUTPATIENT
Start: 2017-11-10 | End: 2017-11-11

## 2017-11-10 RX ADMIN — MEROPENEM 200 MILLIGRAM(S): 1 INJECTION INTRAVENOUS at 14:30

## 2017-11-10 RX ADMIN — POTASSIUM PHOSPHATE, MONOBASIC POTASSIUM PHOSPHATE, DIBASIC 62.5 MILLIMOLE(S): 236; 224 INJECTION, SOLUTION INTRAVENOUS at 04:30

## 2017-11-10 RX ADMIN — MEROPENEM 200 MILLIGRAM(S): 1 INJECTION INTRAVENOUS at 21:03

## 2017-11-10 RX ADMIN — PANTOPRAZOLE SODIUM 40 MILLIGRAM(S): 20 TABLET, DELAYED RELEASE ORAL at 11:45

## 2017-11-10 RX ADMIN — Medication 3 MILLILITER(S): at 17:51

## 2017-11-10 RX ADMIN — SODIUM CHLORIDE 125 MILLILITER(S): 9 INJECTION INTRAMUSCULAR; INTRAVENOUS; SUBCUTANEOUS at 06:04

## 2017-11-10 RX ADMIN — Medication 166.67 MILLIGRAM(S): at 18:01

## 2017-11-10 RX ADMIN — MEROPENEM 200 MILLIGRAM(S): 1 INJECTION INTRAVENOUS at 06:04

## 2017-11-10 RX ADMIN — SENNA PLUS 10 MILLILITER(S): 8.6 TABLET ORAL at 21:03

## 2017-11-10 RX ADMIN — AZITHROMYCIN 250 MILLIGRAM(S): 500 TABLET, FILM COATED ORAL at 11:09

## 2017-11-10 RX ADMIN — DEXMEDETOMIDINE HYDROCHLORIDE IN 0.9% SODIUM CHLORIDE 4.03 MICROGRAM(S)/KG/HR: 4 INJECTION INTRAVENOUS at 18:02

## 2017-11-10 RX ADMIN — HUMAN INSULIN 5 UNIT(S): 100 INJECTION, SUSPENSION SUBCUTANEOUS at 11:48

## 2017-11-10 RX ADMIN — POLYETHYLENE GLYCOL 3350 17 GRAM(S): 17 POWDER, FOR SOLUTION ORAL at 18:01

## 2017-11-10 RX ADMIN — Medication 10 UNIT(S): at 06:03

## 2017-11-10 RX ADMIN — HUMAN INSULIN 5 UNIT(S): 100 INJECTION, SUSPENSION SUBCUTANEOUS at 18:01

## 2017-11-10 RX ADMIN — Medication 6: at 18:01

## 2017-11-10 RX ADMIN — Medication 20 MILLIGRAM(S): at 18:00

## 2017-11-10 RX ADMIN — Medication 8: at 11:45

## 2017-11-10 RX ADMIN — Medication 3: at 00:01

## 2017-11-10 RX ADMIN — Medication 20 MILLIGRAM(S): at 06:03

## 2017-11-10 RX ADMIN — Medication 81 MILLIGRAM(S): at 11:45

## 2017-11-10 RX ADMIN — POLYETHYLENE GLYCOL 3350 17 GRAM(S): 17 POWDER, FOR SOLUTION ORAL at 06:03

## 2017-11-10 RX ADMIN — Medication 3 MILLILITER(S): at 05:03

## 2017-11-10 RX ADMIN — DEXMEDETOMIDINE HYDROCHLORIDE IN 0.9% SODIUM CHLORIDE 4.03 MICROGRAM(S)/KG/HR: 4 INJECTION INTRAVENOUS at 11:45

## 2017-11-10 RX ADMIN — Medication 166.67 MILLIGRAM(S): at 06:04

## 2017-11-10 RX ADMIN — Medication 3 MILLILITER(S): at 00:24

## 2017-11-10 RX ADMIN — Medication 255 MILLIMOLE(S): at 06:03

## 2017-11-10 RX ADMIN — AMIKACIN SULFATE 101.6 MILLIGRAM(S): 250 INJECTION, SOLUTION INTRAMUSCULAR; INTRAVENOUS at 03:02

## 2017-11-10 RX ADMIN — Medication 3 MILLILITER(S): at 11:55

## 2017-11-10 NOTE — PROGRESS NOTE ADULT - ASSESSMENT
77M h/o DM2, GERD, CABG (2016) and interstitial lung disease (UIP) on 3L home O2, p/w increasing REAGAN thought to be ILD flare. Pt developed fevers and hypotension likely related to sepsis. MICU was consulted for dyspnea and hypotension 2/2 sepsis in the setting of advanced ILD    #Neuro  - Switched to precedex, withdraws to pain on UE.   Metabolic Encephalopathy 2/2 sepsis     #Pulm  Respiratory Distress 2/2 elevated lactic acid / Sepsis  - Intubated 11/7 for hypox resp failure and increased WOB, AC/CMV: , RR 28 FiO2 30 PEEP 5  -lactate improving, Plan for spontaneous breathing trials  - respiratory status improved after O2, IVF, and abx  - Duonebs q6      ILD  - Pt. is intubated for hypoxic resp failure and increased WOB  - CT chest consistent with pulmonary nodules. Continue with abx, solumedrol   - Given septic picture holding pts imuran for now     #Cardio  Hypotension 2/2 Sepsis  - Pt responded well to IVF and has good LV systolic function  - On levophed 11/7 and phenylephrine 11/7 for pressure support   Afib/aflutter  -s/p digoxin load with HR better controlled. Avoid amio in the setting of ILD. Cards consulted    #GI  - Holding off feeds concern for ischemic colitis   GERD  - c/w Protonix    #Renal  CHRISTIANE likely prerenal in the setting of sepsis  -resolved  - c/w IVF and monitor Cr in the setting of CT a/p with contrast  - Losartan D/c'd     #ID  Sepsis  - Repeat blood cx growing CNS. Started on vancomunioBcx 11/7 growing Pseudomonas gram (-) rods; sputum cx 11/7 shows numerous GNR and few GPC in pairs  - On Azithromycin 11/7, meropenem  Concern for gut translocation given uptrending lactate     #Endo  DM2  - elevated FS likely 2/2 to steroids. ISS changed to moderate. Continue to monitor    #Heme  Thrombocytopenia   Likely 2/2 to zosyn, med induced, less likely HIT. HIT panel negative. Continue to trend platelets.   Neutropenia   - 1/1 sepsis vs medication induced. Continue to trend    #DVT ppx: SCDs given concern for HIT 77M h/o DM2, GERD, CABG (2016) and interstitial lung disease (UIP) on 3L home O2, p/w increasing REAGAN thought to be ILD flare. Pt developed fevers and hypotension likely related to sepsis. MICU was consulted for dyspnea and hypotension 2/2 sepsis in the setting of advanced ILD    #Neuro  - Switched to precedex, withdraws to pain on UE.   Metabolic Encephalopathy 2/2 sepsis     #Pulm  Respiratory Distress 2/2 elevated lactic acid / Sepsis  - Intubated 11/7 for hypox resp failure and increased WOB, AC/CMV: , RR 28 FiO2 30 PEEP 5. Check repeat ABG and adjust vent settings  -lactate improving, Plan for spontaneous breathing trials  - Duonebs q6      ILD  - Pt. is intubated for hypoxic resp failure and increased WOB  - CT chest consistent with pulmonary nodules. Continue with abx, solumedrol   - Improving, recheck abg and adjust vent setting accordingly    - Given septic picture holding pts imuran for now     #Cardio  Hypotension 2/2 Sepsis  - Pt responded well to IVF and has good LV systolic function  - On levophed 11/7 for pressure support , continue to wean off  Afib/aflutter  -s/p digoxin load with HR better controlled. Check dig level and dose maintenance dose. Avoid amio in the setting of ILD.    #GI  - Continue with feeds  Colitis improving, lactate resolving. Continue with bowel regimen  GERD  - c/w Protonix    #Renal  CHRISTIANE likely prerenal in the setting of sepsis  -resolved  - c/w IVF and monitor Cr in the setting of CT a/p with contrast. Follow up repeat   - Losartan D/c'd     #ID  Sepsis  - Repeat blood cx growing CNS. Restarted on vancomycin. Bcx 11/7 growing Pseudomonas gram (-) rods; sensitive to meropenem (day 3)  - On Azithromycin 11/7 (day 4)  Bacteremia 2/2 to PNA vs gut translocation    #Endo  DM2  - elevated FS likely 2/2 to steroids. ISS changed to moderate. Continue to monitor    #Heme  Thrombocytopenia   Likely 2/2 to zosyn, med induced, less likely HIT. HIT panel negative. Continue to trend platelets.   Neutropenia   - 1/1 sepsis vs medication induced. Continue to trend    #DVT ppx: SCDs given concern for HIT

## 2017-11-10 NOTE — PROGRESS NOTE ADULT - SUBJECTIVE AND OBJECTIVE BOX
Follow-up Pulm Progress Note  Amauri Jackson MD  [phone]    Events noted  Sedated on CMV with FIO2 30%  On minimal vasopressors  Lactic acidosis resolved  ABd CT: questionable colitis; new ascites  Chest CT: bibasilar consolidation c/w pneumonia (new since adm)  7.49/40 - PCO2 on CMV    CXR: unchaged reticular opacities: no gross new pulm edema  TTE: Mild AS, hyperdynamic LV, PA 40-50  Physical Examination:  PULM: Bilateral crackles CVS: Regular rate and rhythm, no murmurs, rubs, or gallops  ABD: Soft, non-tender  EXT:  3+ edema (presacral)    RADIOLOGY REVIEWED  CXR: No change in bilateral reticular opacities    CT chest: see above    TTE:

## 2017-11-10 NOTE — EEG REPORT - NS EEG TEXT BOX
11/10/17 (5hr study)    History:  This is a 77 year old Male patient with a history of mental status changes.    DESCRIPTION OF RECORDING:   The recording is generally poorly organized.   No posterior dominant rhythm is present.   The background consists of generalized polymorphic asynchronous theta and delta which is generally low in amplitude <20uV.   Some superimposed faster frequencies are present.   No normal features of wakefulness, drowsiness or sleep are present.     Hyperventilation was not performed.   Photic stimulation was not performed.    EVENTS:   ·  There were no push button events: .    EEG CLASSIFICATION:   Findings: Abnormal VEEG.   Generalized background slowing polymorphic theta and delta.    INTERPRETATION:   This is an abnormal Video EEG indicating moderate to severe diffuse cerebral dysfunction.  No events were reported.   No focal, lateralized or epileptiform features were present.     ECG appeared irregular.

## 2017-11-10 NOTE — PROGRESS NOTE ADULT - ATTENDING COMMENTS
77 year old man with hypoxic respiratory failure, gram negative bacteremia, distributive shock, ATN, a-fib/flutter with RVR and elevated lactate    remains sedated on ventilator  pseudomonas in sputum and blood, pan sensitive   CHRISTIANE improving  CT scan showed colitis  lactate decreasing   replete phosphate    case discussed at length at bedside with patients son     prognosis guarded    Critical Care Time 40minutes

## 2017-11-10 NOTE — PROGRESS NOTE ADULT - SUBJECTIVE AND OBJECTIVE BOX
CHIEF COMPLAINT: hypoxic respiratory failure 2/2 PNA    Interval Events:  Blood cultures growing CNS..       REVIEW OF SYSTEMS:  Constitutional: [ ] negative [ ] fevers [ ] chills [ ] weight loss [ ] weight gain  HEENT: [ ] negative [ ] dry eyes [ ] eye irritation [ ] postnasal drip [ ] nasal congestion  CV: [ ] negative  [ ] chest pain [ ] orthopnea [ ] palpitations [ ] murmur  Resp: [ ] negative [ ] cough [ ] shortness of breath [ ] dyspnea [ ] wheezing [ ] sputum [ ] hemoptysis  GI: [ ] negative [ ] nausea [ ] vomiting [ ] diarrhea [ ] constipation [ ] abd pain [ ] dysphagia   : [ ] negative [ ] dysuria [ ] nocturia [ ] hematuria [ ] increased urinary frequency  Musculoskeletal: [ ] negative [ ] back pain [ ] myalgias [ ] arthralgias [ ] fracture  Skin: [ ] negative [ ] rash [ ] itch  Neurological: [ ] negative [ ] headache [ ] dizziness [ ] syncope [ ] weakness [ ] numbness  Psychiatric: [ ] negative [ ] anxiety [ ] depression  Endocrine: [ ] negative [ ] diabetes [ ] thyroid problem  Hematologic/Lymphatic: [ ] negative [ ] anemia [ ] bleeding problem  Allergic/Immunologic: [ ] negative [ ] itchy eyes [ ] nasal discharge [ ] hives [ ] angioedema  [ ] All other systems negative  [X] Unable to assess ROS because intubated    OBJECTIVE:  ICU Vital Signs Last 24 Hrs  T(C): 37.3 (10 Nov 2017 04:00), Max: 37.3 (10 Nov 2017 04:00)  T(F): 99.1 (10 Nov 2017 04:00), Max: 99.1 (10 Nov 2017 04:00)  HR: 93 (10 Nov 2017 07:00) (59 - 117)  BP: 123/58 (10 Nov 2017 04:45) (123/58 - 152/95)  BP(mean): 83 (10 Nov 2017 04:45) (83 - 119)  ABP: 95/51 (10 Nov 2017 07:00) (79/49 - 165/77)  ABP(mean): 67 (10 Nov 2017 07:00) (58 - 115)  RR: 28 (10 Nov 2017 07:00) (20 - 53)  SpO2: 97% (10 Nov 2017 07:00) (94% - 100%)    Mode: AC/ CMV (Assist Control/ Continuous Mandatory Ventilation), RR (machine): 28, TV (machine): 470, FiO2: 30, PEEP: 5, ITime: 1, MAP: 15, PIP: 34    11-09 @ 07:01  -  11-10 @ 07:00  --------------------------------------------------------  IN: 4443.3 mL / OUT: 1975 mL / NET: 2468.3 mL      CAPILLARY BLOOD GLUCOSE      POCT Blood Glucose.: 325 mg/dL (10 Nov 2017 05:05)      PHYSICAL EXAM:  GENERAL: moderate resp distress  HEAD:  Atraumatic, Normocephalic  EYES: EOMI, PERRLA, conjunctiva and sclera clear  NECK: Supple, No JVD  CHEST/LUNG: b/l course BS  HEART: tachycardic; No murmurs, rubs, or gallops  ABDOMEN: Soft, Nontender, Nondistended; Bowel sounds present  EXTREMITIES:  2+ Peripheral Pulses, No clubbing, cyanosis, or edema  NEUROLOGY: non-focal  SKIN: No rashes or lesions    LINES:    HOSPITAL MEDICATIONS:  aspirin  chewable 81 milliGRAM(s) Oral daily    amiKACIN  IVPB 400 milliGRAM(s) IV Intermittent every 8 hours  amiKACIN  IVPB      azithromycin  IVPB 500 milliGRAM(s) IV Intermittent every 24 hours  meropenem IVPB 1000 milliGRAM(s) IV Intermittent every 8 hours  vancomycin  IVPB 1250 milliGRAM(s) IV Intermittent every 12 hours    norepinephrine Infusion 0.01 MICROgram(s)/kG/Min IV Continuous <Continuous>    insulin lispro (HumaLOG) corrective regimen sliding scale   SubCutaneous every 6 hours  methylPREDNISolone sodium succinate Injectable 20 milliGRAM(s) IV Push every 8 hours    ALBUTerol/ipratropium for Nebulization 3 milliLiter(s) Nebulizer every 6 hours    dexmedetomidine Infusion 0.2 MICROgram(s)/kG/Hr IV Continuous <Continuous>    pantoprazole  Injectable 40 milliGRAM(s) IV Push daily  polyethylene glycol 3350 17 Gram(s) Oral two times a day  senna Syrup 10 milliLiter(s) Oral at bedtime        sodium chloride 0.9%. 1000 milliLiter(s) IV Continuous <Continuous>    influenza   Vaccine 0.5 milliLiter(s) IntraMuscular once          LABS:                        7.8    1.9   )-----------( 98       ( 10 Nov 2017 02:50 )             23.1     Hgb Trend: 7.8<--, 7.7<--, 7.6<--, 8.3<--, 8.7<--  11-10    142  |  101  |  37<H>  ----------------------------<  264<H>  4.6   |  30  |  0.97    Ca    8.3<L>      10 Nov 2017 02:50  Phos  1.1     11-10  Mg     1.9     11-10    TPro  4.7<L>  /  Alb  2.0<L>  /  TBili  2.6<H>  /  DBili  x   /  AST  171<H>  /  ALT  247<H>  /  AlkPhos  55  11-10    Creatinine Trend: 0.97<--, 0.98<--, 1.22<--, 1.39<--, 1.34<--, 1.39<--  PT/INR - ( 10 Nov 2017 02:50 )   PT: 13.8 sec;   INR: 1.27 ratio         PTT - ( 10 Nov 2017 02:50 )  PTT:36.8 sec    Arterial Blood Gas:  11-10 @ 04:27  7.51/40/73/32/96/8.1  ABG lactate: --  Arterial Blood Gas:  11-09 @ 23:59  7.54/36/309/31/100/7.6  ABG lactate: --  Arterial Blood Gas:  11-09 @ 19:03  7.48/40/140/29/100/5.7  ABG lactate: --  Arterial Blood Gas:  11-09 @ 15:27  7.45/39/139/26/100/2.8  ABG lactate: --  Arterial Blood Gas:  11-09 @ 04:27  7.35/38/187/20/100/-4.6  ABG lactate: --  Arterial Blood Gas:  11-09 @ 00:39  7.29/38/190/18/99/-7.8  ABG lactate: --  Arterial Blood Gas:  11-08 @ 21:50  7.17/42/191/15/99/-12.7  ABG lactate: --  Arterial Blood Gas:  11-08 @ 20:11  7.16/41/197/14/99/-13.4  ABG lactate: --  Arterial Blood Gas:  11-08 @ 16:17  7.15/48/157/16/99/-11.8  ABG lactate: --  Arterial Blood Gas:  11-08 @ 14:14  7.19/49/139/18/99/-9.5  ABG lactate: --        MICROBIOLOGY:     RADIOLOGY:  [ ] Reviewed and interpreted by me    EKG: CHIEF COMPLAINT: hypoxic respiratory failure 2/2 PNA    Interval Events:  CT consistent with colitis and increased stool burden. Blood cultures growing CNS, vancomycin restarted. Amikacin added for double pseudomonas coverage. CHRISTIANE improving, making good urine. Slightly alkalotic on abg, HCO3 gtt d/c. Lactate downtrending to 3.4. Weaned off on versed and fentanyl, on precedex. As per nursing staff overnight, able to withdraw UE to pain. HR better controlled      REVIEW OF SYSTEMS:  Constitutional: [ ] negative [ ] fevers [ ] chills [ ] weight loss [ ] weight gain  HEENT: [ ] negative [ ] dry eyes [ ] eye irritation [ ] postnasal drip [ ] nasal congestion  CV: [ ] negative  [ ] chest pain [ ] orthopnea [ ] palpitations [ ] murmur  Resp: [ ] negative [ ] cough [ ] shortness of breath [ ] dyspnea [ ] wheezing [ ] sputum [ ] hemoptysis  GI: [ ] negative [ ] nausea [ ] vomiting [ ] diarrhea [ ] constipation [ ] abd pain [ ] dysphagia   : [ ] negative [ ] dysuria [ ] nocturia [ ] hematuria [ ] increased urinary frequency  Musculoskeletal: [ ] negative [ ] back pain [ ] myalgias [ ] arthralgias [ ] fracture  Skin: [ ] negative [ ] rash [ ] itch  Neurological: [ ] negative [ ] headache [ ] dizziness [ ] syncope [ ] weakness [ ] numbness  Psychiatric: [ ] negative [ ] anxiety [ ] depression  Endocrine: [ ] negative [ ] diabetes [ ] thyroid problem  Hematologic/Lymphatic: [ ] negative [ ] anemia [ ] bleeding problem  Allergic/Immunologic: [ ] negative [ ] itchy eyes [ ] nasal discharge [ ] hives [ ] angioedema  [ ] All other systems negative  [X] Unable to assess ROS because intubated    OBJECTIVE:  ICU Vital Signs Last 24 Hrs  T(C): 37.3 (10 Nov 2017 04:00), Max: 37.3 (10 Nov 2017 04:00)  T(F): 99.1 (10 Nov 2017 04:00), Max: 99.1 (10 Nov 2017 04:00)  HR: 93 (10 Nov 2017 07:00) (59 - 117)  BP: 123/58 (10 Nov 2017 04:45) (123/58 - 152/95)  BP(mean): 83 (10 Nov 2017 04:45) (83 - 119)  ABP: 95/51 (10 Nov 2017 07:00) (79/49 - 165/77)  ABP(mean): 67 (10 Nov 2017 07:00) (58 - 115)  RR: 28 (10 Nov 2017 07:00) (20 - 53)  SpO2: 97% (10 Nov 2017 07:00) (94% - 100%)    Mode: AC/ CMV (Assist Control/ Continuous Mandatory Ventilation), RR (machine): 28, TV (machine): 470, FiO2: 30, PEEP: 5, ITime: 1, MAP: 15, PIP: 34    11-09 @ 07:01  -  11-10 @ 07:00  --------------------------------------------------------  IN: 4443.3 mL / OUT: 1975 mL / NET: 2468.3 mL      CAPILLARY BLOOD GLUCOSE      POCT Blood Glucose.: 325 mg/dL (10 Nov 2017 05:05)      PHYSICAL EXAM:  GENERAL: moderate resp distress  HEAD:  Atraumatic, Normocephalic  EYES: EOMI, PERRLA, conjunctiva and sclera clear  NECK: Supple, No JVD  CHEST/LUNG: b/l course BS  HEART: tachycardic; No murmurs, rubs, or gallops  ABDOMEN: Soft, Nontender, Nondistended; Bowel sounds diminished   EXTREMITIES:  2+ Peripheral Pulses, No clubbing, cyanosis, or edema  NEUROLOGY: non-focal  SKIN: No rashes or lesions    LINES:    HOSPITAL MEDICATIONS:  aspirin  chewable 81 milliGRAM(s) Oral daily    amiKACIN  IVPB 400 milliGRAM(s) IV Intermittent every 8 hours  amiKACIN  IVPB      azithromycin  IVPB 500 milliGRAM(s) IV Intermittent every 24 hours  meropenem IVPB 1000 milliGRAM(s) IV Intermittent every 8 hours  vancomycin  IVPB 1250 milliGRAM(s) IV Intermittent every 12 hours    norepinephrine Infusion 0.01 MICROgram(s)/kG/Min IV Continuous <Continuous>    insulin lispro (HumaLOG) corrective regimen sliding scale   SubCutaneous every 6 hours  methylPREDNISolone sodium succinate Injectable 20 milliGRAM(s) IV Push every 8 hours    ALBUTerol/ipratropium for Nebulization 3 milliLiter(s) Nebulizer every 6 hours    dexmedetomidine Infusion 0.2 MICROgram(s)/kG/Hr IV Continuous <Continuous>    pantoprazole  Injectable 40 milliGRAM(s) IV Push daily  polyethylene glycol 3350 17 Gram(s) Oral two times a day  senna Syrup 10 milliLiter(s) Oral at bedtime        sodium chloride 0.9%. 1000 milliLiter(s) IV Continuous <Continuous>    influenza   Vaccine 0.5 milliLiter(s) IntraMuscular once          LABS:                        7.8    1.9   )-----------( 98       ( 10 Nov 2017 02:50 )             23.1     Hgb Trend: 7.8<--, 7.7<--, 7.6<--, 8.3<--, 8.7<--  11-10    142  |  101  |  37<H>  ----------------------------<  264<H>  4.6   |  30  |  0.97    Ca    8.3<L>      10 Nov 2017 02:50  Phos  1.1     11-10  Mg     1.9     11-10    TPro  4.7<L>  /  Alb  2.0<L>  /  TBili  2.6<H>  /  DBili  x   /  AST  171<H>  /  ALT  247<H>  /  AlkPhos  55  11-10    Creatinine Trend: 0.97<--, 0.98<--, 1.22<--, 1.39<--, 1.34<--, 1.39<--  PT/INR - ( 10 Nov 2017 02:50 )   PT: 13.8 sec;   INR: 1.27 ratio         PTT - ( 10 Nov 2017 02:50 )  PTT:36.8 sec    Arterial Blood Gas:  11-10 @ 04:27  7.51/40/73/32/96/8.1  ABG lactate: --  Arterial Blood Gas:  11-09 @ 23:59  7.54/36/309/31/100/7.6  ABG lactate: --  Arterial Blood Gas:  11-09 @ 19:03  7.48/40/140/29/100/5.7  ABG lactate: --  Arterial Blood Gas:  11-09 @ 15:27  7.45/39/139/26/100/2.8  ABG lactate: --  Arterial Blood Gas:  11-09 @ 04:27  7.35/38/187/20/100/-4.6  ABG lactate: --  Arterial Blood Gas:  11-09 @ 00:39  7.29/38/190/18/99/-7.8  ABG lactate: --  Arterial Blood Gas:  11-08 @ 21:50  7.17/42/191/15/99/-12.7  ABG lactate: --  Arterial Blood Gas:  11-08 @ 20:11  7.16/41/197/14/99/-13.4  ABG lactate: --  Arterial Blood Gas:  11-08 @ 16:17  7.15/48/157/16/99/-11.8  ABG lactate: --  Arterial Blood Gas:  11-08 @ 14:14  7.19/49/139/18/99/-9.5  ABG lactate: --        MICROBIOLOGY:     RADIOLOGY:  [X ] Reviewed and interpreted by me  Chest CT prelim read: Pulmonary nodules,

## 2017-11-10 NOTE — EEG REPORT - NS EEG TEXT BOX
11/9-11/10/17    History:  This is a 77 year old Male patient with a history of mental status changes.    DESCRIPTION OF RECORDING:   The recording is generally poorly organized.   No posterior dominant rhythm is present.   The background consists of generalized polymorphic asynchronous theta and delta which is generally low in amplitude <20uV.   Some superimposed faster frequencies are present.   No normal features of wakefulness, drowsiness or sleep are present.     Hyperventilation was not performed.   Photic stimulation was not performed.    EVENTS:   ·  There were no push button events: .    EEG CLASSIFICATION:   Findings: Abnormal VEEG.   Generalized background slowing polymorphic theta and delta.    INTERPRETATION:   This is an abnormal Video EEG indicating moderate to severe diffuse cerebral dysfunction.  No events were reported.   No focal, lateralized or epileptiform features were present.     ECG appeared irregular.

## 2017-11-10 NOTE — PROGRESS NOTE ADULT - ASSESSMENT
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

## 2017-11-10 NOTE — PROGRESS NOTE ADULT - ASSESSMENT
77 M with CAD h/o CABG, NL LV systolic function, ILD on home O2 with hypoxic resp failure, septic shock on pressors with presumed PNA. In this setting, new onset AF now rate controlled.   ·	Rates remained well controlled, even with brief conversion to NSR. Continue tele.   ·	At time of visit patient had been weaned off of pressors.   ·	Lactate improving. Renal function stable  ·	Vent and Abx as per MICU.

## 2017-11-10 NOTE — PROGRESS NOTE ADULT - SUBJECTIVE AND OBJECTIVE BOX
CC: AF    Interval History: Remains intubated. AF with controlled rate. Brief conversion to NSR.     MEDICATIONS:  ALBUTerol/ipratropium for Nebulization 3 milliLiter(s) Nebulizer every 6 hours  aspirin  chewable 81 milliGRAM(s) Oral daily  azithromycin  IVPB 500 milliGRAM(s) IV Intermittent every 24 hours  dexmedetomidine Infusion 0.2 MICROgram(s)/kG/Hr IV Continuous <Continuous>  influenza   Vaccine 0.5 milliLiter(s) IntraMuscular once  insulin lispro (HumaLOG) corrective regimen sliding scale   SubCutaneous every 6 hours  insulin NPH human recombinant 5 Unit(s) SubCutaneous every 6 hours  meropenem IVPB 1000 milliGRAM(s) IV Intermittent every 8 hours  methylPREDNISolone sodium succinate Injectable 20 milliGRAM(s) IV Push every 12 hours  norepinephrine Infusion 0.01 MICROgram(s)/kG/Min IV Continuous <Continuous>  pantoprazole  Injectable 40 milliGRAM(s) IV Push daily  polyethylene glycol 3350 17 Gram(s) Oral two times a day  senna Syrup 10 milliLiter(s) Oral at bedtime  vancomycin  IVPB 1250 milliGRAM(s) IV Intermittent every 12 hours      LABS:  11-10    143  |  104  |  42<H>  ----------------------------<  376<H>  4.8   |  30  |  0.91    Ca    7.6<L>      10 Nov 2017 11:38  Phos  2.6     11-10  Mg     2.0     11-10    TPro  4.7<L>  /  Alb  2.0<L>  /  TBili  2.6<H>  /  DBili  x   /  AST  171<H>  /  ALT  247<H>  /  AlkPhos  55  11-10                          7.8    1.9   )-----------( 98       ( 10 Nov 2017 02:50 )             23.1     PT/INR - ( 10 Nov 2017 02:50 )   PT: 13.8 sec;   INR: 1.27 ratio         PTT - ( 10 Nov 2017 02:50 )  PTT:36.8 sec  CARDIAC MARKERS ( 2017 00:41 )  x     / x     / 78 U/L / x     / x      CARDIAC MARKERS ( 2017 16:26 )  x     / <0.01 ng/mL / x     / x     / x          VITAL SIGNS:   T(C): 36.4 (11-10-17 @ 12:00), Max: 37.3 (11-10-17 @ 04:00)  HR: 80 (11-10-17 @ 14:00) (59 - 117)  BP: 123/58 (11-10-17 @ 04:45) (123/58 - 152/95)  RR: 23 (11-10-17 @ 14:00) (17 - 30)  SpO2: 96% (11-10-17 @ 14:00) (94% - 100%)  Daily     Daily Weight in k (10 Nov 2017 04:00)  I&O's Summary    2017 07:  -  10 Nov 2017 07:00  --------------------------------------------------------  IN: 4443.3 mL / OUT: 1975 mL / NET: 2468.3 mL    10 Nov 2017 07:  -  10 Nov 2017 14:25  --------------------------------------------------------  IN: 994 mL / OUT: 270 mL / NET: 724 mL        TELE: AF 70-90. Brief NSR.

## 2017-11-11 LAB
ALBUMIN SERPL ELPH-MCNC: 2.1 G/DL — LOW (ref 3.3–5)
ALP SERPL-CCNC: 113 U/L — SIGNIFICANT CHANGE UP (ref 40–120)
ALT FLD-CCNC: 235 U/L RC — HIGH (ref 10–45)
ANION GAP SERPL CALC-SCNC: 10 MMOL/L — SIGNIFICANT CHANGE UP (ref 5–17)
AST SERPL-CCNC: 125 U/L — HIGH (ref 10–40)
BASOPHILS # BLD AUTO: 0 K/UL — SIGNIFICANT CHANGE UP (ref 0–0.2)
BILIRUB SERPL-MCNC: 2.2 MG/DL — HIGH (ref 0.2–1.2)
BUN SERPL-MCNC: 46 MG/DL — HIGH (ref 7–23)
CALCIUM SERPL-MCNC: 7.9 MG/DL — LOW (ref 8.4–10.5)
CHLORIDE SERPL-SCNC: 104 MMOL/L — SIGNIFICANT CHANGE UP (ref 96–108)
CO2 SERPL-SCNC: 28 MMOL/L — SIGNIFICANT CHANGE UP (ref 22–31)
CREAT SERPL-MCNC: 0.93 MG/DL — SIGNIFICANT CHANGE UP (ref 0.5–1.3)
CULTURE RESULTS: SIGNIFICANT CHANGE UP
DIGOXIN SERPL-MCNC: 1.3 NG/ML — SIGNIFICANT CHANGE UP (ref 0.8–2)
EOSINOPHIL # BLD AUTO: 0 K/UL — SIGNIFICANT CHANGE UP (ref 0–0.5)
GAS PNL BLDA: SIGNIFICANT CHANGE UP
GLUCOSE BLDC GLUCOMTR-MCNC: 223 MG/DL — HIGH (ref 70–99)
GLUCOSE BLDC GLUCOMTR-MCNC: 290 MG/DL — HIGH (ref 70–99)
GLUCOSE BLDC GLUCOMTR-MCNC: 294 MG/DL — HIGH (ref 70–99)
GLUCOSE BLDC GLUCOMTR-MCNC: 321 MG/DL — HIGH (ref 70–99)
GLUCOSE SERPL-MCNC: 310 MG/DL — HIGH (ref 70–99)
HCT VFR BLD CALC: 25.9 % — LOW (ref 39–50)
HGB BLD-MCNC: 8.8 G/DL — LOW (ref 13–17)
INR BLD: 1.1 RATIO — SIGNIFICANT CHANGE UP (ref 0.88–1.16)
LYMPHOCYTES # BLD AUTO: 0.4 K/UL — LOW (ref 1–3.3)
LYMPHOCYTES # BLD AUTO: 13 % — SIGNIFICANT CHANGE UP (ref 13–44)
MAGNESIUM SERPL-MCNC: 2 MG/DL — SIGNIFICANT CHANGE UP (ref 1.6–2.6)
MCHC RBC-ENTMCNC: 34 GM/DL — SIGNIFICANT CHANGE UP (ref 32–36)
MCHC RBC-ENTMCNC: 36.2 PG — HIGH (ref 27–34)
MCV RBC AUTO: 106 FL — HIGH (ref 80–100)
MONOCYTES # BLD AUTO: 0.2 K/UL — SIGNIFICANT CHANGE UP (ref 0–0.9)
MONOCYTES NFR BLD AUTO: 7 % — SIGNIFICANT CHANGE UP (ref 2–14)
NEUTROPHILS # BLD AUTO: 2.5 K/UL — SIGNIFICANT CHANGE UP (ref 1.8–7.4)
NEUTROPHILS NFR BLD AUTO: 74 % — SIGNIFICANT CHANGE UP (ref 43–77)
ORGANISM # SPEC MICROSCOPIC CNT: SIGNIFICANT CHANGE UP
ORGANISM # SPEC MICROSCOPIC CNT: SIGNIFICANT CHANGE UP
PHOSPHATE SERPL-MCNC: 2.1 MG/DL — LOW (ref 2.5–4.5)
PLATELET # BLD AUTO: 105 K/UL — LOW (ref 150–400)
POTASSIUM SERPL-MCNC: 4.6 MMOL/L — SIGNIFICANT CHANGE UP (ref 3.5–5.3)
POTASSIUM SERPL-SCNC: 4.6 MMOL/L — SIGNIFICANT CHANGE UP (ref 3.5–5.3)
PROT SERPL-MCNC: 4.8 G/DL — LOW (ref 6–8.3)
PROTHROM AB SERPL-ACNC: 12 SEC — SIGNIFICANT CHANGE UP (ref 9.8–12.7)
RBC # BLD: 2.44 M/UL — LOW (ref 4.2–5.8)
RBC # FLD: 17.6 % — HIGH (ref 10.3–14.5)
SODIUM SERPL-SCNC: 142 MMOL/L — SIGNIFICANT CHANGE UP (ref 135–145)
SPECIMEN SOURCE: SIGNIFICANT CHANGE UP
VANCOMYCIN TROUGH SERPL-MCNC: 25.2 UG/ML — CRITICAL HIGH (ref 10–20)
WBC # BLD: 3.1 K/UL — LOW (ref 3.8–10.5)
WBC # FLD AUTO: 3.1 K/UL — LOW (ref 3.8–10.5)

## 2017-11-11 PROCEDURE — 93010 ELECTROCARDIOGRAM REPORT: CPT

## 2017-11-11 PROCEDURE — 99291 CRITICAL CARE FIRST HOUR: CPT

## 2017-11-11 PROCEDURE — 99232 SBSQ HOSP IP/OBS MODERATE 35: CPT

## 2017-11-11 RX ORDER — FENTANYL CITRATE 50 UG/ML
25 INJECTION INTRAVENOUS ONCE
Qty: 0 | Refills: 0 | Status: DISCONTINUED | OUTPATIENT
Start: 2017-11-11 | End: 2017-11-11

## 2017-11-11 RX ORDER — POTASSIUM PHOSPHATE, MONOBASIC POTASSIUM PHOSPHATE, DIBASIC 236; 224 MG/ML; MG/ML
15 INJECTION, SOLUTION INTRAVENOUS ONCE
Qty: 0 | Refills: 0 | Status: COMPLETED | OUTPATIENT
Start: 2017-11-11 | End: 2017-11-11

## 2017-11-11 RX ORDER — VANCOMYCIN HCL 1 G
1000 VIAL (EA) INTRAVENOUS EVERY 12 HOURS
Qty: 0 | Refills: 0 | Status: DISCONTINUED | OUTPATIENT
Start: 2017-11-11 | End: 2017-11-12

## 2017-11-11 RX ORDER — ALBUTEROL 90 UG/1
2.5 AEROSOL, METERED ORAL EVERY 6 HOURS
Qty: 0 | Refills: 0 | Status: DISCONTINUED | OUTPATIENT
Start: 2017-11-11 | End: 2017-11-15

## 2017-11-11 RX ORDER — DIGOXIN 250 MCG
0.12 TABLET ORAL DAILY
Qty: 0 | Refills: 0 | Status: DISCONTINUED | OUTPATIENT
Start: 2017-11-11 | End: 2017-11-15

## 2017-11-11 RX ORDER — HEPARIN SODIUM 5000 [USP'U]/ML
5000 INJECTION INTRAVENOUS; SUBCUTANEOUS EVERY 12 HOURS
Qty: 0 | Refills: 0 | Status: DISCONTINUED | OUTPATIENT
Start: 2017-11-11 | End: 2017-11-13

## 2017-11-11 RX ORDER — FENTANYL CITRATE 50 UG/ML
1 INJECTION INTRAVENOUS
Qty: 5000 | Refills: 0 | Status: DISCONTINUED | OUTPATIENT
Start: 2017-11-11 | End: 2017-11-12

## 2017-11-11 RX ORDER — FENTANYL CITRATE 50 UG/ML
1 INJECTION INTRAVENOUS
Qty: 2500 | Refills: 0 | Status: DISCONTINUED | OUTPATIENT
Start: 2017-11-11 | End: 2017-11-11

## 2017-11-11 RX ORDER — FENTANYL CITRATE 50 UG/ML
25 INJECTION INTRAVENOUS EVERY 4 HOURS
Qty: 0 | Refills: 0 | Status: DISCONTINUED | OUTPATIENT
Start: 2017-11-11 | End: 2017-11-11

## 2017-11-11 RX ORDER — HUMAN INSULIN 100 [IU]/ML
9 INJECTION, SUSPENSION SUBCUTANEOUS EVERY 6 HOURS
Qty: 0 | Refills: 0 | Status: DISCONTINUED | OUTPATIENT
Start: 2017-11-11 | End: 2017-11-12

## 2017-11-11 RX ORDER — MIDODRINE HYDROCHLORIDE 2.5 MG/1
10 TABLET ORAL EVERY 8 HOURS
Qty: 0 | Refills: 0 | Status: DISCONTINUED | OUTPATIENT
Start: 2017-11-11 | End: 2017-11-11

## 2017-11-11 RX ORDER — FENTANYL CITRATE 50 UG/ML
25 INJECTION INTRAVENOUS EVERY 4 HOURS
Qty: 0 | Refills: 0 | Status: DISCONTINUED | OUTPATIENT
Start: 2017-11-11 | End: 2017-11-14

## 2017-11-11 RX ADMIN — Medication 3 MILLILITER(S): at 17:10

## 2017-11-11 RX ADMIN — MEROPENEM 200 MILLIGRAM(S): 1 INJECTION INTRAVENOUS at 21:16

## 2017-11-11 RX ADMIN — PANTOPRAZOLE SODIUM 40 MILLIGRAM(S): 20 TABLET, DELAYED RELEASE ORAL at 12:23

## 2017-11-11 RX ADMIN — POTASSIUM PHOSPHATE, MONOBASIC POTASSIUM PHOSPHATE, DIBASIC 62.5 MILLIMOLE(S): 236; 224 INJECTION, SOLUTION INTRAVENOUS at 10:28

## 2017-11-11 RX ADMIN — Medication 3 MILLILITER(S): at 05:27

## 2017-11-11 RX ADMIN — FENTANYL CITRATE 25 MICROGRAM(S): 50 INJECTION INTRAVENOUS at 12:48

## 2017-11-11 RX ADMIN — HUMAN INSULIN 7 UNIT(S): 100 INJECTION, SUSPENSION SUBCUTANEOUS at 00:41

## 2017-11-11 RX ADMIN — AZITHROMYCIN 250 MILLIGRAM(S): 500 TABLET, FILM COATED ORAL at 10:28

## 2017-11-11 RX ADMIN — Medication 20 MILLIGRAM(S): at 06:25

## 2017-11-11 RX ADMIN — FENTANYL CITRATE 25 MICROGRAM(S): 50 INJECTION INTRAVENOUS at 14:15

## 2017-11-11 RX ADMIN — Medication 166.67 MILLIGRAM(S): at 06:25

## 2017-11-11 RX ADMIN — Medication 3 MILLILITER(S): at 11:57

## 2017-11-11 RX ADMIN — Medication 6: at 06:31

## 2017-11-11 RX ADMIN — FENTANYL CITRATE 25 MICROGRAM(S): 50 INJECTION INTRAVENOUS at 14:54

## 2017-11-11 RX ADMIN — HEPARIN SODIUM 5000 UNIT(S): 5000 INJECTION INTRAVENOUS; SUBCUTANEOUS at 06:31

## 2017-11-11 RX ADMIN — Medication 8: at 00:40

## 2017-11-11 RX ADMIN — Medication 0.12 MILLIGRAM(S): at 06:26

## 2017-11-11 RX ADMIN — Medication 10 MILLIGRAM(S): at 14:59

## 2017-11-11 RX ADMIN — Medication 3 MILLILITER(S): at 00:14

## 2017-11-11 RX ADMIN — FENTANYL CITRATE 25 MICROGRAM(S): 50 INJECTION INTRAVENOUS at 09:30

## 2017-11-11 RX ADMIN — FENTANYL CITRATE 4.03 MICROGRAM(S)/KG/HR: 50 INJECTION INTRAVENOUS at 14:54

## 2017-11-11 RX ADMIN — FENTANYL CITRATE 25 MICROGRAM(S): 50 INJECTION INTRAVENOUS at 10:31

## 2017-11-11 RX ADMIN — SENNA PLUS 10 MILLILITER(S): 8.6 TABLET ORAL at 21:15

## 2017-11-11 RX ADMIN — Medication 6: at 12:27

## 2017-11-11 RX ADMIN — HUMAN INSULIN 9 UNIT(S): 100 INJECTION, SUSPENSION SUBCUTANEOUS at 17:17

## 2017-11-11 RX ADMIN — Medication 81 MILLIGRAM(S): at 12:21

## 2017-11-11 RX ADMIN — MEROPENEM 200 MILLIGRAM(S): 1 INJECTION INTRAVENOUS at 06:26

## 2017-11-11 RX ADMIN — Medication 4: at 17:18

## 2017-11-11 RX ADMIN — FENTANYL CITRATE 25 MICROGRAM(S): 50 INJECTION INTRAVENOUS at 12:30

## 2017-11-11 RX ADMIN — MEROPENEM 200 MILLIGRAM(S): 1 INJECTION INTRAVENOUS at 14:59

## 2017-11-11 RX ADMIN — Medication 10 MILLIGRAM(S): at 21:15

## 2017-11-11 RX ADMIN — HUMAN INSULIN 7 UNIT(S): 100 INJECTION, SUSPENSION SUBCUTANEOUS at 06:31

## 2017-11-11 RX ADMIN — POLYETHYLENE GLYCOL 3350 17 GRAM(S): 17 POWDER, FOR SOLUTION ORAL at 06:25

## 2017-11-11 RX ADMIN — HEPARIN SODIUM 5000 UNIT(S): 5000 INJECTION INTRAVENOUS; SUBCUTANEOUS at 17:17

## 2017-11-11 RX ADMIN — HUMAN INSULIN 9 UNIT(S): 100 INJECTION, SUSPENSION SUBCUTANEOUS at 12:28

## 2017-11-11 NOTE — PROGRESS NOTE ADULT - SUBJECTIVE AND OBJECTIVE BOX
CHIEF COMPLAINT: acute on chronic hypoxic respiratory failure 2/2 PNA    Interval Events:  Pt hypothermic to 96.4, started on a warming blanket. Levophed was weaned off but pt became hypotensive therefore gtt was restarted at low dose. BS remain elevated despite being on basal insulin.   During spontaneous breathing trials this morning, became tachypneic.    REVIEW OF SYSTEMS:  Constitutional: [ ] negative [ ] fevers [ ] chills [ ] weight loss [ ] weight gain  HEENT: [ ] negative [ ] dry eyes [ ] eye irritation [ ] postnasal drip [ ] nasal congestion  CV: [ ] negative  [ ] chest pain [ ] orthopnea [ ] palpitations [ ] murmur  Resp: [ ] negative [ ] cough [ ] shortness of breath [ ] dyspnea [ ] wheezing [ ] sputum [ ] hemoptysis  GI: [ ] negative [ ] nausea [ ] vomiting [ ] diarrhea [ ] constipation [ ] abd pain [ ] dysphagia   : [ ] negative [ ] dysuria [ ] nocturia [ ] hematuria [ ] increased urinary frequency  Musculoskeletal: [ ] negative [ ] back pain [ ] myalgias [ ] arthralgias [ ] fracture  Skin: [ ] negative [ ] rash [ ] itch  Neurological: [ ] negative [ ] headache [ ] dizziness [ ] syncope [ ] weakness [ ] numbness  Psychiatric: [ ] negative [ ] anxiety [ ] depression  Endocrine: [ ] negative [ ] diabetes [ ] thyroid problem  Hematologic/Lymphatic: [ ] negative [ ] anemia [ ] bleeding problem  Allergic/Immunologic: [ ] negative [ ] itchy eyes [ ] nasal discharge [ ] hives [ ] angioedema  [ ] All other systems negative  [X ] Unable to assess ROS because ________    OBJECTIVE:  ICU Vital Signs Last 24 Hrs  T(C): 35.8 (11 Nov 2017 06:30), Max: 37.3 (10 Nov 2017 16:00)  T(F): 96.4 (11 Nov 2017 06:30), Max: 99.1 (10 Nov 2017 16:00)  HR: 74 (11 Nov 2017 07:00) (72 - 91)  BP: 104/61 (10 Nov 2017 19:45) (104/61 - 104/61)  BP(mean): 76 (10 Nov 2017 19:45) (76 - 76)  ABP: 127/76 (11 Nov 2017 07:00) (91/51 - 146/78)  ABP(mean): 95 (11 Nov 2017 07:00) (65 - 105)  RR: 28 (11 Nov 2017 07:00) (17 - 45)  SpO2: 97% (11 Nov 2017 07:00) (93% - 100%)    Mode: AC/ CMV (Assist Control/ Continuous Mandatory Ventilation), RR (machine): 20, TV (machine): 470, FiO2: 30, PEEP: 5, ITime: 1, MAP: 8, PIP: 30    11-10 @ 07:01  -  11-11 @ 07:00  --------------------------------------------------------  IN: 2435.4 mL / OUT: 1270 mL / NET: 1165.4 mL      CAPILLARY BLOOD GLUCOSE  321 (11 Nov 2017 00:00)      POCT Blood Glucose.: 290 mg/dL (11 Nov 2017 06:30)      PHYSICAL EXAM:  GENERAL: sedated, intubated in NAD  HEAD:  Atraumatic, Normocephalic  EYES: EOMI, PERRLA, conjunctiva and sclera clear  NECK: Supple, No JVD  CHEST/LUNG: b/l course BS  HEART: tachycardic; No murmurs, rubs, or gallops  ABDOMEN: Soft, Nontender, Nondistended; Bowel sounds present  EXTREMITIES:  2+ Peripheral Pulses, No clubbing, cyanosis, or edema  NEUROLOGY: non-focal  SKIN: decub behind left ear    LINES:    HOSPITAL MEDICATIONS:  aspirin  chewable 81 milliGRAM(s) Oral daily  heparin  Injectable 5000 Unit(s) SubCutaneous every 12 hours    azithromycin  IVPB 500 milliGRAM(s) IV Intermittent every 24 hours  meropenem IVPB 1000 milliGRAM(s) IV Intermittent every 8 hours  vancomycin  IVPB 1250 milliGRAM(s) IV Intermittent every 12 hours    digoxin     Tablet 0.125 milliGRAM(s) Oral daily  norepinephrine Infusion 0.01 MICROgram(s)/kG/Min IV Continuous <Continuous>    insulin lispro (HumaLOG) corrective regimen sliding scale   SubCutaneous every 6 hours  insulin NPH human recombinant 7 Unit(s) SubCutaneous every 6 hours  methylPREDNISolone sodium succinate Injectable 20 milliGRAM(s) IV Push every 12 hours    ALBUTerol/ipratropium for Nebulization 3 milliLiter(s) Nebulizer every 6 hours    dexmedetomidine Infusion 0.2 MICROgram(s)/kG/Hr IV Continuous <Continuous>    pantoprazole  Injectable 40 milliGRAM(s) IV Push daily  polyethylene glycol 3350 17 Gram(s) Oral two times a day  senna Syrup 10 milliLiter(s) Oral at bedtime          influenza   Vaccine 0.5 milliLiter(s) IntraMuscular once          LABS:                        8.8    3.1   )-----------( 105      ( 11 Nov 2017 02:41 )             25.9     Hgb Trend: 8.8<--, 7.8<--, 7.7<--, 7.6<--, 8.3<--  11-11    142  |  104  |  46<H>  ----------------------------<  310<H>  4.6   |  28  |  0.93    Ca    7.9<L>      11 Nov 2017 02:41  Phos  2.1     11-11  Mg     2.0     11-11    TPro  4.8<L>  /  Alb  2.1<L>  /  TBili  2.2<H>  /  DBili  x   /  AST  125<H>  /  ALT  235<H>  /  AlkPhos  113  11-11    Creatinine Trend: 0.93<--, 0.91<--, 0.97<--, 0.98<--, 1.22<--, 1.39<--  PT/INR - ( 11 Nov 2017 02:41 )   PT: 12.0 sec;   INR: 1.10 ratio         PTT - ( 10 Nov 2017 02:50 )  PTT:36.8 sec    Arterial Blood Gas:  11-11 @ 02:36  7.51/38/72/30/96/7.1  ABG lactate: --  Arterial Blood Gas:  11-10 @ 11:34  7.49/40/90/30/98/6.6  ABG lactate: --  Arterial Blood Gas:  11-10 @ 10:04  7.54/37/91/31/98/7.8  ABG lactate: --  Arterial Blood Gas:  11-10 @ 04:27  7.51/40/73/32/96/8.1  ABG lactate: --  Arterial Blood Gas:  11-09 @ 23:59  7.54/36/309/31/100/7.6  ABG lactate: --  Arterial Blood Gas:  11-09 @ 19:03  7.48/40/140/29/100/5.7  ABG lactate: --  Arterial Blood Gas:  11-09 @ 15:27  7.45/39/139/26/100/2.8  ABG lactate: --        MICROBIOLOGY:     Blood cx growing CNS and pseudomonas  Sputum cx growing pseudomonas CHIEF COMPLAINT: acute on chronic hypoxic respiratory failure 2/2 PNA    Interval Events:  Pt hypothermic to 96.4, started on a warming blanket. Levophed was weaned off but pt became hypotensive therefore gtt was restarted at low dose. BS remain elevated despite being on basal insulin. Bradycardic on exam this morning, blood pressure stable.  During spontaneous breathing trials this morning, became tachypneic.    REVIEW OF SYSTEMS:  Constitutional: [ ] negative [ ] fevers [ ] chills [ ] weight loss [ ] weight gain  HEENT: [ ] negative [ ] dry eyes [ ] eye irritation [ ] postnasal drip [ ] nasal congestion  CV: [ ] negative  [ ] chest pain [ ] orthopnea [ ] palpitations [ ] murmur  Resp: [ ] negative [ ] cough [ ] shortness of breath [ ] dyspnea [ ] wheezing [ ] sputum [ ] hemoptysis  GI: [ ] negative [ ] nausea [ ] vomiting [ ] diarrhea [ ] constipation [ ] abd pain [ ] dysphagia   : [ ] negative [ ] dysuria [ ] nocturia [ ] hematuria [ ] increased urinary frequency  Musculoskeletal: [ ] negative [ ] back pain [ ] myalgias [ ] arthralgias [ ] fracture  Skin: [ ] negative [ ] rash [ ] itch  Neurological: [ ] negative [ ] headache [ ] dizziness [ ] syncope [ ] weakness [ ] numbness  Psychiatric: [ ] negative [ ] anxiety [ ] depression  Endocrine: [ ] negative [ ] diabetes [ ] thyroid problem  Hematologic/Lymphatic: [ ] negative [ ] anemia [ ] bleeding problem  Allergic/Immunologic: [ ] negative [ ] itchy eyes [ ] nasal discharge [ ] hives [ ] angioedema  [ ] All other systems negative  [X ] Unable to assess ROS because ________    OBJECTIVE:  ICU Vital Signs Last 24 Hrs  T(C): 35.8 (11 Nov 2017 06:30), Max: 37.3 (10 Nov 2017 16:00)  T(F): 96.4 (11 Nov 2017 06:30), Max: 99.1 (10 Nov 2017 16:00)  HR: 74 (11 Nov 2017 07:00) (72 - 91)  BP: 104/61 (10 Nov 2017 19:45) (104/61 - 104/61)  BP(mean): 76 (10 Nov 2017 19:45) (76 - 76)  ABP: 127/76 (11 Nov 2017 07:00) (91/51 - 146/78)  ABP(mean): 95 (11 Nov 2017 07:00) (65 - 105)  RR: 28 (11 Nov 2017 07:00) (17 - 45)  SpO2: 97% (11 Nov 2017 07:00) (93% - 100%)    Mode: AC/ CMV (Assist Control/ Continuous Mandatory Ventilation), RR (machine): 20, TV (machine): 470, FiO2: 30, PEEP: 5, ITime: 1, MAP: 8, PIP: 30    11-10 @ 07:01  -  11-11 @ 07:00  --------------------------------------------------------  IN: 2435.4 mL / OUT: 1270 mL / NET: 1165.4 mL      CAPILLARY BLOOD GLUCOSE  321 (11 Nov 2017 00:00)      POCT Blood Glucose.: 290 mg/dL (11 Nov 2017 06:30)      PHYSICAL EXAM:  GENERAL: sedated, intubated in NAD  HEAD:  Atraumatic, Normocephalic  EYES: EOMI, PERRLA, conjunctiva and sclera clear  NECK: Supple, No JVD  CHEST/LUNG: b/l course BS  HEART: tachycardic; No murmurs, rubs, or gallops  ABDOMEN: Soft, Nontender, Nondistended; Bowel sounds present  EXTREMITIES:  2+ Peripheral Pulses, No clubbing, cyanosis, or edema  NEUROLOGY: non-focal  SKIN: decub behind left ear    LINES:    HOSPITAL MEDICATIONS:  aspirin  chewable 81 milliGRAM(s) Oral daily  heparin  Injectable 5000 Unit(s) SubCutaneous every 12 hours    azithromycin  IVPB 500 milliGRAM(s) IV Intermittent every 24 hours  meropenem IVPB 1000 milliGRAM(s) IV Intermittent every 8 hours  vancomycin  IVPB 1250 milliGRAM(s) IV Intermittent every 12 hours    digoxin     Tablet 0.125 milliGRAM(s) Oral daily  norepinephrine Infusion 0.01 MICROgram(s)/kG/Min IV Continuous <Continuous>    insulin lispro (HumaLOG) corrective regimen sliding scale   SubCutaneous every 6 hours  insulin NPH human recombinant 7 Unit(s) SubCutaneous every 6 hours  methylPREDNISolone sodium succinate Injectable 20 milliGRAM(s) IV Push every 12 hours    ALBUTerol/ipratropium for Nebulization 3 milliLiter(s) Nebulizer every 6 hours    dexmedetomidine Infusion 0.2 MICROgram(s)/kG/Hr IV Continuous <Continuous>    pantoprazole  Injectable 40 milliGRAM(s) IV Push daily  polyethylene glycol 3350 17 Gram(s) Oral two times a day  senna Syrup 10 milliLiter(s) Oral at bedtime          influenza   Vaccine 0.5 milliLiter(s) IntraMuscular once          LABS:                        8.8    3.1   )-----------( 105      ( 11 Nov 2017 02:41 )             25.9     Hgb Trend: 8.8<--, 7.8<--, 7.7<--, 7.6<--, 8.3<--  11-11    142  |  104  |  46<H>  ----------------------------<  310<H>  4.6   |  28  |  0.93    Ca    7.9<L>      11 Nov 2017 02:41  Phos  2.1     11-11  Mg     2.0     11-11    TPro  4.8<L>  /  Alb  2.1<L>  /  TBili  2.2<H>  /  DBili  x   /  AST  125<H>  /  ALT  235<H>  /  AlkPhos  113  11-11    Creatinine Trend: 0.93<--, 0.91<--, 0.97<--, 0.98<--, 1.22<--, 1.39<--  PT/INR - ( 11 Nov 2017 02:41 )   PT: 12.0 sec;   INR: 1.10 ratio         PTT - ( 10 Nov 2017 02:50 )  PTT:36.8 sec    Arterial Blood Gas:  11-11 @ 02:36  7.51/38/72/30/96/7.1  ABG lactate: --  Arterial Blood Gas:  11-10 @ 11:34  7.49/40/90/30/98/6.6  ABG lactate: --  Arterial Blood Gas:  11-10 @ 10:04  7.54/37/91/31/98/7.8  ABG lactate: --  Arterial Blood Gas:  11-10 @ 04:27  7.51/40/73/32/96/8.1  ABG lactate: --  Arterial Blood Gas:  11-09 @ 23:59  7.54/36/309/31/100/7.6  ABG lactate: --  Arterial Blood Gas:  11-09 @ 19:03  7.48/40/140/29/100/5.7  ABG lactate: --  Arterial Blood Gas:  11-09 @ 15:27  7.45/39/139/26/100/2.8  ABG lactate: --        MICROBIOLOGY:     Blood cx growing CNS and pseudomonas  Sputum cx growing pseudomonas

## 2017-11-11 NOTE — PROGRESS NOTE ADULT - ASSESSMENT
Acute on chronic hypoxemic respiratory failure  Progressive ILD: unlikely UIP, poss chronic hypersens/NSIP  Psudomonoas bacteremia with pneumonia  Septic Shock - clinically improved, now on minimal pressors  Fluid overload with new ascites/edema      REC    Continue abx for pseudomonas, adjust per sensitivties  Maintain VE to target higher paCO2 - doubt he'll sustai 40 on extubation  COntinue IV solumedrol: DC imuran  Diurese aggressively pre weaning as tolerated  Vasopressors titrate to MAP >60 and taper per MIICU

## 2017-11-11 NOTE — PROGRESS NOTE ADULT - ATTENDING COMMENTS
77 year old man with hypoxic respiratory failure, gram negative bacteremia, distributive shock, ATN, a-fib/flutter with RVR     did not do well on SBT today was tachypneic  off Precedex due to bradycardia will try intermittent fentanyl  pseudomonas in sputum and blood continue antibiotics   CHRISTIANE improving    case discussed at length at bedside with patients sons and wife    prognosis guarded    Critical Care Time 40minutes

## 2017-11-11 NOTE — PROGRESS NOTE ADULT - ASSESSMENT
77M h/o DM2, GERD, CABG (2016) and interstitial lung disease (UIP) on 3L home O2, p/w increasing REAGAN thought to be ILD flare. Pt developed fevers and hypotension likely related to sepsis. MICU was consulted for dyspnea and hypotension 2/2 sepsis in the setting of advanced ILD    #Neuro  - Switched to precedex, withdraws to pain on UE.   Metabolic Encephalopathy 2/2 sepsis     #Pulm  Respiratory Distress 2/2 sepsis/PNA  - Intubated 11/7 for hypox resp failure and increased WOB, AC/CMV: , RR 20 FiO2 30 PEEP 5. Check repeat ABG and adjust vent settings  -lactate improving, Plan for spontaneous breathing trials  - Duonebs q6      ILD  - Pt. is intubated for hypoxic resp failure and increased WOB  - CT chest consistent with pulmonary nodules. Continue with abx, solumedrol   - Spontaneous breathing trials, try to wean off vent  - Given septic picture holding pts imuran for now     #Cardio  Hypotension 2/2 Sepsis  - Pt responded well to IVF and has good LV systolic function  - On levophed 11/7 for pressure support , continue to wean off. Consider starting midodrine if not bradycardic  Afib/aflutter: now bradycardic, 2/2 to digoxin toxicity  -Check dig level and dose maintenance dose. Avoid amio in the setting of ILD.    #GI  - Continue with feeds  Colitis improving, lactate resolving. Continue with bowel regimen  GERD  - c/w Protonix    #Renal  CHRISTIANE likely prerenal in the setting of sepsis  -resolved  - Losartan D/c'd   -avoid nephrotoxins    #ID  Sepsis  - Repeat blood cx growing CNS. Restarted on vancomycin. Bcx 11/7 growing Pseudomonas gram (-) rods; sensitive to meropenem (day 4)  - On Azithromycin 11/7 (day 5)  -Follow up repeat blood cultures  Bacteremia 2/2 to PNA vs gut translocation    #Endo  DM2  - elevated FS likely 2/2 to steroids. Started on NPH. ISS changed to moderate. Continue to monitor    #Heme  Thrombocytopenia   Likely 2/2 to zosyn, med induced, less likely HIT. HIT panel negative. Continue to trend platelets.   Neutropenia   - Improving, 2/2 sepsis vs medication induced. Continue to trend    #DVT ppx: Hep sq 77M h/o DM2, GERD, CABG (2016) and interstitial lung disease (UIP) on 3L home O2, p/w increasing REAGAN thought to be ILD flare. Pt developed fevers and hypotension likely related to sepsis. MICU was consulted for dyspnea and hypotension 2/2 sepsis in the setting of advanced ILD    #Neuro  - Switched to precedex, withdraws to pain on UE.   Metabolic Encephalopathy 2/2 sepsis     #Pulm  Respiratory Distress 2/2 sepsis/PNA  - Intubated 11/7 for hypox resp failure and increased WOB, AC/CMV: , RR 20 FiO2 30 PEEP 5. Check repeat ABG and adjust vent settings  -lactate improving, Plan for spontaneous breathing trials  - Duonebs q6      ILD  - Pt. is intubated for hypoxic resp failure and increased WOB  - CT chest consistent with pulmonary nodules. Continue with abx, solumedrol   - Spontaneous breathing trials, try to wean off vent  - Given septic picture holding pts imuran for now     #Cardio  Hypotension 2/2 Sepsis  - Pt responded well to IVF and has good LV systolic function  - On levophed 11/7 for pressure support , continue to wean off. Consider starting midodrine if not bradycardic  Afib/aflutter: now bradycardic, 2/2 to digoxin toxicity  -Check dig level and dose maintenance dose. Avoid amio in the setting of ILD.    #GI  - Continue with feeds  Colitis: improving, lactate resolving. Continue with bowel regimen  Transaminitis: improving, likely sepsis mediated   GERD:  - c/w Protonix    #Renal  CHRISTIANE likely prerenal in the setting of sepsis  -resolved  - Losartan D/c'd   -avoid nephrotoxins    #ID  Sepsis  - Repeat blood cx growing CNS. Restarted on vancomycin. Bcx 11/7 growing Pseudomonas gram (-) rods; sensitive to meropenem (day 4)  - On Azithromycin 11/7 (day 5)  -Follow up repeat blood cultures  Bacteremia 2/2 to PNA vs gut translocation    #Endo  DM2  - elevated FS likely 2/2 to steroids. Started on NPH. ISS changed to moderate. Continue to monitor    #Heme  Thrombocytopenia   Likely 2/2 to zosyn, med induced, less likely HIT. HIT panel negative. Continue to trend platelets.   Neutropenia   - Improving, 2/2 sepsis vs medication induced. Continue to trend    #DVT ppx: Hep sq

## 2017-11-11 NOTE — PROGRESS NOTE ADULT - SUBJECTIVE AND OBJECTIVE BOX
Follow-up Pulm Progress Note  Amauri Jackson MD  [phone]    Events noted  Failed CPAP/PS trial up to 15 PS today  Sedated on CMV with FIO2 30%  On minimal vasopressors  Lactic acidosis resolved  ABd CT: questionable colitis; new ascites  Chest CT: bibasilar consolidation c/w pneumonia (new since adm)  Persistent resp alkalosis on CMV    Vital Signs Last 24 Hrs  T(C): 36.5 (11 Nov 2017 08:00), Max: 37.3 (10 Nov 2017 16:00)  T(F): 97.7 (11 Nov 2017 08:00), Max: 99.1 (10 Nov 2017 16:00)  HR: 70 (11 Nov 2017 11:58) (51 - 91)  BP: 104/61 (10 Nov 2017 19:45) (104/61 - 104/61)  BP(mean): 76 (10 Nov 2017 19:45) (76 - 76)  RR: 33 (11 Nov 2017 11:15) (21 - 45)  SpO2: 97% (11 Nov 2017 11:58) (93% - 100%)    ABG - ( 11 Nov 2017 02:36 )  pH: 7.51  /  pCO2: 38    /  pO2: 72    / HCO3: 30    / Base Excess: 7.1   /  SaO2: 96                             8.8    3.1   )-----------( 105      ( 11 Nov 2017 02:41 )             25.9     11-11    142  |  104  |  46<H>  ----------------------------<  310<H>  4.6   |  28  |  0.93    Ca    7.9<L>      11 Nov 2017 02:41  Phos  2.1     11-11  Mg     2.0     11-11    TPro  4.8<L>  /  Alb  2.1<L>  /  TBili  2.2<H>  /  DBili  x   /  AST  125<H>  /  ALT  235<H>  /  AlkPhos  113  11-11    CXR: unchaged reticular opacities: no gross new pulm edema  TTE: Mild AS, hyperdynamic LV, PA 40-50  Physical Examination:  PULM: Bilateral crackles CVS: Regular rate and rhythm, no murmurs, rubs, or gallops  ABD: Soft, non-tender  EXT:  3+ edema (presacral)    RADIOLOGY REVIEWED  CXR: No change in bilateral reticular opacities    CT chest: see above    TTE:

## 2017-11-11 NOTE — PROGRESS NOTE ADULT - SUBJECTIVE AND OBJECTIVE BOX
********Cardiology Progress Note***************    CC:  Sedated on Vent      INTERVAL HISTORY:    Remains sedated on Vent.;; Failed CPAP trail.   Converted to NSR,  HR dec low 50s on Precedex; now 74          Allergies    No Known Allergies    Intolerances    	    MEDICATIONS:  aspirin  chewable 81 milliGRAM(s) Oral daily  digoxin     Tablet 0.125 milliGRAM(s) Oral daily  heparin  Injectable 5000 Unit(s) SubCutaneous every 12 hours  norepinephrine Infusion 0.01 MICROgram(s)/kG/Min IV Continuous <Continuous>    azithromycin  IVPB 500 milliGRAM(s) IV Intermittent every 24 hours  meropenem IVPB 1000 milliGRAM(s) IV Intermittent every 8 hours  vancomycin  IVPB 1250 milliGRAM(s) IV Intermittent every 12 hours    ALBUTerol/ipratropium for Nebulization 3 milliLiter(s) Nebulizer every 6 hours    fentaNYL    Injectable 25 MICROGram(s) IV Push every 4 hours PRN    pantoprazole  Injectable 40 milliGRAM(s) IV Push daily  polyethylene glycol 3350 17 Gram(s) Oral two times a day  senna Syrup 10 milliLiter(s) Oral at bedtime    insulin lispro (HumaLOG) corrective regimen sliding scale   SubCutaneous every 6 hours  insulin NPH human recombinant 9 Unit(s) SubCutaneous every 6 hours  methylPREDNISolone sodium succinate Injectable 10 milliGRAM(s) IV Push every 8 hours    influenza   Vaccine 0.5 milliLiter(s) IntraMuscular once      PAST MEDICAL & SURGICAL HISTORY:  Asbestos exposure  Kidney stones  Coronary artery disease involving native coronary artery of native heart, angina presence unspecified: S/p CABG 2016  Cataract  Rotator cuff rupture, right  GERD (gastroesophageal reflux disease)  Hyperlipemia  Diabetes mellitus  Encounter for removal of ureteral stent: Had a ureteral stent for kidney stones. Now removed.  S/P CABG (coronary artery bypass graft)  S/P rotator cuff surgery  S/P lens implant: 2000 &amp; 2012  S/P appendectomy: over 50 years ago      FAMILY HISTORY:  Family history of breast cancer (Mother)  Family history of liver disease (Sibling)  Family history of diabetes mellitus      SOCIAL HISTORY:  unchanged    REVIEW OF SYSTEMS:Unable to obtain    PHYSICAL EXAM:  T(C): 37.3 (11-11-17 @ 12:00), Max: 37.3 (11-10-17 @ 16:00)  HR: 70 (11-11-17 @ 13:00) (51 - 91)  BP: 104/61 (11-10-17 @ 19:45) (104/61 - 104/61)  RR: 32 (11-11-17 @ 13:00) (21 - 45)  SpO2: 95% (11-11-17 @ 13:00) (93% - 100%)  Wt(kg): --  I&O's Summary    10 Nov 2017 07:01  -  11 Nov 2017 07:00  --------------------------------------------------------  IN: 2435.4 mL / OUT: 1270 mL / NET: 1165.4 mL    11 Nov 2017 07:01  -  11 Nov 2017 13:14  --------------------------------------------------------  IN: 629.1 mL / OUT: 0 mL / NET: 629.1 mL        Appearance:  Sedated on Vent. NAD. No respiratory distress	  HEENT:   Normal oral mucosa, PERRL, EOMI	  Lymphatic: No lymphadenopathy  Cardiovascular: No JVD.  PMI not palpable. Normal S1 S2.  No murmurs.  Trce b/l LE edema  Respiratory: Lungs clear to auscultation	  Psychiatry: A & O x 3, Mood & affect appropriate  Gastrointestinal:  Soft, Non-tender, + BS	  Skin: No rashes, No ecchymoses, No cyanosis	  Neurologic: Non-focal  Extremities: Normal range of motion, No clubbing, cyanosis or edema  Vascular: Peripheral pulses palpable 2+ bilaterally      LABS:	 	    CBC Full  -  ( 11 Nov 2017 02:41 )  WBC Count : 3.1 K/uL  Hemoglobin : 8.8 g/dL  Hematocrit : 25.9 %  Platelet Count - Automated : 105 K/uL  Mean Cell Volume : 106.0 fl  Mean Cell Hemoglobin : 36.2 pg  Mean Cell Hemoglobin Concentration : 34.0 gm/dL  Auto Neutrophil # : 2.5 K/uL  Auto Lymphocyte # : 0.4 K/uL  Auto Monocyte # : 0.2 K/uL  Auto Eosinophil # : 0.0 K/uL  Auto Basophil # : 0.0 K/uL  Auto Neutrophil % : 74.0 %  Auto Lymphocyte % : 13.0 %  Auto Monocyte % : 7.0 %  Auto Eosinophil % : x  Auto Basophil % : x    11-11    142  |  104  |  46<H>  ----------------------------<  310<H>  4.6   |  28  |  0.93  11-10    143  |  104  |  42<H>  ----------------------------<  376<H>  4.8   |  30  |  0.91    Ca    7.9<L>      11 Nov 2017 02:41  Ca    7.6<L>      10 Nov 2017 11:38  Phos  2.1     11-11  Phos  2.6     11-10  Mg     2.0     11-11  Mg     2.0     11-10    TPro  4.8<L>  /  Alb  2.1<L>  /  TBili  2.2<H>  /  DBili  x   /  AST  125<H>  /  ALT  235<H>  /  AlkPhos  113  11-11  TPro  4.7<L>  /  Alb  2.0<L>  /  TBili  2.6<H>  /  DBili  x   /  AST  171<H>  /  ALT  247<H>  /  AlkPhos  55  11-10      TELEMETRY: 	SB/NSR: 50-80.        Assessment and Plan:   · Assessment		  77 M with CAD h/o CABG, NL LV systolic function, ILD on home O2 with hypoxic resp failure, septic shock on pressors with presumed PNA. In this setting, new onset AF now s/p conversion to NSR  ·	Rates remained well controlled, even with brief conversion to NSR. Continue tele.   ·	At time of visit patient had been weaned off of pressors.   ·	Lactate improving. Renal function stable    ADVISE:  Agree w/ current Mgmt  Continue Digoxin    Oh Marroquin MD, [facility]  Office: [phone]  Pager: [phone]

## 2017-11-12 LAB
ALBUMIN SERPL ELPH-MCNC: 2.1 G/DL — LOW (ref 3.3–5)
ALP SERPL-CCNC: 149 U/L — HIGH (ref 40–120)
ALT FLD-CCNC: 195 U/L RC — HIGH (ref 10–45)
ANION GAP SERPL CALC-SCNC: 8 MMOL/L — SIGNIFICANT CHANGE UP (ref 5–17)
AST SERPL-CCNC: 84 U/L — HIGH (ref 10–40)
BASE EXCESS BLDA CALC-SCNC: 6.6 MMOL/L — HIGH (ref -2–2)
BILIRUB SERPL-MCNC: 1.8 MG/DL — HIGH (ref 0.2–1.2)
BUN SERPL-MCNC: 48 MG/DL — HIGH (ref 7–23)
CALCIUM SERPL-MCNC: 7.7 MG/DL — LOW (ref 8.4–10.5)
CHLORIDE SERPL-SCNC: 107 MMOL/L — SIGNIFICANT CHANGE UP (ref 96–108)
CO2 BLDA-SCNC: 33 MMOL/L — HIGH (ref 22–30)
CO2 SERPL-SCNC: 29 MMOL/L — SIGNIFICANT CHANGE UP (ref 22–31)
CREAT SERPL-MCNC: 0.92 MG/DL — SIGNIFICANT CHANGE UP (ref 0.5–1.3)
CULTURE RESULTS: SIGNIFICANT CHANGE UP
GAS PNL BLDA: SIGNIFICANT CHANGE UP
GLUCOSE BLDC GLUCOMTR-MCNC: 177 MG/DL — HIGH (ref 70–99)
GLUCOSE BLDC GLUCOMTR-MCNC: 180 MG/DL — HIGH (ref 70–99)
GLUCOSE BLDC GLUCOMTR-MCNC: 220 MG/DL — HIGH (ref 70–99)
GLUCOSE BLDC GLUCOMTR-MCNC: 249 MG/DL — HIGH (ref 70–99)
GLUCOSE SERPL-MCNC: 230 MG/DL — HIGH (ref 70–99)
GRAM STN FLD: SIGNIFICANT CHANGE UP
HCO3 BLDA-SCNC: 31 MMOL/L — HIGH (ref 21–29)
HCT VFR BLD CALC: 26.8 % — LOW (ref 39–50)
HGB BLD-MCNC: 8.9 G/DL — LOW (ref 13–17)
HOROWITZ INDEX BLDA+IHG-RTO: 30 — SIGNIFICANT CHANGE UP
INTUBATED: SIGNIFICANT CHANGE UP
MAGNESIUM SERPL-MCNC: 2 MG/DL — SIGNIFICANT CHANGE UP (ref 1.6–2.6)
MCHC RBC-ENTMCNC: 33.2 GM/DL — SIGNIFICANT CHANGE UP (ref 32–36)
MCHC RBC-ENTMCNC: 35.4 PG — HIGH (ref 27–34)
MCV RBC AUTO: 107 FL — HIGH (ref 80–100)
PCO2 BLDA: 47 MMHG — HIGH (ref 32–46)
PH BLDA: 7.43 — SIGNIFICANT CHANGE UP (ref 7.35–7.45)
PHOSPHATE SERPL-MCNC: 3 MG/DL — SIGNIFICANT CHANGE UP (ref 2.5–4.5)
PLATELET # BLD AUTO: 127 K/UL — LOW (ref 150–400)
PO2 BLDA: 96 MMHG — SIGNIFICANT CHANGE UP (ref 74–108)
POTASSIUM SERPL-MCNC: 5.1 MMOL/L — SIGNIFICANT CHANGE UP (ref 3.5–5.3)
POTASSIUM SERPL-SCNC: 5.1 MMOL/L — SIGNIFICANT CHANGE UP (ref 3.5–5.3)
PROT SERPL-MCNC: 4.7 G/DL — LOW (ref 6–8.3)
RBC # BLD: 2.51 M/UL — LOW (ref 4.2–5.8)
RBC # FLD: 18.2 % — HIGH (ref 10.3–14.5)
SAO2 % BLDA: 98 % — HIGH (ref 92–96)
SODIUM SERPL-SCNC: 144 MMOL/L — SIGNIFICANT CHANGE UP (ref 135–145)
SPECIMEN SOURCE: SIGNIFICANT CHANGE UP
SPECIMEN SOURCE: SIGNIFICANT CHANGE UP
VANCOMYCIN TROUGH SERPL-MCNC: 17.1 UG/ML — SIGNIFICANT CHANGE UP (ref 10–20)
WBC # BLD: 4.1 K/UL — SIGNIFICANT CHANGE UP (ref 3.8–10.5)
WBC # FLD AUTO: 4.1 K/UL — SIGNIFICANT CHANGE UP (ref 3.8–10.5)

## 2017-11-12 PROCEDURE — 99291 CRITICAL CARE FIRST HOUR: CPT

## 2017-11-12 PROCEDURE — 99232 SBSQ HOSP IP/OBS MODERATE 35: CPT

## 2017-11-12 RX ORDER — MIDAZOLAM HYDROCHLORIDE 1 MG/ML
1 INJECTION, SOLUTION INTRAMUSCULAR; INTRAVENOUS ONCE
Qty: 0 | Refills: 0 | Status: DISCONTINUED | OUTPATIENT
Start: 2017-11-12 | End: 2017-11-12

## 2017-11-12 RX ORDER — ACETAMINOPHEN 500 MG
650 TABLET ORAL ONCE
Qty: 0 | Refills: 0 | Status: COMPLETED | OUTPATIENT
Start: 2017-11-12 | End: 2017-11-12

## 2017-11-12 RX ORDER — VANCOMYCIN HCL 1 G
750 VIAL (EA) INTRAVENOUS EVERY 12 HOURS
Qty: 0 | Refills: 0 | Status: DISCONTINUED | OUTPATIENT
Start: 2017-11-12 | End: 2017-11-14

## 2017-11-12 RX ORDER — HUMAN INSULIN 100 [IU]/ML
12 INJECTION, SUSPENSION SUBCUTANEOUS EVERY 6 HOURS
Qty: 0 | Refills: 0 | Status: DISCONTINUED | OUTPATIENT
Start: 2017-11-12 | End: 2017-11-14

## 2017-11-12 RX ORDER — MIDAZOLAM HYDROCHLORIDE 1 MG/ML
2 INJECTION, SOLUTION INTRAMUSCULAR; INTRAVENOUS ONCE
Qty: 0 | Refills: 0 | Status: DISCONTINUED | OUTPATIENT
Start: 2017-11-12 | End: 2017-11-12

## 2017-11-12 RX ORDER — VANCOMYCIN HCL 1 G
500 VIAL (EA) INTRAVENOUS ONCE
Qty: 0 | Refills: 0 | Status: DISCONTINUED | OUTPATIENT
Start: 2017-11-12 | End: 2017-11-12

## 2017-11-12 RX ORDER — MIDAZOLAM HYDROCHLORIDE 1 MG/ML
2 INJECTION, SOLUTION INTRAMUSCULAR; INTRAVENOUS
Qty: 100 | Refills: 0 | Status: DISCONTINUED | OUTPATIENT
Start: 2017-11-12 | End: 2017-11-14

## 2017-11-12 RX ADMIN — HUMAN INSULIN 12 UNIT(S): 100 INJECTION, SUSPENSION SUBCUTANEOUS at 11:59

## 2017-11-12 RX ADMIN — HEPARIN SODIUM 5000 UNIT(S): 5000 INJECTION INTRAVENOUS; SUBCUTANEOUS at 05:40

## 2017-11-12 RX ADMIN — Medication 3 MILLILITER(S): at 17:49

## 2017-11-12 RX ADMIN — PANTOPRAZOLE SODIUM 40 MILLIGRAM(S): 20 TABLET, DELAYED RELEASE ORAL at 11:58

## 2017-11-12 RX ADMIN — AZITHROMYCIN 250 MILLIGRAM(S): 500 TABLET, FILM COATED ORAL at 11:58

## 2017-11-12 RX ADMIN — Medication 10 MILLIGRAM(S): at 05:40

## 2017-11-12 RX ADMIN — HUMAN INSULIN 9 UNIT(S): 100 INJECTION, SUSPENSION SUBCUTANEOUS at 00:37

## 2017-11-12 RX ADMIN — Medication 3 MILLILITER(S): at 00:42

## 2017-11-12 RX ADMIN — HUMAN INSULIN 9 UNIT(S): 100 INJECTION, SUSPENSION SUBCUTANEOUS at 05:43

## 2017-11-12 RX ADMIN — Medication 4: at 00:37

## 2017-11-12 RX ADMIN — SENNA PLUS 10 MILLILITER(S): 8.6 TABLET ORAL at 21:44

## 2017-11-12 RX ADMIN — MEROPENEM 200 MILLIGRAM(S): 1 INJECTION INTRAVENOUS at 05:39

## 2017-11-12 RX ADMIN — Medication 2: at 12:03

## 2017-11-12 RX ADMIN — Medication 81 MILLIGRAM(S): at 11:57

## 2017-11-12 RX ADMIN — MIDAZOLAM HYDROCHLORIDE 1 MILLIGRAM(S): 1 INJECTION, SOLUTION INTRAMUSCULAR; INTRAVENOUS at 11:23

## 2017-11-12 RX ADMIN — Medication 150 MILLIGRAM(S): at 19:19

## 2017-11-12 RX ADMIN — MEROPENEM 200 MILLIGRAM(S): 1 INJECTION INTRAVENOUS at 14:50

## 2017-11-12 RX ADMIN — Medication 3 MILLILITER(S): at 11:19

## 2017-11-12 RX ADMIN — Medication 150 MILLIGRAM(S): at 06:50

## 2017-11-12 RX ADMIN — Medication 3 MILLILITER(S): at 06:15

## 2017-11-12 RX ADMIN — Medication 0.12 MILLIGRAM(S): at 05:42

## 2017-11-12 RX ADMIN — Medication 10 MILLIGRAM(S): at 14:49

## 2017-11-12 RX ADMIN — Medication 4: at 05:59

## 2017-11-12 RX ADMIN — POLYETHYLENE GLYCOL 3350 17 GRAM(S): 17 POWDER, FOR SOLUTION ORAL at 17:17

## 2017-11-12 RX ADMIN — Medication 650 MILLIGRAM(S): at 01:20

## 2017-11-12 RX ADMIN — Medication 2: at 17:18

## 2017-11-12 RX ADMIN — MEROPENEM 200 MILLIGRAM(S): 1 INJECTION INTRAVENOUS at 21:43

## 2017-11-12 RX ADMIN — Medication 10 MILLIGRAM(S): at 21:43

## 2017-11-12 RX ADMIN — HUMAN INSULIN 12 UNIT(S): 100 INJECTION, SUSPENSION SUBCUTANEOUS at 17:18

## 2017-11-12 RX ADMIN — HEPARIN SODIUM 5000 UNIT(S): 5000 INJECTION INTRAVENOUS; SUBCUTANEOUS at 17:17

## 2017-11-12 RX ADMIN — MIDAZOLAM HYDROCHLORIDE 2 MILLIGRAM(S): 1 INJECTION, SOLUTION INTRAMUSCULAR; INTRAVENOUS at 09:29

## 2017-11-12 NOTE — PROGRESS NOTE ADULT - ATTENDING COMMENTS
77 year old man with hypoxic respiratory failure, pseudomonas bacteremia, pneumonia, distributive shock, ATN, a-fib/flutter with RVR     pneumonia on top of underlying interstitial lung disease  continue ventilator support did not do well on CPAP trial  off Vasopressors still  continue rate control medications  new fevers overnight cultures were repeated  CHRISTIANE resolving    case discussed at length at bedside with patients sons and wife    prognosis guarded    Critical Care Time 40minutes

## 2017-11-12 NOTE — PROGRESS NOTE ADULT - SUBJECTIVE AND OBJECTIVE BOX
CHIEF COMPLAINT: acute on chronic hypoxic respiratory failure 2/2 PNA    Interval Events: Febrile to 101.1 overnight, Tylenol administered. HR remained in the 60s-80 overnight.     REVIEW OF SYSTEMS:  Constitutional: [ ] negative [ ] fevers [ ] chills [ ] weight loss [ ] weight gain  HEENT: [ ] negative [ ] dry eyes [ ] eye irritation [ ] postnasal drip [ ] nasal congestion  CV: [ ] negative  [ ] chest pain [ ] orthopnea [ ] palpitations [ ] murmur  Resp: [ ] negative [ ] cough [ ] shortness of breath [ ] dyspnea [ ] wheezing [ ] sputum [ ] hemoptysis  GI: [ ] negative [ ] nausea [ ] vomiting [ ] diarrhea [ ] constipation [ ] abd pain [ ] dysphagia   : [ ] negative [ ] dysuria [ ] nocturia [ ] hematuria [ ] increased urinary frequency  Musculoskeletal: [ ] negative [ ] back pain [ ] myalgias [ ] arthralgias [ ] fracture  Skin: [ ] negative [ ] rash [ ] itch  Neurological: [ ] negative [ ] headache [ ] dizziness [ ] syncope [ ] weakness [ ] numbness  Psychiatric: [ ] negative [ ] anxiety [ ] depression  Endocrine: [ ] negative [ ] diabetes [ ] thyroid problem  Hematologic/Lymphatic: [ ] negative [ ] anemia [ ] bleeding problem  Allergic/Immunologic: [ ] negative [ ] itchy eyes [ ] nasal discharge [ ] hives [ ] angioedema  [ ] All other systems negative  [X ] Unable to assess ROS because intubated    OBJECTIVE:  ICU Vital Signs Last 24 Hrs  T(C): 37.1 (12 Nov 2017 04:00), Max: 38.4 (12 Nov 2017 00:00)  T(F): 98.8 (12 Nov 2017 04:00), Max: 101.1 (12 Nov 2017 00:00)  HR: 75 (12 Nov 2017 06:17) (51 - 109)  BP: --  BP(mean): --  ABP: 131/64 (12 Nov 2017 06:00) (76/40 - 170/97)  ABP(mean): 87 (12 Nov 2017 06:00) (52 - 140)  RR: 23 (12 Nov 2017 06:00) (20 - 49)  SpO2: 93% (12 Nov 2017 06:17) (91% - 100%)    Mode: AC/ CMV (Assist Control/ Continuous Mandatory Ventilation), RR (machine): 22, TV (machine): 470, FiO2: 30, PEEP: 5, ITime: 0.6, MAP: 13, PIP: 27    11-11 @ 07:01  -  11-12 @ 07:00  --------------------------------------------------------  IN: 1636.9 mL / OUT: 0 mL / NET: 1636.9 mL      CAPILLARY BLOOD GLUCOSE  321 (11 Nov 2017 00:00)      POCT Blood Glucose.: 220 mg/dL (12 Nov 2017 05:38)      PHYSICAL EXAM:  GENERAL: sedated, intubated in NAD  HEAD:  Atraumatic, Normocephalic  EYES: EOMI, PERRLA, conjunctiva and sclera clear  NECK: Supple, No JVD  CHEST/LUNG: b/l course BS  HEART: tachycardic; No murmurs, rubs, or gallops  ABDOMEN: Soft, Nontender, Nondistended; Bowel sounds present  EXTREMITIES:  2+ Peripheral Pulses, No clubbing, cyanosis, or edema  NEUROLOGY: non-focal  SKIN: decub behind left ear    LINES:    HOSPITAL MEDICATIONS:  aspirin  chewable 81 milliGRAM(s) Oral daily  heparin  Injectable 5000 Unit(s) SubCutaneous every 12 hours    azithromycin  IVPB 500 milliGRAM(s) IV Intermittent every 24 hours  meropenem IVPB 1000 milliGRAM(s) IV Intermittent every 8 hours  vancomycin  IVPB 750 milliGRAM(s) IV Intermittent every 12 hours    digoxin     Tablet 0.125 milliGRAM(s) Oral daily  norepinephrine Infusion 0.01 MICROgram(s)/kG/Min IV Continuous <Continuous>    insulin lispro (HumaLOG) corrective regimen sliding scale   SubCutaneous every 6 hours  insulin NPH human recombinant 9 Unit(s) SubCutaneous every 6 hours  methylPREDNISolone sodium succinate Injectable 10 milliGRAM(s) IV Push every 8 hours    ALBUTerol    0.083% 2.5 milliGRAM(s) Nebulizer every 6 hours PRN  ALBUTerol/ipratropium for Nebulization 3 milliLiter(s) Nebulizer every 6 hours    fentaNYL    Injectable 25 MICROGram(s) IV Push every 4 hours PRN  fentaNYL   Infusion 1 MICROgram(s)/kG/Hr IV Continuous <Continuous>    pantoprazole  Injectable 40 milliGRAM(s) IV Push daily  polyethylene glycol 3350 17 Gram(s) Oral two times a day  senna Syrup 10 milliLiter(s) Oral at bedtime          influenza   Vaccine 0.5 milliLiter(s) IntraMuscular once          LABS:                        8.9    4.1   )-----------( 127      ( 12 Nov 2017 02:42 )             26.8     Hgb Trend: 8.9<--, 8.8<--, 7.8<--, 7.7<--, 7.6<--  11-12    144  |  107  |  48<H>  ----------------------------<  230<H>  5.1   |  29  |  0.92    Ca    7.7<L>      12 Nov 2017 02:42  Phos  3.0     11-12  Mg     2.0     11-12    TPro  4.7<L>  /  Alb  2.1<L>  /  TBili  1.8<H>  /  DBili  x   /  AST  84<H>  /  ALT  195<H>  /  AlkPhos  149<H>  11-12    Creatinine Trend: 0.92<--, 0.93<--, 0.91<--, 0.97<--, 0.98<--, 1.22<--  PT/INR - ( 11 Nov 2017 02:41 )   PT: 12.0 sec;   INR: 1.10 ratio             Arterial Blood Gas:  11-12 @ 02:39  7.43/47/96/31/98/6.6  ABG lactate: --  Arterial Blood Gas:  11-11 @ 02:36  7.51/38/72/30/96/7.1  ABG lactate: --  Arterial Blood Gas:  11-10 @ 11:34  7.49/40/90/30/98/6.6  ABG lactate: --  Arterial Blood Gas:  11-10 @ 10:04  7.54/37/91/31/98/7.8  ABG lactate: --        MICROBIOLOGY:     Blood cx growing CNS and pseudomonas  Sputum cx growing pseudomonas CHIEF COMPLAINT: acute on chronic hypoxic respiratory failure 2/2 PNA    Interval Events: Febrile to 101.1 overnight, Tylenol administered. HR remained in the 60s-80 overnight. Did not tolerate Cpap trial, became tachypneic and hypertensive     REVIEW OF SYSTEMS:  Constitutional: [ ] negative [ ] fevers [ ] chills [ ] weight loss [ ] weight gain  HEENT: [ ] negative [ ] dry eyes [ ] eye irritation [ ] postnasal drip [ ] nasal congestion  CV: [ ] negative  [ ] chest pain [ ] orthopnea [ ] palpitations [ ] murmur  Resp: [ ] negative [ ] cough [ ] shortness of breath [ ] dyspnea [ ] wheezing [ ] sputum [ ] hemoptysis  GI: [ ] negative [ ] nausea [ ] vomiting [ ] diarrhea [ ] constipation [ ] abd pain [ ] dysphagia   : [ ] negative [ ] dysuria [ ] nocturia [ ] hematuria [ ] increased urinary frequency  Musculoskeletal: [ ] negative [ ] back pain [ ] myalgias [ ] arthralgias [ ] fracture  Skin: [ ] negative [ ] rash [ ] itch  Neurological: [ ] negative [ ] headache [ ] dizziness [ ] syncope [ ] weakness [ ] numbness  Psychiatric: [ ] negative [ ] anxiety [ ] depression  Endocrine: [ ] negative [ ] diabetes [ ] thyroid problem  Hematologic/Lymphatic: [ ] negative [ ] anemia [ ] bleeding problem  Allergic/Immunologic: [ ] negative [ ] itchy eyes [ ] nasal discharge [ ] hives [ ] angioedema  [ ] All other systems negative  [X ] Unable to assess ROS because intubated    OBJECTIVE:  ICU Vital Signs Last 24 Hrs  T(C): 37.1 (12 Nov 2017 04:00), Max: 38.4 (12 Nov 2017 00:00)  T(F): 98.8 (12 Nov 2017 04:00), Max: 101.1 (12 Nov 2017 00:00)  HR: 75 (12 Nov 2017 06:17) (51 - 109)  BP: --  BP(mean): --  ABP: 131/64 (12 Nov 2017 06:00) (76/40 - 170/97)  ABP(mean): 87 (12 Nov 2017 06:00) (52 - 140)  RR: 23 (12 Nov 2017 06:00) (20 - 49)  SpO2: 93% (12 Nov 2017 06:17) (91% - 100%)    Mode: AC/ CMV (Assist Control/ Continuous Mandatory Ventilation), RR (machine): 22, TV (machine): 470, FiO2: 30, PEEP: 5, ITime: 0.6, MAP: 13, PIP: 27    11-11 @ 07:01  -  11-12 @ 07:00  --------------------------------------------------------  IN: 1636.9 mL / OUT: 0 mL / NET: 1636.9 mL      CAPILLARY BLOOD GLUCOSE  321 (11 Nov 2017 00:00)      POCT Blood Glucose.: 220 mg/dL (12 Nov 2017 05:38)      PHYSICAL EXAM:  GENERAL: sedated, intubated in NAD  HEAD:  Atraumatic, Normocephalic  EYES: EOMI, PERRLA, conjunctiva and sclera clear  NECK: Supple, No JVD  CHEST/LUNG: b/l course BS  HEART: tachycardic; No murmurs, rubs, or gallops  ABDOMEN: Soft, Nontender, Nondistended; Bowel sounds present  EXTREMITIES:  2+ Peripheral Pulses, No clubbing, cyanosis, or edema  NEUROLOGY: non-focal  SKIN: decub behind left ear    LINES:    HOSPITAL MEDICATIONS:  aspirin  chewable 81 milliGRAM(s) Oral daily  heparin  Injectable 5000 Unit(s) SubCutaneous every 12 hours    azithromycin  IVPB 500 milliGRAM(s) IV Intermittent every 24 hours  meropenem IVPB 1000 milliGRAM(s) IV Intermittent every 8 hours  vancomycin  IVPB 750 milliGRAM(s) IV Intermittent every 12 hours    digoxin     Tablet 0.125 milliGRAM(s) Oral daily  norepinephrine Infusion 0.01 MICROgram(s)/kG/Min IV Continuous <Continuous>    insulin lispro (HumaLOG) corrective regimen sliding scale   SubCutaneous every 6 hours  insulin NPH human recombinant 9 Unit(s) SubCutaneous every 6 hours  methylPREDNISolone sodium succinate Injectable 10 milliGRAM(s) IV Push every 8 hours    ALBUTerol    0.083% 2.5 milliGRAM(s) Nebulizer every 6 hours PRN  ALBUTerol/ipratropium for Nebulization 3 milliLiter(s) Nebulizer every 6 hours    fentaNYL    Injectable 25 MICROGram(s) IV Push every 4 hours PRN  fentaNYL   Infusion 1 MICROgram(s)/kG/Hr IV Continuous <Continuous>    pantoprazole  Injectable 40 milliGRAM(s) IV Push daily  polyethylene glycol 3350 17 Gram(s) Oral two times a day  senna Syrup 10 milliLiter(s) Oral at bedtime          influenza   Vaccine 0.5 milliLiter(s) IntraMuscular once          LABS:                        8.9    4.1   )-----------( 127      ( 12 Nov 2017 02:42 )             26.8     Hgb Trend: 8.9<--, 8.8<--, 7.8<--, 7.7<--, 7.6<--  11-12    144  |  107  |  48<H>  ----------------------------<  230<H>  5.1   |  29  |  0.92    Ca    7.7<L>      12 Nov 2017 02:42  Phos  3.0     11-12  Mg     2.0     11-12    TPro  4.7<L>  /  Alb  2.1<L>  /  TBili  1.8<H>  /  DBili  x   /  AST  84<H>  /  ALT  195<H>  /  AlkPhos  149<H>  11-12    Creatinine Trend: 0.92<--, 0.93<--, 0.91<--, 0.97<--, 0.98<--, 1.22<--  PT/INR - ( 11 Nov 2017 02:41 )   PT: 12.0 sec;   INR: 1.10 ratio             Arterial Blood Gas:  11-12 @ 02:39  7.43/47/96/31/98/6.6  ABG lactate: --  Arterial Blood Gas:  11-11 @ 02:36  7.51/38/72/30/96/7.1  ABG lactate: --  Arterial Blood Gas:  11-10 @ 11:34  7.49/40/90/30/98/6.6  ABG lactate: --  Arterial Blood Gas:  11-10 @ 10:04  7.54/37/91/31/98/7.8  ABG lactate: --        MICROBIOLOGY:     Blood cx growing CNS and pseudomonas  Sputum cx growing pseudomonas

## 2017-11-12 NOTE — PROGRESS NOTE ADULT - SUBJECTIVE AND OBJECTIVE BOX
********Cardiology Progress Note***************    CC:      INTERVAL HISTORY:  Remains sedated on vent.  Low grade fever. Failed weaning trial  Maintaining NSR          Allergies    No Known Allergies    Intolerances    	    MEDICATIONS:  aspirin  chewable 81 milliGRAM(s) Oral daily  digoxin     Tablet 0.125 milliGRAM(s) Oral daily  heparin  Injectable 5000 Unit(s) SubCutaneous every 12 hours  norepinephrine Infusion 0.01 MICROgram(s)/kG/Min IV Continuous <Continuous>    azithromycin  IVPB 500 milliGRAM(s) IV Intermittent every 24 hours  meropenem IVPB 1000 milliGRAM(s) IV Intermittent every 8 hours  vancomycin  IVPB 750 milliGRAM(s) IV Intermittent every 12 hours    ALBUTerol    0.083% 2.5 milliGRAM(s) Nebulizer every 6 hours PRN  ALBUTerol/ipratropium for Nebulization 3 milliLiter(s) Nebulizer every 6 hours    fentaNYL    Injectable 25 MICROGram(s) IV Push every 4 hours PRN  midazolam Infusion 2 mG/Hr IV Continuous <Continuous>    pantoprazole  Injectable 40 milliGRAM(s) IV Push daily  polyethylene glycol 3350 17 Gram(s) Oral two times a day  senna Syrup 10 milliLiter(s) Oral at bedtime    insulin lispro (HumaLOG) corrective regimen sliding scale   SubCutaneous every 6 hours  insulin NPH human recombinant 12 Unit(s) SubCutaneous every 6 hours  methylPREDNISolone sodium succinate Injectable 10 milliGRAM(s) IV Push every 8 hours    influenza   Vaccine 0.5 milliLiter(s) IntraMuscular once      PAST MEDICAL & SURGICAL HISTORY:  Asbestos exposure  Kidney stones  Coronary artery disease involving native coronary artery of native heart, angina presence unspecified: S/p CABG 2016  Cataract  Rotator cuff rupture, right  GERD (gastroesophageal reflux disease)  Hyperlipemia  Diabetes mellitus  Encounter for removal of ureteral stent: Had a ureteral stent for kidney stones. Now removed.  S/P CABG (coronary artery bypass graft)  S/P rotator cuff surgery  S/P lens implant: 2000 &amp; 2012  S/P appendectomy: over 50 years ago      FAMILY HISTORY:  Family history of breast cancer (Mother)  Family history of liver disease (Sibling)  Family history of diabetes mellitus      SOCIAL HISTORY:  unchanged    REVIEW OF SYSTEMS:  Unable to obtain    PHYSICAL EXAM:  T(C): 36.8 (11-12-17 @ 16:00), Max: 38.4 (11-12-17 @ 00:00)  HR: 75 (11-12-17 @ 18:00) (55 - 109)  BP: 111/70 (11-12-17 @ 18:00) (101/67 - 157/85)  RR: 22 (11-12-17 @ 18:00) (15 - 31)  SpO2: 98% (11-12-17 @ 18:00) (91% - 100%)  Wt(kg): --  I&O's Summary    11 Nov 2017 07:01  -  12 Nov 2017 07:00  --------------------------------------------------------  IN: 1636.9 mL / OUT: 0 mL / NET: 1636.9 mL    12 Nov 2017 07:01  -  12 Nov 2017 18:17  --------------------------------------------------------  IN: 252.7 mL / OUT: 0 mL / NET: 252.7 mL      Appearance:  Sedated on Vent. NAD. No respiratory distress	  HEENT:   Normal oral mucosa, PERRL, EOMI	  Lymphatic: No lymphadenopathy  Cardiovascular: No JVD.  PMI not palpable. Normal S1 S2.  No murmurs.  Trce b/l LE edema  Respiratory: Lungs clear to auscultation	  Psychiatry: A & O x 3, Mood & affect appropriate  Gastrointestinal:  Soft, Non-tender, + BS	  Skin: No rashes, No ecchymoses, No cyanosis	  Neurologic: Non-focal  Extremities: Normal range of motion, No clubbing, cyanosis or edema  Vascular: Peripheral pulses palpable 2+ bilaterally  Appearance: NAD. No respiratory distress    LABS:	 	    CBC Full  -  ( 12 Nov 2017 02:42 )  WBC Count : 4.1 K/uL  Hemoglobin : 8.9 g/dL  Hematocrit : 26.8 %  Platelet Count - Automated : 127 K/uL  Mean Cell Volume : 107.0 fl  Mean Cell Hemoglobin : 35.4 pg  Mean Cell Hemoglobin Concentration : 33.2 gm/dL  Auto Neutrophil # : x  Auto Lymphocyte # : x  Auto Monocyte # : x  Auto Eosinophil # : x  Auto Basophil # : x  Auto Neutrophil % : x  Auto Lymphocyte % : x  Auto Monocyte % : x  Auto Eosinophil % : x  Auto Basophil % : x    11-12    144  |  107  |  48<H>  ----------------------------<  230<H>  5.1   |  29  |  0.92  11-11    142  |  104  |  46<H>  ----------------------------<  310<H>  4.6   |  28  |  0.93    Ca    7.7<L>      12 Nov 2017 02:42  Ca    7.9<L>      11 Nov 2017 02:41  Phos  3.0     11-12  Phos  2.1     11-11  Mg     2.0     11-12  Mg     2.0     11-11    TPro  4.7<L>  /  Alb  2.1<L>  /  TBili  1.8<H>  /  DBili  x   /  AST  84<H>  /  ALT  195<H>  /  AlkPhos  149<H>  11-12  TPro  4.8<L>  /  Alb  2.1<L>  /  TBili  2.2<H>  /  DBili  x   /  AST  125<H>  /  ALT  235<H>  /  AlkPhos  113  1    TELEMETRY: NSR

## 2017-11-12 NOTE — PROGRESS NOTE ADULT - ASSESSMENT
77M h/o DM2, GERD, CABG (2016) and interstitial lung disease (UIP) on 3L home O2, p/w increasing REAGAN thought to be ILD flare. Pt developed fevers and hypotension likely related to sepsis. MICU was consulted for dyspnea and hypotension 2/2 sepsis in the setting of advanced ILD    #Neuro  - Switched to precedex, withdraws to pain on UE.   Metabolic Encephalopathy 2/2 sepsis     #Pulm  Respiratory Distress 2/2 sepsis/PNA  - Intubated 11/7 for hypox resp failure and increased WOB, AC/CMV: , RR 20 FiO2 30 PEEP 5. Check repeat ABG and adjust vent settings  -lactate improving, Plan for spontaneous breathing trials  - Duonebs q6      ILD  - Pt. is intubated for hypoxic resp failure and increased WOB  - CT chest consistent with pulmonary nodules. Continue with abx, solumedrol   - Spontaneous breathing trials, try to wean off vent  - Given septic picture holding pts imuran for now     #Cardio  Hypotension 2/2 Sepsis  - Pt responded well to IVF and has good LV systolic function  - On levophed 11/7 for pressure support , continue to wean off. Consider starting midodrine if not bradycardic  Afib/aflutter: now bradycardic, 2/2 to digoxin toxicity  -Check dig level and dose maintenance dose. Avoid amio in the setting of ILD.    #GI  - Continue with feeds  Colitis: improving, lactate resolving. Continue with bowel regimen  Transaminitis: improving, likely sepsis mediated   GERD:  - c/w Protonix    #Renal  CHRISTIANE likely prerenal in the setting of sepsis  -resolved  - Losartan D/c'd   -avoid nephrotoxins    #ID  Sepsis  - Repeat blood cx growing CNS. Restarted on vancomycin. Bcx 11/7 growing Pseudomonas gram (-) rods; sensitive to meropenem (day 4)  - On Azithromycin 11/7 (day 5)  -Follow up repeat blood cultures  Bacteremia 2/2 to PNA vs gut translocation    #Endo  DM2  - elevated FS likely 2/2 to steroids. Started on NPH. ISS changed to moderate. Continue to monitor    #Heme  Thrombocytopenia   Likely 2/2 to zosyn, med induced, less likely HIT. HIT panel negative. Continue to trend platelets.   Neutropenia   - Improving, 2/2 sepsis vs medication induced. Continue to trend    #DVT ppx: Hep sq 77M h/o DM2, GERD, CABG (2016) and interstitial lung disease (UIP) on 3L home O2, p/w increasing REAGAN thought to be ILD flare. Pt developed fevers and hypotension likely related to sepsis. MICU was consulted for dyspnea and hypotension 2/2 sepsis in the setting of advanced ILD    #Neuro  -Sedated, switched to fentanyl drip given bradycardia  -Responds to voice, withdraws to pain.     #Pulm  Acute on chronic respiratory failure(ILD) 2/2 sepsis/PNA   - Intubated 11/7 for hypox resp failure and increased WOB, Does not do well during spontaneous breathing trials. May benefit from diuresis before extubation  - Continue with  meropenem, vancomycin and azithro, solumedrol Duonebs q6. Continue to taper off steroids   - Given septic picture holding pts imuran for now     #Cardio  Hypotension 2/2 Sepsis  - Pt responded well to IVF and has good LV systolic function  - On levophed 11/7 for pressure support , continue to wean off. Consider starting midodrine if not bradycardic  Afib/aflutter: bradycardia resolved now off Precedex   -Continue with digoxin. Avoid amio in the setting of ILD.    #GI  - Continue with feeds  Colitis: improving, lactate resolving. Continue with bowel regimen  Transaminitis: improving, likely sepsis mediated   GERD:  - c/w Protonix    #Renal  CHRISTIANE likely prerenal in the setting of sepsis  -resolved  - Losartan D/c'd   -avoid nephrotoxins    #ID  Sepsis  - Repeat blood cx growing CNS. Restarted on vancomycin. Bcx 11/7 growing Pseudomonas gram (-) rods; sensitive to meropenem (day 5)  - On Azithromycin 11/7 (day 6)  -Follow up repeat blood cultures  Bacteremia 2/2 to PNA vs gut translocation    #Endo  DM2  - elevated FS likely 2/2 to steroids. Started on NPH. ISS changed to moderate. Continue to monitor and adjust as necessary     #Heme  Thrombocytopenia   Likely 2/2 to zosyn, med induced, less likely HIT. HIT panel negative. Continue to trend platelets.   Neutropenia   - Improving, 2/2 sepsis vs medication induced. Continue to trend    #DVT ppx: Hep sq

## 2017-11-12 NOTE — PROGRESS NOTE ADULT - ASSESSMENT
77 M with CAD h/o CABG, NL LV systolic function, ILD on home O2 with hypoxic resp failure, septic shock on pressors with presumed PNA. In this setting, new onset AF now s/p conversion to NSR  ·	Rates remained well controlled, even with brief conversion to NSR. Continue tele.          Renal function stable     ADVISE:  Agree w/ current Mgmt  Continue Digoxin    Oh Marroquin MD, [facility]  Office: [phone]  Pager: [phone]

## 2017-11-13 LAB
ALBUMIN SERPL ELPH-MCNC: 2.1 G/DL — LOW (ref 3.3–5)
ALP SERPL-CCNC: 232 U/L — HIGH (ref 40–120)
ALT FLD-CCNC: 194 U/L RC — HIGH (ref 10–45)
ANION GAP SERPL CALC-SCNC: 5 MMOL/L — SIGNIFICANT CHANGE UP (ref 5–17)
AST SERPL-CCNC: 125 U/L — HIGH (ref 10–40)
BILIRUB SERPL-MCNC: 1.7 MG/DL — HIGH (ref 0.2–1.2)
BUN SERPL-MCNC: 40 MG/DL — HIGH (ref 7–23)
CALCIUM SERPL-MCNC: 8 MG/DL — LOW (ref 8.4–10.5)
CHLORIDE SERPL-SCNC: 108 MMOL/L — SIGNIFICANT CHANGE UP (ref 96–108)
CO2 SERPL-SCNC: 31 MMOL/L — SIGNIFICANT CHANGE UP (ref 22–31)
CREAT SERPL-MCNC: 0.8 MG/DL — SIGNIFICANT CHANGE UP (ref 0.5–1.3)
GAS PNL BLDA: SIGNIFICANT CHANGE UP
GLUCOSE BLDC GLUCOMTR-MCNC: 108 MG/DL — HIGH (ref 70–99)
GLUCOSE BLDC GLUCOMTR-MCNC: 130 MG/DL — HIGH (ref 70–99)
GLUCOSE BLDC GLUCOMTR-MCNC: 131 MG/DL — HIGH (ref 70–99)
GLUCOSE BLDC GLUCOMTR-MCNC: 89 MG/DL — SIGNIFICANT CHANGE UP (ref 70–99)
GLUCOSE SERPL-MCNC: 118 MG/DL — HIGH (ref 70–99)
HCT VFR BLD CALC: 25.3 % — LOW (ref 39–50)
HGB BLD-MCNC: 8.4 G/DL — LOW (ref 13–17)
MAGNESIUM SERPL-MCNC: 2.1 MG/DL — SIGNIFICANT CHANGE UP (ref 1.6–2.6)
MCHC RBC-ENTMCNC: 33.2 GM/DL — SIGNIFICANT CHANGE UP (ref 32–36)
MCHC RBC-ENTMCNC: 35.4 PG — HIGH (ref 27–34)
MCV RBC AUTO: 107 FL — HIGH (ref 80–100)
PHOSPHATE SERPL-MCNC: 2.6 MG/DL — SIGNIFICANT CHANGE UP (ref 2.5–4.5)
PLATELET # BLD AUTO: 152 K/UL — SIGNIFICANT CHANGE UP (ref 150–400)
POTASSIUM SERPL-MCNC: 5.1 MMOL/L — SIGNIFICANT CHANGE UP (ref 3.5–5.3)
POTASSIUM SERPL-SCNC: 5.1 MMOL/L — SIGNIFICANT CHANGE UP (ref 3.5–5.3)
PROT SERPL-MCNC: 4.9 G/DL — LOW (ref 6–8.3)
RBC # BLD: 2.37 M/UL — LOW (ref 4.2–5.8)
RBC # FLD: 18.9 % — HIGH (ref 10.3–14.5)
SODIUM SERPL-SCNC: 144 MMOL/L — SIGNIFICANT CHANGE UP (ref 135–145)
WBC # BLD: 4.8 K/UL — SIGNIFICANT CHANGE UP (ref 3.8–10.5)
WBC # FLD AUTO: 4.8 K/UL — SIGNIFICANT CHANGE UP (ref 3.8–10.5)

## 2017-11-13 PROCEDURE — 99291 CRITICAL CARE FIRST HOUR: CPT

## 2017-11-13 PROCEDURE — 93010 ELECTROCARDIOGRAM REPORT: CPT

## 2017-11-13 RX ORDER — DEXMEDETOMIDINE HYDROCHLORIDE IN 0.9% SODIUM CHLORIDE 4 UG/ML
0.75 INJECTION INTRAVENOUS
Qty: 200 | Refills: 0 | Status: DISCONTINUED | OUTPATIENT
Start: 2017-11-13 | End: 2017-11-14

## 2017-11-13 RX ORDER — MIDAZOLAM HYDROCHLORIDE 1 MG/ML
2 INJECTION, SOLUTION INTRAMUSCULAR; INTRAVENOUS ONCE
Qty: 0 | Refills: 0 | Status: DISCONTINUED | OUTPATIENT
Start: 2017-11-13 | End: 2017-11-13

## 2017-11-13 RX ORDER — HEPARIN SODIUM 5000 [USP'U]/ML
5000 INJECTION INTRAVENOUS; SUBCUTANEOUS EVERY 8 HOURS
Qty: 0 | Refills: 0 | Status: DISCONTINUED | OUTPATIENT
Start: 2017-11-13 | End: 2017-12-05

## 2017-11-13 RX ADMIN — HEPARIN SODIUM 5000 UNIT(S): 5000 INJECTION INTRAVENOUS; SUBCUTANEOUS at 13:47

## 2017-11-13 RX ADMIN — Medication 10 MILLIGRAM(S): at 05:18

## 2017-11-13 RX ADMIN — MEROPENEM 200 MILLIGRAM(S): 1 INJECTION INTRAVENOUS at 22:14

## 2017-11-13 RX ADMIN — Medication 81 MILLIGRAM(S): at 11:39

## 2017-11-13 RX ADMIN — MIDAZOLAM HYDROCHLORIDE 2 MG/HR: 1 INJECTION, SOLUTION INTRAMUSCULAR; INTRAVENOUS at 21:57

## 2017-11-13 RX ADMIN — Medication 3 MILLILITER(S): at 17:25

## 2017-11-13 RX ADMIN — MEROPENEM 200 MILLIGRAM(S): 1 INJECTION INTRAVENOUS at 05:17

## 2017-11-13 RX ADMIN — MIDAZOLAM HYDROCHLORIDE 2 MILLIGRAM(S): 1 INJECTION, SOLUTION INTRAMUSCULAR; INTRAVENOUS at 21:30

## 2017-11-13 RX ADMIN — Medication 150 MILLIGRAM(S): at 18:11

## 2017-11-13 RX ADMIN — POLYETHYLENE GLYCOL 3350 17 GRAM(S): 17 POWDER, FOR SOLUTION ORAL at 05:19

## 2017-11-13 RX ADMIN — Medication 10 MILLIGRAM(S): at 17:42

## 2017-11-13 RX ADMIN — HUMAN INSULIN 12 UNIT(S): 100 INJECTION, SUSPENSION SUBCUTANEOUS at 05:24

## 2017-11-13 RX ADMIN — HEPARIN SODIUM 5000 UNIT(S): 5000 INJECTION INTRAVENOUS; SUBCUTANEOUS at 22:14

## 2017-11-13 RX ADMIN — Medication 3 MILLILITER(S): at 23:51

## 2017-11-13 RX ADMIN — HUMAN INSULIN 12 UNIT(S): 100 INJECTION, SUSPENSION SUBCUTANEOUS at 11:40

## 2017-11-13 RX ADMIN — HUMAN INSULIN 12 UNIT(S): 100 INJECTION, SUSPENSION SUBCUTANEOUS at 00:39

## 2017-11-13 RX ADMIN — DEXMEDETOMIDINE HYDROCHLORIDE IN 0.9% SODIUM CHLORIDE 15.11 MICROGRAM(S)/KG/HR: 4 INJECTION INTRAVENOUS at 03:51

## 2017-11-13 RX ADMIN — PANTOPRAZOLE SODIUM 40 MILLIGRAM(S): 20 TABLET, DELAYED RELEASE ORAL at 11:39

## 2017-11-13 RX ADMIN — Medication 150 MILLIGRAM(S): at 07:29

## 2017-11-13 RX ADMIN — MEROPENEM 200 MILLIGRAM(S): 1 INJECTION INTRAVENOUS at 13:47

## 2017-11-13 RX ADMIN — Medication 3 MILLILITER(S): at 00:44

## 2017-11-13 RX ADMIN — AZITHROMYCIN 250 MILLIGRAM(S): 500 TABLET, FILM COATED ORAL at 11:39

## 2017-11-13 RX ADMIN — HEPARIN SODIUM 5000 UNIT(S): 5000 INJECTION INTRAVENOUS; SUBCUTANEOUS at 05:18

## 2017-11-13 RX ADMIN — Medication 3 MILLILITER(S): at 12:18

## 2017-11-13 RX ADMIN — Medication 3 MILLILITER(S): at 05:45

## 2017-11-13 NOTE — PROGRESS NOTE ADULT - ATTENDING COMMENTS
1. Acute hypoxemic respiratory failure due to pseudomonas pneumonia and bacteremia. Also with coag neg staph in bld cx. A;; cx have cleared.   Decrease versed and start weaning today. Continue current abx regimen. On ultrasound RLL consolidation seen on CT is no longer evident.  2.Hypotension still requiring pressors. Septic Shock.    cc time 35 min

## 2017-11-13 NOTE — PROGRESS NOTE ADULT - ASSESSMENT
Acute on chronic hypoxemic respiratory failure  Progressive ILD: unlikely UIP, poss chronic hypersens/NSIP  Psudomonoas bacteremia with pneumonia  Septic Shock - clinically improved, now on minimal pressors  Fluid overload with new ascites/edema      REC    Continue abx for pseudomonas, adjust per sensitivties  Maintain VE to target higher paCO2 - doubt he'll sustai 40 on extubation  COntinue IV solumedrol: DC imuran  Diurese aggressively pre weaning as tolerated  Continue CPAP/PS weaning per MICU

## 2017-11-13 NOTE — PROGRESS NOTE ADULT - SUBJECTIVE AND OBJECTIVE BOX
Patient is a 77y old  Male who presents with a chief complaint of SOB (04 Nov 2017 14:33)      SUBJECTIVE / OVERNIGHT EVENTS:    patient seen and examined at bedside  intubated and sedated  afebrile overnight, small dose pressors    Vital Signs Last 24 Hrs  T(C): 36.9 (13 Nov 2017 19:30), Max: 37 (13 Nov 2017 16:00)  T(F): 98.5 (13 Nov 2017 19:30), Max: 98.6 (13 Nov 2017 16:00)  HR: 85 (13 Nov 2017 20:12) (51 - 100)  BP: 144/70 (13 Nov 2017 20:00) (72/45 - 172/97)  BP(mean): 101 (13 Nov 2017 20:00) (54 - 126)  RR: 29 (13 Nov 2017 20:00) (19 - 41)  SpO2: 96% (13 Nov 2017 20:12) (94% - 100%)  I&O's Summary    12 Nov 2017 07:01  -  13 Nov 2017 07:00  --------------------------------------------------------  IN: 792.8 mL / OUT: 0 mL / NET: 792.8 mL    13 Nov 2017 07:01  -  13 Nov 2017 20:22  --------------------------------------------------------  IN: 370.3 mL / OUT: 0 mL / NET: 370.3 mL        GENERAL: intubated and sedated  HEAD:  Atraumatic, Normocephalic  EYES: PERRLA, conjunctiva and sclera clear  NECK: Supple, No JVD, No LAD  CHEST/LUNG: B/L velcro crackles throughout, No wheeze  HEART: Regular rate and rhythm; No murmurs, rubs, or gallops  ABDOMEN: Soft, Nontender, Nondistended; Bowel sounds present  EXTREMITIES:  2+ Peripheral Pulses, 3+ edema  SKIN: No rashes or lesions      LABS:                        8.4    4.8   )-----------( 152      ( 13 Nov 2017 03:31 )             25.3     11-13    144  |  108  |  40<H>  ----------------------------<  118<H>  5.1   |  31  |  0.80    Ca    8.0<L>      13 Nov 2017 03:31  Phos  2.6     11-13  Mg     2.1     11-13    TPro  4.9<L>  /  Alb  2.1<L>  /  TBili  1.7<H>  /  DBili  x   /  AST  125<H>  /  ALT  194<H>  /  AlkPhos  232<H>  11-13      CAPILLARY BLOOD GLUCOSE  89 (13 Nov 2017 17:45)  108 (13 Nov 2017 00:00)      POCT Blood Glucose.: 89 mg/dL (13 Nov 2017 17:37)  POCT Blood Glucose.: 130 mg/dL (13 Nov 2017 11:37)  POCT Blood Glucose.: 131 mg/dL (13 Nov 2017 05:22)  POCT Blood Glucose.: 108 mg/dL (13 Nov 2017 00:36)            RADIOLOGY & ADDITIONAL TESTS:    Imaging Personally Reviewed:  [x] YES  [ ] NO    Consultant(s) Notes Reviewed:  [x] YES  [ ] NO      MEDICATIONS  (STANDING):  ALBUTerol/ipratropium for Nebulization 3 milliLiter(s) Nebulizer every 6 hours  aspirin  chewable 81 milliGRAM(s) Oral daily  dexmedetomidine Infusion 0.75 MICROgram(s)/kG/Hr (15.112 mL/Hr) IV Continuous <Continuous>  digoxin     Tablet 0.125 milliGRAM(s) Oral daily  heparin  Injectable 5000 Unit(s) SubCutaneous every 8 hours  influenza   Vaccine 0.5 milliLiter(s) IntraMuscular once  insulin lispro (HumaLOG) corrective regimen sliding scale   SubCutaneous every 6 hours  insulin NPH human recombinant 12 Unit(s) SubCutaneous every 6 hours  meropenem IVPB 1000 milliGRAM(s) IV Intermittent every 8 hours  methylPREDNISolone sodium succinate Injectable 10 milliGRAM(s) IV Push every 12 hours  midazolam Infusion 2 mG/Hr (2 mL/Hr) IV Continuous <Continuous>  norepinephrine Infusion 0.01 MICROgram(s)/kG/Min (1.511 mL/Hr) IV Continuous <Continuous>  pantoprazole  Injectable 40 milliGRAM(s) IV Push daily  polyethylene glycol 3350 17 Gram(s) Oral two times a day  senna Syrup 10 milliLiter(s) Oral at bedtime  vancomycin  IVPB 750 milliGRAM(s) IV Intermittent every 12 hours    MEDICATIONS  (PRN):  ALBUTerol    0.083% 2.5 milliGRAM(s) Nebulizer every 6 hours PRN Bronchospasm  fentaNYL    Injectable 25 MICROGram(s) IV Push every 4 hours PRN Fighting vent/agitation      Care Discussed with Consultants/Other Providers [x] YES  [ ] NO    HEALTH ISSUES - PROBLEM Dx:  Neutropenia: Neutropenia  Need for prophylactic measure: Need for prophylactic measure  Oral thrush: Oral thrush  Nasal discomfort: Nasal discomfort  Gastroesophageal reflux disease without esophagitis: Gastroesophageal reflux disease without esophagitis  Hyperlipidemia, unspecified hyperlipidemia type: Hyperlipidemia, unspecified hyperlipidemia type  Type 2 diabetes mellitus without complication, without long-term current use of insulin: Type 2 diabetes mellitus without complication, without long-term current use of insulin  Coronary artery disease involving native coronary artery of native heart, angina presence unspecified: Coronary artery disease involving native coronary artery of native heart, angina presence unspecified  Hyperkalemia: Hyperkalemia  Lactic acidosis: Lactic acidosis  Transaminitis: Transaminitis  Idiopathic pulmonary fibrosis: Idiopathic pulmonary fibrosis  Shortness of breath: Shortness of breath

## 2017-11-13 NOTE — PROGRESS NOTE ADULT - SUBJECTIVE AND OBJECTIVE BOX
CHIEF COMPLAINT:  acute on chronic hypoxic respiratory failure 2/2 PNA    Interval Events: No acute events overnight. HR remains well controlled. BP labile, off pressors.    REVIEW OF SYSTEMS:  Constitutional: [ ] negative [ ] fevers [ ] chills [ ] weight loss [ ] weight gain  HEENT: [ ] negative [ ] dry eyes [ ] eye irritation [ ] postnasal drip [ ] nasal congestion  CV: [ ] negative  [ ] chest pain [ ] orthopnea [ ] palpitations [ ] murmur  Resp: [ ] negative [ ] cough [ ] shortness of breath [ ] dyspnea [ ] wheezing [ ] sputum [ ] hemoptysis  GI: [ ] negative [ ] nausea [ ] vomiting [ ] diarrhea [ ] constipation [ ] abd pain [ ] dysphagia   : [ ] negative [ ] dysuria [ ] nocturia [ ] hematuria [ ] increased urinary frequency  Musculoskeletal: [ ] negative [ ] back pain [ ] myalgias [ ] arthralgias [ ] fracture  Skin: [ ] negative [ ] rash [ ] itch  Neurological: [ ] negative [ ] headache [ ] dizziness [ ] syncope [ ] weakness [ ] numbness  Psychiatric: [ ] negative [ ] anxiety [ ] depression  Endocrine: [ ] negative [ ] diabetes [ ] thyroid problem  Hematologic/Lymphatic: [ ] negative [ ] anemia [ ] bleeding problem  Allergic/Immunologic: [ ] negative [ ] itchy eyes [ ] nasal discharge [ ] hives [ ] angioedema  [ ] All other systems negative  [X ] Unable to assess ROS because intubated    OBJECTIVE:  ICU Vital Signs Last 24 Hrs  T(C): 36.4 (13 Nov 2017 00:00), Max: 37.3 (12 Nov 2017 20:00)  T(F): 97.6 (13 Nov 2017 00:00), Max: 99.1 (12 Nov 2017 20:00)  HR: 59 (13 Nov 2017 07:30) (52 - 96)  BP: 126/67 (13 Nov 2017 07:30) (72/45 - 162/93)  BP(mean): 92 (13 Nov 2017 07:30) (54 - 123)  ABP: 153/68 (12 Nov 2017 13:00) (112/51 - 171/68)  ABP(mean): 99 (12 Nov 2017 13:00) (71 - 107)  RR: 22 (13 Nov 2017 07:30) (15 - 26)  SpO2: 98% (13 Nov 2017 07:30) (91% - 100%)    Mode: AC/ CMV (Assist Control/ Continuous Mandatory Ventilation), RR (machine): 22, TV (machine): 470, FiO2: 30, PEEP: 5, ITime: 1, MAP: 14, PIP: 36    11-12 @ 07:01  -  11-13 @ 07:00  --------------------------------------------------------  IN: 792.8 mL / OUT: 0 mL / NET: 792.8 mL    11-13 @ 07:01 - 11-13 @ 07:40  --------------------------------------------------------  IN: 13 mL / OUT: 0 mL / NET: 13 mL      CAPILLARY BLOOD GLUCOSE  108 (13 Nov 2017 00:00)      POCT Blood Glucose.: 131 mg/dL (13 Nov 2017 05:22)      PHYSICAL EXAM:  GENERAL: sedated, intubated in NAD  HEAD:  Atraumatic, Normocephalic  EYES: EOMI, PERRLA, conjunctiva and sclera clear  NECK: Supple, No JVD  CHEST/LUNG: b/l course BS  HEART: tachycardic; No murmurs, rubs, or gallops  ABDOMEN: Soft, Nontender, Nondistended; Bowel sounds present  EXTREMITIES:  2+ Peripheral Pulses, No clubbing, cyanosis, or edema  NEUROLOGY: unable to assess 2/2 sedation  SKIN: decub behind left ear    LINES: [facility] MEDICATIONS:  aspirin  chewable 81 milliGRAM(s) Oral daily  heparin  Injectable 5000 Unit(s) SubCutaneous every 12 hours    azithromycin  IVPB 500 milliGRAM(s) IV Intermittent every 24 hours  meropenem IVPB 1000 milliGRAM(s) IV Intermittent every 8 hours  vancomycin  IVPB 750 milliGRAM(s) IV Intermittent every 12 hours    digoxin     Tablet 0.125 milliGRAM(s) Oral daily  norepinephrine Infusion 0.01 MICROgram(s)/kG/Min IV Continuous <Continuous>    insulin lispro (HumaLOG) corrective regimen sliding scale   SubCutaneous every 6 hours  insulin NPH human recombinant 12 Unit(s) SubCutaneous every 6 hours  methylPREDNISolone sodium succinate Injectable 10 milliGRAM(s) IV Push every 8 hours    ALBUTerol    0.083% 2.5 milliGRAM(s) Nebulizer every 6 hours PRN  ALBUTerol/ipratropium for Nebulization 3 milliLiter(s) Nebulizer every 6 hours    dexmedetomidine Infusion 0.75 MICROgram(s)/kG/Hr IV Continuous <Continuous>  fentaNYL    Injectable 25 MICROGram(s) IV Push every 4 hours PRN  midazolam Infusion 2 mG/Hr IV Continuous <Continuous>    pantoprazole  Injectable 40 milliGRAM(s) IV Push daily  polyethylene glycol 3350 17 Gram(s) Oral two times a day  senna Syrup 10 milliLiter(s) Oral at bedtime          influenza   Vaccine 0.5 milliLiter(s) IntraMuscular once          LABS:                        8.4    4.8   )-----------( 152      ( 13 Nov 2017 03:31 )             25.3     Hgb Trend: 8.4<--, 8.9<--, 8.8<--, 7.8<--, 7.7<--  11-13    144  |  108  |  40<H>  ----------------------------<  118<H>  5.1   |  31  |  0.80    Ca    8.0<L>      13 Nov 2017 03:31  Phos  2.6     11-13  Mg     2.1     11-13    TPro  4.9<L>  /  Alb  2.1<L>  /  TBili  1.7<H>  /  DBili  x   /  AST  125<H>  /  ALT  194<H>  /  AlkPhos  232<H>  11-13    Creatinine Trend: 0.80<--, 0.92<--, 0.93<--, 0.91<--, 0.97<--, 0.98<--      Arterial Blood Gas:  11-12 @ 02:39  7.43/47/96/31/98/6.6  ABG lactate: --        MICROBIOLOGY:     Blood cx growing CNS and pseudomonas. CNS likely contaminant as only grew in one bottle  Sputum cx growing pseudomonas CHIEF COMPLAINT:  acute on chronic hypoxic respiratory failure 2/2 PNA    Interval Events: No acute events overnight. HR remains well controlled. BP labile, off pressors.    REVIEW OF SYSTEMS:  Constitutional: [ ] negative [ ] fevers [ ] chills [ ] weight loss [ ] weight gain  HEENT: [ ] negative [ ] dry eyes [ ] eye irritation [ ] postnasal drip [ ] nasal congestion  CV: [ ] negative  [ ] chest pain [ ] orthopnea [ ] palpitations [ ] murmur  Resp: [ ] negative [ ] cough [ ] shortness of breath [ ] dyspnea [ ] wheezing [ ] sputum [ ] hemoptysis  GI: [ ] negative [ ] nausea [ ] vomiting [ ] diarrhea [ ] constipation [ ] abd pain [ ] dysphagia   : [ ] negative [ ] dysuria [ ] nocturia [ ] hematuria [ ] increased urinary frequency  Musculoskeletal: [ ] negative [ ] back pain [ ] myalgias [ ] arthralgias [ ] fracture  Skin: [ ] negative [ ] rash [ ] itch  Neurological: [ ] negative [ ] headache [ ] dizziness [ ] syncope [ ] weakness [ ] numbness  Psychiatric: [ ] negative [ ] anxiety [ ] depression  Endocrine: [ ] negative [ ] diabetes [ ] thyroid problem  Hematologic/Lymphatic: [ ] negative [ ] anemia [ ] bleeding problem  Allergic/Immunologic: [ ] negative [ ] itchy eyes [ ] nasal discharge [ ] hives [ ] angioedema  [ ] All other systems negative  [X ] Unable to assess ROS because intubated    OBJECTIVE:  ICU Vital Signs Last 24 Hrs  T(C): 36.4 (13 Nov 2017 00:00), Max: 37.3 (12 Nov 2017 20:00)  T(F): 97.6 (13 Nov 2017 00:00), Max: 99.1 (12 Nov 2017 20:00)  HR: 59 (13 Nov 2017 07:30) (52 - 96)  BP: 126/67 (13 Nov 2017 07:30) (72/45 - 162/93)  BP(mean): 92 (13 Nov 2017 07:30) (54 - 123)  ABP: 153/68 (12 Nov 2017 13:00) (112/51 - 171/68)  ABP(mean): 99 (12 Nov 2017 13:00) (71 - 107)  RR: 22 (13 Nov 2017 07:30) (15 - 26)  SpO2: 98% (13 Nov 2017 07:30) (91% - 100%)    Mode: AC/ CMV (Assist Control/ Continuous Mandatory Ventilation), RR (machine): 22, TV (machine): 470, FiO2: 30, PEEP: 5, ITime: 1, MAP: 14, PIP: 36    11-12 @ 07:01  -  11-13 @ 07:00  --------------------------------------------------------  IN: 792.8 mL / OUT: 0 mL / NET: 792.8 mL    11-13 @ 07:01 - 11-13 @ 07:40  --------------------------------------------------------  IN: 13 mL / OUT: 0 mL / NET: 13 mL      CAPILLARY BLOOD GLUCOSE  108 (13 Nov 2017 00:00)      POCT Blood Glucose.: 131 mg/dL (13 Nov 2017 05:22)      PHYSICAL EXAM:  GENERAL: sedated, intubated in NAD  HEAD:  Atraumatic, Normocephalic  EYES: EOMI, PERRLA, conjunctiva and sclera clear  NECK: Supple, No JVD  CHEST/LUNG: b/l course BS  HEART: tachycardic; No murmurs, rubs, or gallops  ABDOMEN: Soft, Nontender, Nondistended; Bowel sounds present  EXTREMITIES:  2+ Peripheral Pulses, No clubbing, cyanosis, or edema  NEUROLOGY: unable to assess 2/2 sedation  SKIN: decub behind left ear    LINES: [facility] MEDICATIONS:  aspirin  chewable 81 milliGRAM(s) Oral daily  heparin  Injectable 5000 Unit(s) SubCutaneous every 12 hours    azithromycin  IVPB 500 milliGRAM(s) IV Intermittent every 24 hours  meropenem IVPB 1000 milliGRAM(s) IV Intermittent every 8 hours  vancomycin  IVPB 750 milliGRAM(s) IV Intermittent every 12 hours    digoxin     Tablet 0.125 milliGRAM(s) Oral daily  norepinephrine Infusion 0.01 MICROgram(s)/kG/Min IV Continuous <Continuous>    insulin lispro (HumaLOG) corrective regimen sliding scale   SubCutaneous every 6 hours  insulin NPH human recombinant 12 Unit(s) SubCutaneous every 6 hours  methylPREDNISolone sodium succinate Injectable 10 milliGRAM(s) IV Push every 8 hours    ALBUTerol    0.083% 2.5 milliGRAM(s) Nebulizer every 6 hours PRN  ALBUTerol/ipratropium for Nebulization 3 milliLiter(s) Nebulizer every 6 hours    dexmedetomidine Infusion 0.75 MICROgram(s)/kG/Hr IV Continuous <Continuous>  fentaNYL    Injectable 25 MICROGram(s) IV Push every 4 hours PRN  midazolam Infusion 2 mG/Hr IV Continuous <Continuous>    pantoprazole  Injectable 40 milliGRAM(s) IV Push daily  polyethylene glycol 3350 17 Gram(s) Oral two times a day  senna Syrup 10 milliLiter(s) Oral at bedtime          influenza   Vaccine 0.5 milliLiter(s) IntraMuscular once          LABS:                        8.4    4.8   )-----------( 152      ( 13 Nov 2017 03:31 )             25.3     Hgb Trend: 8.4<--, 8.9<--, 8.8<--, 7.8<--, 7.7<--  11-13    144  |  108  |  40<H>  ----------------------------<  118<H>  5.1   |  31  |  0.80    Ca    8.0<L>      13 Nov 2017 03:31  Phos  2.6     11-13  Mg     2.1     11-13    TPro  4.9<L>  /  Alb  2.1<L>  /  TBili  1.7<H>  /  DBili  x   /  AST  125<H>  /  ALT  194<H>  /  AlkPhos  232<H>  11-13    Creatinine Trend: 0.80<--, 0.92<--, 0.93<--, 0.91<--, 0.97<--, 0.98<--      Arterial Blood Gas:  11-12 @ 02:39  7.43/47/96/31/98/6.6  ABG lactate: --        MICROBIOLOGY:   Culture - Sputum . (11.12.17 @ 12:32)    Gram Stain:   Numerous polymorphonuclear leukocytes per low power field  Rare Squamous epithelial cells per low power field  Numerous Gram Negative Rods per oil power field  Rare Yeast like cells per oil power field    Specimen Source: .Sputum Sputum      Blood cx growing CNS and pseudomonas. CNS likely contaminant as only grew in one bottle CHIEF COMPLAINT:  acute on chronic hypoxic respiratory failure 2/2 PNA    Interval Events: No acute events overnight. HR remains well controlled. Pt was started on precedex and became bradycardic therefore continued on versed. BP labile, off pressors all night but restarted early this morning.    REVIEW OF SYSTEMS:  Constitutional: [ ] negative [ ] fevers [ ] chills [ ] weight loss [ ] weight gain  HEENT: [ ] negative [ ] dry eyes [ ] eye irritation [ ] postnasal drip [ ] nasal congestion  CV: [ ] negative  [ ] chest pain [ ] orthopnea [ ] palpitations [ ] murmur  Resp: [ ] negative [ ] cough [ ] shortness of breath [ ] dyspnea [ ] wheezing [ ] sputum [ ] hemoptysis  GI: [ ] negative [ ] nausea [ ] vomiting [ ] diarrhea [ ] constipation [ ] abd pain [ ] dysphagia   : [ ] negative [ ] dysuria [ ] nocturia [ ] hematuria [ ] increased urinary frequency  Musculoskeletal: [ ] negative [ ] back pain [ ] myalgias [ ] arthralgias [ ] fracture  Skin: [ ] negative [ ] rash [ ] itch  Neurological: [ ] negative [ ] headache [ ] dizziness [ ] syncope [ ] weakness [ ] numbness  Psychiatric: [ ] negative [ ] anxiety [ ] depression  Endocrine: [ ] negative [ ] diabetes [ ] thyroid problem  Hematologic/Lymphatic: [ ] negative [ ] anemia [ ] bleeding problem  Allergic/Immunologic: [ ] negative [ ] itchy eyes [ ] nasal discharge [ ] hives [ ] angioedema  [ ] All other systems negative  [X ] Unable to assess ROS because intubated    OBJECTIVE:  ICU Vital Signs Last 24 Hrs  T(C): 36.4 (13 Nov 2017 00:00), Max: 37.3 (12 Nov 2017 20:00)  T(F): 97.6 (13 Nov 2017 00:00), Max: 99.1 (12 Nov 2017 20:00)  HR: 59 (13 Nov 2017 07:30) (52 - 96)  BP: 126/67 (13 Nov 2017 07:30) (72/45 - 162/93)  BP(mean): 92 (13 Nov 2017 07:30) (54 - 123)  ABP: 153/68 (12 Nov 2017 13:00) (112/51 - 171/68)  ABP(mean): 99 (12 Nov 2017 13:00) (71 - 107)  RR: 22 (13 Nov 2017 07:30) (15 - 26)  SpO2: 98% (13 Nov 2017 07:30) (91% - 100%)    Mode: AC/ CMV (Assist Control/ Continuous Mandatory Ventilation), RR (machine): 22, TV (machine): 470, FiO2: 30, PEEP: 5, ITime: 1, MAP: 14, PIP: 36    11-12 @ 07:01 - 11-13 @ 07:00  --------------------------------------------------------  IN: 792.8 mL / OUT: 0 mL / NET: 792.8 mL    11-13 @ 07:01 - 11-13 @ 07:40  --------------------------------------------------------  IN: 13 mL / OUT: 0 mL / NET: 13 mL      CAPILLARY BLOOD GLUCOSE  108 (13 Nov 2017 00:00)      POCT Blood Glucose.: 131 mg/dL (13 Nov 2017 05:22)      PHYSICAL EXAM:  GENERAL: sedated, intubated in NAD  HEAD:  Atraumatic, Normocephalic  EYES: EOMI, PERRLA, conjunctiva and sclera clear  NECK: Supple, No JVD  CHEST/LUNG: b/l course BS  HEART: tachycardic; No murmurs, rubs, or gallops  ABDOMEN: Soft, Nontender, Nondistended; Bowel sounds present  EXTREMITIES:  2+ Peripheral Pulses, No clubbing, cyanosis, or edema  NEUROLOGY: unable to assess 2/2 sedation  SKIN: decub behind left ear    LINES: [facility] MEDICATIONS:  aspirin  chewable 81 milliGRAM(s) Oral daily  heparin  Injectable 5000 Unit(s) SubCutaneous every 12 hours    azithromycin  IVPB 500 milliGRAM(s) IV Intermittent every 24 hours  meropenem IVPB 1000 milliGRAM(s) IV Intermittent every 8 hours  vancomycin  IVPB 750 milliGRAM(s) IV Intermittent every 12 hours    digoxin     Tablet 0.125 milliGRAM(s) Oral daily  norepinephrine Infusion 0.01 MICROgram(s)/kG/Min IV Continuous <Continuous>    insulin lispro (HumaLOG) corrective regimen sliding scale   SubCutaneous every 6 hours  insulin NPH human recombinant 12 Unit(s) SubCutaneous every 6 hours  methylPREDNISolone sodium succinate Injectable 10 milliGRAM(s) IV Push every 8 hours    ALBUTerol    0.083% 2.5 milliGRAM(s) Nebulizer every 6 hours PRN  ALBUTerol/ipratropium for Nebulization 3 milliLiter(s) Nebulizer every 6 hours    dexmedetomidine Infusion 0.75 MICROgram(s)/kG/Hr IV Continuous <Continuous>  fentaNYL    Injectable 25 MICROGram(s) IV Push every 4 hours PRN  midazolam Infusion 2 mG/Hr IV Continuous <Continuous>    pantoprazole  Injectable 40 milliGRAM(s) IV Push daily  polyethylene glycol 3350 17 Gram(s) Oral two times a day  senna Syrup 10 milliLiter(s) Oral at bedtime          influenza   Vaccine 0.5 milliLiter(s) IntraMuscular once          LABS:                        8.4    4.8   )-----------( 152      ( 13 Nov 2017 03:31 )             25.3     Hgb Trend: 8.4<--, 8.9<--, 8.8<--, 7.8<--, 7.7<--  11-13    144  |  108  |  40<H>  ----------------------------<  118<H>  5.1   |  31  |  0.80    Ca    8.0<L>      13 Nov 2017 03:31  Phos  2.6     11-13  Mg     2.1     11-13    TPro  4.9<L>  /  Alb  2.1<L>  /  TBili  1.7<H>  /  DBili  x   /  AST  125<H>  /  ALT  194<H>  /  AlkPhos  232<H>  11-13    Creatinine Trend: 0.80<--, 0.92<--, 0.93<--, 0.91<--, 0.97<--, 0.98<--      Arterial Blood Gas:  11-12 @ 02:39  7.43/47/96/31/98/6.6  ABG lactate: --        MICROBIOLOGY:   Culture - Sputum . (11.12.17 @ 12:32)    Gram Stain:   Numerous polymorphonuclear leukocytes per low power field  Rare Squamous epithelial cells per low power field  Numerous Gram Negative Rods per oil power field  Rare Yeast like cells per oil power field    Specimen Source: .Sputum Sputum      Blood cx growing CNS and pseudomonas. CNS likely contaminant as only grew in one bottle

## 2017-11-13 NOTE — PROGRESS NOTE ADULT - ASSESSMENT
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 CT chest shows bilateral IPF with UIP pattern.  treating for Pulmonay fibrosis / Intersitial pulmonary dz exacerbation.  respiratory status remain precarious Pulm f/u pending.  c/w supplement oxygen.      -Septic Shock / Pseudomonas bacteremia  clinically improving  c/w pseudomonas coverage  repeat blood cultures  c/w levophed for now    -Idiopathic pulmonary fibrosis.  unchanged CT scan  likely UIP  c/w steroids, imuran on hold due to septic shock     -Type 2 diabetes mellitus without complication, without long-term current use of insulin  insulin dosing per MICU protocol    -DVT ppx - hsq

## 2017-11-13 NOTE — PROGRESS NOTE ADULT - SUBJECTIVE AND OBJECTIVE BOX
Follow-up Pulm Progress Note  Amauri Jackson MD  [phone]    Events noted  WBC 4.8  Sedated on CPAP/PS 5/5 well tolerated  Minimal pressors  Vital Signs Last 24 Hrs  T(C): 36.8 (13 Nov 2017 11:45), Max: 37.3 (12 Nov 2017 20:00)  T(F): 98.2 (13 Nov 2017 11:45), Max: 99.1 (12 Nov 2017 20:00)  HR: 51 (13 Nov 2017 13:29) (51 - 100)  BP: 92/53 (13 Nov 2017 13:15) (72/45 - 172/97)  BP(mean): 67 (13 Nov 2017 13:15) (54 - 126)  RR: 20 (13 Nov 2017 13:15) (19 - 41)  SpO2: 98% (13 Nov 2017 13:29) (91% - 100%)    ABG - ( 12 Nov 2017 02:39 )  pH: 7.43  /  pCO2: 47    /  pO2: 96    / HCO3: 31    / Base Excess: 6.6   /  SaO2: 98                            8.4    4.8   )-----------( 152      ( 13 Nov 2017 03:31 )             25.3     11-13    144  |  108  |  40<H>  ----------------------------<  118<H>  5.1   |  31  |  0.80    Ca    8.0<L>      13 Nov 2017 03:31  Phos  2.6     11-13  Mg     2.1     11-13    TPro  4.9<L>  /  Alb  2.1<L>  /  TBili  1.7<H>  /  DBili  x   /  AST  125<H>  /  ALT  194<H>  /  AlkPhos  232<H>  11-13    CXR: unchaged reticular opacities: no gross new pulm edema  TTE: Mild AS, hyperdynamic LV, PA 40-50  Physical Examination:  PULM: Bilateral crackles CVS: Regular rate and rhythm, no murmurs, rubs, or gallops  ABD: Soft, non-tender  EXT:  3+ edema (presacral)    RADIOLOGY REVIEWED  CXR: No change in bilateral reticular opacities    CT chest: see above    TTE:

## 2017-11-13 NOTE — CHART NOTE - NSCHARTNOTEFT_GEN_A_CORE
Follow up note.    77M h/o DM2, GERD, CABG (2016) and interstitial lung disease (UIP), p/w increasing REAGAN thought to be ILD flare.  MICU was consulted for dyspnea and hypotension 2/2 sepsis in the setting of advanced ILD, pt intubated. Has been on trickle OGT feeds 2/2 concern for ischemic colitis which is improving. Feeds now on hold for weaning trials/possible extubation.    Source: Patient [ ]    Family [ ]     other [x] chart    Diet : 11/9 Glucerna 1.2 10c/hr x 24 hrs    GI: no vomiting, no abd distention, fecal incontinence noted today     Enteral /Parenteral Nutrition: 24 hr intake for 11/13 240cc, 11/12 240 cc       Current Weight:  11/12 185 lb, 11/11 189lb, 11/8 176lb, dosing wt 11/2 177   has 2+ generalized, 3+ B/L hands/feet      Pertinent Medications: MEDICATIONS  (STANDING):  ALBUTerol/ipratropium for Nebulization 3 milliLiter(s) Nebulizer every 6 hours  aspirin  chewable 81 milliGRAM(s) Oral daily  azithromycin  IVPB 500 milliGRAM(s) IV Intermittent every 24 hours  dexmedetomidine Infusion 0.75 MICROgram(s)/kG/Hr (15.112 mL/Hr) IV Continuous <Continuous>  digoxin     Tablet 0.125 milliGRAM(s) Oral daily  heparin  Injectable 5000 Unit(s) SubCutaneous every 8 hours  influenza   Vaccine 0.5 milliLiter(s) IntraMuscular once  insulin lispro (HumaLOG) corrective regimen sliding scale   SubCutaneous every 6 hours  insulin NPH human recombinant 12 Unit(s) SubCutaneous every 6 hours  meropenem IVPB 1000 milliGRAM(s) IV Intermittent every 8 hours  methylPREDNISolone sodium succinate Injectable 10 milliGRAM(s) IV Push every 12 hours  midazolam Infusion 2 mG/Hr (2 mL/Hr) IV Continuous <Continuous>  norepinephrine Infusion 0.01 MICROgram(s)/kG/Min (1.511 mL/Hr) IV Continuous <Continuous>  pantoprazole  Injectable 40 milliGRAM(s) IV Push daily  polyethylene glycol 3350 17 Gram(s) Oral two times a day  senna Syrup 10 milliLiter(s) Oral at bedtime  vancomycin  IVPB 750 milliGRAM(s) IV Intermittent every 12 hours    MEDICATIONS  (PRN):  ALBUTerol    0.083% 2.5 milliGRAM(s) Nebulizer every 6 hours PRN Bronchospasm  fentaNYL    Injectable 25 MICROGram(s) IV Push every 4 hours PRN Fighting vent/agitation    Pertinent Labs:  11-13 Na144 mmol/L Glu 118 mg/dL<H> K+ 5.1 mmol/L Cr  0.80 mg/dL BUN 40 mg/dL<H> Phos 2.6 mg/dL Alb 2.1 g/dL<L>      , 108, 177, 180    Skin: stage 2 B/L ears    Estimated Needs:   [x ] no change since previous assessment  [ ] recalculated:       Previous Nutrition Diagnosis:   [x ] Food & Nutrition Related Knowledge Deficit    Nutrition Diagnosis is [ ] ongoing  [ ] resolved [x ] not applicable          New Nutrition Diagnosis: [x ] Inadequate Protein Energy Intake     Related to: GI issues     As evidenced by: trickle feeds 10cc/hr Glucerna     Recommend    [ ] Change Diet To:    [ ] Nutrition Supplement    [x ] Nutrition Support: if pt remains intubated: Recommend Glucerna 1.2 at 80cc/hr x 18 hrs provides 1728 kcals, 86 gm protein, 1159cc free water for 23 kcals/kg, 1.2gm protein/kg usual wt of 75kg    [ ] Other:        Monitoring and Evaluation:     [ ] PO intake [x ] Tolerance to diet prescription [x ] weights [ x] follow up per protocol    [x ] other: tube feeding tolerance

## 2017-11-13 NOTE — PROGRESS NOTE ADULT - ASSESSMENT
77M h/o DM2, GERD, CABG (2016) and interstitial lung disease (UIP) on 3L home O2, p/w increasing REAGAN thought to be ILD flare. Pt developed fevers and hypotension likely related to sepsis. MICU was consulted for dyspnea and hypotension 2/2 sepsis in the setting of advanced ILD    #Neuro  -Sedated, on versed  -Responds to voice, withdraws to pain.     #Pulm  Acute on chronic respiratory failure(ILD) 2/2 sepsis/PNA   - Intubated 11/7 for hypox resp failure and increased WOB, Does not do well during spontaneous breathing trials. May benefit from diuresis before extubation  - Continue with  meropenem, vancomycin and azithro, solumedrol Duonebs q6. Continue to taper off steroids   - Given septic picture holding pts imuran for now     #Cardio  Hypotension 2/2 Sepsis  - Pt responded well to IVF and has good LV systolic function  - Off levophed, monitor BP as very labile'  Afib/aflutter: bradycardia resolved now off Precedex   -Continue with digoxin. Avoid amio in the setting of ILD.    #GI  - Continue with feeds  Colitis: improving, lactate resolving. Continue with bowel regimen  Transaminitis: stable, likely sepsis mediated   GERD:  - c/w Protonix    #Renal  CHRISTIANE likely prerenal in the setting of sepsis  -resolved  - Losartan D/c'd   -avoid nephrotoxins    #ID  Sepsis  - Repeat blood cx NGTD. CNS may be contaminant, awaiting sensitivities Restarted on vancomycin (day 4). Bcx 11/7 growing Pseudomonas gram (-) rods; sensitive to meropenem (day 6)  -Repeat sputum cx growing   - On Azithromycin 11/7 (day 7) for mycoplasm IgG  Bacteremia 2/2 to PNA vs gut translocation    #Endo  DM2  - elevated FS likely 2/2 to steroids. Now better controlled on NPH. ISS changed to moderate. Continue to monitor and adjust as necessary     #Heme  Thrombocytopenia   Likely 2/2 to zosyn, med induced, less likely HIT. HIT panel negative. Continue to trend platelets.   Neutropenia   - Improving, 2/2 sepsis vs medication induced. Continue to trend    #DVT ppx: Hep sq 77M h/o DM2, GERD, CABG (2016) and interstitial lung disease (UIP) on 3L home O2, p/w increasing REAGAN thought to be ILD flare. Pt developed fevers and hypotension likely related to sepsis. MICU was consulted for dyspnea and hypotension 2/2 sepsis in the setting of advanced ILD    #Neuro  -Sedated, on versed  -Responds to voice, withdraws to pain.     #Pulm  Acute on chronic respiratory failure(ILD) 2/2 sepsis/PNA   - Intubated 11/7 for hypox resp failure and increased WOB, Does not do well during spontaneous breathing trials. May benefit from diuresis before extubation  - Continue with  meropenem, vancomycin and azithro, solumedrol Duonebs q6. Continue to taper off steroids   - Given septic picture holding pts imuran for now     #Cardio  Hypotension 2/2 Sepsis  - Pt responded well to IVF and has good LV systolic function  - Levophed restarted, monitor BP as very labile  Afib/aflutter: bradycardia resolved now off Precedex   -Continue with digoxin. Avoid amio in the setting of ILD.    #GI  - Continue with feeds  Colitis: improving, lactate resolving. Continue with bowel regimen  Transaminitis: stable, likely sepsis mediated   GERD:  - c/w Protonix    #Renal  CHRISTIANE likely prerenal in the setting of sepsis  -resolved  - Losartan D/c'd   -avoid nephrotoxins    #ID  Sepsis  - Repeat blood cx NGTD. CNS may be contaminant, awaiting sensitivities Restarted on vancomycin (day 4). Bcx 11/7 growing Pseudomonas gram (-) rods; sensitive to meropenem (day 6)  -Repeat sputum cx growing   - On Azithromycin 11/7 (day 7) for mycoplasm IgG  Bacteremia 2/2 to PNA vs gut translocation    #Endo  DM2  - elevated FS likely 2/2 to steroids. Now better controlled on NPH. ISS changed to moderate. Continue to monitor and adjust as necessary     #Heme  Thrombocytopenia   Likely 2/2 to zosyn, med induced, less likely HIT. HIT panel negative. Continue to trend platelets.   Neutropenia   - Improving, 2/2 sepsis vs medication induced. Continue to trend    #DVT ppx: Hep sq

## 2017-11-14 LAB
-  AMIKACIN: SIGNIFICANT CHANGE UP
-  AZTREONAM: SIGNIFICANT CHANGE UP
-  CEFEPIME: SIGNIFICANT CHANGE UP
-  CEFTAZIDIME: SIGNIFICANT CHANGE UP
-  CIPROFLOXACIN: SIGNIFICANT CHANGE UP
-  GENTAMICIN: SIGNIFICANT CHANGE UP
-  IMIPENEM: SIGNIFICANT CHANGE UP
-  LEVOFLOXACIN: SIGNIFICANT CHANGE UP
-  MEROPENEM: SIGNIFICANT CHANGE UP
-  PIPERACILLIN/TAZOBACTAM: SIGNIFICANT CHANGE UP
-  TOBRAMYCIN: SIGNIFICANT CHANGE UP
ALBUMIN SERPL ELPH-MCNC: 2.3 G/DL — LOW (ref 3.3–5)
ALP SERPL-CCNC: 362 U/L — HIGH (ref 40–120)
ALT FLD-CCNC: 202 U/L RC — HIGH (ref 10–45)
ANION GAP SERPL CALC-SCNC: 9 MMOL/L — SIGNIFICANT CHANGE UP (ref 5–17)
APTT BLD: 51.7 SEC — HIGH (ref 27.5–37.4)
AST SERPL-CCNC: 135 U/L — HIGH (ref 10–40)
BASE EXCESS BLDA CALC-SCNC: 3.9 MMOL/L — HIGH (ref -2–2)
BILIRUB SERPL-MCNC: 1.6 MG/DL — HIGH (ref 0.2–1.2)
BUN SERPL-MCNC: 37 MG/DL — HIGH (ref 7–23)
CALCIUM SERPL-MCNC: 8.2 MG/DL — LOW (ref 8.4–10.5)
CHLORIDE SERPL-SCNC: 105 MMOL/L — SIGNIFICANT CHANGE UP (ref 96–108)
CO2 BLDA-SCNC: 28 MMOL/L — SIGNIFICANT CHANGE UP (ref 22–30)
CO2 SERPL-SCNC: 26 MMOL/L — SIGNIFICANT CHANGE UP (ref 22–31)
CREAT SERPL-MCNC: 0.8 MG/DL — SIGNIFICANT CHANGE UP (ref 0.5–1.3)
CULTURE RESULTS: SIGNIFICANT CHANGE UP
CULTURE RESULTS: SIGNIFICANT CHANGE UP
GAS PNL BLDA: SIGNIFICANT CHANGE UP
GLUCOSE BLDC GLUCOMTR-MCNC: 111 MG/DL — HIGH (ref 70–99)
GLUCOSE BLDC GLUCOMTR-MCNC: 132 MG/DL — HIGH (ref 70–99)
GLUCOSE BLDC GLUCOMTR-MCNC: 165 MG/DL — HIGH (ref 70–99)
GLUCOSE BLDC GLUCOMTR-MCNC: 172 MG/DL — HIGH (ref 70–99)
GLUCOSE BLDC GLUCOMTR-MCNC: 209 MG/DL — HIGH (ref 70–99)
GLUCOSE SERPL-MCNC: 156 MG/DL — HIGH (ref 70–99)
HCO3 BLDA-SCNC: 27 MMOL/L — SIGNIFICANT CHANGE UP (ref 21–29)
HCT VFR BLD CALC: 25.7 % — LOW (ref 39–50)
HGB BLD-MCNC: 8.4 G/DL — LOW (ref 13–17)
HOROWITZ INDEX BLDA+IHG-RTO: 30 — SIGNIFICANT CHANGE UP
INR BLD: 1.06 RATIO — SIGNIFICANT CHANGE UP (ref 0.88–1.16)
INTUBATED: SIGNIFICANT CHANGE UP
MAGNESIUM SERPL-MCNC: 2.1 MG/DL — SIGNIFICANT CHANGE UP (ref 1.6–2.6)
MCHC RBC-ENTMCNC: 32.9 GM/DL — SIGNIFICANT CHANGE UP (ref 32–36)
MCHC RBC-ENTMCNC: 34.8 PG — HIGH (ref 27–34)
MCV RBC AUTO: 106 FL — HIGH (ref 80–100)
METHOD TYPE: SIGNIFICANT CHANGE UP
ORGANISM # SPEC MICROSCOPIC CNT: SIGNIFICANT CHANGE UP
ORGANISM # SPEC MICROSCOPIC CNT: SIGNIFICANT CHANGE UP
PCO2 BLDA: 36 MMHG — SIGNIFICANT CHANGE UP (ref 32–46)
PH BLDA: 7.49 — HIGH (ref 7.35–7.45)
PHOSPHATE SERPL-MCNC: 3.2 MG/DL — SIGNIFICANT CHANGE UP (ref 2.5–4.5)
PLATELET # BLD AUTO: 192 K/UL — SIGNIFICANT CHANGE UP (ref 150–400)
PO2 BLDA: 120 MMHG — HIGH (ref 74–108)
POTASSIUM SERPL-MCNC: 5.4 MMOL/L — HIGH (ref 3.5–5.3)
POTASSIUM SERPL-SCNC: 5.4 MMOL/L — HIGH (ref 3.5–5.3)
PROT SERPL-MCNC: 5.1 G/DL — LOW (ref 6–8.3)
PROTHROM AB SERPL-ACNC: 11.5 SEC — SIGNIFICANT CHANGE UP (ref 9.8–12.7)
RBC # BLD: 2.43 M/UL — LOW (ref 4.2–5.8)
RBC # FLD: 19.4 % — HIGH (ref 10.3–14.5)
SAO2 % BLDA: 99 % — HIGH (ref 92–96)
SODIUM SERPL-SCNC: 140 MMOL/L — SIGNIFICANT CHANGE UP (ref 135–145)
SPECIMEN SOURCE: SIGNIFICANT CHANGE UP
SPECIMEN SOURCE: SIGNIFICANT CHANGE UP
VANCOMYCIN TROUGH SERPL-MCNC: 18.6 UG/ML — SIGNIFICANT CHANGE UP (ref 10–20)
VANCOMYCIN TROUGH SERPL-MCNC: 21.6 UG/ML — HIGH (ref 10–20)
WBC # BLD: 6.9 K/UL — SIGNIFICANT CHANGE UP (ref 3.8–10.5)
WBC # FLD AUTO: 6.9 K/UL — SIGNIFICANT CHANGE UP (ref 3.8–10.5)

## 2017-11-14 PROCEDURE — 99291 CRITICAL CARE FIRST HOUR: CPT

## 2017-11-14 PROCEDURE — 93010 ELECTROCARDIOGRAM REPORT: CPT

## 2017-11-14 RX ORDER — METFORMIN HYDROCHLORIDE 850 MG/1
2 TABLET ORAL
Qty: 0 | Refills: 0 | COMMUNITY

## 2017-11-14 RX ORDER — GENTAMICIN SULFATE 40 MG/ML
400 VIAL (ML) INJECTION ONCE
Qty: 0 | Refills: 0 | Status: COMPLETED | OUTPATIENT
Start: 2017-11-14 | End: 2017-11-14

## 2017-11-14 RX ORDER — BECLOMETHASONE DIPROPIONATE 40 UG/1
1 AEROSOL, METERED RESPIRATORY (INHALATION)
Qty: 0 | Refills: 0 | COMMUNITY

## 2017-11-14 RX ORDER — REPAGLINIDE 1 MG/1
1 TABLET ORAL
Qty: 0 | Refills: 0 | COMMUNITY

## 2017-11-14 RX ORDER — GENTAMICIN SULFATE 40 MG/ML
350 VIAL (ML) INJECTION ONCE
Qty: 0 | Refills: 0 | Status: DISCONTINUED | OUTPATIENT
Start: 2017-11-14 | End: 2017-11-14

## 2017-11-14 RX ORDER — DEXMEDETOMIDINE HYDROCHLORIDE IN 0.9% SODIUM CHLORIDE 4 UG/ML
0.1 INJECTION INTRAVENOUS
Qty: 200 | Refills: 0 | Status: DISCONTINUED | OUTPATIENT
Start: 2017-11-14 | End: 2017-11-14

## 2017-11-14 RX ADMIN — Medication 0.12 MILLIGRAM(S): at 06:21

## 2017-11-14 RX ADMIN — PANTOPRAZOLE SODIUM 40 MILLIGRAM(S): 20 TABLET, DELAYED RELEASE ORAL at 12:42

## 2017-11-14 RX ADMIN — Medication 3 MILLILITER(S): at 23:49

## 2017-11-14 RX ADMIN — MEROPENEM 200 MILLIGRAM(S): 1 INJECTION INTRAVENOUS at 23:20

## 2017-11-14 RX ADMIN — Medication 3 MILLILITER(S): at 11:27

## 2017-11-14 RX ADMIN — MEROPENEM 200 MILLIGRAM(S): 1 INJECTION INTRAVENOUS at 13:53

## 2017-11-14 RX ADMIN — HUMAN INSULIN 12 UNIT(S): 100 INJECTION, SUSPENSION SUBCUTANEOUS at 06:22

## 2017-11-14 RX ADMIN — HEPARIN SODIUM 5000 UNIT(S): 5000 INJECTION INTRAVENOUS; SUBCUTANEOUS at 13:55

## 2017-11-14 RX ADMIN — DEXMEDETOMIDINE HYDROCHLORIDE IN 0.9% SODIUM CHLORIDE 2.02 MICROGRAM(S)/KG/HR: 4 INJECTION INTRAVENOUS at 06:41

## 2017-11-14 RX ADMIN — HEPARIN SODIUM 5000 UNIT(S): 5000 INJECTION INTRAVENOUS; SUBCUTANEOUS at 06:20

## 2017-11-14 RX ADMIN — HUMAN INSULIN 12 UNIT(S): 100 INJECTION, SUSPENSION SUBCUTANEOUS at 12:41

## 2017-11-14 RX ADMIN — HUMAN INSULIN 12 UNIT(S): 100 INJECTION, SUSPENSION SUBCUTANEOUS at 18:21

## 2017-11-14 RX ADMIN — HEPARIN SODIUM 5000 UNIT(S): 5000 INJECTION INTRAVENOUS; SUBCUTANEOUS at 23:21

## 2017-11-14 RX ADMIN — Medication 3 MILLILITER(S): at 05:56

## 2017-11-14 RX ADMIN — Medication 2: at 12:42

## 2017-11-14 RX ADMIN — Medication 4: at 18:21

## 2017-11-14 RX ADMIN — Medication 500 MILLIGRAM(S): at 12:41

## 2017-11-14 RX ADMIN — Medication 150 MILLIGRAM(S): at 08:39

## 2017-11-14 RX ADMIN — Medication 2: at 01:16

## 2017-11-14 RX ADMIN — Medication 10 MILLIGRAM(S): at 18:20

## 2017-11-14 RX ADMIN — Medication 3 MILLILITER(S): at 17:26

## 2017-11-14 RX ADMIN — HUMAN INSULIN 12 UNIT(S): 100 INJECTION, SUSPENSION SUBCUTANEOUS at 01:16

## 2017-11-14 RX ADMIN — Medication 10 MILLIGRAM(S): at 06:20

## 2017-11-14 RX ADMIN — MEROPENEM 200 MILLIGRAM(S): 1 INJECTION INTRAVENOUS at 06:21

## 2017-11-14 NOTE — PROGRESS NOTE ADULT - SUBJECTIVE AND OBJECTIVE BOX
Patient is a 77y old  Male who presents with a chief complaint of SOB (04 Nov 2017 14:33)      SUBJECTIVE / OVERNIGHT EVENTS:    pt extubated wife and son bedside.  pt responsive to stimuli, nonverbal currently.  pt note tracking movement  no events overnight    Vital Signs Last 24 Hrs  T(C): 37.2 (14 Nov 2017 11:00), Max: 37.2 (14 Nov 2017 11:00)  T(F): 98.9 (14 Nov 2017 11:00), Max: 98.9 (14 Nov 2017 11:00)  HR: 80 (14 Nov 2017 14:00) (63 - 101)  BP: 121/60 (14 Nov 2017 13:30) (104/57 - 205/88)  BP(mean): 86 (14 Nov 2017 13:30) (75 - 128)  RR: 32 (14 Nov 2017 14:00) (22 - 42)  SpO2: 100% (14 Nov 2017 14:00) (93% - 100%)  I&O's Summary    13 Nov 2017 07:01  -  14 Nov 2017 07:00  --------------------------------------------------------  IN: 748.8 mL / OUT: 0 mL / NET: 748.8 mL    14 Nov 2017 07:01  -  14 Nov 2017 14:23  --------------------------------------------------------  IN: 166 mL / OUT: 0 mL / NET: 166 mL            LABS:                        8.4    6.9   )-----------( 192      ( 14 Nov 2017 03:07 )             25.7     11-14    140  |  105  |  37<H>  ----------------------------<  156<H>  5.4<H>   |  26  |  0.80    Ca    8.2<L>      14 Nov 2017 03:07  Phos  3.2     11-14  Mg     2.1     11-14    TPro  5.1<L>  /  Alb  2.3<L>  /  TBili  1.6<H>  /  DBili  x   /  AST  135<H>  /  ALT  202<H>  /  AlkPhos  362<H>  11-14    PT/INR - ( 14 Nov 2017 03:07 )   PT: 11.5 sec;   INR: 1.06 ratio         PTT - ( 14 Nov 2017 03:07 )  PTT:51.7 sec  CAPILLARY BLOOD GLUCOSE  132 (14 Nov 2017 06:00)  165 (14 Nov 2017 00:30)  89 (13 Nov 2017 17:45)      POCT Blood Glucose.: 172 mg/dL (14 Nov 2017 12:25)  POCT Blood Glucose.: 132 mg/dL (14 Nov 2017 05:30)  POCT Blood Glucose.: 165 mg/dL (14 Nov 2017 00:59)  POCT Blood Glucose.: 89 mg/dL (13 Nov 2017 17:37)            RADIOLOGY & ADDITIONAL TESTS:    Imaging Personally Reviewed:  [x] YES  [ ] NO    Consultant(s) Notes Reviewed:  [x] YES  [ ] NO      MEDICATIONS  (STANDING):  ALBUTerol/ipratropium for Nebulization 3 milliLiter(s) Nebulizer every 6 hours  aspirin  chewable 81 milliGRAM(s) Oral daily  dexmedetomidine Infusion 0.1 MICROgram(s)/kG/Hr (2.015 mL/Hr) IV Continuous <Continuous>  digoxin     Tablet 0.125 milliGRAM(s) Oral daily  heparin  Injectable 5000 Unit(s) SubCutaneous every 8 hours  influenza   Vaccine 0.5 milliLiter(s) IntraMuscular once  insulin lispro (HumaLOG) corrective regimen sliding scale   SubCutaneous every 6 hours  insulin NPH human recombinant 12 Unit(s) SubCutaneous every 6 hours  meropenem IVPB 1000 milliGRAM(s) IV Intermittent every 8 hours  methylPREDNISolone sodium succinate Injectable 10 milliGRAM(s) IV Push every 12 hours  midazolam Infusion 2 mG/Hr (2 mL/Hr) IV Continuous <Continuous>  norepinephrine Infusion 0.01 MICROgram(s)/kG/Min (1.511 mL/Hr) IV Continuous <Continuous>  pantoprazole  Injectable 40 milliGRAM(s) IV Push daily  polyethylene glycol 3350 17 Gram(s) Oral two times a day  senna Syrup 10 milliLiter(s) Oral at bedtime    MEDICATIONS  (PRN):  ALBUTerol    0.083% 2.5 milliGRAM(s) Nebulizer every 6 hours PRN Bronchospasm  fentaNYL    Injectable 25 MICROGram(s) IV Push every 4 hours PRN Fighting vent/agitation      Care Discussed with Consultants/Other Providers [x] YES  [ ] NO    HEALTH ISSUES - PROBLEM Dx:  Neutropenia: Neutropenia  Need for prophylactic measure: Need for prophylactic measure  Oral thrush: Oral thrush  Nasal discomfort: Nasal discomfort  Gastroesophageal reflux disease without esophagitis: Gastroesophageal reflux disease without esophagitis  Hyperlipidemia, unspecified hyperlipidemia type: Hyperlipidemia, unspecified hyperlipidemia type  Type 2 diabetes mellitus without complication, without long-term current use of insulin: Type 2 diabetes mellitus without complication, without long-term current use of insulin  Coronary artery disease involving native coronary artery of native heart, angina presence unspecified: Coronary artery disease involving native coronary artery of native heart, angina presence unspecified  Hyperkalemia: Hyperkalemia  Lactic acidosis: Lactic acidosis  Transaminitis: Transaminitis  Idiopathic pulmonary fibrosis: Idiopathic pulmonary fibrosis  Shortness of breath: Shortness of breath

## 2017-11-14 NOTE — PROGRESS NOTE ADULT - ATTENDING COMMENTS
1.Acute hypoxemic respiratory failure due to pseudomonas pneumonia on top off baseline intersitial lung disease.. Pt improving hemodynamically and ventilator requirements minimal.  Pt tolerated PS wean. Will give trial of extubation.    2.Id. Continue Meropenem. D/c Vancomycin for 1 bottle coag neg staph that has cleared. Likely contaminant.      critical care time 35 min

## 2017-11-14 NOTE — PROGRESS NOTE ADULT - SUBJECTIVE AND OBJECTIVE BOX
Follow-up Pulm Progress Note  Amauri Jackson MD  [phone]    Events noted  Extubated  AFebrile on Gent/Meropenum  Breathing comfortably on ventimask  Off vasopressors    Vital Signs Last 24 Hrs  T(C): 36.8 (13 Nov 2017 11:45), Max: 37.3 (12 Nov 2017 20:00)  T(F): 98.2 (13 Nov 2017 11:45), Max: 99.1 (12 Nov 2017 20:00)  HR: 51 (13 Nov 2017 13:29) (51 - 100)  BP: 92/53 (13 Nov 2017 13:15) (72/45 - 172/97)  BP(mean): 67 (13 Nov 2017 13:15) (54 - 126)  RR: 20 (13 Nov 2017 13:15) (19 - 41)  SpO2: 98% (13 Nov 2017 13:29) (91% - 100%)    ABG - ( 12 Nov 2017 02:39 )  pH: 7.43  /  pCO2: 47    /  pO2: 96    / HCO3: 31    / Base Excess: 6.6   /  SaO2: 98                            8.4    4.8   )-----------( 152      ( 13 Nov 2017 03:31 )             25.3     11-13    144  |  108  |  40<H>  ----------------------------<  118<H>  5.1   |  31  |  0.80    Ca    8.0<L>      13 Nov 2017 03:31  Phos  2.6     11-13  Mg     2.1     11-13    TPro  4.9<L>  /  Alb  2.1<L>  /  TBili  1.7<H>  /  DBili  x   /  AST  125<H>  /  ALT  194<H>  /  AlkPhos  232<H>  11-13    CXR: unchaged reticular opacities: no gross new pulm edema  TTE: Mild AS, hyperdynamic LV, PA 40-50  Physical Examination:  PULM: Bilateral crackles CVS: Regular rate and rhythm, no murmurs, rubs, or gallops  ABD: Soft, non-tender  EXT:  3+ edema (presacral)    RADIOLOGY REVIEWED  CXR: No change in bilateral reticular opacities    CT chest: see above    TTE:

## 2017-11-14 NOTE — PROGRESS NOTE ADULT - ASSESSMENT
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 CT chest shows bilateral IPF  treated for Pulmonary fibrosis / Intersitial pulmonary dz exacerbation.  pt developed ARF intubated transferred to MICU w/ acute hypoxic respiratory failure w/ septic shock 2/2 to pseudomonal bacteremia.      -Septic Shock / Pseudomonas bacteremia titrating pressors c/w iv abx discussed with ICU attending and team.  back off sedation to assess mental status.      -Idiopathic pulmonary fibrosis / Acute hypoxic expiratory failure - extubated repeat CXR  c/w steroids, imuran on hold due to septic shock       -Type 2 diabetes mellitus c/w insulin tx     -DVT ppx

## 2017-11-14 NOTE — PROGRESS NOTE ADULT - ASSESSMENT
Acute on chronic hypoxemic respiratory failure  Progressive ILD: unlikely UIP, poss chronic hypersens/NSIP  Psudomonoas bacteremia with pneumonia  Septic Shock - clinically resolved    REC    Continue abx for pseudomonas, adjust per sensitivties  BIPAP prn for work of breathing/fatigue  Continue solumedrol

## 2017-11-14 NOTE — PROGRESS NOTE ADULT - SUBJECTIVE AND OBJECTIVE BOX
MICU PROGRESS NOTE    Interval Events: Overnight restarted on Versed gtt, developing elevated airway pressures. He was suctioned early this AM with RR in 30's, TV's 200-300's. Started on precedex gtt and became bradycardic to 50's.  He was then started on CPAP with plan to stop Versed this AM. On CPAP 5/15 currently with plan to decrease to 5/8 once off Versed later this AM.     REVIEW OF SYSTEMS:  Constitutional: [ ] negative [ ] fevers [ ] chills [ ] weight loss [ ] weight gain  HEENT: [ ] negative [ ] dry eyes [ ] eye irritation [ ] postnasal drip [ ] nasal congestion  CV: [ ] negative  [ ] chest pain [ ] orthopnea [ ] palpitations [ ] murmur  Resp: [ ] negative [ ] cough [ ] shortness of breath [ ] dyspnea [ ] wheezing [ ] sputum [ ] hemoptysis  GI: [ ] negative [ ] nausea [ ] vomiting [ ] diarrhea [ ] constipation [ ] abd pain [ ] dysphagia   : [ ] negative [ ] dysuria [ ] nocturia [ ] hematuria [ ] increased urinary frequency  Musculoskeletal: [ ] negative [ ] back pain [ ] myalgias [ ] arthralgias [ ] fracture  Skin: [ ] negative [ ] rash [ ] itch  Neurological: [ ] negative [ ] headache [ ] dizziness [ ] syncope [ ] weakness [ ] numbness  Psychiatric: [ ] negative [ ] anxiety [ ] depression  Endocrine: [ ] negative [ ] diabetes [ ] thyroid problem  Hematologic/Lymphatic: [ ] negative [ ] anemia [ ] bleeding problem  Allergic/Immunologic: [ ] negative [ ] itchy eyes [ ] nasal discharge [ ] hives [ ] angioedema  [ ] All other systems negative  [x ] Unable to assess ROS because ________    OBJECTIVE:  ICU Vital Signs Last 24 Hrs  T(C): 37.1 (14 Nov 2017 00:00), Max: 37.1 (14 Nov 2017 00:00)  T(F): 98.7 (14 Nov 2017 00:00), Max: 98.7 (14 Nov 2017 00:00)  HR: 90 (14 Nov 2017 06:30) (51 - 101)  BP: 133/62 (14 Nov 2017 06:30) (79/47 - 205/88)  BP(mean): 89 (14 Nov 2017 06:30) (58 - 128)  ABP: --  ABP(mean): --  RR: 38 (14 Nov 2017 06:30) (19 - 42)  SpO2: 96% (14 Nov 2017 06:30) (93% - 100%)    Mode: AC/ CMV (Assist Control/ Continuous Mandatory Ventilation), RR (machine): 22, TV (machine): 450, FiO2: 30, PS: 5, ITime: 1, MAP: 13, PIP: 34    11-12 @ 07:01 - 11-13 @ 07:00  --------------------------------------------------------  IN: 792.8 mL / OUT: 0 mL / NET: 792.8 mL    11-13 @ 07:01 - 11-14 @ 06:55  --------------------------------------------------------  IN: 746.8 mL / OUT: 0 mL / NET: 746.8 mL      CAPILLARY BLOOD GLUCOSE  132 (14 Nov 2017 06:00)      POCT Blood Glucose.: 132 mg/dL (14 Nov 2017 05:30)      PHYSICAL EXAM:  GENERAL: sedated, intubated in NAD  HEAD:  Atraumatic, Normocephalic  EYES: EOMI, PERRLA, conjunctiva and sclera clear  NECK: Supple, No JVD  CHEST/LUNG: b/l course BS  HEART: tachycardic; No murmurs, rubs, or gallops  ABDOMEN: Soft, Nontender, Nondistended; Bowel sounds present  EXTREMITIES:  2+ Peripheral Pulses, No clubbing, cyanosis, or edema  NEUROLOGY: unable to assess 2/2 sedation  SKIN: decub behind left ear    LINES:    HOSPITAL MEDICATIONS:  Standing Meds:  ALBUTerol/ipratropium for Nebulization 3 milliLiter(s) Nebulizer every 6 hours  aspirin  chewable 81 milliGRAM(s) Oral daily  dexmedetomidine Infusion 0.1 MICROgram(s)/kG/Hr IV Continuous <Continuous>  digoxin     Tablet 0.125 milliGRAM(s) Oral daily  heparin  Injectable 5000 Unit(s) SubCutaneous every 8 hours  influenza   Vaccine 0.5 milliLiter(s) IntraMuscular once  insulin lispro (HumaLOG) corrective regimen sliding scale   SubCutaneous every 6 hours  insulin NPH human recombinant 12 Unit(s) SubCutaneous every 6 hours  meropenem IVPB 1000 milliGRAM(s) IV Intermittent every 8 hours  methylPREDNISolone sodium succinate Injectable 10 milliGRAM(s) IV Push every 12 hours  midazolam Infusion 2 mG/Hr IV Continuous <Continuous>  norepinephrine Infusion 0.01 MICROgram(s)/kG/Min IV Continuous <Continuous>  pantoprazole  Injectable 40 milliGRAM(s) IV Push daily  polyethylene glycol 3350 17 Gram(s) Oral two times a day  senna Syrup 10 milliLiter(s) Oral at bedtime  vancomycin  IVPB 750 milliGRAM(s) IV Intermittent every 12 hours      PRN Meds:  ALBUTerol    0.083% 2.5 milliGRAM(s) Nebulizer every 6 hours PRN  fentaNYL    Injectable 25 MICROGram(s) IV Push every 4 hours PRN      LABS:                        8.4    6.9   )-----------( 192      ( 14 Nov 2017 03:07 )             25.7     Hgb Trend: 8.4<--, 8.4<--, 8.9<--, 8.8<--, 7.8<--  11-14    140  |  105  |  37<H>  ----------------------------<  156<H>  5.4<H>   |  26  |  0.80    Ca    8.2<L>      14 Nov 2017 03:07  Phos  3.2     11-14  Mg     2.1     11-14    TPro  5.1<L>  /  Alb  2.3<L>  /  TBili  1.6<H>  /  DBili  x   /  AST  135<H>  /  ALT  202<H>  /  AlkPhos  362<H>  11-14    Creatinine Trend: 0.80<--, 0.80<--, 0.92<--, 0.93<--, 0.91<--, 0.97<--  PT/INR - ( 14 Nov 2017 03:07 )   PT: 11.5 sec;   INR: 1.06 ratio         PTT - ( 14 Nov 2017 03:07 )  PTT:51.7 sec    Arterial Blood Gas:  11-14 @ 05:05  7.49/36/97/27/98/4.3  ABG lactate: --  Arterial Blood Gas:  11-14 @ 03:00  7.49/36/120/27/99/3.9  ABG lactate: --  Arterial Blood Gas:  11-13 @ 15:19  7.47/39/106/28/98/4.6  ABG lactate: --    MICROBIOLOGY:   Culture - Sputum . (11.12.17 @ 12:32)    Gram Stain:   Numerous polymorphonuclear leukocytes per low power field  Rare Squamous epithelial cells per low power field  Numerous Gram Negative Rods per oil power field  Rare Yeast like cells per oil power field    Specimen Source: .Sputum Sputum    Culture Results:   Moderate Pseudomonas aeruginosa  No routine respiratory claus Isolated    Culture - Blood in AM (11.11.17 @ 08:23)    Specimen Source: .Blood Blood-Venous    Culture Results:   No growth to date.    Culture - Blood in AM (11.11.17 @ 08:23)    Specimen Source: .Blood Blood-Peripheral    Culture Results:   No growth to date.      Culture - Blood in AM (11.09.17 @ 09:12)    -  Coagulase negative Staphylococcus: Detec    Gram Stain:   Growth in aerobic bottle: Gram Positive Cocci in Clusters    Specimen Source: .Blood Blood-Peripheral    Organism: Blood Culture PCR    Culture Results:   Growth in aerobic bottle: Coag Negative Staphylococcus  Single set isolate, possible contaminant. Contact  Microbiology if susceptibility testing clinically  indicated.  ***Blood Panel PCR results on this specimen are available  approximately 3 hours after the Gram stain result.***  Gram stain, PCR, and/or culture results may not always  correspond due to difference in methodologies.  ************************************************************  This PCR assay was performed using [employer].  The following targets are tested for: Enterococcus,  vancomycin resistant enterococci, Listeria monocytogenes,  coagulase negative staphylococci, S. aureus,  methicillin resistant S. aureus, Streptococcus agalactiae  (Group B), S. pneumoniae, S.pyogenes (Group A),  Acinetobacter baumannii, Enterobacter cloacae, E. coli,  Klebsiella oxytoca, K. pneumoniae, Proteus sp.,  Serratia marcescens, Haemophilus influenzae,  Neisseria meningitidis, Pseudomonas aeruginosa, Candida  albicans, C. glabrata, C krusei, C parapsilosis,  C. tropicalis and the KPC resistance gene.    Organism Identification: Blood Culture PCR    Method Type: PCR    RADIOLOGY:  [x ] Reviewed and interpreted by me    < from: CT Abdomen and Pelvis w/ Oral Cont and w/ IV Cont (11.09.17 @ 20:32) >  IMPRESSION:   Mild wall thickening versus underdistention of the colon, particularly   involving the rectosigmoid colon, raising consideration of a colitis.   Colonic diverticulosis.     Small to moderatevolume ascites, new since 10/31/2017.    Parenchymal consolidation predominantly in the lower lobes bilaterally,   new since 10/31/2017, concerning for pneumonia.    < end of copied text >    EKG: no new

## 2017-11-14 NOTE — PROGRESS NOTE ADULT - ASSESSMENT
77M h/o DM2, GERD, CABG (2016) and interstitial lung disease (UIP) on 3L home O2, p/w increasing REAGAN thought to be ILD flare. Pt developed fevers and hypotension likely related to sepsis. MICU was consulted for dyspnea and hypotension 2/2 sepsis in the setting of advanced ILD. Found to have PNA on 11/9 CT, difficult to wean off ventilator, CPAPing this AM.     #Neuro  -Sedated, on versed again this AM also with Precedex, weaning Versed.   -Responds to voice, withdraws to pain.     #Pulm  Acute on chronic respiratory failure(ILD) 2/2 sepsis/PNA   - Intubated 11/7 for hypox resp failure and increased WOB, Does not do well during spontaneous breathing trials. May benefit from diuresis before extubation  - Continue with  meropenem, vancomycin.   -c/w Solumedrol. Continue to taper off steroids 10 q12h currently.   - c/w Duonebs q6.   - Given septic picture holding pts imuran for now     #Cardio  Hypotension 2/2 Sepsis  - Pt responded well to IVF and has good LV systolic function  - Weaned off pressors.   - Afib/aflutter: bradycardia resolved now off Precedex   - Continue with digoxin. Avoid amio in the setting of ILD.    #GI  - Continue with feeds  Colitis: improving, lactate resolving. Continue with bowel regimen  Transaminitis: stable, likely sepsis mediated   GERD:  - c/w Protonix    #Renal  CHRISTIANE likely prerenal in the setting of sepsis  -resolved SCr 0.80.  - Losartan D/c'd   -avoid nephrotoxins    #ID  Sepsis  - Repeat blood cx NGTD. CNS may be contaminant, awaiting sensitivities Restarted on vancomycin (day 4). Bcx 11/7 growing Pseudomonas gram (-) rods; sensitive to meropenem (day 6)  -Repeat sputum cx growing   -on Azithromycin 11/7 (day 7) for mycoplasm IgG  -bacteremia 2/2 PNA vs gut translocation    #Endo  DM2  - elevated FS likely 2/2 to steroids. Now better controlled on NPH. ISS changed to moderate. Continue to monitor and adjust as necessary     #Heme  Thrombocytopenia: likely 2/2 to Zosyn, medication-induced, less likely HIT. HIT panel negative. Continue to trend platelets.   -Resolved.   Neutropenia   - Improving, 2/2 sepsis vs medication induced. Continue to trend    #DVT ppx: [facility]    Chay Busby  MICU resident PGY-2  [id number], [id number] 77M h/o DM2, GERD, CABG (2016) and interstitial lung disease (UIP) on 3L home O2, p/w increasing REAGAN thought to be ILD flare. Pt developed fevers and hypotension likely related to sepsis. MICU was consulted for dyspnea and hypotension 2/2 sepsis in the setting of advanced ILD. Found to have PNA on 11/9 CT, difficult to wean off ventilator, CPAPing this AM.     #Neuro  -Sedated, on versed again this AM also with Precedex, weaning Versed.   -Responds to voice, withdraws to pain.     #Pulm  Acute on chronic respiratory failure(ILD) 2/2 sepsis/PNA   - Intubated 11/7 for hypox resp failure and increased WOB, Does not do well during spontaneous breathing trials. May benefit from diuresis before extubation.  - Doing well on pressure support trials, will extubate   - Continue with  meropenem, vancomycin.   -c/w Solumedrol. Continue to taper off steroids 10 q12h currently.   - c/w Duonebs q6.   - Given septic picture holding pts imuran for now     #Cardio  Hypotension 2/2 Sepsis  - Pt responded well to IVF and has good LV systolic function  - Weaned off pressors.   - Afib/aflutter: bradycardia resolved now off Precedex   - Continue with digoxin. Avoid amio in the setting of ILD.    #GI  - Continue with feeds  Colitis: improving, lactate resolving. Continue with bowel regimen  Transaminitis: stable, likely sepsis mediated   GERD:  - c/w Protonix    #Renal  CHRISTIANE likely prerenal in the setting of sepsis  -resolved SCr 0.80.  - Losartan D/c'd   -avoid nephrotoxins    #ID  Sepsis  - Repeat blood cx NGTD. CNS may be contaminant, awaiting sensitivities Restarted on vancomycin (day 4). 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 Now better controlled on NPH. ISS changed to moderate. Continue to monitor and adjust as necessary     #Heme  Thrombocytopenia: likely 2/2 to Zosyn, medication-induced, less likely HIT. HIT panel negative. Continue to trend platelets.   -Resolved.   Neutropenia   - Improving, 2/2 sepsis vs medication induced. Continue to trend    #DVT ppx: [facility]    Chay Busby  MICU resident PGY-2  [id number], [id number]

## 2017-11-14 NOTE — AIRWAY REMOVAL NOTE  ADULT & PEDS - ARTIFICAL AIRWAY REMOVAL COMMENTS
pt extubated by respiratory as per order with LYNDA conti at bedside, pt is on aerosol mask 40%, will continue to monitor

## 2017-11-15 DIAGNOSIS — N17.9 ACUTE KIDNEY FAILURE, UNSPECIFIED: ICD-10-CM

## 2017-11-15 DIAGNOSIS — R78.81 BACTEREMIA: ICD-10-CM

## 2017-11-15 LAB
ALBUMIN SERPL ELPH-MCNC: 2.4 G/DL — LOW (ref 3.3–5)
ALP SERPL-CCNC: 373 U/L — HIGH (ref 40–120)
ALT FLD-CCNC: 188 U/L RC — HIGH (ref 10–45)
ANION GAP SERPL CALC-SCNC: 13 MMOL/L — SIGNIFICANT CHANGE UP (ref 5–17)
ANION GAP SERPL CALC-SCNC: 8 MMOL/L — SIGNIFICANT CHANGE UP (ref 5–17)
APTT BLD: 33.4 SEC — SIGNIFICANT CHANGE UP (ref 27.5–37.4)
AST SERPL-CCNC: 99 U/L — HIGH (ref 10–40)
BILIRUB SERPL-MCNC: 1.5 MG/DL — HIGH (ref 0.2–1.2)
BUN SERPL-MCNC: 35 MG/DL — HIGH (ref 7–23)
BUN SERPL-MCNC: 38 MG/DL — HIGH (ref 7–23)
CALCIUM SERPL-MCNC: 8.5 MG/DL — SIGNIFICANT CHANGE UP (ref 8.4–10.5)
CALCIUM SERPL-MCNC: 8.5 MG/DL — SIGNIFICANT CHANGE UP (ref 8.4–10.5)
CHLORIDE SERPL-SCNC: 107 MMOL/L — SIGNIFICANT CHANGE UP (ref 96–108)
CHLORIDE SERPL-SCNC: 109 MMOL/L — HIGH (ref 96–108)
CO2 SERPL-SCNC: 22 MMOL/L — SIGNIFICANT CHANGE UP (ref 22–31)
CO2 SERPL-SCNC: 25 MMOL/L — SIGNIFICANT CHANGE UP (ref 22–31)
CREAT SERPL-MCNC: 0.93 MG/DL — SIGNIFICANT CHANGE UP (ref 0.5–1.3)
CREAT SERPL-MCNC: 1.02 MG/DL — SIGNIFICANT CHANGE UP (ref 0.5–1.3)
GAS PNL BLDA: SIGNIFICANT CHANGE UP
GAS PNL BLDA: SIGNIFICANT CHANGE UP
GAS PNL BLDV: SIGNIFICANT CHANGE UP
GLUCOSE BLDC GLUCOMTR-MCNC: 138 MG/DL — HIGH (ref 70–99)
GLUCOSE BLDC GLUCOMTR-MCNC: 191 MG/DL — HIGH (ref 70–99)
GLUCOSE BLDC GLUCOMTR-MCNC: 209 MG/DL — HIGH (ref 70–99)
GLUCOSE SERPL-MCNC: 143 MG/DL — HIGH (ref 70–99)
GLUCOSE SERPL-MCNC: 212 MG/DL — HIGH (ref 70–99)
HCT VFR BLD CALC: 26.8 % — LOW (ref 39–50)
HGB BLD-MCNC: 9.3 G/DL — LOW (ref 13–17)
INR BLD: 1.01 RATIO — SIGNIFICANT CHANGE UP (ref 0.88–1.16)
MAGNESIUM SERPL-MCNC: 2.1 MG/DL — SIGNIFICANT CHANGE UP (ref 1.6–2.6)
MCHC RBC-ENTMCNC: 34.6 GM/DL — SIGNIFICANT CHANGE UP (ref 32–36)
MCHC RBC-ENTMCNC: 36.3 PG — HIGH (ref 27–34)
MCV RBC AUTO: 105 FL — HIGH (ref 80–100)
PHOSPHATE SERPL-MCNC: 3.2 MG/DL — SIGNIFICANT CHANGE UP (ref 2.5–4.5)
PLATELET # BLD AUTO: 239 K/UL — SIGNIFICANT CHANGE UP (ref 150–400)
POTASSIUM SERPL-MCNC: 5.2 MMOL/L — SIGNIFICANT CHANGE UP (ref 3.5–5.3)
POTASSIUM SERPL-MCNC: 5.5 MMOL/L — HIGH (ref 3.5–5.3)
POTASSIUM SERPL-SCNC: 5.2 MMOL/L — SIGNIFICANT CHANGE UP (ref 3.5–5.3)
POTASSIUM SERPL-SCNC: 5.5 MMOL/L — HIGH (ref 3.5–5.3)
PROT SERPL-MCNC: 5.4 G/DL — LOW (ref 6–8.3)
PROTHROM AB SERPL-ACNC: 11 SEC — SIGNIFICANT CHANGE UP (ref 9.8–12.7)
RBC # BLD: 2.55 M/UL — LOW (ref 4.2–5.8)
RBC # FLD: 19.9 % — HIGH (ref 10.3–14.5)
SODIUM SERPL-SCNC: 142 MMOL/L — SIGNIFICANT CHANGE UP (ref 135–145)
SODIUM SERPL-SCNC: 142 MMOL/L — SIGNIFICANT CHANGE UP (ref 135–145)
WBC # BLD: 8.4 K/UL — SIGNIFICANT CHANGE UP (ref 3.8–10.5)
WBC # FLD AUTO: 8.4 K/UL — SIGNIFICANT CHANGE UP (ref 3.8–10.5)

## 2017-11-15 PROCEDURE — 76700 US EXAM ABDOM COMPLETE: CPT | Mod: 26

## 2017-11-15 PROCEDURE — 99233 SBSQ HOSP IP/OBS HIGH 50: CPT | Mod: GC

## 2017-11-15 RX ORDER — LEVALBUTEROL 1.25 MG/.5ML
0.63 SOLUTION, CONCENTRATE RESPIRATORY (INHALATION) EVERY 6 HOURS
Qty: 0 | Refills: 0 | Status: DISCONTINUED | OUTPATIENT
Start: 2017-11-15 | End: 2017-11-17

## 2017-11-15 RX ORDER — DIGOXIN 250 MCG
0.12 TABLET ORAL DAILY
Qty: 0 | Refills: 0 | Status: DISCONTINUED | OUTPATIENT
Start: 2017-11-15 | End: 2017-11-18

## 2017-11-15 RX ORDER — SODIUM BICARBONATE 1 MEQ/ML
50 SYRINGE (ML) INTRAVENOUS ONCE
Qty: 0 | Refills: 0 | Status: COMPLETED | OUTPATIENT
Start: 2017-11-15 | End: 2017-11-15

## 2017-11-15 RX ORDER — SODIUM CHLORIDE 9 MG/ML
250 INJECTION INTRAMUSCULAR; INTRAVENOUS; SUBCUTANEOUS ONCE
Qty: 0 | Refills: 0 | Status: COMPLETED | OUTPATIENT
Start: 2017-11-15 | End: 2017-11-16

## 2017-11-15 RX ORDER — TIOTROPIUM BROMIDE 18 UG/1
1 CAPSULE ORAL; RESPIRATORY (INHALATION) DAILY
Qty: 0 | Refills: 0 | Status: DISCONTINUED | OUTPATIENT
Start: 2017-11-15 | End: 2017-12-05

## 2017-11-15 RX ORDER — INSULIN HUMAN 100 [IU]/ML
5 INJECTION, SOLUTION SUBCUTANEOUS ONCE
Qty: 0 | Refills: 0 | Status: COMPLETED | OUTPATIENT
Start: 2017-11-15 | End: 2017-11-15

## 2017-11-15 RX ORDER — DEXTROSE 50 % IN WATER 50 %
25 SYRINGE (ML) INTRAVENOUS ONCE
Qty: 0 | Refills: 0 | Status: COMPLETED | OUTPATIENT
Start: 2017-11-15 | End: 2017-11-15

## 2017-11-15 RX ADMIN — TIOTROPIUM BROMIDE 1 CAPSULE(S): 18 CAPSULE ORAL; RESPIRATORY (INHALATION) at 11:39

## 2017-11-15 RX ADMIN — Medication 10 MILLIGRAM(S): at 05:10

## 2017-11-15 RX ADMIN — HEPARIN SODIUM 5000 UNIT(S): 5000 INJECTION INTRAVENOUS; SUBCUTANEOUS at 05:11

## 2017-11-15 RX ADMIN — LEVALBUTEROL 0.63 MILLIGRAM(S): 1.25 SOLUTION, CONCENTRATE RESPIRATORY (INHALATION) at 18:36

## 2017-11-15 RX ADMIN — MEROPENEM 200 MILLIGRAM(S): 1 INJECTION INTRAVENOUS at 05:10

## 2017-11-15 RX ADMIN — INSULIN HUMAN 5 UNIT(S): 100 INJECTION, SOLUTION SUBCUTANEOUS at 23:45

## 2017-11-15 RX ADMIN — HEPARIN SODIUM 5000 UNIT(S): 5000 INJECTION INTRAVENOUS; SUBCUTANEOUS at 13:32

## 2017-11-15 RX ADMIN — LEVALBUTEROL 0.63 MILLIGRAM(S): 1.25 SOLUTION, CONCENTRATE RESPIRATORY (INHALATION) at 11:39

## 2017-11-15 RX ADMIN — Medication 10 MILLIGRAM(S): at 17:01

## 2017-11-15 RX ADMIN — Medication 4: at 23:30

## 2017-11-15 RX ADMIN — Medication 50 MILLIEQUIVALENT(S): at 23:45

## 2017-11-15 RX ADMIN — LEVALBUTEROL 0.63 MILLIGRAM(S): 1.25 SOLUTION, CONCENTRATE RESPIRATORY (INHALATION) at 05:50

## 2017-11-15 RX ADMIN — Medication 0.12 MILLIGRAM(S): at 11:55

## 2017-11-15 RX ADMIN — HEPARIN SODIUM 5000 UNIT(S): 5000 INJECTION INTRAVENOUS; SUBCUTANEOUS at 22:31

## 2017-11-15 RX ADMIN — MEROPENEM 200 MILLIGRAM(S): 1 INJECTION INTRAVENOUS at 13:32

## 2017-11-15 RX ADMIN — Medication 25 MILLILITER(S): at 23:45

## 2017-11-15 RX ADMIN — MEROPENEM 200 MILLIGRAM(S): 1 INJECTION INTRAVENOUS at 22:31

## 2017-11-15 RX ADMIN — Medication 2: at 17:02

## 2017-11-15 NOTE — CHART NOTE - NSCHARTNOTEFT_GEN_A_CORE
Interim note.    Pt discussed on rounds. Extubated yesterday, placed on BIPAP last night. Currently NPO, swallow eval ordered. Will f/u on NPO status and ST recommendations. Interim note.    Pt discussed on rounds. Extubated yesterday. Currently NPO, swallow eval ordered. Will f/u on NPO status and ST recommendations.

## 2017-11-15 NOTE — PHYSICAL THERAPY INITIAL EVALUATION ADULT - IMPAIRED TRANSFERS: SIT/STAND, REHAB EVAL
impaired motor control/impaired postural control/decreased strength
decreased strength/SOB; increased work of breathing/impaired balance

## 2017-11-15 NOTE — PROGRESS NOTE ADULT - SUBJECTIVE AND OBJECTIVE BOX
CHIEF COMPLAINT: acute on chronic hypoxic respiratory failure 2/2 PNA    Interval Events:    REVIEW OF SYSTEMS:  Constitutional: [ ] negative [ ] fevers [ ] chills [ ] weight loss [ ] weight gain  HEENT: [ ] negative [ ] dry eyes [ ] eye irritation [ ] postnasal drip [ ] nasal congestion  CV: [ ] negative  [ ] chest pain [ ] orthopnea [ ] palpitations [ ] murmur  Resp: [ ] negative [ ] cough [ ] shortness of breath [ ] dyspnea [ ] wheezing [ ] sputum [ ] hemoptysis  GI: [ ] negative [ ] nausea [ ] vomiting [ ] diarrhea [ ] constipation [ ] abd pain [ ] dysphagia   : [ ] negative [ ] dysuria [ ] nocturia [ ] hematuria [ ] increased urinary frequency  Musculoskeletal: [ ] negative [ ] back pain [ ] myalgias [ ] arthralgias [ ] fracture  Skin: [ ] negative [ ] rash [ ] itch  Neurological: [ ] negative [ ] headache [ ] dizziness [ ] syncope [ ] weakness [ ] numbness  Psychiatric: [ ] negative [ ] anxiety [ ] depression  Endocrine: [ ] negative [ ] diabetes [ ] thyroid problem  Hematologic/Lymphatic: [ ] negative [ ] anemia [ ] bleeding problem  Allergic/Immunologic: [ ] negative [ ] itchy eyes [ ] nasal discharge [ ] hives [ ] angioedema  [ ] All other systems negative  [ ] Unable to assess ROS because ________    OBJECTIVE:  ICU Vital Signs Last 24 Hrs  T(C): 36.6 (15 Nov 2017 00:00), Max: 37.2 (14 Nov 2017 11:00)  T(F): 97.8 (15 Nov 2017 00:00), Max: 98.9 (14 Nov 2017 11:00)  HR: 92 (15 Nov 2017 06:00) (65 - 117)  BP: 147/68 (15 Nov 2017 06:00) (104/57 - 153/75)  BP(mean): 98 (15 Nov 2017 06:00) (75 - 112)  ABP: --  ABP(mean): --  RR: 26 (15 Nov 2017 06:00) (11 - 43)  SpO2: 99% (15 Nov 2017 06:00) (93% - 100%)    Mode: CPAP with PS, FiO2: 30, PEEP: 5, PS: 8, MAP: 9, PIP: 17    11-14 @ 07:01  -  11-15 @ 07:00  --------------------------------------------------------  IN: 366 mL / OUT: 120 mL / NET: 246 mL      CAPILLARY BLOOD GLUCOSE  209 (14 Nov 2017 18:00)      POCT Blood Glucose.: 138 mg/dL (15 Nov 2017 05:09)      PHYSICAL EXAM:  GENERAL: sedated, intubated in NAD  HEAD:  Atraumatic, Normocephalic  EYES: EOMI, PERRLA, conjunctiva and sclera clear  NECK: Supple, No JVD  CHEST/LUNG: b/l course BS  HEART: tachycardic; No murmurs, rubs, or gallops  ABDOMEN: Soft, Nontender, Nondistended; Bowel sounds present  EXTREMITIES:  2+ Peripheral Pulses, No clubbing, cyanosis, or edema  NEUROLOGY: unable to assess 2/2 sedation  SKIN: decub behind left ear    LINES:    HOSPITAL MEDICATIONS:  aspirin  chewable 81 milliGRAM(s) Oral daily  heparin  Injectable 5000 Unit(s) SubCutaneous every 8 hours    meropenem IVPB 1000 milliGRAM(s) IV Intermittent every 8 hours    digoxin  Injectable 0.125 milliGRAM(s) IV Push daily    insulin lispro (HumaLOG) corrective regimen sliding scale   SubCutaneous every 6 hours  methylPREDNISolone sodium succinate Injectable 10 milliGRAM(s) IV Push every 12 hours    levalbuterol Inhalation 0.63 milliGRAM(s) Inhalation every 6 hours  tiotropium 18 MICROgram(s) Capsule 1 Capsule(s) Inhalation daily              influenza   Vaccine 0.5 milliLiter(s) IntraMuscular once          LABS:                        9.3    8.4   )-----------( 239      ( 15 Nov 2017 02:43 )             26.8     Hgb Trend: 9.3<--, 8.4<--, 8.4<--, 8.9<--, 8.8<--  11-15    142  |  107  |  35<H>  ----------------------------<  143<H>  5.2   |  22  |  0.93    Ca    8.5      15 Nov 2017 02:43  Phos  3.2     11-15  Mg     2.1     11-15    TPro  5.4<L>  /  Alb  2.4<L>  /  TBili  1.5<H>  /  DBili  x   /  AST  99<H>  /  ALT  188<H>  /  AlkPhos  373<H>  11-15    Creatinine Trend: 0.93<--, 0.80<--, 0.80<--, 0.92<--, 0.93<--, 0.91<--  PT/INR - ( 15 Nov 2017 02:43 )   PT: 11.0 sec;   INR: 1.01 ratio         PTT - ( 15 Nov 2017 02:43 )  PTT:33.4 sec    Arterial Blood Gas:  11-15 @ 02:40  7.50/33/106/26/99/2.9  ABG lactate: --  Arterial Blood Gas:  11-14 @ 14:33  7.49/33/74/25/95/2.3  ABG lactate: --  Arterial Blood Gas:  11-14 @ 10:24  7.48/37/103/27/98/3.4  ABG lactate: --  Arterial Blood Gas:  11-14 @ 05:05  7.49/36/97/27/98/4.3  ABG lactate: --  Arterial Blood Gas:  11-14 @ 03:00  7.49/36/120/27/99/3.9  ABG lactate: --  Arterial Blood Gas:  11-13 @ 15:19  7.47/39/106/28/98/4.6  ABG lactate: --        MICROBIOLOGY:     RADIOLOGY:  [ ] Reviewed and interpreted by me    EKG: CHIEF COMPLAINT: acute on chronic hypoxic respiratory failure 2/2 PNA    Interval Events: Extubated yesterday. Became tachypneic overnight, was placed on Bipap with improvement. Sputum grew CRE, was given 1x dose of gentamicin.     REVIEW OF SYSTEMS:  Constitutional: [ ] negative [ ] fevers [ ] chills [ ] weight loss [ ] weight gain  HEENT: [ ] negative [ ] dry eyes [ ] eye irritation [ ] postnasal drip [ ] nasal congestion  CV: [ ] negative  [ ] chest pain [ ] orthopnea [ ] palpitations [ ] murmur  Resp: [ ] negative [ ] cough [ ] shortness of breath [ ] dyspnea [ ] wheezing [ ] sputum [ ] hemoptysis  GI: [ ] negative [ ] nausea [ ] vomiting [ ] diarrhea [ ] constipation [ ] abd pain [ ] dysphagia   : [ ] negative [ ] dysuria [ ] nocturia [ ] hematuria [ ] increased urinary frequency  Musculoskeletal: [ ] negative [ ] back pain [ ] myalgias [ ] arthralgias [ ] fracture  Skin: [ ] negative [ ] rash [ ] itch  Neurological: [ ] negative [ ] headache [ ] dizziness [ ] syncope [ ] weakness [ ] numbness  Psychiatric: [ ] negative [ ] anxiety [ ] depression  Endocrine: [ ] negative [ ] diabetes [ ] thyroid problem  Hematologic/Lymphatic: [ ] negative [ ] anemia [ ] bleeding problem  Allergic/Immunologic: [ ] negative [ ] itchy eyes [ ] nasal discharge [ ] hives [ ] angioedema  [ ] All other systems negative  [ ] Unable to assess ROS because ________    OBJECTIVE:  ICU Vital Signs Last 24 Hrs  T(C): 36.6 (15 Nov 2017 00:00), Max: 37.2 (14 Nov 2017 11:00)  T(F): 97.8 (15 Nov 2017 00:00), Max: 98.9 (14 Nov 2017 11:00)  HR: 92 (15 Nov 2017 06:00) (65 - 117)  BP: 147/68 (15 Nov 2017 06:00) (104/57 - 153/75)  BP(mean): 98 (15 Nov 2017 06:00) (75 - 112)  ABP: --  ABP(mean): --  RR: 26 (15 Nov 2017 06:00) (11 - 43)  SpO2: 99% (15 Nov 2017 06:00) (93% - 100%)    Mode: CPAP with PS, FiO2: 30, PEEP: 5, PS: 8, MAP: 9, PIP: 17    11-14 @ 07:01  -  11-15 @ 07:00  --------------------------------------------------------  IN: 366 mL / OUT: 120 mL / NET: 246 mL      CAPILLARY BLOOD GLUCOSE  209 (14 Nov 2017 18:00)      POCT Blood Glucose.: 138 mg/dL (15 Nov 2017 05:09)      PHYSICAL EXAM:  GENERAL: sedated, intubated in NAD  HEAD:  Atraumatic, Normocephalic  EYES: EOMI, PERRLA, conjunctiva and sclera clear  NECK: Supple, No JVD  CHEST/LUNG: b/l course BS  HEART: tachycardic; No murmurs, rubs, or gallops  ABDOMEN: Soft, Nontender, Nondistended; Bowel sounds present  EXTREMITIES:  2+ Peripheral Pulses, No clubbing, cyanosis, or edema  NEUROLOGY: unable to assess 2/2 sedation  SKIN: decub behind left ear    LINES:    HOSPITAL MEDICATIONS:  aspirin  chewable 81 milliGRAM(s) Oral daily  heparin  Injectable 5000 Unit(s) SubCutaneous every 8 hours    meropenem IVPB 1000 milliGRAM(s) IV Intermittent every 8 hours    digoxin  Injectable 0.125 milliGRAM(s) IV Push daily    insulin lispro (HumaLOG) corrective regimen sliding scale   SubCutaneous every 6 hours  methylPREDNISolone sodium succinate Injectable 10 milliGRAM(s) IV Push every 12 hours    levalbuterol Inhalation 0.63 milliGRAM(s) Inhalation every 6 hours  tiotropium 18 MICROgram(s) Capsule 1 Capsule(s) Inhalation daily              influenza   Vaccine 0.5 milliLiter(s) IntraMuscular once          LABS:                        9.3    8.4   )-----------( 239      ( 15 Nov 2017 02:43 )             26.8     Hgb Trend: 9.3<--, 8.4<--, 8.4<--, 8.9<--, 8.8<--  11-15    142  |  107  |  35<H>  ----------------------------<  143<H>  5.2   |  22  |  0.93    Ca    8.5      15 Nov 2017 02:43  Phos  3.2     11-15  Mg     2.1     11-15    TPro  5.4<L>  /  Alb  2.4<L>  /  TBili  1.5<H>  /  DBili  x   /  AST  99<H>  /  ALT  188<H>  /  AlkPhos  373<H>  11-15    Creatinine Trend: 0.93<--, 0.80<--, 0.80<--, 0.92<--, 0.93<--, 0.91<--  PT/INR - ( 15 Nov 2017 02:43 )   PT: 11.0 sec;   INR: 1.01 ratio         PTT - ( 15 Nov 2017 02:43 )  PTT:33.4 sec    Arterial Blood Gas:  11-15 @ 02:40  7.50/33/106/26/99/2.9  ABG lactate: --  Arterial Blood Gas:  11-14 @ 14:33  7.49/33/74/25/95/2.3  ABG lactate: --  Arterial Blood Gas:  11-14 @ 10:24  7.48/37/103/27/98/3.4  ABG lactate: --  Arterial Blood Gas:  11-14 @ 05:05  7.49/36/97/27/98/4.3  ABG lactate: --  Arterial Blood Gas:  11-14 @ 03:00  7.49/36/120/27/99/3.9  ABG lactate: --  Arterial Blood Gas:  11-13 @ 15:19  7.47/39/106/28/98/4.6  ABG lactate: --        MICROBIOLOGY:     RADIOLOGY:  [ ] Reviewed and interpreted by me    EKG:

## 2017-11-15 NOTE — PHYSICAL THERAPY INITIAL EVALUATION ADULT - PERTINENT HX OF CURRENT PROBLEM, REHAB EVAL
77 year old male  admitted on 11/1/17 with PMH of "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 CT chest shows bilateral IPF with UIP pattern.
78 y/o M admitted 11/1/17 w PMH of "asbestos-related lung disease" on home O2 (3L), CAD s/p CABG (2016), DM-2, GERD, kidney stones; presents w/2-3 weeks worsening REAGAN. CT chest shows bilateral IPF w/UIP pattern. Pt developed fevers/hypotension on floor, MICU consulted for dyspnea/hypotension 2/2 sepsis in the setting of advanced ILD. Found to have PNA on 11/9 CT s/p extubation 11/14.

## 2017-11-15 NOTE — PHYSICAL THERAPY INITIAL EVALUATION ADULT - MANUAL MUSCLE TESTING RESULTS, REHAB EVAL
grossly 2+/5 t/o unable to move extremx4 full range against gravity/grossly assessed due to
grossly assessed due to/3 to 3+/5 grossly ext x 4

## 2017-11-15 NOTE — PROGRESS NOTE ADULT - SUBJECTIVE AND OBJECTIVE BOX
Patient is a 77y old  Male who presents with a chief complaint of SOB (04 Nov 2017 14:33)      SUBJECTIVE / OVERNIGHT EVENTS:    pt resting in bed pt wife an brother-inlaw bedside.  pt conversive comfortable currently.  no cp abd pain n/v/d.  pt state he is feeling better today      Vital Signs Last 24 Hrs  T(C): 37.1 (15 Nov 2017 11:00), Max: 37.1 (15 Nov 2017 11:00)  T(F): 98.7 (15 Nov 2017 11:00), Max: 98.7 (15 Nov 2017 11:00)  HR: 78 (15 Nov 2017 15:00) (76 - 117)  BP: 129/62 (15 Nov 2017 15:00) (113/55 - 171/78)  BP(mean): 88 (15 Nov 2017 15:00) (78 - 123)  RR: 33 (15 Nov 2017 15:00) (11 - 48)  SpO2: 99% (15 Nov 2017 15:00) (93% - 100%)  I&O's Summary    14 Nov 2017 07:01  -  15 Nov 2017 07:00  --------------------------------------------------------  IN: 366 mL / OUT: 120 mL / NET: 246 mL            LABS:                        9.3    8.4   )-----------( 239      ( 15 Nov 2017 02:43 )             26.8     11-15    142  |  107  |  35<H>  ----------------------------<  143<H>  5.2   |  22  |  0.93    Ca    8.5      15 Nov 2017 02:43  Phos  3.2     11-15  Mg     2.1     11-15    TPro  5.4<L>  /  Alb  2.4<L>  /  TBili  1.5<H>  /  DBili  x   /  AST  99<H>  /  ALT  188<H>  /  AlkPhos  373<H>  11-15    PT/INR - ( 15 Nov 2017 02:43 )   PT: 11.0 sec;   INR: 1.01 ratio         PTT - ( 15 Nov 2017 02:43 )  PTT:33.4 sec  CAPILLARY BLOOD GLUCOSE  209 (15 Nov 2017 12:00)  209 (14 Nov 2017 18:00)      POCT Blood Glucose.: 209 mg/dL (15 Nov 2017 11:57)  POCT Blood Glucose.: 138 mg/dL (15 Nov 2017 05:09)  POCT Blood Glucose.: 111 mg/dL (14 Nov 2017 23:27)  POCT Blood Glucose.: 209 mg/dL (14 Nov 2017 17:27)            RADIOLOGY & ADDITIONAL TESTS:    Imaging Personally Reviewed:  [x] YES  [ ] NO    Consultant(s) Notes Reviewed:  [x] YES  [ ] NO      MEDICATIONS  (STANDING):  aspirin  chewable 81 milliGRAM(s) Oral daily  digoxin  Injectable 0.125 milliGRAM(s) IV Push daily  heparin  Injectable 5000 Unit(s) SubCutaneous every 8 hours  influenza   Vaccine 0.5 milliLiter(s) IntraMuscular once  insulin lispro (HumaLOG) corrective regimen sliding scale   SubCutaneous every 6 hours  levalbuterol Inhalation 0.63 milliGRAM(s) Inhalation every 6 hours  meropenem IVPB 1000 milliGRAM(s) IV Intermittent every 8 hours  methylPREDNISolone sodium succinate Injectable 10 milliGRAM(s) IV Push every 12 hours  tiotropium 18 MICROgram(s) Capsule 1 Capsule(s) Inhalation daily    MEDICATIONS  (PRN):      Care Discussed with Consultants/Other Providers [x] YES  [ ] NO    HEALTH ISSUES - PROBLEM Dx:  Neutropenia: Neutropenia  Need for prophylactic measure: Need for prophylactic measure  Oral thrush: Oral thrush  Nasal discomfort: Nasal discomfort  Gastroesophageal reflux disease without esophagitis: Gastroesophageal reflux disease without esophagitis  Hyperlipidemia, unspecified hyperlipidemia type: Hyperlipidemia, unspecified hyperlipidemia type  Type 2 diabetes mellitus without complication, without long-term current use of insulin: Type 2 diabetes mellitus without complication, without long-term current use of insulin  Coronary artery disease involving native coronary artery of native heart, angina presence unspecified: Coronary artery disease involving native coronary artery of native heart, angina presence unspecified  Hyperkalemia: Hyperkalemia  Lactic acidosis: Lactic acidosis  Transaminitis: Transaminitis  Idiopathic pulmonary fibrosis: Idiopathic pulmonary fibrosis  Shortness of breath: Shortness of breath

## 2017-11-15 NOTE — PROGRESS NOTE ADULT - ASSESSMENT
77M h/o DM2, GERD, CABG (2016) and interstitial lung disease (UIP) on 3L home O2, p/w increasing REAGAN thought to be ILD flare. Pt developed fevers and hypotension likely related to sepsis. MICU was consulted for dyspnea and hypotension 2/2 sepsis in the setting of advanced ILD. Found to have PNA on 11/9 CT, difficult to wean off ventilator, CPAPing this AM.     #Neuro  -Sedated, on versed again this AM also with Precedex, weaning Versed.   -Responds to voice, withdraws to pain.     #Pulm  Acute on chronic respiratory failure(ILD) 2/2 sepsis/PNA   - Intubated 11/7 for hypox resp failure and increased WOB, Does not do well during spontaneous breathing trials. May benefit from diuresis before extubation.  - Doing well on pressure support trials, will extubate   - Continue with  meropenem, vancomycin.   -c/w Solumedrol. Continue to taper off steroids 10 q12h currently.   - c/w Duonebs q6.   - Given septic picture holding pts imuran for now     #Cardio  Hypotension 2/2 Sepsis  - Pt responded well to IVF and has good LV systolic function  - Weaned off pressors.   - Afib/aflutter: bradycardia resolved now off Precedex   - Continue with digoxin. Avoid amio in the setting of ILD.    #GI  - Continue with feeds  Colitis: improving, lactate resolving. Continue with bowel regimen  Transaminitis: stable, likely sepsis mediated   GERD:  - c/w Protonix    #Renal  CHRISTIANE likely prerenal in the setting of sepsis  -resolved SCr 0.80.  - Losartan D/c'd   -avoid nephrotoxins    #ID  Sepsis  - Repeat blood cx NGTD. CNS may be contaminant, awaiting sensitivities Restarted on vancomycin (day 4). 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 Now better controlled on NPH. ISS changed to moderate. Continue to monitor and adjust as necessary     #Heme  Thrombocytopenia: likely 2/2 to Zosyn, medication-induced, less likely HIT. HIT panel negative. Continue to trend platelets.   -Resolved.   Neutropenia   - Improving, 2/2 sepsis vs medication induced. Continue to trend    #DVT ppx: [facility]    Chay Busby  MICU resident PGY-2  [id number], [id number] 77M h/o DM2, GERD, CABG (2016) and interstitial lung disease (UIP) on 3L home O2, p/w increasing REAGAN thought to be ILD flare. Pt developed fevers and hypotension likely related to sepsis. MICU was consulted for dyspnea and hypotension 2/2 sepsis in the setting of advanced ILD. Found to have PNA on 11/9 CT s/p extubation 11/14     #Neuro  d.   -Responds to voice, withdraws to pain.     #Pulm  Acute on chronic respiratory failure(ILD) 2/2 sepsis/PNA   - Intubated 11/7 for hypox resp failure and increased WOB, Extubated yesterday, on bipap overnight with improvement in respiratory distress  - Continue with  meropenem day 8. 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  -c/w Solumedrol. Continue to taper off steroids 10 q12h currently.   - c/w Duonebs q6.   - Given septic picture holding pts imuran for now     #Cardio  Hypotension 2/2 Sepsis  - Pt responded well to IVF and has good LV systolic function  - Weaned off pressors.   - Afib/aflutter: bradycardia resolved now off Precedex   - Continue with digoxin. Avoid amio in the setting of ILD.    #GI  -NPO  Colitis: improving, lactate resolving. Continue with bowel regimen  Transaminitis: stable, likely sepsis mediated   GERD:  - c/w Protonix    #Renal  CHRISTIANE likely prerenal in the setting of sepsis  -resolved SCr 0.80.  - Losartan D/c'd   -avoid nephrotoxins    #ID  Sepsis  - Repeat blood cx NGTD. CNS may be contaminant, vancomycin discontinued. Bcx 11/7 growing Pseudomonas gram (-) rods; sensitive to meropenem (day 8). Sputum cx growing CRE, dosed an additional gentamicin 400mg (was dosed amikacin and gentamicin previously this admission  -stop vancomycin  -Repeat sputum cx growing   -completed Azithromycin 11/7 (day 7)   -bacteremia 2/2 PNA vs gut translocation      #Endo  DM2  - elevated FS likely 2/2 to steroids. NPO, NPH held. Continue with ISS. Continue to monitor and adjust as necessary     #Heme  Thrombocytopenia: likely 2/2 to Zosyn, medication-induced, less likely HIT. HIT panel negative. Continue to trend platelets.   -Resolved.   Neutropenia   - Improving, 2/2 sepsis vs medication induced. Continue to trend    #DVT ppx: [facility]

## 2017-11-15 NOTE — PROGRESS NOTE ADULT - ASSESSMENT
77M with ILD on home O2, DM, GERd, admitted with dyspnea and treated for exacerbation of chronic lung disease with steroids and immunesuppression  complicated hospital course with septic shock, MICU stay requiring intubation and blood pressure support secondary to pseudomonas bacteremia / pneumonia  Now extubated.    11/7 blood cx with sensitive pseudomonas   11/7 sputum cx sensitive pseudonamas  11/9 blood cx cns likely contaminant  11/11 blood cx cleared bacteremia  11/12 sputum cx resistant pseudomonas sensitive to aminoglycoside        Antimicrobials:  zosyn 11/7-11/9  vancomycin 11/7-11/9   meropenem 11/8----     gentamicin 11/8, 11/14  amikacin 11/10

## 2017-11-15 NOTE — PROGRESS NOTE ADULT - ASSESSMENT
Acute on chronic hypoxemic respiratory failure  Progressive ILD: unlikely UIP, poss chronic hypersens/NSIP  Psudomonoas bacteremia with pneumonia  Septic Shock - clinically resolved    REC    Continue abx for pseudomonas, adjust per sensitivties  BIPAP prn for work of breathing/fatigue  Continue solumedrol 10 q 12  Negative balance as tolerated

## 2017-11-15 NOTE — PROGRESS NOTE ADULT - ATTENDING COMMENTS
1.Pulmonary Resp. failure due to pseudomonas pneumonia resolved.  Pt tolerating extubation. ID for ? need to change abx  after sputum cx resistant to meropenem. Pt had previously been sensitive to Meropenem. Base line intersitial lung disease. Continue 20 mg steroids daily.  2. Speech evaluation for dysphagia.

## 2017-11-15 NOTE — PHYSICAL THERAPY INITIAL EVALUATION ADULT - DIAGNOSIS, PT EVAL
decreased strength, functional mobility due to decreased resp capacity
decreased strength, functional mobility due to decreased resp capacity

## 2017-11-15 NOTE — PROGRESS NOTE ADULT - ASSESSMENT
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 CT chest shows bilateral IPF  treated for Pulmonary fibrosis / Intersitial pulmonary dz exacerbation.  pt developed ARF intubated transferred to MICU w/ acute hypoxic respiratory failure w/ septic shock 2/2 to pseudomonal bacteremia.   pt extubated.      -Septic Shock / Pseudomonas bacteremia improved today c/w iv abx shock resolving .      -Idiopathic pulmonary fibrosis / Acute hypoxic expiratory failure - extubated repeat CXR c/w steroids, imuran on hold due to septic shock       -Type 2 diabetes mellitus c/w insulin tx     -DVT ppx

## 2017-11-15 NOTE — PHYSICAL THERAPY INITIAL EVALUATION ADULT - BED MOBILITY LIMITATIONS, REHAB EVAL
decreased ability to use arms for pushing/pulling/impaired ability to control trunk for mobility/decreased ability to use legs for bridging/pushing
decreased ability to use arms for pushing/pulling/decreased ability to use legs for bridging/pushing/impaired ability to control trunk for mobility

## 2017-11-15 NOTE — PHYSICAL THERAPY INITIAL EVALUATION ADULT - PLANNED THERAPY INTERVENTIONS, PT EVAL
balance training/bed mobility training/strengthening/gait training/transfer training
stair negotiation/gait training/strengthening/transfer training/balance training/bed mobility training

## 2017-11-15 NOTE — PROGRESS NOTE ADULT - SUBJECTIVE AND OBJECTIVE BOX
Follow-up Pulm Progress Note  Amauri Jackson MD  [phone]    Events noted  Remains Extubated  Awake and responsive: without dyspnea: wants to eat  AFebrile on Meropenum  Breathing comfortably on nasal oxygen  Off vasopressors    Vital Signs Last 24 Hrs  T(C): 37.1 (15 Nov 2017 11:00), Max: 37.1 (15 Nov 2017 11:00)  T(F): 98.7 (15 Nov 2017 11:00), Max: 98.7 (15 Nov 2017 11:00)  HR: 104 (15 Nov 2017 12:00) (76 - 117)  BP: 163/86 (15 Nov 2017 11:00) (111/57 - 167/97)  BP(mean): 116 (15 Nov 2017 11:00) (75 - 123)  RR: 44 (15 Nov 2017 12:00) (11 - 48)  SpO2: 96% (15 Nov 2017 12:00) (93% - 100%)    ABG - ( 15 Nov 2017 02:40 )  pH: 7.50  /  pCO2: 33    /  pO2: 106   / HCO3: 26    / Base Excess: 2.9   /  SaO2: 99                           9.3    8.4   )-----------( 239      ( 15 Nov 2017 02:43 )             26.8     11-15    142  |  107  |  35<H>  ----------------------------<  143<H>  5.2   |  22  |  0.93    Ca    8.5      15 Nov 2017 02:43  Phos  3.2     11-15  Mg     2.1     11-15    TPro  5.4<L>  /  Alb  2.4<L>  /  TBili  1.5<H>  /  DBili  x   /  AST  99<H>  /  ALT  188<H>  /  AlkPhos  373<H>  11-15    CXR: unchanged reticular opacities: no gross new pulm edema  TTE: Mild AS, hyperdynamic LV, PA 40-50  Physical Examination:  PULM: Bilateral crackles   CVS: Regular rate and rhythm, no murmurs, rubs, or gallops  ABD: Soft, non-tender  EXT:  1-2+edema (presacral)    RADIOLOGY REVIEWED  CXR: No change in bilateral reticular opacities    CT chest: see above    TTE:

## 2017-11-15 NOTE — PHYSICAL THERAPY INITIAL EVALUATION ADULT - GENERAL OBSERVATIONS, REHAB EVAL
Pt received supine in bed, A&Ox3, agreeable to PT re-eval, haja 30 min. +ICU monitoring, +O2 Via NC.

## 2017-11-15 NOTE — PHYSICAL THERAPY INITIAL EVALUATION ADULT - ADDITIONAL COMMENTS
lives w/wife in private home 3 steps to enter w/ handrail, flight to bedroom and was recently since August using 3 liters nasal cannula

## 2017-11-15 NOTE — PHYSICAL THERAPY INITIAL EVALUATION ADULT - IMPAIRMENTS FOUND, PT EVAL
ergonomics and body mechanics/gait, locomotion, and balance/aerobic capacity/endurance/muscle strength/decreased midline orientation
aerobic capacity/endurance/gait, locomotion, and balance/muscle strength

## 2017-11-15 NOTE — PROGRESS NOTE ADULT - SUBJECTIVE AND OBJECTIVE BOX
[facility], Division of Infectious Diseases  LOGAN Chavarria A. Lee  [phone]  Name: MADDIE MALLOY  Age: 77y  Gender: Male  MRN: [id number]    Interval History--  Notes reviewed  77M h/o DM2, GERD, CABG (2016) and interstitial lung disease (UIP) on 3L home O2, p/w increasing REAGAN on 11/1/17. The patient was started on treatment for a suspected ILD flare w/ solumedrol 30 q12. He was also continued on home doses of Azathioprine. The patient is on chronic steroids at home.  There was some concern for fluid overload, so TTE was done which showed no signs of systolic heart failure. CT chest was done and showed no signs of PNA. on 11/7 pt started to develop worsening hypoxia and increased WOB. BiPAP was started. ABG was done which showed Respiratory alkalosis. About 4 hours later RRT was called for hypotension to the 70s. Pt was found to be febrile and was having rigors. Blood cultures, ABG, and labs were done. Pt was noted to be having diarrhea as well. Cdiff PCR was sent. Pt was started on IVF, zosyn, Vanc, azithro, and PO vanc. BP improved to 110s. Pts lactate on labs was elevated from prior labs, and increased WOB was thought to be related to sepsis/lactate. Pt was also given 100 mg of IV hydrocortisone. After meds were administered pts mental status, resp status, and hemodynamics improved. Pt was upgraded to the MICU for acute on chronic hypoxemic respiratory failure and septic shock, and intubated 11/7 and started on vasopressors.      Bcx 11/7 grew Psuedomonas gram (-) rods, gave one time dose gentamycin on top of current broad spectrum Abx. Went up on levophed and down on shea overnight.. Near max on levo now.  NOw pt extubated 11/14.  Pt off pressors and maintaining a blood pressure.  States he is comfortable without complaints.  Wife at bedside states has minimal cough. She is concerned about his weakness.      Past Medical History--  Asbestos exposure  Kidney stones  Coronary artery disease involving native coronary artery of native heart, angina presence unspecified  Cataract  Rotator cuff rupture, right  GERD (gastroesophageal reflux disease)  Hyperlipemia  Diabetes mellitus  Encounter for removal of ureteral stent  S/P CABG (coronary artery bypass graft)  S/P rotator cuff surgery  S/P lens implant  S/P appendectomy      For details regarding the patient's social history, family history, and other miscellaneous elements, please refer the initial infectious diseases consultation and/or the admitting history and physical examination for this admission.    Allergies    No Known Allergies    Intolerances        Medications--  Antibiotics:  meropenem IVPB 1000 milliGRAM(s) IV Intermittent every 8 hours    Immunologic:  influenza   Vaccine 0.5 milliLiter(s) IntraMuscular once    Other:  aspirin  chewable  digoxin  Injectable  heparin  Injectable  insulin lispro (HumaLOG) corrective regimen sliding scale  levalbuterol Inhalation  methylPREDNISolone sodium succinate Injectable  tiotropium 18 MICROgram(s) Capsule      Review of Systems--  A 10-point review of systems was obtained.     Pertinent positives and negatives--  Constitutional: No fevers. No Chills. No Rigors.   Cardiovascular: No chest pain. No palpitations.  Respiratory: No shortness of breath. + cough.  Gastrointestinal: No nausea or vomiting. No diarrhea or constipation.   Psychiatric: Pno anxiety    Review of systems otherwise negative except as previously noted.    Physical Examination--  Vital Signs: T(F): 98.1 (11-15-17 @ 15:00), Max: 98.7 (11-15-17 @ 11:00)  HR: 77 (11-15-17 @ 17:00)  BP: 136/70 (11-15-17 @ 17:00)  RR: 35 (11-15-17 @ 17:00)  SpO2: 98% (11-15-17 @ 17:00)  Wt(kg): --  General: Nontoxic-appearing Male in no acute distress.  HEENT: AT/NC.  Anicteric. Conjunctiva pink and moist. bottom lip ecchymosis  Neck: Not rigid. No sense of mass.  Nodes: None palpable.  Lungs: occasional  rhonchi  Heart: Regular rate and rhythm. No Murmur. No rub. No gallop. No palpable thrill.  Abdomen: Bowel sounds present and normoactive. Soft. Nondistended. Nontender. ecchymosis, nt  Extremities: No cyanosis or clubbing. ++ edema.   Skin: Warm. Dry. Good turgor. No rash. No vasculitic stigmata.  Psychiatric: Appropriate affect and mood for situation.         Laboratory Studies--  CBC                        9.3    8.4   )-----------( 239      ( 15 Nov 2017 02:43 )             26.8       Chemistries  11-15    142  |  107  |  35<H>  ----------------------------<  143<H>  5.2   |  22  |  0.93    Ca    8.5      15 Nov 2017 02:43  Phos  3.2     11-15  Mg     2.1     11-15    TPro  5.4<L>  /  Alb  2.4<L>  /  TBili  1.5<H>  /  DBili  x   /  AST  99<H>  /  ALT  188<H>  /  AlkPhos  373<H>  11-15      Culture Data    Culture - Sputum . (11.12.17 @ 12:32)    Gram Stain:   Numerous polymorphonuclear leukocytes per low power field  Rare Squamous epithelial cells per low power field  Numerous Gram Negative Rods per oil power field  Rare Yeast like cells per oil power field    -  Amikacin: S <=8    -  Aztreonam: R >16    -  Cefepime: R >16    -  Ceftazidime: R >16    -  Ciprofloxacin: I 2    -  Gentamicin: S 4    -  Levofloxacin: R >4    -  Imipenem: R >8    -  Meropenem: R >8    -  Piperacillin/Tazobactam: R >64    -  Tobramycin: R >8    Specimen Source: .Sputum Sputum    Culture Results:   Moderate Pseudomonas aeruginosa (Carbapenem Resistant)  No routine respiratory claus Isolated    Organism Identification: Pseudomonas aeruginosa (Carbapenem Resistant)    Organism: Pseudomonas aeruginosa (Carbapenem Resistant)    Method Type: PAUL            Culture - Blood in AM (11.11.17 @ 08:23)    Specimen Source: .Blood Blood-Peripheral    Culture Results:   No growth to date.          Culture - Blood in AM (11.09.17 @ 09:12)    -  Coagulase negative Staphylococcus: Detec    Gram Stain:   Growth in aerobic bottle: Gram Positive Cocci in Clusters    Specimen Source: .Blood Blood-Peripheral    Organism: Blood Culture PCR    Culture Results:   Growth in aerobic bottle: Coag Negative Staphylococcus  Single set isolate, possible contaminant. Contact  Microbiology if susceptibility testing clinically  indicated.  ***Blood Panel PCR results on this specimen are available  approximately 3 hours after the Gram stain result.***  Gram stain, PCR, and/or culture results may not always  correspond due to difference in methodologies.  ************************************************************  This PCR assay was performed using pickrset.  The following targets are tested for: Enterococcus,  vancomycin resistant enterococci, Listeria monocytogenes,  coagulase negative staphylococci, S. aureus,  methicillin resistant S. aureus, Streptococcus agalactiae  (Group B), S. pneumoniae, S.pyogenes (Group A),  Acinetobacter baumannii, Enterobacter cloacae, E. coli,  Klebsiella oxytoca, K. pneumoniae, Proteus sp.,  Serratia marcescens, Haemophilus influenzae,  Neisseria meningitidis, Pseudomonas aeruginosa, Candida  albicans, C. glabrata, C krusei, C parapsilosis,  C. tropicalis and the KPC resistance gene.    Organism Identification: Blood Culture PCR    Method Type: PCR        Culture - Blood (11.07.17 @ 08:47)    -  Pseudomonas aeruginosa: Detec    -  Meropenem: S <=1    -  Levofloxacin: S <=1    -  Piperacillin/Tazobactam: S <=8    -  Tobramycin: S <=2    Gram Stain:   Growth in aerobic bottle: Gram Negative Rods    -  Aztreonam: S <=4    -  Amikacin: S <=8    -  Cefepime: S <=2    -  Ceftazidime: S <=1    -  Ciprofloxacin: S <=0.5    -  Gentamicin: S 4    -  Imipenem: S <=1    Specimen Source: .Blood Blood-Venous    Organism: Blood Culture PCR    Organism: Pseudomonas aeruginosa    Culture Results:   Growth in aerobic bottle: Pseudomonas aeruginosa  ***Blood Panel PCR results on this specimen are available  approximately 3 hours after the Gram stain result.***  Gram stain, PCR, and/or culture results may not always  correspond due to differencein methodologies.  ************************************************************  This PCR assay was performed using pickrset.  The following targets are tested for: Enterococcus,  vancomycin resistant enterococci, Listeria monocytogenes,  coagulase negative staphylococci, S. aureus,  methicillin resistant S. aureus, Streptococcus agalactiae  (Group B), S. pneumoniae, S. pyogenes (Group A),  Acinetobacter baumannii, Enterobacter cloacae, E. coli,  Klebsiella oxytoca, K. pneumoniae, Proteus sp.,  Serratia marcescens, Haemophilus influenzae,  Neisseria meningitidis, Pseudomonas aeruginosa, Candida  albicans, C. glabrata, C krusei, C parapsilosis,  C. tropicalis and the KPC resistance gene.    Organism Identification: Blood Culture PCR  Pseudomonas aeruginosa    Method Type: PCR    Method Type: PAUL        < from: CT Chest w/ IV Cont (11.09.17 @ 20:06) >  EXAM:  CT CHEST IC                          EXAM:  CT ABDOMEN AND PELVIS OC IC                            PROCEDURE DATE:  11/09/2017            INTERPRETATION:  CLINICAL INFORMATION: Increasing lactate. Evaluate for   ischemic colitis. Fever. Evaluate for pneumonia. Interstitial lung   disease.    COMPARISON: CT CHEST ABDOMEN PELVIS 10/31/2017    PROCEDURE:   CT of the Chest, Abdomen and Pelvis was performed with intravenous   contrast.   Intravenous contrast: 90 ml Omnipaque 350. 10 ml discarded.  Oral contrast: positive contrast was administered.  Sagittal and coronal reformats were performed.    FINDINGS:    CHEST:     LUNGS AND LARGE AIRWAYS: Bilateral lower lobe consolidations, new since   10/31/2017. Unchanged diffuse groundglass opacities, traction   bronchiectasis, architectural distortion, and interlobular septal   thickening consistent with interstitial lung disease. The endotracheal   tube terminates above the jenny.  PLEURA: No pleural effusions.  VESSELS:  Atheroscleroticchange of the aorta.  HEART: Heart size is mildly enlarged. No pericardial effusion. Coronary   artery calcifications.  MEDIASTINUM AND BRANDON: Unchanged subcentimeter paratracheal, AP window,   and bilateral perihilar lymph nodes.  CHEST WALL AND LOWER NECK: Within normal limits.    ABDOMEN AND PELVIS:    LIVER: Within normal limits.  BILE DUCTS: Normal caliber.  GALLBLADDER: Within normal limits.  SPLEEN: Within normal limits.  PANCREAS: Within normal limits.  ADRENALS: Within normal limits.  KIDNEYS/URETERS: Unchanged right renal malrotation with hydronephrosis to   the level of the ureteropelvic junction. Nonobstructing 7 mm right lower   renal pole calculus. Unchanged right renal cysts and subcentimeter left   parapelvic renal hypodensity,too small to characterize. No   hydronephrosis of the left kidney. Mild malrotation of the left kidney is   also noted.    BLADDER: Collapsed around a Villagran catheter.  REPRODUCTIVE ORGANS: The prostate is within normal limits.    BOWEL: Mild colonic wall thickening versus underdistention, particularly   involving the rectosigmoid colon. No obstruction. Appendix is not   visualized.  PERITONEUM: Small to moderate volume perihepatic and pelvic ascites.  VESSELS:  Atherosclerotic change of the aortaand branches. Mild   aneurysmal dilatation of the infrarenal abdominal aorta to 2.7 cm.   Accessory renal arteries bilaterally.  RETROPERITONEUM: No lymphadenopathy.    ABDOMINAL WALL: Bilateral fat-containing inguinal hernias.  BONES: Mild degenerative spinal changes. Status post sternotomy.    IMPRESSION:   Mild wall thickening versus underdistention of the colon, particularly   involving the rectosigmoid colon, raising consideration of a colitis.   Colonic diverticulosis.     Small to moderatevolume ascites, new since 10/31/2017.    Parenchymal consolidation predominantly in the lower lobes bilaterally,   new since 10/31/2017, concerning for pneumonia.          < end of copied text >      < from: US Abdomen Complete (11.15.17 @ 15:16) >  EXAM:  US ABDOMEN COMPLETE                            PROCEDURE DATE:  11/15/2017            INTERPRETATION:  CLINICAL INFORMATION: Bacteremia, transaminitis.    COMPARISON: CT chest abdomen pelvis dated 9 November.    TECHNIQUE: Sonography of the abdomen. Limited study due to patient's   inability to cooperate.    FINDINGS:    Liver: Within normal limits.    Bile ducts: Normal caliber. Common bile duct measures 2 mm.     Gallbladder: Within normal limits.        Pancreas: Limited visualization.    Spleen: 8 cm. Within normal limits.    Right kidney: 13.7 cm. No hydronephrosis. Several cortical cysts   measuring up to 3.8 cm at the lower pole. Nonobstructive lower pole   calculus.    Left kidney: 12.4 cm.  No hydronephrosis. Multiple small cortical cysts   measuring up to 1.9 cm at the upper pole.    Ascites: Mild perihepatic fluid.    Aorta and IVC: Atheromatous aorta. IVC patent.    IMPRESSION:     Limited study. No evidence of gallbladder or biliary disease.      < end of copied text >

## 2017-11-16 LAB
ALBUMIN SERPL ELPH-MCNC: 2.6 G/DL — LOW (ref 3.3–5)
ALP SERPL-CCNC: 449 U/L — HIGH (ref 40–120)
ALT FLD-CCNC: 206 U/L RC — HIGH (ref 10–45)
ANION GAP SERPL CALC-SCNC: 10 MMOL/L — SIGNIFICANT CHANGE UP (ref 5–17)
ANION GAP SERPL CALC-SCNC: 12 MMOL/L — SIGNIFICANT CHANGE UP (ref 5–17)
ANION GAP SERPL CALC-SCNC: 12 MMOL/L — SIGNIFICANT CHANGE UP (ref 5–17)
APTT BLD: 31 SEC — SIGNIFICANT CHANGE UP (ref 27.5–37.4)
AST SERPL-CCNC: 129 U/L — HIGH (ref 10–40)
BILIRUB SERPL-MCNC: 1.4 MG/DL — HIGH (ref 0.2–1.2)
BUN SERPL-MCNC: 37 MG/DL — HIGH (ref 7–23)
BUN SERPL-MCNC: 37 MG/DL — HIGH (ref 7–23)
BUN SERPL-MCNC: 40 MG/DL — HIGH (ref 7–23)
CALCIUM SERPL-MCNC: 8.4 MG/DL — SIGNIFICANT CHANGE UP (ref 8.4–10.5)
CALCIUM SERPL-MCNC: 8.5 MG/DL — SIGNIFICANT CHANGE UP (ref 8.4–10.5)
CALCIUM SERPL-MCNC: 8.6 MG/DL — SIGNIFICANT CHANGE UP (ref 8.4–10.5)
CHLORIDE SERPL-SCNC: 109 MMOL/L — HIGH (ref 96–108)
CHLORIDE SERPL-SCNC: 109 MMOL/L — HIGH (ref 96–108)
CHLORIDE SERPL-SCNC: 110 MMOL/L — HIGH (ref 96–108)
CO2 SERPL-SCNC: 24 MMOL/L — SIGNIFICANT CHANGE UP (ref 22–31)
CO2 SERPL-SCNC: 26 MMOL/L — SIGNIFICANT CHANGE UP (ref 22–31)
CO2 SERPL-SCNC: 26 MMOL/L — SIGNIFICANT CHANGE UP (ref 22–31)
CREAT SERPL-MCNC: 1.07 MG/DL — SIGNIFICANT CHANGE UP (ref 0.5–1.3)
CREAT SERPL-MCNC: 1.07 MG/DL — SIGNIFICANT CHANGE UP (ref 0.5–1.3)
CREAT SERPL-MCNC: 1.12 MG/DL — SIGNIFICANT CHANGE UP (ref 0.5–1.3)
CULTURE RESULTS: SIGNIFICANT CHANGE UP
CULTURE RESULTS: SIGNIFICANT CHANGE UP
DIGOXIN SERPL-MCNC: 0.9 NG/ML — SIGNIFICANT CHANGE UP (ref 0.8–2)
GLUCOSE BLDC GLUCOMTR-MCNC: 114 MG/DL — HIGH (ref 70–99)
GLUCOSE BLDC GLUCOMTR-MCNC: 147 MG/DL — HIGH (ref 70–99)
GLUCOSE SERPL-MCNC: 102 MG/DL — HIGH (ref 70–99)
GLUCOSE SERPL-MCNC: 163 MG/DL — HIGH (ref 70–99)
GLUCOSE SERPL-MCNC: 235 MG/DL — HIGH (ref 70–99)
HCT VFR BLD CALC: 27.8 % — LOW (ref 39–50)
HGB BLD-MCNC: 9.3 G/DL — LOW (ref 13–17)
INR BLD: 0.99 RATIO — SIGNIFICANT CHANGE UP (ref 0.88–1.16)
MAGNESIUM SERPL-MCNC: 2.3 MG/DL — SIGNIFICANT CHANGE UP (ref 1.6–2.6)
MAGNESIUM SERPL-MCNC: 2.3 MG/DL — SIGNIFICANT CHANGE UP (ref 1.6–2.6)
MCHC RBC-ENTMCNC: 33.5 GM/DL — SIGNIFICANT CHANGE UP (ref 32–36)
MCHC RBC-ENTMCNC: 35.3 PG — HIGH (ref 27–34)
MCV RBC AUTO: 106 FL — HIGH (ref 80–100)
PHOSPHATE SERPL-MCNC: 3.5 MG/DL — SIGNIFICANT CHANGE UP (ref 2.5–4.5)
PHOSPHATE SERPL-MCNC: 4 MG/DL — SIGNIFICANT CHANGE UP (ref 2.5–4.5)
PLATELET # BLD AUTO: 288 K/UL — SIGNIFICANT CHANGE UP (ref 150–400)
POTASSIUM SERPL-MCNC: 5.1 MMOL/L — SIGNIFICANT CHANGE UP (ref 3.5–5.3)
POTASSIUM SERPL-MCNC: 5.1 MMOL/L — SIGNIFICANT CHANGE UP (ref 3.5–5.3)
POTASSIUM SERPL-MCNC: 5.3 MMOL/L — SIGNIFICANT CHANGE UP (ref 3.5–5.3)
POTASSIUM SERPL-SCNC: 5.1 MMOL/L — SIGNIFICANT CHANGE UP (ref 3.5–5.3)
POTASSIUM SERPL-SCNC: 5.1 MMOL/L — SIGNIFICANT CHANGE UP (ref 3.5–5.3)
POTASSIUM SERPL-SCNC: 5.3 MMOL/L — SIGNIFICANT CHANGE UP (ref 3.5–5.3)
PROT SERPL-MCNC: 5.8 G/DL — LOW (ref 6–8.3)
PROTHROM AB SERPL-ACNC: 10.7 SEC — SIGNIFICANT CHANGE UP (ref 9.8–12.7)
RBC # BLD: 2.63 M/UL — LOW (ref 4.2–5.8)
RBC # FLD: 19.8 % — HIGH (ref 10.3–14.5)
SODIUM SERPL-SCNC: 145 MMOL/L — SIGNIFICANT CHANGE UP (ref 135–145)
SODIUM SERPL-SCNC: 146 MMOL/L — HIGH (ref 135–145)
SODIUM SERPL-SCNC: 147 MMOL/L — HIGH (ref 135–145)
SPECIMEN SOURCE: SIGNIFICANT CHANGE UP
SPECIMEN SOURCE: SIGNIFICANT CHANGE UP
WBC # BLD: 7.8 K/UL — SIGNIFICANT CHANGE UP (ref 3.8–10.5)
WBC # FLD AUTO: 7.8 K/UL — SIGNIFICANT CHANGE UP (ref 3.8–10.5)

## 2017-11-16 PROCEDURE — 99232 SBSQ HOSP IP/OBS MODERATE 35: CPT

## 2017-11-16 PROCEDURE — 99291 CRITICAL CARE FIRST HOUR: CPT

## 2017-11-16 RX ORDER — ALBUMIN HUMAN 25 %
250 VIAL (ML) INTRAVENOUS ONCE
Qty: 0 | Refills: 0 | Status: COMPLETED | OUTPATIENT
Start: 2017-11-16 | End: 2017-11-16

## 2017-11-16 RX ORDER — SODIUM CHLORIDE 9 MG/ML
1000 INJECTION, SOLUTION INTRAVENOUS
Qty: 0 | Refills: 0 | Status: DISCONTINUED | OUTPATIENT
Start: 2017-11-16 | End: 2017-11-17

## 2017-11-16 RX ADMIN — MEROPENEM 100 MILLIGRAM(S): 1 INJECTION INTRAVENOUS at 16:51

## 2017-11-16 RX ADMIN — Medication 6: at 17:01

## 2017-11-16 RX ADMIN — Medication 125 MILLILITER(S): at 05:04

## 2017-11-16 RX ADMIN — SODIUM CHLORIDE 50 MILLILITER(S): 9 INJECTION, SOLUTION INTRAVENOUS at 21:18

## 2017-11-16 RX ADMIN — SODIUM CHLORIDE 250 MILLILITER(S): 9 INJECTION INTRAMUSCULAR; INTRAVENOUS; SUBCUTANEOUS at 00:00

## 2017-11-16 RX ADMIN — LEVALBUTEROL 0.63 MILLIGRAM(S): 1.25 SOLUTION, CONCENTRATE RESPIRATORY (INHALATION) at 23:36

## 2017-11-16 RX ADMIN — Medication 10 MILLIGRAM(S): at 05:04

## 2017-11-16 RX ADMIN — HEPARIN SODIUM 5000 UNIT(S): 5000 INJECTION INTRAVENOUS; SUBCUTANEOUS at 21:18

## 2017-11-16 RX ADMIN — LEVALBUTEROL 0.63 MILLIGRAM(S): 1.25 SOLUTION, CONCENTRATE RESPIRATORY (INHALATION) at 00:06

## 2017-11-16 RX ADMIN — Medication 0.12 MILLIGRAM(S): at 11:36

## 2017-11-16 RX ADMIN — MEROPENEM 100 MILLIGRAM(S): 1 INJECTION INTRAVENOUS at 21:18

## 2017-11-16 RX ADMIN — LEVALBUTEROL 0.63 MILLIGRAM(S): 1.25 SOLUTION, CONCENTRATE RESPIRATORY (INHALATION) at 05:35

## 2017-11-16 RX ADMIN — HEPARIN SODIUM 5000 UNIT(S): 5000 INJECTION INTRAVENOUS; SUBCUTANEOUS at 05:04

## 2017-11-16 RX ADMIN — MEROPENEM 200 MILLIGRAM(S): 1 INJECTION INTRAVENOUS at 05:05

## 2017-11-16 RX ADMIN — HEPARIN SODIUM 5000 UNIT(S): 5000 INJECTION INTRAVENOUS; SUBCUTANEOUS at 11:35

## 2017-11-16 RX ADMIN — Medication 125 MILLILITER(S): at 09:06

## 2017-11-16 RX ADMIN — LEVALBUTEROL 0.63 MILLIGRAM(S): 1.25 SOLUTION, CONCENTRATE RESPIRATORY (INHALATION) at 17:17

## 2017-11-16 NOTE — PROGRESS NOTE ADULT - SUBJECTIVE AND OBJECTIVE BOX
CHIEF COMPLAINT: acute on chronic hypoxic respiratory failure 2/2 PNA    Interval Events: Required bipap overnight, feeling better this morning. Hyperkalemic to 5.5, was treated with 1 amp of HCO3, 5 units of insulin, 25 g of D50 and 250 ml of NS.    REVIEW OF SYSTEMS:  Constitutional: [ ] negative [ ] fevers [ ] chills [ ] weight loss [ ] weight gain  HEENT: [ ] negative [ ] dry eyes [ ] eye irritation [ ] postnasal drip [ ] nasal congestion  CV: [ ] negative  [ ] chest pain [ ] orthopnea [ ] palpitations [ ] murmur  Resp: [ ] negative [ ] cough [ ] shortness of breath [ ] dyspnea [ ] wheezing [ ] sputum [ ] hemoptysis  GI: [ ] negative [ ] nausea [ ] vomiting [ ] diarrhea [ ] constipation [ ] abd pain [ ] dysphagia   : [ ] negative [ ] dysuria [ ] nocturia [ ] hematuria [ ] increased urinary frequency  Musculoskeletal: [ ] negative [ ] back pain [ ] myalgias [ ] arthralgias [ ] fracture  Skin: [ ] negative [ ] rash [ ] itch  Neurological: [ ] negative [ ] headache [ ] dizziness [ ] syncope [ ] weakness [ ] numbness  Psychiatric: [ ] negative [ ] anxiety [ ] depression  Endocrine: [ ] negative [ ] diabetes [ ] thyroid problem  Hematologic/Lymphatic: [ ] negative [ ] anemia [ ] bleeding problem  Allergic/Immunologic: [ ] negative [ ] itchy eyes [ ] nasal discharge [ ] hives [ ] angioedema  [ ] All other systems negative  [ ] Unable to assess ROS because ________    OBJECTIVE:  ICU Vital Signs Last 24 Hrs  T(C): 36.8 (16 Nov 2017 04:00), Max: 37.1 (15 Nov 2017 11:00)  T(F): 98.2 (16 Nov 2017 04:00), Max: 98.7 (15 Nov 2017 11:00)  HR: 76 (16 Nov 2017 06:00) (67 - 106)  BP: 140/69 (16 Nov 2017 06:00) (120/68 - 192/89)  BP(mean): 97 (16 Nov 2017 06:00) (87 - 135)  ABP: --  ABP(mean): --  RR: 35 (16 Nov 2017 06:00) (17 - 50)  SpO2: 100% (16 Nov 2017 06:00) (91% - 100%)        11-15 @ 07:01  -  11-16 @ 07:00  --------------------------------------------------------  IN: 700 mL / OUT: 550 mL / NET: 150 mL      CAPILLARY BLOOD GLUCOSE  114 (16 Nov 2017 06:00)      POCT Blood Glucose.: 114 mg/dL (16 Nov 2017 05:27)      PHYSICAL EXAM:  GENERAL: awake, alert in NAD  HEAD:  Atraumatic, Normocephalic  EYES: EOMI, PERRLA, conjunctiva and sclera clear  NECK: Supple, No JVD  CHEST/LUNG: b/l course BS  HEART: tachycardic; No murmurs, rubs, or gallops  ABDOMEN: Soft, Nontender, Nondistended; Bowel sounds present  EXTREMITIES:  2+ Peripheral Pulses, No clubbing, cyanosis, or edema  NEUROLOGY: non focal  SKIN: decub behind left ear    LINES:    HOSPITAL MEDICATIONS:  aspirin  chewable 81 milliGRAM(s) Oral daily  heparin  Injectable 5000 Unit(s) SubCutaneous every 8 hours    meropenem IVPB 1000 milliGRAM(s) IV Intermittent every 8 hours    digoxin  Injectable 0.125 milliGRAM(s) IV Push daily    insulin lispro (HumaLOG) corrective regimen sliding scale   SubCutaneous every 6 hours  methylPREDNISolone sodium succinate Injectable 10 milliGRAM(s) IV Push every 12 hours    levalbuterol Inhalation 0.63 milliGRAM(s) Inhalation every 6 hours  tiotropium 18 MICROgram(s) Capsule 1 Capsule(s) Inhalation daily              influenza   Vaccine 0.5 milliLiter(s) IntraMuscular once          LABS:                        9.3    7.8   )-----------( 288      ( 16 Nov 2017 02:34 )             27.8     Hgb Trend: 9.3<--, 9.3<--, 8.4<--, 8.4<--, 8.9<--  11-16    145  |  109<H>  |  37<H>  ----------------------------<  102<H>  5.1   |  26  |  1.07    Ca    8.6      16 Nov 2017 02:34  Phos  3.5     11-16  Mg     2.3     11-16    TPro  5.8<L>  /  Alb  2.6<L>  /  TBili  1.4<H>  /  DBili  x   /  AST  129<H>  /  ALT  206<H>  /  AlkPhos  449<H>  11-16    Creatinine Trend: 1.07<--, 1.02<--, 0.93<--, 0.80<--, 0.80<--, 0.92<--  PT/INR - ( 16 Nov 2017 02:34 )   PT: 10.7 sec;   INR: 0.99 ratio         PTT - ( 16 Nov 2017 02:34 )  PTT:31.0 sec    Arterial Blood Gas:  11-15 @ 02:40  7.50/33/106/26/99/2.9  ABG lactate: --  Arterial Blood Gas:  11-14 @ 14:33  7.49/33/74/25/95/2.3  ABG lactate: --  Arterial Blood Gas:  11-14 @ 10:24  7.48/37/103/27/98/3.4  ABG lactate: --    Venous Blood Gas:  11-15 @ 20:55  7.43/41/43/27/74  VBG Lactate: 1.1      MICROBIOLOGY:     Culture - Sputum . (11.12.17 @ 12:32)    Gram Stain:   Numerous polymorphonuclear leukocytes per low power field  Rare Squamous epithelial cells per low power field  Numerous Gram Negative Rods per oil power field  Rare Yeast like cells per oil power field    Specimen Source: .Sputum Sputum    Culture Results:   Moderate Pseudomonas aeruginosa  No routine respiratory claus Isolated    Culture - Blood in AM (11.11.17 @ 08:23)    Specimen Source: .Blood Blood-Venous    Culture Results:   No growth to date.    Culture - Blood in AM (11.11.17 @ 08:23)    Specimen Source: .Blood Blood-Peripheral    Culture Results:   No growth to date.      Culture - Blood in AM (11.09.17 @ 09:12)    -  Coagulase negative Staphylococcus: Detec    Gram Stain:   Growth in aerobic bottle: Gram Positive Cocci in Clusters    Specimen Source: .Blood Blood-Peripheral    Organism: Blood Culture PCR    Culture Results:   Growth in aerobic bottle: Coag Negative Staphylococcus  Single set isolate, possible contaminant. Contact  Microbiology if susceptibility testing clinically  indicated.  ***Blood Panel PCR results on this specimen are available  approximately 3 hours after the Gram stain result.***  Gram stain, PCR, and/or culture results may not always  correspond due to difference in methodologies.  ************************************************************  This PCR assay was performed using [employer].  The following targets are tested for: Enterococcus,  vancomycin resistant enterococci, Listeria monocytogenes,  coagulase negative staphylococci, S. aureus,  methicillin resistant S. aureus, Streptococcus agalactiae  (Group B), S. pneumoniae, S.pyogenes (Group A),  Acinetobacter baumannii, Enterobacter cloacae, E. coli,  Klebsiella oxytoca, K. pneumoniae, Proteus sp.,  Serratia marcescens, Haemophilus influenzae,  Neisseria meningitidis, Pseudomonas aeruginosa, Candida  albicans, C. glabrata, C krusei, C parapsilosis,  C. tropicalis and the KPC resistance gene.    Organism Identification: Blood Culture PCR    Method Type: PCR CHIEF COMPLAINT: acute on chronic hypoxic respiratory failure 2/2 PNA    Interval Events: Required bipap overnight, some WOB this morning so put back on bipap. Hyperkalemic to 5.5, was treated with 1 amp of HCO3, 5 units of insulin, 25 g of D50 and 250 ml of NS.    REVIEW OF SYSTEMS:  Constitutional: [X ] negative [ ] fevers [ ] chills [ ] weight loss [ ] weight gain  HEENT: [x ] negative [ ] dry eyes [ ] eye irritation [ ] postnasal drip [ ] nasal congestion  CV: [ x] negative  [ ] chest pain [ ] orthopnea [ ] palpitations [ ] murmur  Resp: [ ] negative [x ] cough [x ] shortness of breath [ ] dyspnea [ ] wheezing [ ] sputum [ ] hemoptysis  GI: [X ] negative [ ] nausea [ ] vomiting [ ] diarrhea [ ] constipation [ ] abd pain [ ] dysphagia   : [X ] negative [ ] dysuria [ ] nocturia [ ] hematuria [ ] increased urinary frequency  Musculoskeletal: [ ] negative [ ] back pain [ ] myalgias [ ] arthralgias [ ] fracture  Skin: [ ] negative [ ] rash [ ] itch  Neurological: [ ] negative [ ] headache [ ] dizziness [ ] syncope [ ] weakness [ ] numbness  Psychiatric: [ ] negative [ ] anxiety [ ] depression  Endocrine: [ ] negative [ ] diabetes [ ] thyroid problem  Hematologic/Lymphatic: [ ] negative [ ] anemia [ ] bleeding problem  Allergic/Immunologic: [ ] negative [ ] itchy eyes [ ] nasal discharge [ ] hives [ ] angioedema  [ ] All other systems negative  [ ] Unable to assess ROS because ________    OBJECTIVE:  ICU Vital Signs Last 24 Hrs  T(C): 36.8 (16 Nov 2017 04:00), Max: 37.1 (15 Nov 2017 11:00)  T(F): 98.2 (16 Nov 2017 04:00), Max: 98.7 (15 Nov 2017 11:00)  HR: 76 (16 Nov 2017 06:00) (67 - 106)  BP: 140/69 (16 Nov 2017 06:00) (120/68 - 192/89)  BP(mean): 97 (16 Nov 2017 06:00) (87 - 135)  ABP: --  ABP(mean): --  RR: 35 (16 Nov 2017 06:00) (17 - 50)  SpO2: 100% (16 Nov 2017 06:00) (91% - 100%)        11-15 @ 07:01  -  11-16 @ 07:00  --------------------------------------------------------  IN: 700 mL / OUT: 550 mL / NET: 150 mL      CAPILLARY BLOOD GLUCOSE  114 (16 Nov 2017 06:00)      POCT Blood Glucose.: 114 mg/dL (16 Nov 2017 05:27)      PHYSICAL EXAM:  GENERAL: awake, alert in NAD  HEAD:  Atraumatic, Normocephalic  EYES: EOMI, PERRLA, conjunctiva and sclera clear  NECK: Supple, No JVD  CHEST/LUNG: b/l course BS  HEART: tachycardic; No murmurs, rubs, or gallops  ABDOMEN: Soft, Nontender, Nondistended; Bowel sounds present  EXTREMITIES:  2+ Peripheral Pulses, No clubbing, cyanosis, or edema  NEUROLOGY: non focal  SKIN: decub behind left ear    LINES:    HOSPITAL MEDICATIONS:  aspirin  chewable 81 milliGRAM(s) Oral daily  heparin  Injectable 5000 Unit(s) SubCutaneous every 8 hours    meropenem IVPB 1000 milliGRAM(s) IV Intermittent every 8 hours    digoxin  Injectable 0.125 milliGRAM(s) IV Push daily    insulin lispro (HumaLOG) corrective regimen sliding scale   SubCutaneous every 6 hours  methylPREDNISolone sodium succinate Injectable 10 milliGRAM(s) IV Push every 12 hours    levalbuterol Inhalation 0.63 milliGRAM(s) Inhalation every 6 hours  tiotropium 18 MICROgram(s) Capsule 1 Capsule(s) Inhalation daily              influenza   Vaccine 0.5 milliLiter(s) IntraMuscular once          LABS:                        9.3    7.8   )-----------( 288      ( 16 Nov 2017 02:34 )             27.8     Hgb Trend: 9.3<--, 9.3<--, 8.4<--, 8.4<--, 8.9<--  11-16    145  |  109<H>  |  37<H>  ----------------------------<  102<H>  5.1   |  26  |  1.07    Ca    8.6      16 Nov 2017 02:34  Phos  3.5     11-16  Mg     2.3     11-16    TPro  5.8<L>  /  Alb  2.6<L>  /  TBili  1.4<H>  /  DBili  x   /  AST  129<H>  /  ALT  206<H>  /  AlkPhos  449<H>  11-16    Creatinine Trend: 1.07<--, 1.02<--, 0.93<--, 0.80<--, 0.80<--, 0.92<--  PT/INR - ( 16 Nov 2017 02:34 )   PT: 10.7 sec;   INR: 0.99 ratio         PTT - ( 16 Nov 2017 02:34 )  PTT:31.0 sec    Arterial Blood Gas:  11-15 @ 02:40  7.50/33/106/26/99/2.9  ABG lactate: --  Arterial Blood Gas:  11-14 @ 14:33  7.49/33/74/25/95/2.3  ABG lactate: --  Arterial Blood Gas:  11-14 @ 10:24  7.48/37/103/27/98/3.4  ABG lactate: --    Venous Blood Gas:  11-15 @ 20:55  7.43/41/43/27/74  VBG Lactate: 1.1      MICROBIOLOGY:     Culture - Sputum . (11.12.17 @ 12:32)    Gram Stain:   Numerous polymorphonuclear leukocytes per low power field  Rare Squamous epithelial cells per low power field  Numerous Gram Negative Rods per oil power field  Rare Yeast like cells per oil power field    Specimen Source: .Sputum Sputum    Culture Results:   Moderate Pseudomonas aeruginosa  No routine respiratory claus Isolated    Culture - Blood in AM (11.11.17 @ 08:23)    Specimen Source: .Blood Blood-Venous    Culture Results:   No growth to date.    Culture - Blood in AM (11.11.17 @ 08:23)    Specimen Source: .Blood Blood-Peripheral    Culture Results:   No growth to date.      Culture - Blood in AM (11.09.17 @ 09:12)    -  Coagulase negative Staphylococcus: Detec    Gram Stain:   Growth in aerobic bottle: Gram Positive Cocci in Clusters    Specimen Source: .Blood Blood-Peripheral    Organism: Blood Culture PCR    Culture Results:   Growth in aerobic bottle: Coag Negative Staphylococcus  Single set isolate, possible contaminant. Contact  Microbiology if susceptibility testing clinically  indicated.  ***Blood Panel PCR results on this specimen are available  approximately 3 hours after the Gram stain result.***  Gram stain, PCR, and/or culture results may not always  correspond due to difference in methodologies.  ************************************************************  This PCR assay was performed using [employer].  The following targets are tested for: Enterococcus,  vancomycin resistant enterococci, Listeria monocytogenes,  coagulase negative staphylococci, S. aureus,  methicillin resistant S. aureus, Streptococcus agalactiae  (Group B), S. pneumoniae, S.pyogenes (Group A),  Acinetobacter baumannii, Enterobacter cloacae, E. coli,  Klebsiella oxytoca, K. pneumoniae, Proteus sp.,  Serratia marcescens, Haemophilus influenzae,  Neisseria meningitidis, Pseudomonas aeruginosa, Candida  albicans, C. glabrata, C krusei, C parapsilosis,  C. tropicalis and the KPC resistance gene.    Organism Identification: Blood Culture PCR    Method Type: PCR

## 2017-11-16 NOTE — PROGRESS NOTE ADULT - ASSESSMENT
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 CT chest shows bilateral IPF  treated for Pulmonary fibrosis / Intersitial pulmonary dz exacerbation.  pt developed ARF intubated transferred to MICU w/ acute hypoxic respiratory failure w/ septic shock 2/2 to pseudomonal bacteremia.   pt extubated.      -Septic Shock / Pseudomonas bacteremia improved today c/w iv abx shock resolving, patient clinically improving, rest of care per MICU      -Idiopathic pulmonary fibrosis / Acute hypoxic expiratory failure - extubated repeat CXR c/w steroids, imuran on hold due to septic shock, pulmonary appreciated       -Type 2 diabetes mellitus c/w insulin tx     -DVT ppx

## 2017-11-16 NOTE — PROGRESS NOTE ADULT - SUBJECTIVE AND OBJECTIVE BOX
Follow-up Pulm Progress Note  Amauri Jackson MD  [phone]    Events noted  Remains Extubated  Breathing comfortably supine on nasal oxygen  AFebrile on Meropenum  Abu US negative for GB disease  Off vasopressors    Vital Signs Last 24 Hrs  T(C): 36.4 (16 Nov 2017 08:00), Max: 37 (16 Nov 2017 00:00)  T(F): 97.6 (16 Nov 2017 08:00), Max: 98.6 (16 Nov 2017 00:00)  HR: 60 (16 Nov 2017 12:16) (60 - 106)  BP: 140/78 (16 Nov 2017 09:00) (113/58 - 192/89)  BP(mean): 103 (16 Nov 2017 09:00) (80 - 135)  RR: 18 (16 Nov 2017 12:16) (17 - 57)  SpO2: 100% (16 Nov 2017 12:16) (91% - 100%)    ABG - ( 15 Nov 2017 02:40 )  pH: 7.50  /  pCO2: 33    /  pO2: 106   / HCO3: 26    / Base Excess: 2.9   /  SaO2: 99                            9.3    7.8   )-----------( 288      ( 16 Nov 2017 02:34 )             27.8   11-16    147<H>  |  109<H>  |  37<H>  ----------------------------<  163<H>  5.1   |  26  |  1.07    Ca    8.4      16 Nov 2017 08:29  Phos  3.5     11-16  Mg     2.3     11-16    TPro  5.8<L>  /  Alb  2.6<L>  /  TBili  1.4<H>  /  DBili  x   /  AST  129<H>  /  ALT  206<H>  /  AlkPhos  449<H>  11-16    CXR: unchanged reticular opacities: no gross new pulm edema  TTE: Mild AS, hyperdynamic LV, PA 40-50  Physical Examination:  PULM: Bilateral crackles   CVS: Regular rate and rhythm, no murmurs, rubs, or gallops  ABD: Soft, non-tender  EXT:  1-2+edema (presacral)    RADIOLOGY REVIEWED  CXR: No change in bilateral reticular opacities    CT chest: see above    TTE:

## 2017-11-16 NOTE — PROGRESS NOTE ADULT - ASSESSMENT
Acute on chronic hypoxemic respiratory failure: clinically resoliving post extubation  Progressive ILD: unlikely UIP, poss chronic hypersens/NSIP  Psudomonoas bacteremia with pneumonia  Septic Shock - clinically resolved    REC    Continue abx for pseudomonas  BIPAP prn for work of breathing/fatigue  Continue solumedrol 10 q day  Negative balance as tolerated

## 2017-11-16 NOTE — PROGRESS NOTE ADULT - SUBJECTIVE AND OBJECTIVE BOX
Patient is a 77y old  Male who presents with a chief complaint of SOB (04 Nov 2017 14:33)      SUBJECTIVE / OVERNIGHT EVENTS:    patient seen and examined at bedside  weak but extubated and feeling better  discussed at length with family at bedside    Vital Signs Last 24 Hrs  T(C): 36.8 (16 Nov 2017 20:00), Max: 37 (16 Nov 2017 00:00)  T(F): 98.3 (16 Nov 2017 20:00), Max: 98.6 (16 Nov 2017 00:00)  HR: 73 (16 Nov 2017 21:00) (60 - 101)  BP: 170/79 (16 Nov 2017 21:00) (113/58 - 192/84)  BP(mean): 114 (16 Nov 2017 21:00) (80 - 135)  RR: 18 (16 Nov 2017 21:00) (15 - 63)  SpO2: 100% (16 Nov 2017 21:00) (86% - 100%)  I&O's Summary    15 Nov 2017 07:01  -  16 Nov 2017 07:00  --------------------------------------------------------  IN: 700 mL / OUT: 550 mL / NET: 150 mL    16 Nov 2017 07:01  -  16 Nov 2017 22:05  --------------------------------------------------------  IN: 400 mL / OUT: 35 mL / NET: 365 mL    Physical Exam:   · Constitutional	detailed exam	  · Constitutional Details	pale weak	  · Eyes	EOMI; PERRL; no drainage or redness	  · Neck	No bruits; no thyromegaly or nodules	  · Respiratory	detailed exam	  · Respiratory Details	airway patent; good air movement	  · Cardiovascular	detailed exam	  · Cardiovascular Details	no rub	  · Cardiovascular Details	positive S1; positive S2	  · Gastrointestinal	Soft, non-tender, no hepatosplenomegaly, normal bowel sounds	  · Extremities	detailed exam	  · Extremities Details	no clubbing; no cyanosis	  · Neurological	detailed exam	  · Neurological Details	awake and alert today	        LABS:                        9.3    7.8   )-----------( 288      ( 16 Nov 2017 02:34 )             27.8     11-16    146<H>  |  110<H>  |  40<H>  ----------------------------<  235<H>  5.3   |  24  |  1.12    Ca    8.5      16 Nov 2017 17:01  Phos  4.0     11-16  Mg     2.3     11-16    TPro  5.8<L>  /  Alb  2.6<L>  /  TBili  1.4<H>  /  DBili  x   /  AST  129<H>  /  ALT  206<H>  /  AlkPhos  449<H>  11-16    PT/INR - ( 16 Nov 2017 02:34 )   PT: 10.7 sec;   INR: 0.99 ratio         PTT - ( 16 Nov 2017 02:34 )  PTT:31.0 sec  CAPILLARY BLOOD GLUCOSE  147 (16 Nov 2017 12:00)  114 (16 Nov 2017 06:00)  212 (15 Nov 2017 23:00)      POCT Blood Glucose.: 147 mg/dL (16 Nov 2017 12:15)  POCT Blood Glucose.: 114 mg/dL (16 Nov 2017 05:27)            RADIOLOGY & ADDITIONAL TESTS:    Imaging Personally Reviewed:  [x] YES  [ ] NO    Consultant(s) Notes Reviewed:  [x] YES  [ ] NO      MEDICATIONS  (STANDING):  aspirin  chewable 81 milliGRAM(s) Oral daily  digoxin  Injectable 0.125 milliGRAM(s) IV Push daily  heparin  Injectable 5000 Unit(s) SubCutaneous every 8 hours  influenza   Vaccine 0.5 milliLiter(s) IntraMuscular once  insulin lispro (HumaLOG) corrective regimen sliding scale   SubCutaneous every 6 hours  levalbuterol Inhalation 0.63 milliGRAM(s) Inhalation every 6 hours  meropenem IVPB 1000 milliGRAM(s) IV Intermittent every 8 hours  methylPREDNISolone sodium succinate Injectable 10 milliGRAM(s) IV Push daily  sodium chloride 0.45%. 1000 milliLiter(s) (50 mL/Hr) IV Continuous <Continuous>  tiotropium 18 MICROgram(s) Capsule 1 Capsule(s) Inhalation daily    MEDICATIONS  (PRN):      Care Discussed with Consultants/Other Providers [x] YES  [ ] NO    HEALTH ISSUES - PROBLEM Dx:  CHRISTIANE (acute kidney injury): CHRISTIANE (acute kidney injury)  Bacteremia: Bacteremia  Neutropenia: Neutropenia  Need for prophylactic measure: Need for prophylactic measure  Oral thrush: Oral thrush  Nasal discomfort: Nasal discomfort  Gastroesophageal reflux disease without esophagitis: Gastroesophageal reflux disease without esophagitis  Hyperlipidemia, unspecified hyperlipidemia type: Hyperlipidemia, unspecified hyperlipidemia type  Type 2 diabetes mellitus without complication, without long-term current use of insulin: Type 2 diabetes mellitus without complication, without long-term current use of insulin  Coronary artery disease involving native coronary artery of native heart, angina presence unspecified: Coronary artery disease involving native coronary artery of native heart, angina presence unspecified  Hyperkalemia: Hyperkalemia  Lactic acidosis: Lactic acidosis  Transaminitis: Transaminitis  Idiopathic pulmonary fibrosis: Idiopathic pulmonary fibrosis  Shortness of breath: Shortness of breath

## 2017-11-16 NOTE — SWALLOW BEDSIDE ASSESSMENT ADULT - PHARYNGEAL PHASE
Delayed pharyngeal swallow/Decreased laryngeal elevation/multiple swallows x3, visible dyspnea post PO intake, pt requested reduction in bolus size to 1/2 teaspoon amounts, subtle throat clears post PO intake./Multiple swallows Decreased laryngeal elevation/Multiple swallows/visible dyspnea post PO intake and pt's family requested rest breaks during PO trials./Delayed pharyngeal swallow

## 2017-11-16 NOTE — PROGRESS NOTE ADULT - ASSESSMENT
77M h/o DM2, GERD, CABG (2016) and interstitial lung disease (UIP) on 3L home O2, p/w increasing REAGAN thought to be ILD flare. Pt developed fevers and hypotension likely related to sepsis. MICU was consulted for dyspnea and hypotension 2/2 sepsis in the setting of advanced ILD. Found to have PNA on 11/9 CT s/p extubation 11/14     #Neuro  -Awake, alert. No issues    #Pulm  Acute on chronic respiratory failure(ILD) 2/2 sepsis/PNA   - Intubated 11/7 for hypox resp failure and increased WOB, Extubated yesterday, on bipap overnight with improvement in respiratory distress  - Continue with  meropenem day 9. 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  -c/w Solumedrol. Continue to taper off steroids 10 q12h currently.   - c/w Duonebs q6.   - Given septic picture holding pts imuran for now     #Cardio  Hypotension 2/2 Sepsis  - Pt responded well to IVF and has good LV systolic function  - Weaned off pressors.   - Afib/aflutter: bradycardia resolved now off Precedex   - Continue with digoxin. Avoid amio in the setting of ILD.    #GI  -NPO  Colitis: improving, lactate resolving. Continue with bowel regimen  Transaminitis: stable, likely sepsis mediated   GERD:  - c/w Protonix    #Renal  CHRISTIANE likely prerenal in the setting of sepsis  -resolved SCr 0.80.  - Losartan D/c'd   -avoid nephrotoxins    #ID  Sepsis  - Repeat blood cx NGTD. CNS may be contaminant, vancomycin discontinued. Bcx 11/7 growing Pseudomonas gram (-) rods; sensitive to meropenem (day 8). Sputum cx growing CRE, dosed an additional gentamicin 400mg (was dosed amikacin and gentamicin previously this admission  -stop vancomycin  -Repeat sputum cx growing   -completed Azithromycin 11/7 (day 7)   -bacteremia 2/2 PNA vs gut translocation      #Endo  DM2  - elevated FS likely 2/2 to steroids. NPO, NPH held. Continue with ISS. Continue to monitor and adjust as necessary     #Heme  Thrombocytopenia: likely 2/2 to Zosyn, medication-induced, less likely HIT. HIT panel negative. Continue to trend platelets.   -Resolved.   Neutropenia   - Improving, 2/2 sepsis vs medication induced. Continue to trend    #DVT ppx: SQH  Dispo: PT recs rehab/[facility]

## 2017-11-16 NOTE — PROGRESS NOTE ADULT - SUBJECTIVE AND OBJECTIVE BOX
Patient was seen at 4 pm.   CC: AF in setting of septic shock.     Interval History: Now extubated feels better. No chest pain. No sob    MEDICATIONS:  aspirin  chewable 81 milliGRAM(s) Oral daily  digoxin  Injectable 0.125 milliGRAM(s) IV Push daily  heparin  Injectable 5000 Unit(s) SubCutaneous every 8 hours  influenza   Vaccine 0.5 milliLiter(s) IntraMuscular once  insulin lispro (HumaLOG) corrective regimen sliding scale   SubCutaneous every 6 hours  levalbuterol Inhalation 0.63 milliGRAM(s) Inhalation every 6 hours  meropenem IVPB 1000 milliGRAM(s) IV Intermittent every 8 hours  methylPREDNISolone sodium succinate Injectable 10 milliGRAM(s) IV Push daily  sodium chloride 0.45%. 1000 milliLiter(s) IV Continuous <Continuous>  tiotropium 18 MICROgram(s) Capsule 1 Capsule(s) Inhalation daily      LABS:      146<H>  |  110<H>  |  40<H>  ----------------------------<  235<H>  5.3   |  24  |  1.12    Ca    8.5      2017 17:01  Phos  4.0       Mg     2.3         TPro  5.8<L>  /  Alb  2.6<L>  /  TBili  1.4<H>  /  DBili  x   /  AST  129<H>  /  ALT  206<H>  /  AlkPhos  449<H>                            9.3    7.8   )-----------( 288      ( 2017 02:34 )             27.8     PT/INR - ( 2017 02:34 )   PT: 10.7 sec;   INR: 0.99 ratio         PTT - ( 2017 02:34 )  PTT:31.0 sec      VITAL SIGNS:   T(C): 36.8 (17 @ 20:00), Max: 37 (17 @ 00:00)  HR: 73 (17 @ 21:00) (60 - 101)  BP: 170/79 (17 @ 21:00) (113/58 - 192/84)  RR: 18 (17 @ 21:00) (15 - 63)  SpO2: 100% (11-16-17 @ 21:00) (86% - 100%)  Daily     Daily Weight in k.6 (2017 06:00)  I&O's Summary    15 Nov 2017 07:  -  2017 07:00  --------------------------------------------------------  IN: 700 mL / OUT: 550 mL / NET: 150 mL    2017 07:  -  2017 22:30  --------------------------------------------------------  IN: 400 mL / OUT: 35 mL / NET: 365 mL        TELE: DARLYN

## 2017-11-16 NOTE — SWALLOW BEDSIDE ASSESSMENT ADULT - SWALLOW EVAL: PATIENT/FAMILY GOALS STATEMENT
Pt's family denied history of dysphagia. Reported h/o reflux which is controlled with omeprazole. Pt has not been previously assessed by an SLP for swallowing

## 2017-11-16 NOTE — PROGRESS NOTE ADULT - ASSESSMENT
77 M with CAD h/o CABG, NL LV systolic function, ILD on home O2 with hypoxic resp failure, septic shock on pressors with presumed PNA. In this setting, new onset AF now NSR  ·	Now in NSR. Given concern about K would stop digoxin and resume lopressor 25 BID. Patient was on lopressor at home. Patient also hypertensive so lopressor can help with BP  ·	Continue ASA  ·	As per MICU  ·	D/w MICU resident and family.

## 2017-11-17 LAB
ALBUMIN SERPL ELPH-MCNC: 2.8 G/DL — LOW (ref 3.3–5)
ALP SERPL-CCNC: 337 U/L — HIGH (ref 40–120)
ALT FLD-CCNC: 153 U/L RC — HIGH (ref 10–45)
ANION GAP SERPL CALC-SCNC: 12 MMOL/L — SIGNIFICANT CHANGE UP (ref 5–17)
ANION GAP SERPL CALC-SCNC: 8 MMOL/L — SIGNIFICANT CHANGE UP (ref 5–17)
AST SERPL-CCNC: 96 U/L — HIGH (ref 10–40)
BASOPHILS # BLD AUTO: 0 K/UL — SIGNIFICANT CHANGE UP (ref 0–0.2)
BASOPHILS NFR BLD AUTO: 0 % — SIGNIFICANT CHANGE UP (ref 0–2)
BILIRUB SERPL-MCNC: 1.4 MG/DL — HIGH (ref 0.2–1.2)
BUN SERPL-MCNC: 36 MG/DL — HIGH (ref 7–23)
BUN SERPL-MCNC: 38 MG/DL — HIGH (ref 7–23)
CALCIUM SERPL-MCNC: 8.5 MG/DL — SIGNIFICANT CHANGE UP (ref 8.4–10.5)
CALCIUM SERPL-MCNC: 8.7 MG/DL — SIGNIFICANT CHANGE UP (ref 8.4–10.5)
CHLORIDE SERPL-SCNC: 111 MMOL/L — HIGH (ref 96–108)
CHLORIDE SERPL-SCNC: 112 MMOL/L — HIGH (ref 96–108)
CO2 SERPL-SCNC: 25 MMOL/L — SIGNIFICANT CHANGE UP (ref 22–31)
CO2 SERPL-SCNC: 28 MMOL/L — SIGNIFICANT CHANGE UP (ref 22–31)
CREAT SERPL-MCNC: 1.02 MG/DL — SIGNIFICANT CHANGE UP (ref 0.5–1.3)
CREAT SERPL-MCNC: 1.06 MG/DL — SIGNIFICANT CHANGE UP (ref 0.5–1.3)
EOSINOPHIL # BLD AUTO: 0 K/UL — SIGNIFICANT CHANGE UP (ref 0–0.5)
EOSINOPHIL NFR BLD AUTO: 0.7 % — SIGNIFICANT CHANGE UP (ref 0–6)
GLUCOSE BLDC GLUCOMTR-MCNC: 136 MG/DL — HIGH (ref 70–99)
GLUCOSE BLDC GLUCOMTR-MCNC: 182 MG/DL — HIGH (ref 70–99)
GLUCOSE BLDC GLUCOMTR-MCNC: 225 MG/DL — HIGH (ref 70–99)
GLUCOSE BLDC GLUCOMTR-MCNC: 323 MG/DL — HIGH (ref 70–99)
GLUCOSE BLDC GLUCOMTR-MCNC: 70 MG/DL — SIGNIFICANT CHANGE UP (ref 70–99)
GLUCOSE SERPL-MCNC: 161 MG/DL — HIGH (ref 70–99)
GLUCOSE SERPL-MCNC: 63 MG/DL — LOW (ref 70–99)
HCT VFR BLD CALC: 24.4 % — LOW (ref 39–50)
HGB BLD-MCNC: 8.3 G/DL — LOW (ref 13–17)
INR BLD: 1.02 RATIO — SIGNIFICANT CHANGE UP (ref 0.88–1.16)
LYMPHOCYTES # BLD AUTO: 0.4 K/UL — LOW (ref 1–3.3)
LYMPHOCYTES # BLD AUTO: 9.8 % — LOW (ref 13–44)
MAGNESIUM SERPL-MCNC: 2.2 MG/DL — SIGNIFICANT CHANGE UP (ref 1.6–2.6)
MCHC RBC-ENTMCNC: 34.1 GM/DL — SIGNIFICANT CHANGE UP (ref 32–36)
MCHC RBC-ENTMCNC: 36.4 PG — HIGH (ref 27–34)
MCV RBC AUTO: 107 FL — HIGH (ref 80–100)
MONOCYTES # BLD AUTO: 0.3 K/UL — SIGNIFICANT CHANGE UP (ref 0–0.9)
MONOCYTES NFR BLD AUTO: 7.3 % — SIGNIFICANT CHANGE UP (ref 2–14)
NEUTROPHILS # BLD AUTO: 3.5 K/UL — SIGNIFICANT CHANGE UP (ref 1.8–7.4)
NEUTROPHILS NFR BLD AUTO: 82.3 % — HIGH (ref 43–77)
PHOSPHATE SERPL-MCNC: 3.1 MG/DL — SIGNIFICANT CHANGE UP (ref 2.5–4.5)
PLATELET # BLD AUTO: 255 K/UL — SIGNIFICANT CHANGE UP (ref 150–400)
POTASSIUM SERPL-MCNC: 4.3 MMOL/L — SIGNIFICANT CHANGE UP (ref 3.5–5.3)
POTASSIUM SERPL-MCNC: 4.4 MMOL/L — SIGNIFICANT CHANGE UP (ref 3.5–5.3)
POTASSIUM SERPL-SCNC: 4.3 MMOL/L — SIGNIFICANT CHANGE UP (ref 3.5–5.3)
POTASSIUM SERPL-SCNC: 4.4 MMOL/L — SIGNIFICANT CHANGE UP (ref 3.5–5.3)
PROT SERPL-MCNC: 5.6 G/DL — LOW (ref 6–8.3)
PROTHROM AB SERPL-ACNC: 11.1 SEC — SIGNIFICANT CHANGE UP (ref 9.8–12.7)
RBC # BLD: 2.28 M/UL — LOW (ref 4.2–5.8)
RBC # FLD: 19.9 % — HIGH (ref 10.3–14.5)
SODIUM SERPL-SCNC: 148 MMOL/L — HIGH (ref 135–145)
SODIUM SERPL-SCNC: 148 MMOL/L — HIGH (ref 135–145)
WBC # BLD: 4.3 K/UL — SIGNIFICANT CHANGE UP (ref 3.8–10.5)
WBC # FLD AUTO: 4.3 K/UL — SIGNIFICANT CHANGE UP (ref 3.8–10.5)

## 2017-11-17 PROCEDURE — 99291 CRITICAL CARE FIRST HOUR: CPT

## 2017-11-17 PROCEDURE — 71010: CPT | Mod: 26

## 2017-11-17 RX ORDER — TETRACAINE/BENZOCAINE/BUTAMBEN 2%-14%-2%
1 OINTMENT (GRAM) TOPICAL ONCE
Qty: 0 | Refills: 0 | Status: COMPLETED | OUTPATIENT
Start: 2017-11-17 | End: 2017-11-17

## 2017-11-17 RX ORDER — MORPHINE SULFATE 50 MG/1
2 CAPSULE, EXTENDED RELEASE ORAL ONCE
Qty: 0 | Refills: 0 | Status: DISCONTINUED | OUTPATIENT
Start: 2017-11-17 | End: 2017-11-17

## 2017-11-17 RX ORDER — KETOROLAC TROMETHAMINE 30 MG/ML
15 SYRINGE (ML) INJECTION ONCE
Qty: 0 | Refills: 0 | Status: DISCONTINUED | OUTPATIENT
Start: 2017-11-17 | End: 2017-11-17

## 2017-11-17 RX ORDER — SODIUM CHLORIDE 9 MG/ML
1000 INJECTION, SOLUTION INTRAVENOUS
Qty: 0 | Refills: 0 | Status: DISCONTINUED | OUTPATIENT
Start: 2017-11-17 | End: 2017-11-17

## 2017-11-17 RX ORDER — KETOROLAC TROMETHAMINE 30 MG/ML
30 SYRINGE (ML) INJECTION ONCE
Qty: 0 | Refills: 0 | Status: DISCONTINUED | OUTPATIENT
Start: 2017-11-17 | End: 2017-11-17

## 2017-11-17 RX ORDER — HYDROMORPHONE HYDROCHLORIDE 2 MG/ML
1 INJECTION INTRAMUSCULAR; INTRAVENOUS; SUBCUTANEOUS ONCE
Qty: 0 | Refills: 0 | Status: DISCONTINUED | OUTPATIENT
Start: 2017-11-17 | End: 2017-11-17

## 2017-11-17 RX ORDER — HYDRALAZINE HCL 50 MG
10 TABLET ORAL ONCE
Qty: 0 | Refills: 0 | Status: COMPLETED | OUTPATIENT
Start: 2017-11-17 | End: 2017-11-17

## 2017-11-17 RX ORDER — ACETAMINOPHEN 500 MG
1000 TABLET ORAL ONCE
Qty: 0 | Refills: 0 | Status: COMPLETED | OUTPATIENT
Start: 2017-11-17 | End: 2017-11-17

## 2017-11-17 RX ORDER — DEXTROSE 50 % IN WATER 50 %
25 SYRINGE (ML) INTRAVENOUS ONCE
Qty: 0 | Refills: 0 | Status: COMPLETED | OUTPATIENT
Start: 2017-11-17 | End: 2017-11-17

## 2017-11-17 RX ADMIN — Medication 2: at 21:27

## 2017-11-17 RX ADMIN — Medication 1000 MILLIGRAM(S): at 05:30

## 2017-11-17 RX ADMIN — Medication 10 MILLIGRAM(S): at 05:42

## 2017-11-17 RX ADMIN — Medication 1000 MILLIGRAM(S): at 09:55

## 2017-11-17 RX ADMIN — Medication 8: at 16:44

## 2017-11-17 RX ADMIN — Medication 0.12 MILLIGRAM(S): at 13:15

## 2017-11-17 RX ADMIN — Medication 10 MILLIGRAM(S): at 02:33

## 2017-11-17 RX ADMIN — Medication 400 MILLIGRAM(S): at 09:40

## 2017-11-17 RX ADMIN — Medication 25 MILLILITER(S): at 00:41

## 2017-11-17 RX ADMIN — HEPARIN SODIUM 5000 UNIT(S): 5000 INJECTION INTRAVENOUS; SUBCUTANEOUS at 05:42

## 2017-11-17 RX ADMIN — HYDROMORPHONE HYDROCHLORIDE 1 MILLIGRAM(S): 2 INJECTION INTRAMUSCULAR; INTRAVENOUS; SUBCUTANEOUS at 12:31

## 2017-11-17 RX ADMIN — Medication 400 MILLIGRAM(S): at 04:28

## 2017-11-17 RX ADMIN — HEPARIN SODIUM 5000 UNIT(S): 5000 INJECTION INTRAVENOUS; SUBCUTANEOUS at 21:19

## 2017-11-17 RX ADMIN — Medication 15 MILLIGRAM(S): at 06:55

## 2017-11-17 RX ADMIN — MEROPENEM 100 MILLIGRAM(S): 1 INJECTION INTRAVENOUS at 05:42

## 2017-11-17 RX ADMIN — MORPHINE SULFATE 2 MILLIGRAM(S): 50 CAPSULE, EXTENDED RELEASE ORAL at 08:10

## 2017-11-17 RX ADMIN — SODIUM CHLORIDE 50 MILLILITER(S): 9 INJECTION, SOLUTION INTRAVENOUS at 01:05

## 2017-11-17 RX ADMIN — MEROPENEM 100 MILLIGRAM(S): 1 INJECTION INTRAVENOUS at 21:19

## 2017-11-17 RX ADMIN — Medication 15 MILLIGRAM(S): at 06:44

## 2017-11-17 RX ADMIN — Medication 1 SPRAY(S): at 15:47

## 2017-11-17 RX ADMIN — HYDROMORPHONE HYDROCHLORIDE 1 MILLIGRAM(S): 2 INJECTION INTRAMUSCULAR; INTRAVENOUS; SUBCUTANEOUS at 12:16

## 2017-11-17 RX ADMIN — LEVALBUTEROL 0.63 MILLIGRAM(S): 1.25 SOLUTION, CONCENTRATE RESPIRATORY (INHALATION) at 05:25

## 2017-11-17 RX ADMIN — MORPHINE SULFATE 2 MILLIGRAM(S): 50 CAPSULE, EXTENDED RELEASE ORAL at 08:25

## 2017-11-17 RX ADMIN — HEPARIN SODIUM 5000 UNIT(S): 5000 INJECTION INTRAVENOUS; SUBCUTANEOUS at 13:18

## 2017-11-17 RX ADMIN — MEROPENEM 100 MILLIGRAM(S): 1 INJECTION INTRAVENOUS at 13:15

## 2017-11-17 NOTE — PROGRESS NOTE ADULT - ASSESSMENT
77M h/o DM2, GERD, CABG (2016) and interstitial lung disease (UIP) on 3L home O2, p/w increasing REAGAN thought to be ILD flare. Pt developed fevers and hypotension likely related to sepsis. MICU was consulted for dyspnea and hypotension 2/2 sepsis in the setting of advanced ILD. Found to have PNA on 11/9 CT s/p extubation 11/14     #Neuro  -Awake, alert. No issues    #Pulm  Acute on chronic respiratory failure(ILD) 2/2 sepsis/PNA   - Intubated 11/7 for hypox resp failure and increased WOB, Extubated yesterday, on bipap overnight with improvement in respiratory distress  - Continue with  meropenem day 9. 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  -c/w Solumedrol. Continue to taper off steroids 10 q12h currently.   - c/w Duonebs q6.   - Given septic picture holding pts imuran for now     #Cardio  Hypotension 2/2 Sepsis  - Pt responded well to IVF and has good LV systolic function  - Weaned off pressors.   - Afib/aflutter: bradycardia resolved now off Precedex   - Continue with digoxin. Avoid amio in the setting of ILD.    #GI  -NPO  Colitis: improving, lactate resolving. Continue with bowel regimen  Transaminitis: stable, likely sepsis mediated   GERD:  - c/w Protonix    #Renal  CHRISTIANE likely prerenal in the setting of sepsis  -resolved SCr 0.80.  - Losartan D/c'd   -avoid nephrotoxins    #ID  Sepsis  - Repeat blood cx NGTD. CNS may be contaminant, vancomycin discontinued. Bcx 11/7 growing Pseudomonas gram (-) rods; sensitive to meropenem (day 8). Sputum cx growing CRE, dosed an additional gentamicin 400mg (was dosed amikacin and gentamicin previously this admission  -stop vancomycin  -Repeat sputum cx growing   -completed Azithromycin 11/7 (day 7)   -bacteremia 2/2 PNA vs gut translocation      #Endo  DM2  - elevated FS likely 2/2 to steroids. NPO, NPH held. Continue with ISS. Continue to monitor and adjust as necessary     #Heme  Thrombocytopenia: likely 2/2 to Zosyn, medication-induced, less likely HIT. HIT panel negative. Continue to trend platelets.   -Resolved.   Neutropenia   - Improving, 2/2 sepsis vs medication induced. Continue to trend    #DVT ppx: SQH  Dispo: PT recs rehab/[facility] 77M h/o DM2, GERD, CABG (2016) and interstitial lung disease (UIP) on 3L home O2, p/w increasing REAGAN thought to be ILD flare. Pt developed fevers and hypotension likely related to sepsis. MICU was consulted for dyspnea and hypotension 2/2 sepsis in the setting of advanced ILD. Found to have PNA on 11/9 CT s/p extubation 11/14     #Neuro  -Awake, alert. No issues    #Pulm  Acute on chronic respiratory failure(ILD) 2/2 sepsis/PNA   - Intubated 11/7 for hypox resp failure and increased WOB, Extubated to NC with bipap as needed  - Continue with  meropenem day 10. Appreciate ID recs, given clinical improvement does not require double pseudomonas coverage  -c/w Solumedrol 10 q daily  - c/w Duonebs q6.   - Given septic picture holding pts imuran for now     #Cardio  Hypotension 2/2 Sepsis  - Pt responded well to IVF and has good LV systolic function. Now hypertensive. Consider starting iv lopressor while npo     Afib/aflutter: bradycardia resolved now off Precedex   - Continue with digoxin. Avoid amio in the setting of ILD.    #GI  -NPO  Colitis: improving, lactate resolving. Continue with bowel regimen  Transaminitis: stable, likely sepsis mediated   GERD:  - c/w Protonix    #Renal  CHRISTIANE likely prerenal in the setting of sepsis  -resolved SCr 0.80.  - Losartan D/c'd   -avoid nephrotoxins    #ID  Sepsis  - Repeat blood cx NGTD. CNS may be contaminant, vancomycin discontinued. Bcx 11/7 growing Pseudomonas gram (-) rods; sensitive to meropenem (day 8). Sputum cx growing CRE, dosed an additional gentamicin 400mg (was dosed amikacin and gentamicin previously this admission  -stop vancomycin  -Repeat sputum cx growing   -completed Azithromycin 11/7 (day 7)   -bacteremia 2/2 PNA vs gut translocation      #Endo  DM2  - elevated FS likely 2/2 to steroids. NPO, NPH held. Continue with ISS. Continue to monitor and adjust as necessary     #Heme  Thrombocytopenia: likely 2/2 to Zosyn, medication-induced, less likely HIT. HIT panel negative. Continue to trend platelets.   -Resolved.   Neutropenia   - Improving, 2/2 sepsis vs medication induced. Continue to trend    #DVT ppx: SQH  Dispo: PT recs rehab/[facility] 77M h/o DM2, GERD, CABG (2016) and interstitial lung disease (UIP) on 3L home O2, p/w increasing REAGAN thought to be ILD flare. Pt developed fevers and hypotension likely related to sepsis. MICU was consulted for dyspnea and hypotension 2/2 sepsis in the setting of advanced ILD. Found to have PNA on 11/9 CT s/p extubation 11/14     #Neuro  -Awake, alert. No issues    #Pulm  Acute on chronic respiratory failure(ILD) 2/2 sepsis/PNA   - Intubated 11/7 for hypox resp failure and increased WOB, Extubated to NC with bipap as needed  - To complete total course of abx today, meropenem day 10. Appreciate ID recs, given clinical improvement does not require double pseudomonas coverage  -c/w Solumedrol 10 q daily  - c/w Duonebs q6.   - Given septic picture holding pts imuran for now     #Cardio  Hypotension 2/2 Sepsis  - Pt responded well to IVF and has good LV systolic function. Now hypertensive. Consider starting iv lopressor while npo     Afib/aflutter: bradycardia resolved now off Precedex   - Continue with digoxin. Avoid amio in the setting of ILD.    #GI  -NPO. Plan for feast today. Unable to tolerate NG tube. Follow up s/s recs  Colitis: improving, lactate resolving. Continue with bowel regimen  Transaminitis: stable, likely sepsis mediated   GERD:  - c/w Protonix    #Renal  CHRISTIANE likely prerenal in the setting of sepsis  -resolved SCr 0.80.  - Losartan D/c'd   -avoid nephrotoxins    #ID  Sepsis  - Repeat blood cx NGTD. CNS may be contaminant, vancomycin discontinued. Bcx 11/7 growing Pseudomonas gram (-) rods; sensitive to meropenem (day 10). Sputum cx growing CRE, dosed an additional gentamicin 400mg (was dosed amikacin and gentamicin previously this admission  -completed Azithromycin 11/7 (day 7)   -bacteremia 2/2 PNA vs gut translocation      #Endo  DM2  one isolated episode of hypoglycemia to 64. Repeat . Hold off on D5, monitor FS in the setting of being NPO  -NPH held. Continue with ISS. Continue to monitor and adjust as necessary     #Heme  Thrombocytopenia: likely 2/2 to Zosyn, medication-induced, less likely HIT. HIT panel negative. Continue to trend platelets.   -Resolved.   Neutropenia   - Improving, 2/2 sepsis vs medication induced. Continue to trend    #DVT ppx: SQH  Dispo: PT recs rehab/[facility]

## 2017-11-17 NOTE — SWALLOW FEES ASSESSMENT ADULT - MODE OF PRESENTATION
~ 2 ounces applesauce ~ 1 ounce ensure pudding fed by clinician/~ 2 ounces applesauce/spoon ~ 1 ounce ensure pudding/spoon/fed by clinician fed by clinician/spoon

## 2017-11-17 NOTE — SWALLOW BEDSIDE ASSESSMENT ADULT - PHARYNGEAL PHASE
Decreased laryngeal elevation/improved dyspnea and RR with PO intake compared with yesterday/Delayed pharyngeal swallow

## 2017-11-17 NOTE — SWALLOW FEES ASSESSMENT ADULT - PHARYNGEAL PHASE COMMENTS
Trace residue noted along the laryngeal surface of the epiglottis and along bilateral aryepiglottic folds suggestive of silent laryngeal penetration during the swallow with incomplete retrieval. Residue noted along the laryngeal surface of the epiglottis, along the aryepiglottic folds, over the interarytenoid space, and deep in the laryngeal vestibule suggestive of silent laryngeal penetration during the swallow with incomplete retrieval. Increased residue noted deep in the laryngeal vestibule, over the interarytenoid space, and along the vocal folds suggestive of silent laryngeal penetration during the swallow with incomplete retrieval.

## 2017-11-17 NOTE — SWALLOW FEES ASSESSMENT ADULT - ROSENBEK'S PENETRATION ASPIRATION SCALE
(3) material remains above the vocal cords, visible residue remains (penetration) (5) material contacts vocal cords, visible residue remains (penetration)

## 2017-11-17 NOTE — SWALLOW BEDSIDE ASSESSMENT ADULT - ASR SWALLOW LINGUAL MOBILITY
within functional limits/? necrosis along lingual tip
within functional limits/? necrosis along lingual tip

## 2017-11-17 NOTE — CHART NOTE - NSCHARTNOTEFT_GEN_A_CORE
Follow up note.     77M h/o DM2, GERD, CABG (2016) and interstitial lung disease (UIP) on 3L home O2, p/w increasing REAGAN thought to be ILD flare. Pt developed fevers and hypotension likely related to sepsis. MICU was consulted for dyspnea and hypotension 2/2 sepsis in the setting of advanced ILD. Found to have PNA on 11/9 CT s/p extubation 11/14. Noted pt was unable to assess at this time to tolerate NGT placement. ST rubi 11/16 recommended NPO, plan for FEEST today.    Source: Patient [ ]    Family [ ]     other [x ] chart, team    Diet : 11/14 NPO      Patient reports [ ] nausea  [ ] vomiting [ ] diarrhea [ ] constipation  [ ]chewing problems [x ] swallowing issues  [ ] other:      PO intake:  < 50% [ ] 50-75% [ ]   % [ ]  other : na, NPO     Source for PO intake [ ] Patient [ ] family [ ] chart [ ] staff [ ] other     Enteral /Parenteral Nutrition: n/a      Current Weight: 11/17 160, 11/16 171 ? wt accuracy dosing wt 11/2 177lb      Pertinent Medications: MEDICATIONS  (STANDING):  aspirin  chewable 81 milliGRAM(s) Oral daily  digoxin  Injectable 0.125 milliGRAM(s) IV Push daily  heparin  Injectable 5000 Unit(s) SubCutaneous every 8 hours  influenza   Vaccine 0.5 milliLiter(s) IntraMuscular once  insulin lispro (HumaLOG) corrective regimen sliding scale   SubCutaneous every 6 hours  levalbuterol Inhalation 0.63 milliGRAM(s) Inhalation every 6 hours  meropenem IVPB 1000 milliGRAM(s) IV Intermittent every 8 hours  methylPREDNISolone sodium succinate Injectable 10 milliGRAM(s) IV Push daily  sodium chloride 0.45%. 1000 milliLiter(s) (50 mL/Hr) IV Continuous <Continuous>  tiotropium 18 MICROgram(s) Capsule 1 Capsule(s) Inhalation daily    MEDICATIONS  (PRN):    Pertinent Labs:  11-17 Na148 mmol/L<H> Glu 161 mg/dL<H> K+ 4.3 mmol/L Cr  1.06 mg/dL BUN 36 mg/dL<H> Phos 3.1 mg/dL Alb 2.8 g/dL<L>   , 136, 70, 147    Skin: stage 2 B/L behind ears    Estimated Needs:   [x ] no change since previous assessment  [ ] recalculated:       Previous Nutrition Diagnosis: [x ] Inadequate Protein-Energy Intake     Nutrition Diagnosis is [ x] ongoing  [ ] resolved [ ] not applicable   pt currently NPO, pending FEES       New Nutrition Diagnosis: [x ] not applicable      Recommend    [ ] Change Diet To:    [ ] Nutrition Supplement    [ ] Nutrition Support    [x ] Other: if pt able to take po recommend consistent carbohydrate, low Na diet, food consistency per ST recommendation       Monitoring and Evaluation:     [ ] PO intake [ ] Tolerance to diet prescription [x ] weights x ] follow up per protocol    [x ] other: f/u on results of FEES

## 2017-11-17 NOTE — SWALLOW FEES ASSESSMENT ADULT - LARYNGEAL PENETRATION AFTER SWALLOW - SILENT
Trace/over the interarytenoid space with incomplete retrieval. over the interarytenoid space with incomplete retrieval./Trace

## 2017-11-17 NOTE — SWALLOW BEDSIDE ASSESSMENT ADULT - SLP GENERAL OBSERVATIONS
Pt awake in bed, O2 via NC, family present at bedside. Oriented x3. + Baseline tachypnea (RR ~38-40, RN present in room, MD: Dimitry alerted and cleared pt for exam). + Hematoma +/- dried blood along lower labial surface.
Pt awake in bed, improved tachypnea compared with yesterday (RR ~ 32). Pt able to communicate needs and follow directions for exam. + 4L O2 via NC.

## 2017-11-17 NOTE — SWALLOW FEES ASSESSMENT ADULT - ORAL PHASE
Uncontrolled bolus/spillover in vamsi-pharynx/moderate-maximal to the valleculae Uncontrolled bolus/spillover in vamsi-pharynx/maximal to the valleculae moderate-maximal to the valleculae and moderate to the aryepiglottic folds./Uncontrolled bolus/spillover in vamsi-pharynx/Uncontrolled bolus/spillover in hypopharynx

## 2017-11-17 NOTE — CHART NOTE - NSCHARTNOTEFT_GEN_A_CORE
MICU Transfer Note    Transfer from: MICU    Transfer to: ( X ) Medicine    (  ) Telemetry     (   ) RCU        (    ) Palliative         (   ) Stroke Unit          (   ) __________________    Accepting Physician: Dr. Jhony Gibbons  Signout given to: Dr. Jhony Gibbons    MICU COURSE:     77M h/o DM2, GERD, CABG (2016) and interstitial lung disease (UIP) on 3L home O2, p/w increasing REAGAN on 11/1/17. The patient was started on treatment for a suspected ILD flare w/ solumedrol 30 q12. He was also continued on home doses of Azathioprine. The patient is on chronic steroids at home.  There was some concern for fluid overload, so TTE was done which showed no signs of systolic heart failure. CT chest was done and showed no signs of PNA. on 11/7 pt started to develop worsening hypoxia and increased WOB. BiPAP was started. ABG was done which showed Respiratory alkalosis. About 4 hours later RRT was called for hypotension to the 70s. Pt was found to be febrile and was having rigors. Blood cultures, ABG, and labs were done. Pt was noted to be having diarrhea as well. Cdiff PCR was sent. Pt was started on IVF, zosyn, Vanc, azithro, and PO vanc. BP improved to 110s. Pts lactate on labs was elevated from prior labs, and increased WOB was thought to be related to sepsis/lactate. Pt was also given 100 mg of IV hydrocortisone. After meds were administered pts mental status, resp status, and hemodynamics improved. He was placed on a venti mask.  An RRT was called for hypoxic respiratory failure requiring intubation 2/2 to pseudomonas PNA. Pt also found to be septic with pseudomonas bacteremia and coag neg staph (deemed a contaminant, s/p 4 days of vanc). On arrival to the MICU, had a rising lactate thought to be 2/2 to hypoperfusion. He was started on dobutamine which pushed him into afib. He was treated with adenosine, amio and finally digoxin with rate control. CT A/P consistent with colitis (likely ischemic). Lactate downtrended and pt improved s/p antibiotics and steroids. He was extubated to bipap to use PRN. Speech and swallow evaluation performed demonstrated dysphagia. He made NPO. An NG tube was inserted with plan for repeat speech and swallow in a week to reassess swallowing function.      ASSESSMENT & PLAN:   77M h/o DM2, GERD, CABG (2016) and interstitial lung disease (UIP) on 3L home O2, p/w increasing REAGAN thought to be ILD flare. Pt developed fevers and hypotension likely related to sepsis. MICU was consulted for dyspnea and hypotension 2/2 sepsis in the setting of advanced ILD. Found to have PNA on 11/9 CT s/p extubation 11/14     #Neuro  -Awake, alert. No issues    #Pulm  Acute on chronic respiratory failure(ILD) 2/2 sepsis/PNA   - Intubated 11/7 for hypox resp failure and increased WOB, Extubated to NC with bipap as needed  - To complete total course of abx today, meropenem day 10. Appreciate ID recs, given clinical improvement does not require double pseudomonas coverage  -c/w Solumedrol 10 q daily  - c/w Duonebs q6.   - Given septic picture holding pts imuran for now     #Cardio  Hypotension 2/2 Sepsis  - Pt responded well to IVF and has good LV systolic function. Now hypertensive. Consider starting iv lopressor while npo     Afib/aflutter: bradycardia resolved now off Precedex   - Continue with digoxin. Avoid amio in the setting of ILD.    #GI  -Continue with NG tube feeds  Colitis: improving, lactate resolving. Continue with bowel regimen  Transaminitis: stable, likely sepsis mediated   GERD:  - c/w Protonix    #Renal  CHRISTIANE likely prerenal in the setting of sepsis  -resolved SCr 0.80.  - Losartan D/c'd   -avoid nephrotoxins    #ID  Sepsis  - Repeat blood cx NGTD. CNS may be contaminant, vancomycin discontinued. Bcx 11/7 growing Pseudomonas gram (-) rods; sensitive to meropenem (day 10). Sputum cx growing CRE, dosed an additional gentamicin 400mg (was dosed amikacin and gentamicin previously this admission. As per ID no need for double pseudomonas coverage  -completed Azithromycin 11/7 (day 7)   -bacteremia 2/2 PNA vs gut translocation      #Endo  DM2  one isolated episode of hypoglycemia to 64. Repeat .   -NPH held. Continue with ISS. Continue to monitor and adjust as necessary     #Heme  Thrombocytopenia: likely 2/2 to Zosyn, medication-induced, less likely HIT. HIT panel negative. Continue to trend platelets.   -Resolved.   Neutropenia   - Improving, 2/2 sepsis vs medication induced. Continue to trend    #DVT ppx: SQH  Dispo: PT recs rehab/[facility]          FOR FOLLOW UP:  Completed course of antibiotics, can switch to PO prednisone 10 mg (home dose)  Continue with incentive spirometer, aggressive chest PT  If continues to be hypertensive, start restart metoprolol  Continue with tube feeds. Trend BS. Restart basal bolus if needed MICU Transfer Note    Transfer from: MICU    Transfer to: ( X ) Medicine    (  ) Telemetry     (   ) RCU        (    ) Palliative         (   ) Stroke Unit          (   ) __________________    Accepting Physician: Dr. Jhony Gibbons  Signout given to: Dr. Jhony Gibbons    MICU COURSE:     77M h/o DM2, GERD, CABG (2016) and interstitial lung disease (UIP) on 3L home O2, p/w increasing REAGAN on 11/1/17. The patient was started on treatment for a suspected ILD flare w/ solumedrol 30 q12. He was also continued on home doses of Azathioprine. The patient is on chronic steroids at home.  There was some concern for fluid overload, so TTE was done which showed no signs of systolic heart failure. CT chest was done and showed no signs of PNA. on 11/7 pt started to develop worsening hypoxia and increased WOB. BiPAP was started. ABG was done which showed Respiratory alkalosis. About 4 hours later RRT was called for hypotension to the 70s. Pt was found to be febrile and was having rigors. Blood cultures, ABG, and labs were done. Pt was noted to be having diarrhea as well. Cdiff PCR was sent. Pt was started on IVF, zosyn, Vanc, azithro, and PO vanc. BP improved to 110s. Pts lactate on labs was elevated from prior labs, and increased WOB was thought to be related to sepsis/lactate. Pt was also given 100 mg of IV hydrocortisone. After meds were administered pts mental status, resp status, and hemodynamics improved. He was placed on a venti mask.  An RRT was called for hypoxic respiratory failure requiring intubation 2/2 to pseudomonas PNA. Pt also found to be septic with pseudomonas bacteremia and coag neg staph (deemed a contaminant, s/p 4 days of vanc). On arrival to the MICU, had a rising lactate thought to be 2/2 to hypoperfusion. He was started on dobutamine which pushed him into afib. He was treated with adenosine, amio and finally digoxin with rate control. CT A/P consistent with colitis (likely ischemic). Lactate downtrended and pt improved s/p antibiotics and steroids. He was extubated to bipap to use PRN. Speech and swallow evaluation performed demonstrated dysphagia. He made NPO. An NG tube was inserted with plan for repeat speech and swallow in a week to reassess swallowing function.      ASSESSMENT & PLAN:   77M h/o DM2, GERD, CABG (2016) and interstitial lung disease (UIP) on 3L home O2, p/w increasing REAGAN thought to be ILD flare. Pt developed fevers and hypotension likely related to sepsis. MICU was consulted for dyspnea and hypotension 2/2 sepsis in the setting of advanced ILD. Found to have PNA on 11/9 CT s/p extubation 11/14     #Neuro  -Awake, alert. No issues    #Pulm  Acute on chronic respiratory failure(ILD) 2/2 sepsis/PNA   - Intubated 11/7 for hypox resp failure and increased WOB, Extubated to NC with bipap as needed  - To complete total course of abx today, meropenem day 10. Appreciate ID recs, given clinical improvement does not require double pseudomonas coverage  -c/w Solumedrol 10 q daily  - c/w Duonebs q6.   - Given septic picture holding pts imuran for now     #Cardio  Hypotension 2/2 Sepsis  - Pt responded well to IVF and has good LV systolic function. Now hypertensive. Consider starting iv lopressor while npo     Afib/aflutter: bradycardia resolved now off Precedex   - Continue with digoxin. Avoid amio in the setting of ILD.    #GI  -Continue with NG tube feeds  Colitis: improving, lactate resolving. Continue with bowel regimen  Transaminitis: stable, likely sepsis mediated   GERD:  - c/w Protonix    #Renal  CHRISTIANE likely 2/2 to dehydration  -resolved SCr 0.80.  - Losartan D/c'd   -avoid nephrotoxins    #ID  Sepsis  - Repeat blood cx NGTD. CNS may be contaminant, vancomycin discontinued. Bcx 11/7 growing Pseudomonas gram (-) rods; sensitive to meropenem (day 10). Sputum cx growing CRE, dosed an additional gentamicin 400mg (was dosed amikacin and gentamicin previously this admission. As per ID no need for double pseudomonas coverage  -completed Azithromycin 11/7 (day 7)   -bacteremia 2/2 PNA vs gut translocation      #Endo  DM2  one isolated episode of hypoglycemia to 64. Repeat .   -NPH held. Continue with ISS. Continue to monitor and adjust as necessary     #Heme  Thrombocytopenia: likely 2/2 to Zosyn, medication-induced, less likely HIT. HIT panel negative. Continue to trend platelets.   -Resolved.   Neutropenia   - Improving, 2/2 sepsis vs medication induced. Continue to trend    #DVT ppx: SQH  Dispo: PT recs rehab/[facility]          FOR FOLLOW UP:  CHRISTIANE s/p iv fluids and free water through NG tube. Continue to trend  Completed course of antibiotics, c/w PO prednisone 10 mg (home dose)  Continue with incentive spirometer, aggressive chest PT  If continues to be hypertensive, start restart metoprolol. Continued discussion regarding anticoagulation for new afib (although now converted to sinus, may be have been in the setting of sepsis)  Continue with tube feeds. Trend BS. Restart basal bolus if needed

## 2017-11-17 NOTE — SWALLOW FEES ASSESSMENT ADULT - COMMENTS
Baseline secretions in valleculae, ? hematoma vs other noted along laryngeal surface of the epiglottis.

## 2017-11-17 NOTE — SWALLOW FEES ASSESSMENT ADULT - DIAGNOSTIC IMPRESSIONS
Pt presents with an oropharyngeal dysphagia superimposed upon reduced coordination of respiration with deglutition. The swallow is marked by uncontrolled loss, post swallow pharyngeal residue, silent laryngeal penetration with incomplete retrieval with all consistencies administered, and deep silent laryngeal penetration (to the level of the vocal folds) with teaspoon amounts of honey thick liquids.   Disorders: reduced lingual strength/ROM/Rate of motion, reduced tongue to palate contact, reduced BOT to posterior pharyngeal wall contact, delay in trigger of the swallow reflex, reduced hyo-laryngeal excursion, reduced laryngeal closure, reduced pharyngeal contractility, and signs of reduced supraglottic sensation. Pt presents with an oropharyngeal dysphagia superimposed upon reduced coordination of respiration with deglutition. The swallow is marked by uncontrolled loss, post swallow pharyngeal residue, silent laryngeal penetration with incomplete retrieval with all consistencies administered, and deep silent laryngeal penetration (to the level of the vocal folds) with teaspoon amounts of honey thick liquids.   Disorders: reduced lingual strength/ROM/Rate of motion, reduced tongue to palate contact, reduced BOT to posterior pharyngeal wall contact, delay in trigger of the swallow reflex, reduced hyo-laryngeal excursion, reduced laryngeal closure, reduced pharyngeal contractility, and signs of reduced supraglottic sensation.     + CN  + CN  + CN

## 2017-11-17 NOTE — PROGRESS NOTE ADULT - SUBJECTIVE AND OBJECTIVE BOX
Follow-up Pulm Progress Note  Amauri Jackson MD  [phone]    Events noted  Remains Extubated  NPO after FEEST  Breathing comfortably supine on nasal oxygen supine  AFebrile on Meropenum  Abu US negative for GB disease  Off vasopressors    Vital Signs Last 24 Hrs  T(C): 37.4 (17 Nov 2017 04:00), Max: 37.4 (17 Nov 2017 04:00)  T(F): 99.3 (17 Nov 2017 04:00), Max: 99.3 (17 Nov 2017 04:00)  HR: 71 (17 Nov 2017 12:17) (64 - 101)  BP: 172/83 (17 Nov 2017 10:00) (123/62 - 189/88)  BP(mean): 119 (17 Nov 2017 10:00) (64 - 126)  RR: 24 (17 Nov 2017 12:17) (15 - 63)  SpO2: 99% (17 Nov 2017 12:17) (86% - 100%)                        8.3    4.3   )-----------( 255      ( 17 Nov 2017 04:23 )             24.4     11-17    148<H>  |  111<H>  |  36<H>  ----------------------------<  161<H>  4.3   |  25  |  1.06    Ca    8.5      17 Nov 2017 04:23  Phos  3.1     11-17  Mg     2.2     11-17    TPro  5.6<L>  /  Alb  2.8<L>  /  TBili  1.4<H>  /  DBili  x   /  AST  96<H>  /  ALT  153<H>  /  AlkPhos  337<H>  11-17      CXR: unchanged reticular opacities: no gross new pulm edema  TTE: Mild AS, hyperdynamic LV, PA 40-50  Physical Examination:  PULM: Bilateral crackles   CVS: Regular rate and rhythm, no murmurs, rubs, or gallops  ABD: Soft, non-tender  EXT:  1-2+edema (presacral)    RADIOLOGY REVIEWED  CXR: No change in bilateral reticular opacities    CT chest: see above    TTE:

## 2017-11-17 NOTE — PROGRESS NOTE ADULT - SUBJECTIVE AND OBJECTIVE BOX
Patient is a 77y old  Male who presents with a chief complaint of SOB (04 Nov 2017 14:33)      SUBJECTIVE / OVERNIGHT EVENTS:    feeling slightly better and a bit stronger  frustrated at not being able to eat  no acute events overnight    Vital Signs Last 24 Hrs  T(C): 36.8 (17 Nov 2017 20:00), Max: 37.4 (17 Nov 2017 04:00)  T(F): 98.3 (17 Nov 2017 20:00), Max: 99.3 (17 Nov 2017 04:00)  HR: 71 (17 Nov 2017 21:00) (62 - 671)  BP: 151/70 (17 Nov 2017 21:00) (125/62 - 189/88)  BP(mean): 100 (17 Nov 2017 21:00) (64 - 130)  RR: 25 (17 Nov 2017 21:00) (15 - 47)  SpO2: 100% (17 Nov 2017 21:00) (93% - 100%)  I&O's Summary    16 Nov 2017 07:01  -  17 Nov 2017 07:00  --------------------------------------------------------  IN: 950 mL / OUT: 35 mL / NET: 915 mL    17 Nov 2017 07:01  -  17 Nov 2017 21:55  --------------------------------------------------------  IN: 515 mL / OUT: 5 mL / NET: 510 mL        Physical Exam:   · Constitutional	detailed exam	  · Constitutional Details	pale weak	  · Eyes	EOMI; PERRL; no drainage or redness	  · Neck	No bruits; no thyromegaly or nodules	  · Respiratory	detailed exam	  · Respiratory Details	airway patent; good air movement, crackles present	  · Cardiovascular	detailed exam	  · Cardiovascular Details	no rub	  · Cardiovascular Details	positive S1; positive S2	  · Gastrointestinal	Soft, non-tender, no hepatosplenomegaly, normal bowel sounds	  · Extremities	detailed exam	  · Extremities Details	no clubbing; no cyanosis	  · Neurological	detailed exam	  · Neurological Details	awake and alert today	      LABS:                        8.3    4.3   )-----------( 255      ( 17 Nov 2017 04:23 )             24.4     11-17    148<H>  |  111<H>  |  36<H>  ----------------------------<  161<H>  4.3   |  25  |  1.06    Ca    8.5      17 Nov 2017 04:23  Phos  3.1     11-17  Mg     2.2     11-17    TPro  5.6<L>  /  Alb  2.8<L>  /  TBili  1.4<H>  /  DBili  x   /  AST  96<H>  /  ALT  153<H>  /  AlkPhos  337<H>  11-17    PT/INR - ( 17 Nov 2017 04:23 )   PT: 11.1 sec;   INR: 1.02 ratio         PTT - ( 16 Nov 2017 02:34 )  PTT:31.0 sec  CAPILLARY BLOOD GLUCOSE  182 (17 Nov 2017 21:00)  161 (17 Nov 2017 05:00)  136 (17 Nov 2017 00:45)  70 (17 Nov 2017 00:25)      POCT Blood Glucose.: 182 mg/dL (17 Nov 2017 21:23)  POCT Blood Glucose.: 323 mg/dL (17 Nov 2017 16:40)  POCT Blood Glucose.: 225 mg/dL (17 Nov 2017 10:06)  POCT Blood Glucose.: 136 mg/dL (17 Nov 2017 01:04)  POCT Blood Glucose.: 70 mg/dL (17 Nov 2017 00:23)            RADIOLOGY & ADDITIONAL TESTS:    Imaging Personally Reviewed:  [x] YES  [ ] NO    Consultant(s) Notes Reviewed:  [x] YES  [ ] NO      MEDICATIONS  (STANDING):  aspirin  chewable 81 milliGRAM(s) Oral daily  digoxin  Injectable 0.125 milliGRAM(s) IV Push daily  heparin  Injectable 5000 Unit(s) SubCutaneous every 8 hours  influenza   Vaccine 0.5 milliLiter(s) IntraMuscular once  insulin lispro (HumaLOG) corrective regimen sliding scale   SubCutaneous every 6 hours  meropenem IVPB 1000 milliGRAM(s) IV Intermittent every 8 hours  methylPREDNISolone sodium succinate Injectable 10 milliGRAM(s) IV Push daily  tiotropium 18 MICROgram(s) Capsule 1 Capsule(s) Inhalation daily    MEDICATIONS  (PRN):      Care Discussed with Consultants/Other Providers [x] YES  [ ] NO    HEALTH ISSUES - PROBLEM Dx:  CHRISTIANE (acute kidney injury): CHRISTIANE (acute kidney injury)  Bacteremia: Bacteremia  Neutropenia: Neutropenia  Need for prophylactic measure: Need for prophylactic measure  Oral thrush: Oral thrush  Nasal discomfort: Nasal discomfort  Gastroesophageal reflux disease without esophagitis: Gastroesophageal reflux disease without esophagitis  Hyperlipidemia, unspecified hyperlipidemia type: Hyperlipidemia, unspecified hyperlipidemia type  Type 2 diabetes mellitus without complication, without long-term current use of insulin: Type 2 diabetes mellitus without complication, without long-term current use of insulin  Coronary artery disease involving native coronary artery of native heart, angina presence unspecified: Coronary artery disease involving native coronary artery of native heart, angina presence unspecified  Hyperkalemia: Hyperkalemia  Lactic acidosis: Lactic acidosis  Transaminitis: Transaminitis  Idiopathic pulmonary fibrosis: Idiopathic pulmonary fibrosis  Shortness of breath: Shortness of breath

## 2017-11-17 NOTE — PROGRESS NOTE ADULT - ASSESSMENT
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 CT chest shows bilateral IPF  treated for Pulmonary fibrosis / Intersitial pulmonary dz exacerbation.  pt developed ARF intubated transferred to MICU w/ acute hypoxic respiratory failure w/ septic shock 2/2 to pseudomonal bacteremia.   pt extubated.      -Septic Shock / Pseudomonas bacteremia improved today c/w iv abx shock resolving, patient clinically improving, stable for downgrade to floor    -Nutrition - patient has failed speech and swallow eval, will need ng tube feeds for 7 days and regain strength prior to reassesment    -Idiopathic pulmonary fibrosis / Acute hypoxic expiratory failure - c/w steroids, imuran on hold due to septic shock, pulmonary appreciated       -Type 2 diabetes mellitus c/w insulin tx     -DVT ppx

## 2017-11-17 NOTE — SWALLOW BEDSIDE ASSESSMENT ADULT - MUCOSAL QUALITY
? necrosis along lingual tip (appears improved compared with yesterday)
? necrosis along lingual tip

## 2017-11-17 NOTE — PROGRESS NOTE ADULT - SUBJECTIVE AND OBJECTIVE BOX
CHIEF COMPLAINT: acute on chronic hypoxic respiratory failure 2/2 PNA    Interval Events: Hypertensive overnight to 190s systolic, treated with 10 mg IV hydralazine. Also complained of back pain, given 1 g of IV Tylenol. Noted to be hypoglycemic to 63, started on D51/3NS.    REVIEW OF SYSTEMS:  Constitutional: [X ] negative [ ] fevers [ ] chills [ ] weight loss [ ] weight gain  HEENT: [x ] negative [ ] dry eyes [ ] eye irritation [ ] postnasal drip [ ] nasal congestion  CV: [ x] negative  [ ] chest pain [ ] orthopnea [ ] palpitations [ ] murmur  Resp: [ ] negative [x ] cough [x ] shortness of breath [ ] dyspnea [ ] wheezing [ ] sputum [ ] hemoptysis  GI: [X ] negative [ ] nausea [ ] vomiting [ ] diarrhea [ ] constipation [ ] abd pain [ ] dysphagia   : [X ] negative [ ] dysuria [ ] nocturia [ ] hematuria [ ] increased urinary frequency  Musculoskeletal: [ ] negative [ ] back pain [ ] myalgias [ ] arthralgias [ ] fracture  Skin: [ ] negative [ ] rash [ ] itch  Neurological: [ ] negative [ ] headache [ ] dizziness [ ] syncope [ ] weakness [ ] numbness  Psychiatric: [ ] negative [ ] anxiety [ ] depression  Endocrine: [ ] negative [ ] diabetes [ ] thyroid problem  Hematologic/Lymphatic: [ ] negative [ ] anemia [ ] bleeding problem  Allergic/Immunologic: [ ] negative [ ] itchy eyes [ ] nasal discharge [ ] hives [ ] angioedema  [ ] All other systems negative  [ ] Unable to assess ROS because ________    OBJECTIVE:  ICU Vital Signs Last 24 Hrs  T(C): 37.4 (17 Nov 2017 04:00), Max: 37.4 (17 Nov 2017 04:00)  T(F): 99.3 (17 Nov 2017 04:00), Max: 99.3 (17 Nov 2017 04:00)  HR: 81 (17 Nov 2017 06:00) (60 - 101)  BP: 136/61 (17 Nov 2017 06:00) (123/62 - 192/84)  BP(mean): 88 (17 Nov 2017 06:00) (87 - 126)  ABP: --  ABP(mean): --  RR: 31 (17 Nov 2017 06:00) (15 - 63)  SpO2: 100% (17 Nov 2017 06:00) (86% - 100%)        11-16 @ 07:01  -  11-17 @ 07:00  --------------------------------------------------------  IN: 900 mL / OUT: 35 mL / NET: 865 mL      CAPILLARY BLOOD GLUCOSE  161 (17 Nov 2017 05:00)      POCT Blood Glucose.: 136 mg/dL (17 Nov 2017 01:04)      PHYSICAL EXAM:  GENERAL: awake, alert in NAD  HEAD:  Atraumatic, Normocephalic  EYES: EOMI, PERRLA, conjunctiva and sclera clear  NECK: Supple, No JVD  CHEST/LUNG: b/l course BS  HEART: tachycardic; No murmurs, rubs, or gallops  ABDOMEN: Soft, Nontender, Nondistended; Bowel sounds present  EXTREMITIES:  2+ Peripheral Pulses, No clubbing, cyanosis, or edema  NEUROLOGY: non focal  SKIN: decub behind left ear      LINES:    HOSPITAL MEDICATIONS:  aspirin  chewable 81 milliGRAM(s) Oral daily  heparin  Injectable 5000 Unit(s) SubCutaneous every 8 hours    meropenem IVPB 1000 milliGRAM(s) IV Intermittent every 8 hours    digoxin  Injectable 0.125 milliGRAM(s) IV Push daily    insulin lispro (HumaLOG) corrective regimen sliding scale   SubCutaneous every 6 hours  methylPREDNISolone sodium succinate Injectable 10 milliGRAM(s) IV Push daily    levalbuterol Inhalation 0.63 milliGRAM(s) Inhalation every 6 hours  tiotropium 18 MICROgram(s) Capsule 1 Capsule(s) Inhalation daily            dextrose 5% + sodium chloride 0.3%. 1000 milliLiter(s) IV Continuous <Continuous>    influenza   Vaccine 0.5 milliLiter(s) IntraMuscular once          LABS:                        8.3    4.3   )-----------( 255      ( 17 Nov 2017 04:23 )             24.4     Hgb Trend: 8.3<--, 9.3<--, 9.3<--, 8.4<--, 8.4<--  11-17    148<H>  |  111<H>  |  36<H>  ----------------------------<  161<H>  4.3   |  25  |  1.06    Ca    8.5      17 Nov 2017 04:23  Phos  3.1     11-17  Mg     2.2     11-17    TPro  5.6<L>  /  Alb  2.8<L>  /  TBili  1.4<H>  /  DBili  x   /  AST  96<H>  /  ALT  153<H>  /  AlkPhos  337<H>  11-17    Creatinine Trend: 1.06<--, 1.02<--, 1.12<--, 1.07<--, 1.07<--, 1.02<--  PT/INR - ( 17 Nov 2017 04:23 )   PT: 11.1 sec;   INR: 1.02 ratio         PTT - ( 16 Nov 2017 02:34 )  PTT:31.0 sec      Venous Blood Gas:  11-15 @ 20:55  7.43/41/43/27/74  VBG Lactate: 1.1      MICROBIOLOGY:     RADIOLOGY:  [ ] Reviewed and interpreted by me    EKG: CHIEF COMPLAINT: acute on chronic hypoxic respiratory failure 2/2 PNA    Interval Events: Hypertensive overnight to 190s systolic, treated with 10 mg IV hydralazine. Also complained of back pain, given 1 g of IV Tylenol with relief. This morning reports back pain is 10/10, describes it as sharp without radiation. Noted to be hypoglycemic to 63, started on D51/3NS.    REVIEW OF SYSTEMS:  Constitutional: [X ] negative [ ] fevers [ ] chills [ ] weight loss [ ] weight gain  HEENT: [x ] negative [ ] dry eyes [ ] eye irritation [ ] postnasal drip [ ] nasal congestion  CV: [ x] negative  [ ] chest pain [ ] orthopnea [ ] palpitations [ ] murmur  Resp: [ ] negative [x ] cough [x ] shortness of breath [ ] dyspnea [ ] wheezing [ ] sputum [ ] hemoptysis  GI: [X ] negative [ ] nausea [ ] vomiting [ ] diarrhea [ ] constipation [ ] abd pain [ ] dysphagia   : [X ] negative [ ] dysuria [ ] nocturia [ ] hematuria [ ] increased urinary frequency  Musculoskeletal: [ ] negative [X ] back pain [ ] myalgias [ ] arthralgias [ ] fracture  Skin: [ x] negative [ ] rash [ ] itch  Neurological: [ X] negative [ ] headache [ ] dizziness [ ] syncope [ ] weakness [ ] numbness  Psychiatric: [X ] negative [ ] anxiety [ ] depression  Endocrine: [X ] negative [ ] diabetes [ ] thyroid problem  Hematologic/Lymphatic: [ x] negative [ ] anemia [ ] bleeding problem  Allergic/Immunologic: [ x] negative [ ] itchy eyes [ ] nasal discharge [ ] hives [ ] angioedema  [ ] All other systems negative  [ ] Unable to assess ROS because ________    OBJECTIVE:  ICU Vital Signs Last 24 Hrs  T(C): 37.4 (17 Nov 2017 04:00), Max: 37.4 (17 Nov 2017 04:00)  T(F): 99.3 (17 Nov 2017 04:00), Max: 99.3 (17 Nov 2017 04:00)  HR: 81 (17 Nov 2017 06:00) (60 - 101)  BP: 136/61 (17 Nov 2017 06:00) (123/62 - 192/84)  BP(mean): 88 (17 Nov 2017 06:00) (87 - 126)  ABP: --  ABP(mean): --  RR: 31 (17 Nov 2017 06:00) (15 - 63)  SpO2: 100% (17 Nov 2017 06:00) (86% - 100%)        11-16 @ 07:01  -  11-17 @ 07:00  --------------------------------------------------------  IN: 900 mL / OUT: 35 mL / NET: 865 mL      CAPILLARY BLOOD GLUCOSE  161 (17 Nov 2017 05:00)      POCT Blood Glucose.: 136 mg/dL (17 Nov 2017 01:04)      PHYSICAL EXAM:  GENERAL: awake, alert in NAD  HEAD:  Atraumatic, Normocephalic  EYES: EOMI, PERRLA, conjunctiva and sclera clear  NECK: Supple, No JVD  CHEST/LUNG: b/l course BS  HEART: tachycardic; No murmurs, rubs, or gallops  ABDOMEN: Soft, Nontender, Nondistended; Bowel sounds present  EXTREMITIES:  2+ Peripheral Pulses, No clubbing, cyanosis, or edema  NEUROLOGY: non focal  SKIN: decub behind left ear      LINES:    HOSPITAL MEDICATIONS:  aspirin  chewable 81 milliGRAM(s) Oral daily  heparin  Injectable 5000 Unit(s) SubCutaneous every 8 hours    meropenem IVPB 1000 milliGRAM(s) IV Intermittent every 8 hours    digoxin  Injectable 0.125 milliGRAM(s) IV Push daily    insulin lispro (HumaLOG) corrective regimen sliding scale   SubCutaneous every 6 hours  methylPREDNISolone sodium succinate Injectable 10 milliGRAM(s) IV Push daily    levalbuterol Inhalation 0.63 milliGRAM(s) Inhalation every 6 hours  tiotropium 18 MICROgram(s) Capsule 1 Capsule(s) Inhalation daily            dextrose 5% + sodium chloride 0.3%. 1000 milliLiter(s) IV Continuous <Continuous>    influenza   Vaccine 0.5 milliLiter(s) IntraMuscular once          LABS:                        8.3    4.3   )-----------( 255      ( 17 Nov 2017 04:23 )             24.4     Hgb Trend: 8.3<--, 9.3<--, 9.3<--, 8.4<--, 8.4<--  11-17    148<H>  |  111<H>  |  36<H>  ----------------------------<  161<H>  4.3   |  25  |  1.06    Ca    8.5      17 Nov 2017 04:23  Phos  3.1     11-17  Mg     2.2     11-17    TPro  5.6<L>  /  Alb  2.8<L>  /  TBili  1.4<H>  /  DBili  x   /  AST  96<H>  /  ALT  153<H>  /  AlkPhos  337<H>  11-17    Creatinine Trend: 1.06<--, 1.02<--, 1.12<--, 1.07<--, 1.07<--, 1.02<--  PT/INR - ( 17 Nov 2017 04:23 )   PT: 11.1 sec;   INR: 1.02 ratio         PTT - ( 16 Nov 2017 02:34 )  PTT:31.0 sec      Venous Blood Gas:  11-15 @ 20:55  7.43/41/43/27/74  VBG Lactate: 1.1      MICROBIOLOGY:   Culture - Sputum . (11.12.17 @ 12:32)    Gram Stain:   Numerous polymorphonuclear leukocytes per low power field  Rare Squamous epithelial cells per low power field  Numerous Gram Negative Rods per oil power field  Rare Yeast like cells per oil power field    -  Amikacin: S <=8    -  Aztreonam: R >16    -  Cefepime: R >16    -  Ceftazidime: R >16    -  Ciprofloxacin: I 2    -  Gentamicin: S 4    -  Imipenem: R >8    -  Levofloxacin: R >4    -  Meropenem: R >8    -  Piperacillin/Tazobactam: R >64    -  Tobramycin: R >8    Specimen Source: .Sputum Sputum    Culture Results:   Moderate Pseudomonas aeruginosa (Carbapenem Resistant)  No routine respiratory claus Isolated    Organism Identification: Pseudomonas aeruginosa (Carbapenem Resistant)    Organism: Pseudomonas aeruginosa (Carbapenem Resistant)    Method Type: PAUL    Culture - Blood in AM (11.11.17 @ 08:23)    Specimen Source: .Blood Blood-Venous    Culture Results:   No growth at 5 days.        Culture - Blood (11.07.17 @ 08:47)    -  Pseudomonas aeruginosa: Detec    -  Cefepime: S <=2    -  Ceftazidime: S <=1    -  Aztreonam: S <=4    -  Amikacin: S <=8    Gram Stain:   Growth in aerobic bottle: Gram Negative Rods    -  Ciprofloxacin: S <=0.5    -  Meropenem: S <=1    -  Levofloxacin: S <=1    -  Gentamicin: S 4    -  Imipenem: S <=1    -  Piperacillin/Tazobactam: S <=8    -  Tobramycin: S <=2    Specimen Source: .Blood Blood-Venous    Organism: Blood Culture PCR    Organism: Pseudomonas aeruginosa    Culture Results:   Growth in aerobic bottle: Pseudomonas aeruginosa  ***Blood Panel PCR results on this specimen are available  approximately 3 hours after the Gram stain result.***  Gram stain, PCR, and/or culture results may not always  correspond due to differencein methodologies.  ************************************************************  This PCR assay was performed using [employer].  The following targets are tested for: Enterococcus,  vancomycin resistant enterococci, Listeria monocytogenes,  coagulase negative staphylococci, S. aureus,  methicillin resistant S. aureus, Streptococcus agalactiae  (Group B), S. pneumoniae, S. pyogenes (Group A),  Acinetobacter baumannii, Enterobacter cloacae, E. coli,  Klebsiella oxytoca, K. pneumoniae, Proteus sp.,  Serratia marcescens, Haemophilus influenzae,  Neisseria meningitidis, Pseudomonas aeruginosa, Candida  albicans, C. glabrata, C krusei, C parapsilosis,  C. tropicalis and the KPC resistance gene.    Organism Identification: Blood Culture PCR  Pseudomonas aeruginosa    Method Type: PCR    Method Type: PAUL CHIEF COMPLAINT: acute on chronic hypoxic respiratory failure 2/2 PNA    Interval Events: Hypertensive overnight to 190s systolic, treated with 10 mg IV hydralazine. Also complained of back pain, given 1 g of IV Tylenol with relief. This morning reports back pain is 10/10, describes it as sharp without radiation. Noted to be hypoglycemic to 63, started on D51/3NS.    REVIEW OF SYSTEMS:  Constitutional: [X ] negative [ ] fevers [ ] chills [ ] weight loss [ ] weight gain  HEENT: [x ] negative [ ] dry eyes [ ] eye irritation [ ] postnasal drip [ ] nasal congestion  CV: [ x] negative  [ ] chest pain [ ] orthopnea [ ] palpitations [ ] murmur  Resp: [ ] negative [x ] cough [x ] shortness of breath [ ] dyspnea [ ] wheezing [ ] sputum [ ] hemoptysis  GI: [X ] negative [ ] nausea [ ] vomiting [ ] diarrhea [ ] constipation [ ] abd pain [ ] dysphagia   : [X ] negative [ ] dysuria [ ] nocturia [ ] hematuria [ ] increased urinary frequency  Musculoskeletal: [ ] negative [X ] back pain [ ] myalgias [ ] arthralgias [ ] fracture  Skin: [ x] negative [ ] rash [ ] itch  Neurological: [ X] negative [ ] headache [ ] dizziness [ ] syncope [ ] weakness [ ] numbness  Psychiatric: [X ] negative [ ] anxiety [ ] depression  Endocrine: [X ] negative [ ] diabetes [ ] thyroid problem  Hematologic/Lymphatic: [ x] negative [ ] anemia [ ] bleeding problem  Allergic/Immunologic: [ x] negative [ ] itchy eyes [ ] nasal discharge [ ] hives [ ] angioedema  [ ] All other systems negative  [ ] Unable to assess ROS because ________    OBJECTIVE:  ICU Vital Signs Last 24 Hrs  T(C): 37.4 (17 Nov 2017 04:00), Max: 37.4 (17 Nov 2017 04:00)  T(F): 99.3 (17 Nov 2017 04:00), Max: 99.3 (17 Nov 2017 04:00)  HR: 81 (17 Nov 2017 06:00) (60 - 101)  BP: 136/61 (17 Nov 2017 06:00) (123/62 - 192/84)  BP(mean): 88 (17 Nov 2017 06:00) (87 - 126)  ABP: --  ABP(mean): --  RR: 31 (17 Nov 2017 06:00) (15 - 63)  SpO2: 100% (17 Nov 2017 06:00) (86% - 100%)        11-16 @ 07:01  -  11-17 @ 07:00  --------------------------------------------------------  IN: 900 mL / OUT: 35 mL / NET: 865 mL      CAPILLARY BLOOD GLUCOSE  161 (17 Nov 2017 05:00)      POCT Blood Glucose.: 136 mg/dL (17 Nov 2017 01:04)      PHYSICAL EXAM:  GENERAL: awake, alert in NAD  HEAD:  Atraumatic, Normocephalic  EYES: EOMI, PERRLA, conjunctiva and sclera clear  NECK: Supple, No JVD  CHEST/LUNG: b/l course BS  HEART: tachycardic; No murmurs, rubs, or gallops  ABDOMEN: Soft, Nontender, Nondistended; Bowel sounds present  EXTREMITIES:  2+ Peripheral Pulses, No clubbing, cyanosis, or edema  BACK: spinal and paraspinal TTP  NEUROLOGY: non focal  SKIN: decub behind left ear      LINES:    HOSPITAL MEDICATIONS:  aspirin  chewable 81 milliGRAM(s) Oral daily  heparin  Injectable 5000 Unit(s) SubCutaneous every 8 hours    meropenem IVPB 1000 milliGRAM(s) IV Intermittent every 8 hours    digoxin  Injectable 0.125 milliGRAM(s) IV Push daily    insulin lispro (HumaLOG) corrective regimen sliding scale   SubCutaneous every 6 hours  methylPREDNISolone sodium succinate Injectable 10 milliGRAM(s) IV Push daily    levalbuterol Inhalation 0.63 milliGRAM(s) Inhalation every 6 hours  tiotropium 18 MICROgram(s) Capsule 1 Capsule(s) Inhalation daily            dextrose 5% + sodium chloride 0.3%. 1000 milliLiter(s) IV Continuous <Continuous>    influenza   Vaccine 0.5 milliLiter(s) IntraMuscular once          LABS:                        8.3    4.3   )-----------( 255      ( 17 Nov 2017 04:23 )             24.4     Hgb Trend: 8.3<--, 9.3<--, 9.3<--, 8.4<--, 8.4<--  11-17    148<H>  |  111<H>  |  36<H>  ----------------------------<  161<H>  4.3   |  25  |  1.06    Ca    8.5      17 Nov 2017 04:23  Phos  3.1     11-17  Mg     2.2     11-17    TPro  5.6<L>  /  Alb  2.8<L>  /  TBili  1.4<H>  /  DBili  x   /  AST  96<H>  /  ALT  153<H>  /  AlkPhos  337<H>  11-17    Creatinine Trend: 1.06<--, 1.02<--, 1.12<--, 1.07<--, 1.07<--, 1.02<--  PT/INR - ( 17 Nov 2017 04:23 )   PT: 11.1 sec;   INR: 1.02 ratio         PTT - ( 16 Nov 2017 02:34 )  PTT:31.0 sec      Venous Blood Gas:  11-15 @ 20:55  7.43/41/43/27/74  VBG Lactate: 1.1      MICROBIOLOGY:   Culture - Sputum . (11.12.17 @ 12:32)    Gram Stain:   Numerous polymorphonuclear leukocytes per low power field  Rare Squamous epithelial cells per low power field  Numerous Gram Negative Rods per oil power field  Rare Yeast like cells per oil power field    -  Amikacin: S <=8    -  Aztreonam: R >16    -  Cefepime: R >16    -  Ceftazidime: R >16    -  Ciprofloxacin: I 2    -  Gentamicin: S 4    -  Imipenem: R >8    -  Levofloxacin: R >4    -  Meropenem: R >8    -  Piperacillin/Tazobactam: R >64    -  Tobramycin: R >8    Specimen Source: .Sputum Sputum    Culture Results:   Moderate Pseudomonas aeruginosa (Carbapenem Resistant)  No routine respiratory claus Isolated    Organism Identification: Pseudomonas aeruginosa (Carbapenem Resistant)    Organism: Pseudomonas aeruginosa (Carbapenem Resistant)    Method Type: PAUL    Culture - Blood in AM (11.11.17 @ 08:23)    Specimen Source: .Blood Blood-Venous    Culture Results:   No growth at 5 days.        Culture - Blood (11.07.17 @ 08:47)    -  Pseudomonas aeruginosa: Detec    -  Cefepime: S <=2    -  Ceftazidime: S <=1    -  Aztreonam: S <=4    -  Amikacin: S <=8    Gram Stain:   Growth in aerobic bottle: Gram Negative Rods    -  Ciprofloxacin: S <=0.5    -  Meropenem: S <=1    -  Levofloxacin: S <=1    -  Gentamicin: S 4    -  Imipenem: S <=1    -  Piperacillin/Tazobactam: S <=8    -  Tobramycin: S <=2    Specimen Source: .Blood Blood-Venous    Organism: Blood Culture PCR    Organism: Pseudomonas aeruginosa    Culture Results:   Growth in aerobic bottle: Pseudomonas aeruginosa  ***Blood Panel PCR results on this specimen are available  approximately 3 hours after the Gram stain result.***  Gram stain, PCR, and/or culture results may not always  correspond due to differencein methodologies.  ************************************************************  This PCR assay was performed using [employer].  The following targets are tested for: Enterococcus,  vancomycin resistant enterococci, Listeria monocytogenes,  coagulase negative staphylococci, S. aureus,  methicillin resistant S. aureus, Streptococcus agalactiae  (Group B), S. pneumoniae, S. pyogenes (Group A),  Acinetobacter baumannii, Enterobacter cloacae, E. coli,  Klebsiella oxytoca, K. pneumoniae, Proteus sp.,  Serratia marcescens, Haemophilus influenzae,  Neisseria meningitidis, Pseudomonas aeruginosa, Candida  albicans, C. glabrata, C krusei, C parapsilosis,  C. tropicalis and the KPC resistance gene.    Organism Identification: Blood Culture PCR  Pseudomonas aeruginosa    Method Type: PCR    Method Type: PAUL

## 2017-11-17 NOTE — PROGRESS NOTE ADULT - ASSESSMENT
Acute on chronic hypoxemic respiratory failure: clinically resoliving post extubation  Progressive ILD - unclear etiology  Psudomonoas bacteremia with pneumonia: resolving  OP dysphagia  Septic Shock - clinically resolved    REC  Cotinue antibiotics for bactermic pneumonia: favor 14 days total  Negative balance as tolerated  NPO for now: NGT for feeds - repeat swallow eval in 7 days  Continue nasal oxygen

## 2017-11-18 LAB
ALBUMIN SERPL ELPH-MCNC: 2.9 G/DL — LOW (ref 3.3–5)
ALP SERPL-CCNC: 307 U/L — HIGH (ref 40–120)
ALT FLD-CCNC: 141 U/L RC — HIGH (ref 10–45)
ANION GAP SERPL CALC-SCNC: 11 MMOL/L — SIGNIFICANT CHANGE UP (ref 5–17)
AST SERPL-CCNC: 62 U/L — HIGH (ref 10–40)
BILIRUB SERPL-MCNC: 1.1 MG/DL — SIGNIFICANT CHANGE UP (ref 0.2–1.2)
BUN SERPL-MCNC: 48 MG/DL — HIGH (ref 7–23)
CALCIUM SERPL-MCNC: 8.5 MG/DL — SIGNIFICANT CHANGE UP (ref 8.4–10.5)
CHLORIDE SERPL-SCNC: 111 MMOL/L — HIGH (ref 96–108)
CO2 SERPL-SCNC: 26 MMOL/L — SIGNIFICANT CHANGE UP (ref 22–31)
CREAT SERPL-MCNC: 1.62 MG/DL — HIGH (ref 0.5–1.3)
GLUCOSE BLDC GLUCOMTR-MCNC: 206 MG/DL — HIGH (ref 70–99)
GLUCOSE SERPL-MCNC: 203 MG/DL — HIGH (ref 70–99)
HCT VFR BLD CALC: 25.1 % — LOW (ref 39–50)
HGB BLD-MCNC: 8.4 G/DL — LOW (ref 13–17)
MAGNESIUM SERPL-MCNC: 2.3 MG/DL — SIGNIFICANT CHANGE UP (ref 1.6–2.6)
MCHC RBC-ENTMCNC: 33.4 GM/DL — SIGNIFICANT CHANGE UP (ref 32–36)
MCHC RBC-ENTMCNC: 35.6 PG — HIGH (ref 27–34)
MCV RBC AUTO: 107 FL — HIGH (ref 80–100)
PHOSPHATE SERPL-MCNC: 4 MG/DL — SIGNIFICANT CHANGE UP (ref 2.5–4.5)
PLATELET # BLD AUTO: 270 K/UL — SIGNIFICANT CHANGE UP (ref 150–400)
POTASSIUM SERPL-MCNC: 4.9 MMOL/L — SIGNIFICANT CHANGE UP (ref 3.5–5.3)
POTASSIUM SERPL-SCNC: 4.9 MMOL/L — SIGNIFICANT CHANGE UP (ref 3.5–5.3)
PROT SERPL-MCNC: 5.8 G/DL — LOW (ref 6–8.3)
RBC # BLD: 2.36 M/UL — LOW (ref 4.2–5.8)
RBC # FLD: 19.2 % — HIGH (ref 10.3–14.5)
SODIUM SERPL-SCNC: 148 MMOL/L — HIGH (ref 135–145)
WBC # BLD: 3.6 K/UL — LOW (ref 3.8–10.5)
WBC # FLD AUTO: 3.6 K/UL — LOW (ref 3.8–10.5)

## 2017-11-18 RX ORDER — ACETAMINOPHEN 500 MG
1000 TABLET ORAL ONCE
Qty: 0 | Refills: 0 | Status: COMPLETED | OUTPATIENT
Start: 2017-11-18 | End: 2017-11-18

## 2017-11-18 RX ORDER — SALIVA SUBSTITUTE COMB NO.11 351 MG
5 POWDER IN PACKET (EA) MUCOUS MEMBRANE
Qty: 0 | Refills: 0 | Status: DISCONTINUED | OUTPATIENT
Start: 2017-11-18 | End: 2017-11-18

## 2017-11-18 RX ORDER — SODIUM CHLORIDE 9 MG/ML
1000 INJECTION, SOLUTION INTRAVENOUS
Qty: 0 | Refills: 0 | Status: COMPLETED | OUTPATIENT
Start: 2017-11-18 | End: 2017-11-18

## 2017-11-18 RX ORDER — DIGOXIN 250 MCG
0.12 TABLET ORAL DAILY
Qty: 0 | Refills: 0 | Status: DISCONTINUED | OUTPATIENT
Start: 2017-11-18 | End: 2017-12-05

## 2017-11-18 RX ORDER — SALIVA SUBSTITUTE COMB NO.11 351 MG
1 POWDER IN PACKET (EA) MUCOUS MEMBRANE THREE TIMES A DAY
Qty: 0 | Refills: 0 | Status: DISCONTINUED | OUTPATIENT
Start: 2017-11-18 | End: 2017-12-05

## 2017-11-18 RX ORDER — POLYETHYLENE GLYCOL 3350 17 G/17G
17 POWDER, FOR SOLUTION ORAL
Qty: 0 | Refills: 0 | Status: DISCONTINUED | OUTPATIENT
Start: 2017-11-18 | End: 2017-11-24

## 2017-11-18 RX ORDER — SENNA PLUS 8.6 MG/1
10 TABLET ORAL AT BEDTIME
Qty: 0 | Refills: 0 | Status: DISCONTINUED | OUTPATIENT
Start: 2017-11-18 | End: 2017-11-24

## 2017-11-18 RX ADMIN — Medication 10 MILLIGRAM(S): at 17:38

## 2017-11-18 RX ADMIN — Medication 4: at 18:57

## 2017-11-18 RX ADMIN — Medication 1000 MILLIGRAM(S): at 19:56

## 2017-11-18 RX ADMIN — HEPARIN SODIUM 5000 UNIT(S): 5000 INJECTION INTRAVENOUS; SUBCUTANEOUS at 05:34

## 2017-11-18 RX ADMIN — Medication 0.12 MILLIGRAM(S): at 14:37

## 2017-11-18 RX ADMIN — HEPARIN SODIUM 5000 UNIT(S): 5000 INJECTION INTRAVENOUS; SUBCUTANEOUS at 17:39

## 2017-11-18 RX ADMIN — Medication 4: at 05:34

## 2017-11-18 RX ADMIN — Medication 400 MILLIGRAM(S): at 06:57

## 2017-11-18 RX ADMIN — POLYETHYLENE GLYCOL 3350 17 GRAM(S): 17 POWDER, FOR SOLUTION ORAL at 05:34

## 2017-11-18 RX ADMIN — MEROPENEM 100 MILLIGRAM(S): 1 INJECTION INTRAVENOUS at 05:34

## 2017-11-18 RX ADMIN — Medication 400 MILLIGRAM(S): at 18:57

## 2017-11-18 RX ADMIN — Medication 1000 MILLIGRAM(S): at 07:30

## 2017-11-18 RX ADMIN — Medication 81 MILLIGRAM(S): at 14:37

## 2017-11-18 RX ADMIN — TIOTROPIUM BROMIDE 1 CAPSULE(S): 18 CAPSULE ORAL; RESPIRATORY (INHALATION) at 17:38

## 2017-11-18 RX ADMIN — SODIUM CHLORIDE 75 MILLILITER(S): 9 INJECTION, SOLUTION INTRAVENOUS at 05:53

## 2017-11-18 RX ADMIN — Medication 1 SPRAY(S): at 17:43

## 2017-11-18 RX ADMIN — Medication 10 MILLIGRAM(S): at 05:34

## 2017-11-18 NOTE — PROGRESS NOTE ADULT - SUBJECTIVE AND OBJECTIVE BOX
[facility], Division of Infectious Diseases  LOGAN Chavarria A. Lee    Name: MADDIE MALLOY  Age: 77y  Gender: Male  MRN: [id number]    Interval History--  Notes reviewed. Transferred out of ICU to floor. No complaints. Breathing comfortably on NC. Denies fevers, chills, or rigors. No pain.     Past Medical History--  Asbestos exposure  Kidney stones  Coronary artery disease involving native coronary artery of native heart, angina presence unspecified  Cataract  Rotator cuff rupture, right  GERD (gastroesophageal reflux disease)  Hyperlipemia  Diabetes mellitus  Encounter for removal of ureteral stent  S/P CABG (coronary artery bypass graft)  S/P rotator cuff surgery  S/P lens implant  S/P appendectomy      For details regarding the patient's social history, family history, and other miscellaneous elements, please refer the initial infectious diseases consultation and/or the admitting history and physical examination for this admission.    Allergies    No Known Allergies    Intolerances        Medications--  Antibiotics:    Immunologic:  influenza   Vaccine 0.5 milliLiter(s) IntraMuscular once    Other:  aspirin  chewable  digoxin     Tablet  heparin  Injectable  insulin lispro (HumaLOG) corrective regimen sliding scale  polyethylene glycol 3350  predniSONE   Tablet  saliva substitute (BIOTENE MOISTURIZING MOUTH SPRAY)  senna Syrup  tiotropium 18 MICROgram(s) Capsule      Review of Systems--  A 10-point review of systems was obtained.     Pertinent positives and negatives--  Constitutional: No fevers. No Chills. No Rigors.   Cardiovascular: No chest pain. No palpitations.  Respiratory: No shortness of breath. No cough.  Gastrointestinal: No nausea or vomiting. No diarrhea or constipation.   Psychiatric: Pleasant. Appropriate affect.    Review of systems otherwise negative except as previously noted.    Physical Examination--  Vital Signs: T(F): 98.2 (11-18-17 @ 15:30), Max: 99 (11-18-17 @ 00:00)  HR: 83 (11-18-17 @ 15:30)  BP: 145/71 (11-18-17 @ 15:30)  RR: 18 (11-18-17 @ 15:30)  SpO2: 100% (11-18-17 @ 15:30)  Wt(kg): --  General: Frail but nontoxic-appearing Male in no acute distress.  HEENT: AT/NC. Anicteric. Conjunctiva pink and moist. 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 Dentition fair. NGT.  Neck: Not rigid. No sense of mass. CCL C/D/I  Nodes: None palpable.  Lungs: Diminished breath sounds bilaterally without rales, wheezing or rhonchi  Heart: Regular rate and rhythm. No Murmur. No rub. No gallop. No palpable thrill.  Abdomen: Bowel sounds present and normoactive. Soft. Mildly distended. Nontender. No sense of mass. No organomegaly.  Extremities: No cyanosis or clubbing. 1+edema.   Skin: Warm. Dry. Good turgor. No rash. No vasculitic stigmata.  Psychiatric: Appropriate affect and mood for situation.         Laboratory Studies--  CBC                        8.4    3.6   )-----------( 270      ( 18 Nov 2017 03:24 )             25.1       Chemistries  11-18    148<H>  |  111<H>  |  48<H>  ----------------------------<  203<H>  4.9   |  26  |  1.62<H>    Ca    8.5      18 Nov 2017 03:24  Phos  4.0     11-18  Mg     2.3     11-18    TPro  5.8<L>  /  Alb  2.9<L>  /  TBili  1.1  /  DBili  x   /  AST  62<H>  /  ALT  141<H>  /  AlkPhos  307<H>  11-18      Culture Data  No new cx data

## 2017-11-18 NOTE — PROGRESS NOTE ADULT - ATTENDING COMMENTS
I'm available for issues over the remainder of the weekend, please call if ID input needed.    Sharan Scruggs MD  [phone]

## 2017-11-18 NOTE — OCCUPATIONAL THERAPY INITIAL EVALUATION ADULT - ADDITIONAL COMMENTS
lives w/wife in private home 3 steps to enter w/ handrail, flight to bedroom and was recently put on 02 since August using 3 liters nasal cannula. pt was I with all adls and functional mobility tasks prior to hospitalization

## 2017-11-18 NOTE — PROGRESS NOTE ADULT - SUBJECTIVE AND OBJECTIVE BOX
Follow-up Pulm Progress Note  Amauri Jackson MD  [phone]    Events noted  Remains Extubated  NPO after FEEST  Breathing comfortably supine on nasal oxygen supine  Dyspnea resovled at rest  AFebrile off antibiotics  Abu US negative for GB disease  Off vasopressors    Vital Signs Last 24 Hrs  T(C): 36.8 (18 Nov 2017 08:00), Max: 37.2 (18 Nov 2017 00:00)  T(F): 98.2 (18 Nov 2017 08:00), Max: 99 (18 Nov 2017 00:00)  HR: 64 (18 Nov 2017 09:00) (62 - 671)  BP: 137/70 (18 Nov 2017 09:00) (125/62 - 183/92)  BP(mean): 97 (18 Nov 2017 09:00) (86 - 130)  RR: 16 (18 Nov 2017 09:00) (15 - 47)  SpO2: 100% (18 Nov 2017 09:00) (94% - 100%)                          8.4    3.6   )-----------( 270      ( 18 Nov 2017 03:24 )             25.1     11-18    148<H>  |  111<H>  |  48<H>  ----------------------------<  203<H>  4.9   |  26  |  1.62<H>    Ca    8.5      18 Nov 2017 03:24  Phos  4.0     11-18  Mg     2.3     11-18    TPro  5.8<L>  /  Alb  2.9<L>  /  TBili  1.1  /  DBili  x   /  AST  62<H>  /  ALT  141<H>  /  AlkPhos  307<H>  11-18      CXR: unchanged reticular opacities: no gross new pulm edema  TTE: Mild AS, hyperdynamic LV, PA 40-50  Physical Examination:  PULM: Bibasilar crackels   CVS: Regular rate and rhythm, no murmurs, rubs, or gallops  ABD: Soft, non-tender  EXT:  1-2+edema (presacral)    RADIOLOGY REVIEWED  CXR: No change in bilateral reticular opacities    CT chest: see above    TTE:

## 2017-11-18 NOTE — OCCUPATIONAL THERAPY INITIAL EVALUATION ADULT - PERTINENT HX OF CURRENT PROBLEM, REHAB EVAL
78 y/o M with reports of increased dyspnea on exertion and shortness of breath.Cxray (11/1): bilateral reticular opacities consistent pulmonary fibrosis. US abdomen: no gallbladder disease. CT chest/abdomen/pelvis: Mild wall thickening versus underdistention of the colon, particularly

## 2017-11-18 NOTE — OCCUPATIONAL THERAPY INITIAL EVALUATION ADULT - PLANNED THERAPY INTERVENTIONS, OT EVAL
IADL retraining/balance training/neuromuscular re-education/parent/caregiver training.../ADL retraining/bed mobility training/motor coordination training/fine motor coordination training/transfer training/ROM/strengthening/stretching

## 2017-11-18 NOTE — PROGRESS NOTE ADULT - SUBJECTIVE AND OBJECTIVE BOX
Patient is a 77y old  Male who presents with a chief complaint of SOB (04 Nov 2017 14:33)      SUBJECTIVE / OVERNIGHT EVENTS:    no events overngiht pt resting in bed.  pt notes difficulty clearing secreation and is using suction.  pt son bedside.  pt notes weak cough which he attributes to pain med.  pt son bedside.      Vital Signs Last 24 Hrs  T(C): 36.8 (18 Nov 2017 08:00), Max: 37.2 (18 Nov 2017 00:00)  T(F): 98.2 (18 Nov 2017 08:00), Max: 99 (18 Nov 2017 00:00)  HR: 64 (18 Nov 2017 09:00) (62 - 671)  BP: 137/70 (18 Nov 2017 09:00) (125/62 - 179/82)  BP(mean): 97 (18 Nov 2017 09:00) (86 - 120)  RR: 16 (18 Nov 2017 09:00) (15 - 46)  SpO2: 100% (18 Nov 2017 09:00) (94% - 100%)  I&O's Summary    17 Nov 2017 07:01  -  18 Nov 2017 07:00  --------------------------------------------------------  IN: 925 mL / OUT: 5 mL / NET: 920 mL            LABS:                        8.4    3.6   )-----------( 270      ( 18 Nov 2017 03:24 )             25.1     11-18    148<H>  |  111<H>  |  48<H>  ----------------------------<  203<H>  4.9   |  26  |  1.62<H>    Ca    8.5      18 Nov 2017 03:24  Phos  4.0     11-18  Mg     2.3     11-18    TPro  5.8<L>  /  Alb  2.9<L>  /  TBili  1.1  /  DBili  x   /  AST  62<H>  /  ALT  141<H>  /  AlkPhos  307<H>  11-18    PT/INR - ( 17 Nov 2017 04:23 )   PT: 11.1 sec;   INR: 1.02 ratio           CAPILLARY BLOOD GLUCOSE  203 (18 Nov 2017 04:00)  182 (17 Nov 2017 21:00)      POCT Blood Glucose.: 182 mg/dL (17 Nov 2017 21:23)  POCT Blood Glucose.: 323 mg/dL (17 Nov 2017 16:40)            RADIOLOGY & ADDITIONAL TESTS:    Imaging Personally Reviewed:  [x] YES  [ ] NO    Consultant(s) Notes Reviewed:  [x] YES  [ ] NO      MEDICATIONS  (STANDING):  aspirin  chewable 81 milliGRAM(s) Oral daily  digoxin  Injectable 0.125 milliGRAM(s) IV Push daily  heparin  Injectable 5000 Unit(s) SubCutaneous every 8 hours  influenza   Vaccine 0.5 milliLiter(s) IntraMuscular once  insulin lispro (HumaLOG) corrective regimen sliding scale   SubCutaneous every 6 hours  polyethylene glycol 3350 17 Gram(s) Oral two times a day  predniSONE   Tablet 10 milliGRAM(s) Oral daily  saliva substitute (BIOTENE MOISTURIZING MOUTH SPRAY) 1 [place](s) Topical three times a day  senna Syrup 10 milliLiter(s) Oral at bedtime  tiotropium 18 MICROgram(s) Capsule 1 Capsule(s) Inhalation daily    MEDICATIONS  (PRN):      Care Discussed with Consultants/Other Providers [x] YES  [ ] NO    HEALTH ISSUES - PROBLEM Dx:  CHRISTIANE (acute kidney injury): CHRISTIANE (acute kidney injury)  Bacteremia: Bacteremia  Neutropenia: Neutropenia  Need for prophylactic measure: Need for prophylactic measure  Oral thrush: Oral thrush  Nasal discomfort: Nasal discomfort  Gastroesophageal reflux disease without esophagitis: Gastroesophageal reflux disease without esophagitis  Hyperlipidemia, unspecified hyperlipidemia type: Hyperlipidemia, unspecified hyperlipidemia type  Type 2 diabetes mellitus without complication, without long-term current use of insulin: Type 2 diabetes mellitus without complication, without long-term current use of insulin  Coronary artery disease involving native coronary artery of native heart, angina presence unspecified: Coronary artery disease involving native coronary artery of native heart, angina presence unspecified  Hyperkalemia: Hyperkalemia  Lactic acidosis: Lactic acidosis  Transaminitis: Transaminitis  Idiopathic pulmonary fibrosis: Idiopathic pulmonary fibrosis  Shortness of breath: Shortness of breath

## 2017-11-18 NOTE — PROGRESS NOTE ADULT - ASSESSMENT
Acute on chronic hypoxemic respiratory failure: clinically resoliving post extubation  Progressive ILD - unclear etiology  Pseudomonoas bacteremia with pneumonia: resolving  OP dysphagia  Septic Shock - clinically resolved    REC  OBserve off antibiotics  = to Negative balance as tolerated  NPO for now: NGT for feeds - repeat swallow eval in 7 days  Continue nasal oxygen  Check CXR today

## 2017-11-18 NOTE — PROGRESS NOTE ADULT - ASSESSMENT
77M h/o DM2, GERD, CABG (2016) and interstitial lung disease (UIP) on 3L home O2, p/w increasing REAGAN thought to be ILD flare transferred to the MICU for pseudomonas PNA c/b sepsis 2/2 to PNA/pseudomonas bacteremia hospital course further c/b new onset afib     #Neuro  -Awake, alert. No issues    #Pulm  Acute on chronic respiratory failure(ILD) 2/2 sepsis/PNA   - Intubated 11/7 for hypox resp failure and increased WOB, Extubated to NC with bipap as needed  - Completed course of meropenem. (10 days) Appreciate ID recs, given clinical improvement does not require double pseudomonas coverage  -c/w Solumedrol 10 q daily  - c/w Duonebs q6.   - Given septic picture holding pts imuran for now     #Cardio  Hypotension 2/2 Sepsis  - Pt responded well to IVF and has good LV systolic function. Now hypertensive. Consider starting iv lopressor while npo     Afib/aflutter: bradycardia resolved now off Precedex   - Continue with digoxin. Avoid amio in the setting of ILD.    #GI  -Failed S/S evaluation, NG tube inserted with plan for repeat evaluation. Continue with glucerna tube feeds  Colitis: improving, lactate resolved. Continue with bowel regimen  Transaminitis: stable, likely sepsis mediated   GERD:  - c/w Protonix    #Renal  CHRISTIANE likely prerenal in the setting of dehydration/nsaid use  -s/p 1 L bolus, will start free water  continue to trend cr, if no improvement obtain urine lytes  avoid nephrotoxins    #ID  Sepsis  - Repeat blood cx NGTD. CNS may be contaminant, vancomycin discontinued. Bcx 11/7 growing Pseudomonas gram (-) rods; sensitive to meropenem. Sputum cx growing CRE, dosed an additional gentamicin 400mg (was dosed amikacin and gentamicin previously this admission  -completed Azithromycin 11/7 (day 7)   -bacteremia 2/2 PNA vs gut translocation      #Endo  DM2  one isolated episode of hypoglycemia to 64. Repeat . Hold off on D5, monitor FS in the setting of being NPO  -NPH held. Continue with ISS. Continue to monitor and adjust as necessary     #Heme  Thrombocytopenia: likely 2/2 to Zosyn, medication-induced, less likely HIT. HIT panel negative. Continue to trend platelets.   -Resolved.   Neutropenia   - Improving, 2/2 sepsis vs medication induced. Continue to trend    #DVT ppx: SQH  Dispo: PT recs rehab/[facility] 77M h/o DM2, GERD, CABG (2016) and interstitial lung disease (UIP) on 3L home O2, p/w increasing REAGAN thought to be ILD flare transferred to the MICU for pseudomonas PNA c/b sepsis 2/2 to PNA/pseudomonas bacteremia hospital course further c/b new onset afib     #Neuro  -Awake, alert. No issues    #Pulm  Acute on chronic respiratory failure(ILD) 2/2 sepsis/PNA   - Intubated 11/7 for hypox resp failure and increased WOB, Extubated to NC with bipap as needed  - Completed course of meropenem. (10 days) Appreciate ID recs, given clinical improvement does not require double pseudomonas coverage  -c/w prednisone 10 mg  - c/w Duonebs q6  - Given septic picture holding pts imuran for now     #Cardio  Hypotension 2/2 Sepsis  - Pt responded well to IVF and has good LV systolic function. Now hypertensive. Consider starting metoprolol if bp tolerates   Afib/aflutter: bradycardia resolved now off Precedex   - Continue with digoxin. Avoid amio in the setting of ILD.    #GI  -Failed S/S evaluation, NG tube inserted with plan for repeat evaluation. Continue with glucerna tube feeds  Colitis: improving, lactate resolved. Continue with bowel regimen  Transaminitis: stable, likely sepsis mediated   GERD:  - c/w Protonix    #Renal  CHRISTIANE likely prerenal in the setting of dehydration/nsaid use  -s/p 1 L bolus, will start free water  continue to trend cr, if no improvement obtain urine lytes  avoid nephrotoxins    #ID  Sepsis  - Repeat blood cx NGTD. CNS may be contaminant, vancomycin discontinued. Bcx 11/7 growing Pseudomonas gram (-) rods; sensitive to meropenem. Sputum cx growing CRE, dosed an additional gentamicin 400mg (was dosed amikacin and gentamicin previously this admission  -completed Azithromycin 11/7 (day 7)   -bacteremia 2/2 PNA vs gut translocation      #Endo  DM2  -Monitor FS, continue with ISS.  Start basal/bolus if necessary. Continue to monitor and adjust as necessary     #Heme  Thrombocytopenia: likely 2/2 to Zosyn, medication-induced, less likely HIT. HIT panel negative. Continue to trend platelets.   -Resolved.   Neutropenia   - downtrending 2/2 medication induced. Continue to trend    #DVT ppx: SQH  Dispo: PT recs rehab/[facility]

## 2017-11-18 NOTE — PROGRESS NOTE ADULT - ASSESSMENT
77M with ILD on home O2, DM, GERd, admitted with dyspnea and treated for exacerbation of chronic lung disease with steroids and immunesuppression  complicated hospital course with septic shock, MICU stay requiring intubation and blood pressure support secondary to pseudomonas bacteremia / pneumonia  Now extubated, out of ICU, and off antibiotics  NGT placed, ongoing reassessment re: ability to take PO

## 2017-11-18 NOTE — OCCUPATIONAL THERAPY INITIAL EVALUATION ADULT - DIAGNOSIS, OT EVAL
Pt impaired for ADLS/IALDS/FUNCTIONAL MOBILITY 2' decreased strength, coordination, endurance and sitting/ standing tolerance.

## 2017-11-18 NOTE — PROGRESS NOTE ADULT - ASSESSMENT
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 CT chest shows bilateral IPF  treated for Pulmonary fibrosis / Intersitial pulmonary dz exacerbation.  pt developed ARF intubated transferred to MICU w/ acute hypoxic respiratory failure w/ septic shock 2/2 to pseudomonal bacteremia.   pt extubated downgraded to floor.      Pseudomonas bacteremia improved today c/w iv abd for now shock  resolved.      -Nutrition - s/p peg.  pt has difficulty clearing secretions w/ weak cough.  do not increase feeds today as fear for aspiration.  pt recently extubated and is at risk for reintubation.    monitor today, nutrition f/u pending.      -Idiopathic pulmonary fibrosis / Acute hypoxic expiratory failure - c/w steroids, imuran on hold will discuss with heme regarding restarting        -Type 2 diabetes mellitus c/w insulin tx     -DVT ppx

## 2017-11-18 NOTE — PROGRESS NOTE ADULT - SUBJECTIVE AND OBJECTIVE BOX
CHIEF COMPLAINT: acute on chronic hypoxic respiratory failure 2/2 PNA    Interval Events: NG tube inserted. Noted to have CHRISTIANE overnight    REVIEW OF SYSTEMS:  Constitutional: [X ] negative [ ] fevers [ ] chills [ ] weight loss [ ] weight gain  HEENT: [x ] negative [ ] dry eyes [ ] eye irritation [ ] postnasal drip [ ] nasal congestion  CV: [ x] negative  [ ] chest pain [ ] orthopnea [ ] palpitations [ ] murmur  Resp: [ ] negative [x ] cough [x ] shortness of breath [ ] dyspnea [ ] wheezing [ ] sputum [ ] hemoptysis  GI: [X ] negative [ ] nausea [ ] vomiting [ ] diarrhea [ ] constipation [ ] abd pain [ ] dysphagia   : [X ] negative [ ] dysuria [ ] nocturia [ ] hematuria [ ] increased urinary frequency  Musculoskeletal: [ ] negative [X ] back pain [ ] myalgias [ ] arthralgias [ ] fracture  Skin: [ x] negative [ ] rash [ ] itch  Neurological: [ X] negative [ ] headache [ ] dizziness [ ] syncope [ ] weakness [ ] numbness  Psychiatric: [X ] negative [ ] anxiety [ ] depression  Endocrine: [X ] negative [ ] diabetes [ ] thyroid problem  Hematologic/Lymphatic: [ x] negative [ ] anemia [ ] bleeding problem  Allergic/Immunologic: [ x] negative [ ] itchy eyes [ ] nasal discharge [ ] hives [ ] angioedema  [ ] All other systems negative  [ ] Unable to assess ROS because ________    OBJECTIVE:  ICU Vital Signs Last 24 Hrs  T(C): 37.2 (18 Nov 2017 04:00), Max: 37.2 (18 Nov 2017 00:00)  T(F): 99 (18 Nov 2017 04:00), Max: 99 (18 Nov 2017 00:00)  HR: 76 (18 Nov 2017 06:00) (62 - 671)  BP: 162/75 (18 Nov 2017 06:00) (125/62 - 183/92)  BP(mean): 108 (18 Nov 2017 06:00) (64 - 130)  ABP: --  ABP(mean): --  RR: 25 (18 Nov 2017 06:00) (15 - 47)  SpO2: 100% (18 Nov 2017 06:00) (94% - 100%)        11-17 @ 07:01  -  11-18 @ 07:00  --------------------------------------------------------  IN: 925 mL / OUT: 5 mL / NET: 920 mL      CAPILLARY BLOOD GLUCOSE  203 (18 Nov 2017 04:00)      POCT Blood Glucose.: 182 mg/dL (17 Nov 2017 21:23)      PHYSICAL EXAM:  GENERAL: awake, alert in NAD  HEAD:  Atraumatic, Normocephalic  EYES: EOMI, PERRLA, conjunctiva and sclera clear  NECK: Supple, No JVD  CHEST/LUNG: b/l course BS  HEART: tachycardic; No murmurs, rubs, or gallops  ABDOMEN: Soft, Nontender, Nondistended; Bowel sounds present  EXTREMITIES:  2+ Peripheral Pulses, No clubbing, cyanosis, or edema  BACK: spinal and paraspinal TTP  NEUROLOGY: non focal  SKIN: decub behind left ear    LINES: [facility] MEDICATIONS:  aspirin  chewable 81 milliGRAM(s) Oral daily  heparin  Injectable 5000 Unit(s) SubCutaneous every 8 hours      digoxin  Injectable 0.125 milliGRAM(s) IV Push daily    insulin lispro (HumaLOG) corrective regimen sliding scale   SubCutaneous every 6 hours  methylPREDNISolone sodium succinate Injectable 10 milliGRAM(s) IV Push daily    tiotropium 18 MICROgram(s) Capsule 1 Capsule(s) Inhalation daily      polyethylene glycol 3350 17 Gram(s) Oral two times a day  senna Syrup 10 milliLiter(s) Oral at bedtime          influenza   Vaccine 0.5 milliLiter(s) IntraMuscular once          LABS:                        8.4    3.6   )-----------( 270      ( 18 Nov 2017 03:24 )             25.1     Hgb Trend: 8.4<--, 8.3<--, 9.3<--, 9.3<--, 8.4<--  11-18    148<H>  |  111<H>  |  48<H>  ----------------------------<  203<H>  4.9   |  26  |  1.62<H>    Ca    8.5      18 Nov 2017 03:24  Phos  4.0     11-18  Mg     2.3     11-18    TPro  5.8<L>  /  Alb  2.9<L>  /  TBili  1.1  /  DBili  x   /  AST  62<H>  /  ALT  141<H>  /  AlkPhos  307<H>  11-18    Creatinine Trend: 1.62<--, 1.06<--, 1.02<--, 1.12<--, 1.07<--, 1.07<--  PT/INR - ( 17 Nov 2017 04:23 )   PT: 11.1 sec;   INR: 1.02 ratio           MICROBIOLOGY:   MICROBIOLOGY:   Culture - Sputum . (11.12.17 @ 12:32)    Gram Stain:   Numerous polymorphonuclear leukocytes per low power field  Rare Squamous epithelial cells per low power field  Numerous Gram Negative Rods per oil power field  Rare Yeast like cells per oil power field    -  Amikacin: S <=8    -  Aztreonam: R >16    -  Cefepime: R >16    -  Ceftazidime: R >16    -  Ciprofloxacin: I 2    -  Gentamicin: S 4    -  Imipenem: R >8    -  Levofloxacin: R >4    -  Meropenem: R >8    -  Piperacillin/Tazobactam: R >64    -  Tobramycin: R >8    Specimen Source: .Sputum Sputum    Culture Results:   Moderate Pseudomonas aeruginosa (Carbapenem Resistant)  No routine respiratory claus Isolated    Organism Identification: Pseudomonas aeruginosa (Carbapenem Resistant)    Organism: Pseudomonas aeruginosa (Carbapenem Resistant)    Method Type: PAUL    Culture - Blood in AM (11.11.17 @ 08:23)    Specimen Source: .Blood Blood-Venous    Culture Results:   No growth at 5 days.        Culture - Blood (11.07.17 @ 08:47)    -  Pseudomonas aeruginosa: Detec    -  Cefepime: S <=2    -  Ceftazidime: S <=1    -  Aztreonam: S <=4    -  Amikacin: S <=8    Gram Stain:   Growth in aerobic bottle: Gram Negative Rods    -  Ciprofloxacin: S <=0.5    -  Meropenem: S <=1    -  Levofloxacin: S <=1    -  Gentamicin: S 4    -  Imipenem: S <=1    -  Piperacillin/Tazobactam: S <=8    -  Tobramycin: S <=2    Specimen Source: .Blood Blood-Venous    Organism: Blood Culture PCR    Organism: Pseudomonas aeruginosa    Culture Results:   Growth in aerobic bottle: Pseudomonas aeruginosa  ***Blood Panel PCR results on this specimen are available  approximately 3 hours after the Gram stain result.***  Gram stain, PCR, and/or culture results may not always  correspond due to differencein methodologies.  ************************************************************  This PCR assay was performed using [employer].  The following targets are tested for: Enterococcus,  vancomycin resistant enterococci, Listeria monocytogenes,  coagulase negative staphylococci, S. aureus,  methicillin resistant S. aureus, Streptococcus agalactiae  (Group B), S. pneumoniae, S. pyogenes (Group A),  Acinetobacter baumannii, Enterobacter cloacae, E. coli,  Klebsiella oxytoca, K. pneumoniae, Proteus sp.,  Serratia marcescens, Haemophilus influenzae,  Neisseria meningitidis, Pseudomonas aeruginosa, Candida  albicans, C. glabrata, C krusei, C parapsilosis,  C. tropicalis and the KPC resistance gene.    Organism Identification: Blood Culture PCR  Pseudomonas aeruginosa    Method Type: PCR    Method Type: PAUL CHIEF COMPLAINT: acute on chronic hypoxic respiratory failure 2/2 PNA    Interval Events: NG tube inserted. Noted to have CHRISTIANE overnight, bolused 1L overnight. Reports significant improvement in back pain. Was able to sit in the chair for a few hours yesterday afternoon.    REVIEW OF SYSTEMS:  Constitutional: [X ] negative [ ] fevers [ ] chills [ ] weight loss [ ] weight gain  HEENT: [x ] negative [ ] dry eyes [ ] eye irritation [ ] postnasal drip [ ] nasal congestion  CV: [ x] negative  [ ] chest pain [ ] orthopnea [ ] palpitations [ ] murmur  Resp: [ ] negative [x ] cough [x ] shortness of breath [ ] dyspnea [ ] wheezing [ ] sputum [ ] hemoptysis  GI: [X ] negative [ ] nausea [ ] vomiting [ ] diarrhea [ ] constipation [ ] abd pain [ ] dysphagia   : [X ] negative [ ] dysuria [ ] nocturia [ ] hematuria [ ] increased urinary frequency  Musculoskeletal: [ ] negative [X ] back pain [ ] myalgias [ ] arthralgias [ ] fracture  Skin: [ x] negative [ ] rash [ ] itch  Neurological: [ X] negative [ ] headache [ ] dizziness [ ] syncope [ ] weakness [ ] numbness  Psychiatric: [X ] negative [ ] anxiety [ ] depression  Endocrine: [X ] negative [ ] diabetes [ ] thyroid problem  Hematologic/Lymphatic: [ x] negative [ ] anemia [ ] bleeding problem  Allergic/Immunologic: [ x] negative [ ] itchy eyes [ ] nasal discharge [ ] hives [ ] angioedema  [ ] All other systems negative  [ ] Unable to assess ROS because ________    OBJECTIVE:  ICU Vital Signs Last 24 Hrs  T(C): 37.2 (18 Nov 2017 04:00), Max: 37.2 (18 Nov 2017 00:00)  T(F): 99 (18 Nov 2017 04:00), Max: 99 (18 Nov 2017 00:00)  HR: 76 (18 Nov 2017 06:00) (62 - 671)  BP: 162/75 (18 Nov 2017 06:00) (125/62 - 183/92)  BP(mean): 108 (18 Nov 2017 06:00) (64 - 130)  ABP: --  ABP(mean): --  RR: 25 (18 Nov 2017 06:00) (15 - 47)  SpO2: 100% (18 Nov 2017 06:00) (94% - 100%)        11-17 @ 07:01  -  11-18 @ 07:00  --------------------------------------------------------  IN: 925 mL / OUT: 5 mL / NET: 920 mL      CAPILLARY BLOOD GLUCOSE  203 (18 Nov 2017 04:00)      POCT Blood Glucose.: 182 mg/dL (17 Nov 2017 21:23)      PHYSICAL EXAM:  GENERAL: awake, alert in NAD  HEAD:  Atraumatic, Normocephalic. NG tubew in place   EYES: EOMI, PERRLA, conjunctiva and sclera clear  NECK: Supple, No JVD  CHEST/LUNG: b/l course BS  HEART: tachycardic; No murmurs, rubs, or gallops  ABDOMEN: Soft, Nontender, Nondistended; Bowel sounds present  EXTREMITIES:  2+ Peripheral Pulses, No clubbing, cyanosis, or edema  BACK: spinal and paraspinal TTP  NEUROLOGY: non focal  SKIN: decub behind left ear    LINES: [facility] MEDICATIONS:  aspirin  chewable 81 milliGRAM(s) Oral daily  heparin  Injectable 5000 Unit(s) SubCutaneous every 8 hours      digoxin  Injectable 0.125 milliGRAM(s) IV Push daily    insulin lispro (HumaLOG) corrective regimen sliding scale   SubCutaneous every 6 hours  methylPREDNISolone sodium succinate Injectable 10 milliGRAM(s) IV Push daily    tiotropium 18 MICROgram(s) Capsule 1 Capsule(s) Inhalation daily      polyethylene glycol 3350 17 Gram(s) Oral two times a day  senna Syrup 10 milliLiter(s) Oral at bedtime          influenza   Vaccine 0.5 milliLiter(s) IntraMuscular once          LABS:                        8.4    3.6   )-----------( 270      ( 18 Nov 2017 03:24 )             25.1     Hgb Trend: 8.4<--, 8.3<--, 9.3<--, 9.3<--, 8.4<--  11-18    148<H>  |  111<H>  |  48<H>  ----------------------------<  203<H>  4.9   |  26  |  1.62<H>    Ca    8.5      18 Nov 2017 03:24  Phos  4.0     11-18  Mg     2.3     11-18    TPro  5.8<L>  /  Alb  2.9<L>  /  TBili  1.1  /  DBili  x   /  AST  62<H>  /  ALT  141<H>  /  AlkPhos  307<H>  11-18    Creatinine Trend: 1.62<--, 1.06<--, 1.02<--, 1.12<--, 1.07<--, 1.07<--  PT/INR - ( 17 Nov 2017 04:23 )   PT: 11.1 sec;   INR: 1.02 ratio           MICROBIOLOGY:   MICROBIOLOGY:   Culture - Sputum . (11.12.17 @ 12:32)    Gram Stain:   Numerous polymorphonuclear leukocytes per low power field  Rare Squamous epithelial cells per low power field  Numerous Gram Negative Rods per oil power field  Rare Yeast like cells per oil power field    -  Amikacin: S <=8    -  Aztreonam: R >16    -  Cefepime: R >16    -  Ceftazidime: R >16    -  Ciprofloxacin: I 2    -  Gentamicin: S 4    -  Imipenem: R >8    -  Levofloxacin: R >4    -  Meropenem: R >8    -  Piperacillin/Tazobactam: R >64    -  Tobramycin: R >8    Specimen Source: .Sputum Sputum    Culture Results:   Moderate Pseudomonas aeruginosa (Carbapenem Resistant)  No routine respiratory claus Isolated    Organism Identification: Pseudomonas aeruginosa (Carbapenem Resistant)    Organism: Pseudomonas aeruginosa (Carbapenem Resistant)    Method Type: PAUL    Culture - Blood in AM (11.11.17 @ 08:23)    Specimen Source: .Blood Blood-Venous    Culture Results:   No growth at 5 days.        Culture - Blood (11.07.17 @ 08:47)    -  Pseudomonas aeruginosa: Detec    -  Cefepime: S <=2    -  Ceftazidime: S <=1    -  Aztreonam: S <=4    -  Amikacin: S <=8    Gram Stain:   Growth in aerobic bottle: Gram Negative Rods    -  Ciprofloxacin: S <=0.5    -  Meropenem: S <=1    -  Levofloxacin: S <=1    -  Gentamicin: S 4    -  Imipenem: S <=1    -  Piperacillin/Tazobactam: S <=8    -  Tobramycin: S <=2    Specimen Source: .Blood Blood-Venous    Organism: Blood Culture PCR    Organism: Pseudomonas aeruginosa    Culture Results:   Growth in aerobic bottle: Pseudomonas aeruginosa  ***Blood Panel PCR results on this specimen are available  approximately 3 hours after the Gram stain result.***  Gram stain, PCR, and/or culture results may not always  correspond due to differencein methodologies.  ************************************************************  This PCR assay was performed using [employer].  The following targets are tested for: Enterococcus,  vancomycin resistant enterococci, Listeria monocytogenes,  coagulase negative staphylococci, S. aureus,  methicillin resistant S. aureus, Streptococcus agalactiae  (Group B), S. pneumoniae, S. pyogenes (Group A),  Acinetobacter baumannii, Enterobacter cloacae, E. coli,  Klebsiella oxytoca, K. pneumoniae, Proteus sp.,  Serratia marcescens, Haemophilus influenzae,  Neisseria meningitidis, Pseudomonas aeruginosa, Candida  albicans, C. glabrata, C krusei, C parapsilosis,  C. tropicalis and the KPC resistance gene.    Organism Identification: Blood Culture PCR  Pseudomonas aeruginosa    Method Type: PCR    Method Type: PAUL

## 2017-11-18 NOTE — OCCUPATIONAL THERAPY INITIAL EVALUATION ADULT - RANGE OF MOTION EXAMINATION, UPPER EXTREMITY
Left UE Active ROM was WFL (within functional limits)/Right UE Active ROM was WFL (within functional limits)

## 2017-11-19 LAB
GLUCOSE BLDC GLUCOMTR-MCNC: 130 MG/DL — HIGH (ref 70–99)
GLUCOSE BLDC GLUCOMTR-MCNC: 144 MG/DL — HIGH (ref 70–99)
GLUCOSE BLDC GLUCOMTR-MCNC: 188 MG/DL — HIGH (ref 70–99)
GLUCOSE BLDC GLUCOMTR-MCNC: 199 MG/DL — HIGH (ref 70–99)
GLUCOSE BLDC GLUCOMTR-MCNC: 217 MG/DL — HIGH (ref 70–99)

## 2017-11-19 RX ORDER — ACETAMINOPHEN 500 MG
1000 TABLET ORAL ONCE
Qty: 0 | Refills: 0 | Status: DISCONTINUED | OUTPATIENT
Start: 2017-11-19 | End: 2017-11-19

## 2017-11-19 RX ORDER — MORPHINE SULFATE 50 MG/1
1 CAPSULE, EXTENDED RELEASE ORAL ONCE
Qty: 0 | Refills: 0 | Status: DISCONTINUED | OUTPATIENT
Start: 2017-11-19 | End: 2017-11-19

## 2017-11-19 RX ORDER — TRAMADOL HYDROCHLORIDE 50 MG/1
50 TABLET ORAL EVERY 6 HOURS
Qty: 0 | Refills: 0 | Status: DISCONTINUED | OUTPATIENT
Start: 2017-11-19 | End: 2017-11-26

## 2017-11-19 RX ADMIN — TRAMADOL HYDROCHLORIDE 50 MILLIGRAM(S): 50 TABLET ORAL at 23:11

## 2017-11-19 RX ADMIN — HEPARIN SODIUM 5000 UNIT(S): 5000 INJECTION INTRAVENOUS; SUBCUTANEOUS at 07:01

## 2017-11-19 RX ADMIN — POLYETHYLENE GLYCOL 3350 17 GRAM(S): 17 POWDER, FOR SOLUTION ORAL at 17:47

## 2017-11-19 RX ADMIN — MORPHINE SULFATE 1 MILLIGRAM(S): 50 CAPSULE, EXTENDED RELEASE ORAL at 10:01

## 2017-11-19 RX ADMIN — TIOTROPIUM BROMIDE 1 CAPSULE(S): 18 CAPSULE ORAL; RESPIRATORY (INHALATION) at 12:52

## 2017-11-19 RX ADMIN — MORPHINE SULFATE 1 MILLIGRAM(S): 50 CAPSULE, EXTENDED RELEASE ORAL at 01:41

## 2017-11-19 RX ADMIN — Medication 1 SPRAY(S): at 07:01

## 2017-11-19 RX ADMIN — POLYETHYLENE GLYCOL 3350 17 GRAM(S): 17 POWDER, FOR SOLUTION ORAL at 07:01

## 2017-11-19 RX ADMIN — Medication 1 SPRAY(S): at 01:52

## 2017-11-19 RX ADMIN — MORPHINE SULFATE 1 MILLIGRAM(S): 50 CAPSULE, EXTENDED RELEASE ORAL at 02:30

## 2017-11-19 RX ADMIN — Medication 2: at 01:51

## 2017-11-19 RX ADMIN — Medication 81 MILLIGRAM(S): at 12:53

## 2017-11-19 RX ADMIN — Medication 1 SPRAY(S): at 22:32

## 2017-11-19 RX ADMIN — HEPARIN SODIUM 5000 UNIT(S): 5000 INJECTION INTRAVENOUS; SUBCUTANEOUS at 22:32

## 2017-11-19 RX ADMIN — MORPHINE SULFATE 1 MILLIGRAM(S): 50 CAPSULE, EXTENDED RELEASE ORAL at 09:46

## 2017-11-19 RX ADMIN — HEPARIN SODIUM 5000 UNIT(S): 5000 INJECTION INTRAVENOUS; SUBCUTANEOUS at 15:15

## 2017-11-19 RX ADMIN — Medication 0.12 MILLIGRAM(S): at 07:01

## 2017-11-19 RX ADMIN — Medication 10 MILLIGRAM(S): at 07:01

## 2017-11-19 RX ADMIN — Medication 4: at 12:52

## 2017-11-19 RX ADMIN — Medication 1 SPRAY(S): at 15:15

## 2017-11-19 RX ADMIN — SENNA PLUS 10 MILLILITER(S): 8.6 TABLET ORAL at 22:31

## 2017-11-19 RX ADMIN — HEPARIN SODIUM 5000 UNIT(S): 5000 INJECTION INTRAVENOUS; SUBCUTANEOUS at 01:51

## 2017-11-19 NOTE — PROGRESS NOTE ADULT - SUBJECTIVE AND OBJECTIVE BOX
Patient is a 77y old  Male who presents with a chief complaint of SOB (04 Nov 2017 14:33)      SUBJECTIVE / OVERNIGHT EVENTS:    pt resting in chair bedside, no events overnight no cp abd pain n/v/d fever chill.  case discussed at [facility] with pt family in [place]     Vital Signs Last 24 Hrs  T(C): 36.9 (19 Nov 2017 07:25), Max: 36.9 (19 Nov 2017 07:25)  T(F): 98.4 (19 Nov 2017 07:25), Max: 98.4 (19 Nov 2017 07:25)  HR: 110 (19 Nov 2017 07:25) (69 - 110)  BP: 151/87 (19 Nov 2017 07:25) (145/71 - 157/84)  BP(mean): --  RR: 20 (19 Nov 2017 07:25) (18 - 20)  SpO2: 90% (19 Nov 2017 07:25) (90% - 100%)  I&O's Summary    18 Nov 2017 07:01  -  19 Nov 2017 07:00  --------------------------------------------------------  IN: 260 mL / OUT: 0 mL / NET: 260 mL            LABS:                        8.4    3.6   )-----------( 270      ( 18 Nov 2017 03:24 )             25.1     11-18    148<H>  |  111<H>  |  48<H>  ----------------------------<  203<H>  4.9   |  26  |  1.62<H>    Ca    8.5      18 Nov 2017 03:24  Phos  4.0     11-18  Mg     2.3     11-18    TPro  5.8<L>  /  Alb  2.9<L>  /  TBili  1.1  /  DBili  x   /  AST  62<H>  /  ALT  141<H>  /  AlkPhos  307<H>  11-18      CAPILLARY BLOOD GLUCOSE      POCT Blood Glucose.: 130 mg/dL (19 Nov 2017 06:16)  POCT Blood Glucose.: 188 mg/dL (19 Nov 2017 01:48)  POCT Blood Glucose.: 199 mg/dL (19 Nov 2017 00:37)  POCT Blood Glucose.: 206 mg/dL (18 Nov 2017 18:03)            RADIOLOGY & ADDITIONAL TESTS:    Imaging Personally Reviewed:  [x] YES  [ ] NO    Consultant(s) Notes Reviewed:  [x] YES  [ ] NO      MEDICATIONS  (STANDING):  aspirin  chewable 81 milliGRAM(s) Oral daily  digoxin     Tablet 0.125 milliGRAM(s) Oral daily  heparin  Injectable 5000 Unit(s) SubCutaneous every 8 hours  influenza   Vaccine 0.5 milliLiter(s) IntraMuscular once  insulin lispro (HumaLOG) corrective regimen sliding scale   SubCutaneous every 6 hours  polyethylene glycol 3350 17 Gram(s) Oral two times a day  predniSONE   Tablet 10 milliGRAM(s) Oral daily  saliva substitute (BIOTENE MOISTURIZING MOUTH SPRAY) 1 [place](s) Topical three times a day  senna Syrup 10 milliLiter(s) Oral at bedtime  tiotropium 18 MICROgram(s) Capsule 1 Capsule(s) Inhalation daily    MEDICATIONS  (PRN):  traMADol 50 milliGRAM(s) Oral every 6 hours PRN Moderate Pain (4 - 6)      Care Discussed with Consultants/Other Providers [x] YES  [ ] NO    HEALTH ISSUES - PROBLEM Dx:  CHRISTIANE (acute kidney injury): CHRISTIANE (acute kidney injury)  Bacteremia: Bacteremia  Neutropenia: Neutropenia  Need for prophylactic measure: Need for prophylactic measure  Oral thrush: Oral thrush  Nasal discomfort: Nasal discomfort  Gastroesophageal reflux disease without esophagitis: Gastroesophageal reflux disease without esophagitis  Hyperlipidemia, unspecified hyperlipidemia type: Hyperlipidemia, unspecified hyperlipidemia type  Type 2 diabetes mellitus without complication, without long-term current use of insulin: Type 2 diabetes mellitus without complication, without long-term current use of insulin  Coronary artery disease involving native coronary artery of native heart, angina presence unspecified: Coronary artery disease involving native coronary artery of native heart, angina presence unspecified  Hyperkalemia: Hyperkalemia  Lactic acidosis: Lactic acidosis  Transaminitis: Transaminitis  Idiopathic pulmonary fibrosis: Idiopathic pulmonary fibrosis  Shortness of breath: Shortness of breath

## 2017-11-19 NOTE — PROVIDER CONTACT NOTE (MEDICATION) - ASSESSMENT
Pt AOx4, c/o lumbar pain, received IVPB Acetaminophen with little effect to level of pain, turning and positioning gives minimal temporary relief.

## 2017-11-19 NOTE — PROGRESS NOTE ADULT - ASSESSMENT
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 CT chest shows bilateral IPF  treated for Pulmonary fibrosis / Intersitial pulmonary dz exacerbation.  pt developed ARF intubated transferred to MICU w/ acute hypoxic respiratory failure w/ septic shock 2/2 to pseudomonal bacteremia resolved off abx.   pt extubated downgraded to floor.  Family is refusing imuran will hold for now and can f/u with outpt pulm to discuss restarting     -Back pain - pt has not complained to me regarding back discomfort nor family.  will start tramadol PRN.  neuro consult Dr Lowe if pain continues.  pt moving all extremities spontaneously.   PT consult     -Idiopathic pulmonary fibrosis / Acute hypoxic expiratory failure - pt respiratory status remain precarious c/w steroids.  family is refusing imuran will hold for now and can f/u with outpt pulm to discuss restarting.  have counseled pt family extensively regarding importance of pt clearing his own secretion, as inability is a risk factor for reintubation.   Family talking about DC to rehab, but respiratory status remains to precarious to even consider DC at this time.     -Nutrition - s/p peg.  reinforced that  pt has difficulty clearing secretions w/ weak cough.  do not  increase feeds  as fear for aspiration.  pt recently extubated and is at risk for reintubation.  monitor today, nutrition f/u pending.      -Pseudomonas bacteremia off abx resolved discussed with Dr zuñiga       -Type 2 diabetes mellitus c/w insulin tx     -DVT ppx     Pt family counseled extensively - currently with unreasonable exsections given pt condition. Unclear if pt family understands severity of condition and ramification of there requests?   have counseled family extensively and will continue the dialogue

## 2017-11-20 DIAGNOSIS — K12.1 OTHER FORMS OF STOMATITIS: ICD-10-CM

## 2017-11-20 LAB
ANION GAP SERPL CALC-SCNC: 14 MMOL/L — SIGNIFICANT CHANGE UP (ref 5–17)
APPEARANCE UR: CLEAR — SIGNIFICANT CHANGE UP
BILIRUB UR-MCNC: NEGATIVE — SIGNIFICANT CHANGE UP
BUN SERPL-MCNC: 54 MG/DL — HIGH (ref 7–23)
CALCIUM SERPL-MCNC: 8.7 MG/DL — SIGNIFICANT CHANGE UP (ref 8.4–10.5)
CHLORIDE SERPL-SCNC: 116 MMOL/L — HIGH (ref 96–108)
CO2 SERPL-SCNC: 23 MMOL/L — SIGNIFICANT CHANGE UP (ref 22–31)
COLOR SPEC: YELLOW — SIGNIFICANT CHANGE UP
CREAT SERPL-MCNC: 1.99 MG/DL — HIGH (ref 0.5–1.3)
DIFF PNL FLD: ABNORMAL
GLUCOSE BLDC GLUCOMTR-MCNC: 160 MG/DL — HIGH (ref 70–99)
GLUCOSE BLDC GLUCOMTR-MCNC: 170 MG/DL — HIGH (ref 70–99)
GLUCOSE BLDC GLUCOMTR-MCNC: 177 MG/DL — HIGH (ref 70–99)
GLUCOSE BLDC GLUCOMTR-MCNC: 177 MG/DL — HIGH (ref 70–99)
GLUCOSE BLDC GLUCOMTR-MCNC: 215 MG/DL — HIGH (ref 70–99)
GLUCOSE SERPL-MCNC: 175 MG/DL — HIGH (ref 70–99)
GLUCOSE UR QL: NEGATIVE MG/DL — SIGNIFICANT CHANGE UP
HCT VFR BLD CALC: 28.6 % — LOW (ref 39–50)
HGB BLD-MCNC: 9.6 G/DL — LOW (ref 13–17)
KETONES UR-MCNC: NEGATIVE — SIGNIFICANT CHANGE UP
LEUKOCYTE ESTERASE UR-ACNC: NEGATIVE — SIGNIFICANT CHANGE UP
MCHC RBC-ENTMCNC: 33.4 GM/DL — SIGNIFICANT CHANGE UP (ref 32–36)
MCHC RBC-ENTMCNC: 35.8 PG — HIGH (ref 27–34)
MCV RBC AUTO: 107 FL — HIGH (ref 80–100)
NITRITE UR-MCNC: NEGATIVE — SIGNIFICANT CHANGE UP
PH UR: 5.5 — SIGNIFICANT CHANGE UP (ref 5–8)
PLATELET # BLD AUTO: 252 K/UL — SIGNIFICANT CHANGE UP (ref 150–400)
POTASSIUM SERPL-MCNC: 5.3 MMOL/L — SIGNIFICANT CHANGE UP (ref 3.5–5.3)
POTASSIUM SERPL-SCNC: 5.3 MMOL/L — SIGNIFICANT CHANGE UP (ref 3.5–5.3)
PROT UR-MCNC: ABNORMAL MG/DL
RBC # BLD: 2.67 M/UL — LOW (ref 4.2–5.8)
RBC # FLD: 19.2 % — HIGH (ref 10.3–14.5)
SODIUM SERPL-SCNC: 153 MMOL/L — HIGH (ref 135–145)
SP GR SPEC: 1.01 — SIGNIFICANT CHANGE UP (ref 1.01–1.02)
UROBILINOGEN FLD QL: NEGATIVE MG/DL — SIGNIFICANT CHANGE UP
WBC # BLD: 5.6 K/UL — SIGNIFICANT CHANGE UP (ref 3.8–10.5)
WBC # FLD AUTO: 5.6 K/UL — SIGNIFICANT CHANGE UP (ref 3.8–10.5)

## 2017-11-20 PROCEDURE — 76775 US EXAM ABDO BACK WALL LIM: CPT | Mod: 26

## 2017-11-20 PROCEDURE — 71010: CPT | Mod: 26

## 2017-11-20 RX ORDER — BACITRACIN ZINC 500 UNIT/G
1 OINTMENT IN PACKET (EA) TOPICAL THREE TIMES A DAY
Qty: 0 | Refills: 0 | Status: DISCONTINUED | OUTPATIENT
Start: 2017-11-20 | End: 2017-12-05

## 2017-11-20 RX ORDER — SODIUM CHLORIDE 9 MG/ML
500 INJECTION INTRAMUSCULAR; INTRAVENOUS; SUBCUTANEOUS ONCE
Qty: 0 | Refills: 0 | Status: COMPLETED | OUTPATIENT
Start: 2017-11-20 | End: 2017-11-20

## 2017-11-20 RX ORDER — METOPROLOL TARTRATE 50 MG
25 TABLET ORAL
Qty: 0 | Refills: 0 | Status: DISCONTINUED | OUTPATIENT
Start: 2017-11-20 | End: 2017-12-05

## 2017-11-20 RX ORDER — MORPHINE SULFATE 50 MG/1
1 CAPSULE, EXTENDED RELEASE ORAL ONCE
Qty: 0 | Refills: 0 | Status: DISCONTINUED | OUTPATIENT
Start: 2017-11-20 | End: 2017-11-20

## 2017-11-20 RX ORDER — SODIUM CHLORIDE 9 MG/ML
1000 INJECTION INTRAMUSCULAR; INTRAVENOUS; SUBCUTANEOUS
Qty: 0 | Refills: 0 | Status: DISCONTINUED | OUTPATIENT
Start: 2017-11-20 | End: 2017-11-20

## 2017-11-20 RX ORDER — PANTOPRAZOLE SODIUM 20 MG/1
40 TABLET, DELAYED RELEASE ORAL DAILY
Qty: 0 | Refills: 0 | Status: DISCONTINUED | OUTPATIENT
Start: 2017-11-20 | End: 2017-12-05

## 2017-11-20 RX ADMIN — Medication 2: at 13:06

## 2017-11-20 RX ADMIN — Medication 1 SPRAY(S): at 05:45

## 2017-11-20 RX ADMIN — Medication: at 02:44

## 2017-11-20 RX ADMIN — SENNA PLUS 10 MILLILITER(S): 8.6 TABLET ORAL at 21:26

## 2017-11-20 RX ADMIN — POLYETHYLENE GLYCOL 3350 17 GRAM(S): 17 POWDER, FOR SOLUTION ORAL at 18:51

## 2017-11-20 RX ADMIN — Medication 0.12 MILLIGRAM(S): at 05:45

## 2017-11-20 RX ADMIN — Medication 2: at 18:37

## 2017-11-20 RX ADMIN — SODIUM CHLORIDE 1000 MILLILITER(S): 9 INJECTION INTRAMUSCULAR; INTRAVENOUS; SUBCUTANEOUS at 12:15

## 2017-11-20 RX ADMIN — Medication 1 APPLICATION(S): at 15:36

## 2017-11-20 RX ADMIN — Medication 25 MILLIGRAM(S): at 18:52

## 2017-11-20 RX ADMIN — Medication 81 MILLIGRAM(S): at 12:15

## 2017-11-20 RX ADMIN — POLYETHYLENE GLYCOL 3350 17 GRAM(S): 17 POWDER, FOR SOLUTION ORAL at 05:45

## 2017-11-20 RX ADMIN — HEPARIN SODIUM 5000 UNIT(S): 5000 INJECTION INTRAVENOUS; SUBCUTANEOUS at 13:08

## 2017-11-20 RX ADMIN — HEPARIN SODIUM 5000 UNIT(S): 5000 INJECTION INTRAVENOUS; SUBCUTANEOUS at 05:45

## 2017-11-20 RX ADMIN — HEPARIN SODIUM 5000 UNIT(S): 5000 INJECTION INTRAVENOUS; SUBCUTANEOUS at 21:26

## 2017-11-20 RX ADMIN — MORPHINE SULFATE 1 MILLIGRAM(S): 50 CAPSULE, EXTENDED RELEASE ORAL at 00:45

## 2017-11-20 RX ADMIN — Medication 10 MILLIGRAM(S): at 05:45

## 2017-11-20 RX ADMIN — Medication 1 APPLICATION(S): at 21:26

## 2017-11-20 RX ADMIN — Medication 1 SPRAY(S): at 13:09

## 2017-11-20 RX ADMIN — MORPHINE SULFATE 1 MILLIGRAM(S): 50 CAPSULE, EXTENDED RELEASE ORAL at 01:15

## 2017-11-20 RX ADMIN — Medication 4: at 07:03

## 2017-11-20 RX ADMIN — TRAMADOL HYDROCHLORIDE 50 MILLIGRAM(S): 50 TABLET ORAL at 00:01

## 2017-11-20 RX ADMIN — Medication 1 SPRAY(S): at 21:26

## 2017-11-20 NOTE — PROGRESS NOTE ADULT - ASSESSMENT
Acute on chronic hypoxemic respiratory failure: clinically resoliving post extubation  Progressive ILD - unclear etiology  Pseudomonoas bacteremia with pneumonia: resolving  OP dysphagia  Oral ulcerations  CHRISTIANE  Septic Shock - clinically resolved    REC  Observe off antibiotics  Increase feeds andn add free water  Repeat FEEST planned for friday  PT

## 2017-11-20 NOTE — PROGRESS NOTE ADULT - ASSESSMENT
77M with ILD on home O2, DM, GERd, admitted with dyspnea and treated for exacerbation of chronic lung disease with steroids and immunesuppression  complicated hospital course with septic shock, MICU stay requiring intubation and blood pressure support secondary to pseudomonas bacteremia / pneumonia  Now extubated, out of ICU, and off antibiotics  NGT placed, ongoing reassessment re: ability to take PO  oral ulcers now scabbed

## 2017-11-20 NOTE — PROGRESS NOTE ADULT - ASSESSMENT
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 CT chest shows bilateral IPF  treated for Pulmonary fibrosis / Intersitial pulmonary dz exacerbation.  pt developed ARF intubated transferred to MICU w/ acute hypoxic respiratory failure w/ septic shock 2/2 to pseudomonal bacteremia resolved off abx.   pt extubated downgraded to floor.  Family is refusing imuran will hold for now and can f/u with outpt pulm to discuss restarting     -Back pain - c/w  tramadol PRN.  neuro consult Dr Lowe if pain continues.  pt moving all extremities spontaneously.   PT consult     -Idiopathic pulmonary fibrosis / Acute hypoxic expiratory failure - improving hold imuran as per pt family wishes.  respiratory status remains precarious     -Nutrition - NG in place,  increase Feed rate discussed with pulm and nutrition.  monitor for aspiration.      -CHRISTIANE / Hypernatremia - prerenal discussed with nephro attending feeds increased will start IV fluids as per nephro    -Pseudomonas bacteremia off abx resolved  Lip lesion - wound care consult called to evaluate discussed with NP    -Type 2 diabetes mellitus c/w insulin tx     -DVT ppx

## 2017-11-20 NOTE — PROGRESS NOTE ADULT - RS GEN PE MLT RESP DETAILS PC
airway patent/breath sounds equal/anterior fields
breath sounds equal/airway patent
airway patent/poor breath sounds decreased at bases
airway patent/breath sounds equal
good air movement/airway patent
breath sounds equal/airway patent

## 2017-11-20 NOTE — PROGRESS NOTE ADULT - NEUROLOGICAL DETAILS
responds to verbal commands/responds to pain
nonverbal/responds to pain/disoriented
responds to pain/responds to verbal commands

## 2017-11-20 NOTE — CHART NOTE - NSCHARTNOTEFT_GEN_A_CORE
Nutrition consult. Pt with PMH of "asbestos related lung disease" admitted with shortness of breath, CT scan shows IPF S/P treatment, pt developed ARF and acute hypoxic respiratory failure with septic shock due to pseudomonal bacteremia pt intubated and transferred to MICU, pt now extubated and on floor S/P FEES 11/17 recommended NPO with non-oral nutrition/hydration with plan for re-evaluation.   Source: Patient [ x]    Family [x ]     other [ ] Wife María at bedside    Diet : NPO with tube feeding Glucerna 1.2 Flako @ 10 mL/h x 24 h. Provides 288 Kcal, 14g protein at goal.       Patient reports [ ] nausea  [ ] vomiting [ ] diarrhea [ ] constipation  [ ]chewing problems [ ] swallowing issues  [ ] other: Pt denies N+V or abdominal pain, last BM documented 11/18       Enteral /Parenteral Nutrition: Pt reports tolerating Glucerna 1.2 Flako @ 10 mL/h x 24 h well however pt reports feeling very hungry and weak, pt has been receiving trickle feedings/NPO x 12 days in house. Per MD, pt at high aspiration risk and unable to advance beyond 10 mL/h.       Current Weight: 177.6 lbs (11/2), 171 lbs (11/16), 160.2 lbs (11/7), 154.9 lbs (11/18)  lbs-pt reported via home scale PTA. Pt with ~10 lbs wt loss from UBW to recent weight 154.9 lbs, will continue to closely monitor weights.  Nutrition focused physical exam conducted with verbal consent from patient, pt with evidence of mild muscle mass depletion to temple, clavicle and upper thigh, unable to assess calves due to compression boots in place. Pt with adequate fat stores to triceps.   % Weight Change    Pertinent Medications: MEDICATIONS  (STANDING):  aspirin  chewable 81 milliGRAM(s) Oral daily  digoxin     Tablet 0.125 milliGRAM(s) Oral daily  heparin  Injectable 5000 Unit(s) SubCutaneous every 8 hours  influenza   Vaccine 0.5 milliLiter(s) IntraMuscular once  insulin lispro (HumaLOG) corrective regimen sliding scale   SubCutaneous every 6 hours  polyethylene glycol 3350 17 Gram(s) Oral two times a day  predniSONE   Tablet 10 milliGRAM(s) Oral daily  saliva substitute (BIOTENE MOISTURIZING MOUTH SPRAY) 1 [place](s) Topical three times a day  senna Syrup 10 milliLiter(s) Oral at bedtime  tiotropium 18 MICROgram(s) Capsule 1 Capsule(s) Inhalation daily    MEDICATIONS  (PRN):  traMADol 50 milliGRAM(s) Oral every 6 hours PRN Moderate Pain (4 - 6)    Pertinent Labs:  POCT blood glucose: 11/19: 130-217, 11/20: 177-215    Skin: 1+ dominique foot/ankle edema noted 11/19, none noted thus far today, stage 2 dominique pressure injuries located behind ears.     Estimated Needs:   [ x] no change since previous assessment  [ ] recalculated:       Previous Nutrition Diagnosis:     [ x] Inadequate Protein-Energy Intake     Nutrition Diagnosis is [x ] ongoing, now addressed by nutrition diagnosis below.         New Nutrition Diagnosis: [ ] not applicable    Malnutrition (moderate-acute)     Related to: inability to consume sufficient energy due to dysphagia and aspiration risk    As evidenced by: pt meeting <50% estimated needs x >7 days, mild muscle mass depletion, wt loss in house     Interventions:     Recommend    [ ] Other:     1. As medically feasible, consider increasing tube feed of Glucerna 1.2 Flako  via NGT slowly 10 mL q 12 h to new goal of 60 mL/h x 24 h. This regimen at goal provides 1440 mL total volume 1728 Kcal, 86g protein, meets >100% micronutrient needs and 1159 mL free water (25 kcal/Kg, 1.2g protein/Kg based on weight 11/18 70.4 Kg). If unable to advance tube feeding via NGT, may consider post pyloric feeding to reduce aspiration risk. If post pyloric feeding is not feasible and plan to continue tube feedings at 10 mL/h may consider changing formula to Two Flako HN @ 10 mL/h to provide 240 mL total volume, 480 Kcal, 20 g protein (noted this regimen will not meet estimated nutrient needs however will provide slightly more than current regimen of Glucerna 1.2 Flako @ 10 mL/h x 24 h)-discussed with DILMA Rivers.   2. Noted given prolonged hypocaloric feeding pt is at risk for refeeding syndrome, closely monitor electrolytes K, Mg, PO4 daily and replete as needed. Consider adding B-complex and multivitamin          Monitoring and Evaluation:     [ ] PO intake [ x] Tolerance to diet prescription [x ] weights [x ] follow up per protocol    [ ] other: Nutrition consult. Pt with PMH of "asbestos related lung disease" admitted with shortness of breath, CT scan shows IPF S/P treatment, pt developed ARF and acute hypoxic respiratory failure with septic shock due to pseudomonal bacteremia pt intubated and transferred to MICU, pt now extubated and on floor S/P FEES 11/17 recommended NPO with non-oral nutrition/hydration with plan for re-evaluation.   Source: Patient [ x]    Family [x ]     other [ ] Wife María at bedside    Diet : NPO with tube feeding Glucerna 1.2 Flako @ 10 mL/h x 24 h. Provides 288 Kcal, 14g protein at goal.       Patient reports [ ] nausea  [ ] vomiting [ ] diarrhea [ ] constipation  [ ]chewing problems [ ] swallowing issues  [ ] other: Pt denies N+V or abdominal pain, last BM documented 11/18       Enteral /Parenteral Nutrition: Pt reports tolerating Glucerna 1.2 Flako @ 10 mL/h x 24 h well however pt reports feeling very hungry and weak, pt has been receiving trickle feedings/NPO x 12 days in house. Per MD, pt at high aspiration risk and unable to advance beyond 10 mL/h.       Current Weight: 177.6 lbs (11/2), 171 lbs (11/16), 160.2 lbs (11/7), 154.9 lbs (11/18)  lbs-pt reported via home scale PTA. Pt with ~10 lbs wt loss from UBW to recent weight 154.9 lbs, will continue to closely monitor weights.  Nutrition focused physical exam conducted with verbal consent from patient, pt with evidence of mild muscle mass depletion to temple, clavicle and upper thigh, unable to assess calves due to compression boots in place. Pt with adequate fat stores to triceps.   % Weight Change    Pertinent Medications: MEDICATIONS  (STANDING):  aspirin  chewable 81 milliGRAM(s) Oral daily  digoxin     Tablet 0.125 milliGRAM(s) Oral daily  heparin  Injectable 5000 Unit(s) SubCutaneous every 8 hours  influenza   Vaccine 0.5 milliLiter(s) IntraMuscular once  insulin lispro (HumaLOG) corrective regimen sliding scale   SubCutaneous every 6 hours  polyethylene glycol 3350 17 Gram(s) Oral two times a day  predniSONE   Tablet 10 milliGRAM(s) Oral daily  saliva substitute (BIOTENE MOISTURIZING MOUTH SPRAY) 1 [place](s) Topical three times a day  senna Syrup 10 milliLiter(s) Oral at bedtime  tiotropium 18 MICROgram(s) Capsule 1 Capsule(s) Inhalation daily    MEDICATIONS  (PRN):  traMADol 50 milliGRAM(s) Oral every 6 hours PRN Moderate Pain (4 - 6)    Pertinent Labs:  POCT blood glucose: 11/19: 130-217, 11/20: 177-215    Skin: 1+ dominique foot/ankle edema noted 11/19, none noted thus far today, stage 2 dominique pressure injuries located behind ears.     Estimated Needs:   [ x] no change since previous assessment  [ ] recalculated:       Previous Nutrition Diagnosis:     [ x] Inadequate Protein-Energy Intake     Nutrition Diagnosis is [x ] ongoing, now addressed by nutrition diagnosis below.         New Nutrition Diagnosis: [ ] not applicable    Malnutrition (moderate-acute)     Related to: inability to consume sufficient energy due to dysphagia and aspiration risk    As evidenced by: pt meeting <50% estimated needs x >7 days, mild muscle mass depletion, wt loss in house     Interventions:     Recommend    [ ] Other:     1. As medically feasible, consider increasing tube feed of Glucerna 1.2 Flako  via NGT slowly 10 mL q 12 h to new goal of 60 mL/h x 24 h. This regimen at goal provides 1440 mL total volume 1728 Kcal, 86g protein, meets >100% micronutrient needs and 1159 mL free water (25 kcal/Kg, 1.2g protein/Kg based on weight 11/18 70.4 Kg). If unable to advance tube feeding via NGT, may consider post pyloric feeding to reduce aspiration risk. If post pyloric feeding is not feasible and plan to continue tube feedings at 10 mL/h may consider changing formula to Two Flako HN @ 10 mL/h to provide 240 mL total volume, 480 Kcal, 20 g protein (noted this regimen will not meet estimated nutrient needs however will provide slightly more than current regimen of Glucerna 1.2 Flako @ 10 mL/h x 24 h)-discussed with DILMA Rivers.   2. Noted given prolonged hypocaloric feeding pt is at risk for refeeding syndrome, closely monitor electrolytes K, Mg, PO4 daily and replete as needed. Consider adding thiamine and multivitamin daily         Monitoring and Evaluation:     [ ] PO intake [ x] Tolerance to diet prescription [x ] weights [x ] follow up per protocol    [ ] other: Nutrition consult. Pt with PMH of "asbestos related lung disease" admitted with shortness of breath, CT scan shows IPF S/P treatment, pt developed ARF and acute hypoxic respiratory failure with septic shock due to pseudomonal bacteremia pt intubated and transferred to MICU, pt now extubated and on floor S/P FEES 11/17 recommended NPO with non-oral nutrition/hydration with plan for re-evaluation.   Source: Patient [ x]    Family [x ]     other [ ] Wife María at bedside    Diet : NPO with tube feeding Glucerna 1.2 Flako @ 10 mL/h x 24 h. Provides 288 Kcal, 14g protein at goal.       Patient reports [ ] nausea  [ ] vomiting [ ] diarrhea [ ] constipation  [ ]chewing problems [ ] swallowing issues  [ ] other: Pt denies N+V or abdominal pain, last BM documented 11/18       Enteral /Parenteral Nutrition: Pt reports tolerating Glucerna 1.2 Flako @ 10 mL/h x 24 h well however pt reports feeling very hungry and weak, pt has been receiving trickle feedings/NPO x 12 days in house. Per MD, pt at high aspiration risk and unable to advance beyond 10 mL/h.       Current Weight: 177.6 lbs (11/2), 171 lbs (11/16), 160.2 lbs (11/7), 154.9 lbs (11/18)  lbs-pt reported via home scale PTA. Pt with ~10 lbs wt loss from UBW to recent weight 154.9 lbs, will continue to closely monitor weights.  Nutrition focused physical exam conducted with verbal consent from patient, pt with evidence of mild muscle mass depletion to temple, clavicle and upper thigh, unable to assess calves due to compression boots in place. Pt with adequate fat stores to triceps.   % Weight Change    Pertinent Medications: MEDICATIONS  (STANDING):  aspirin  chewable 81 milliGRAM(s) Oral daily  digoxin     Tablet 0.125 milliGRAM(s) Oral daily  heparin  Injectable 5000 Unit(s) SubCutaneous every 8 hours  influenza   Vaccine 0.5 milliLiter(s) IntraMuscular once  insulin lispro (HumaLOG) corrective regimen sliding scale   SubCutaneous every 6 hours  polyethylene glycol 3350 17 Gram(s) Oral two times a day  predniSONE   Tablet 10 milliGRAM(s) Oral daily  saliva substitute (BIOTENE MOISTURIZING MOUTH SPRAY) 1 [place](s) Topical three times a day  senna Syrup 10 milliLiter(s) Oral at bedtime  tiotropium 18 MICROgram(s) Capsule 1 Capsule(s) Inhalation daily    MEDICATIONS  (PRN):  traMADol 50 milliGRAM(s) Oral every 6 hours PRN Moderate Pain (4 - 6)    Pertinent Labs:  POCT blood glucose: 11/19: 130-217, 11/20: 177-215    Skin: 1+ dominique foot/ankle edema noted 11/19, none noted thus far today, stage 2 dominique pressure injuries located behind ears.     Estimated Needs:   [ x] no change since previous assessment  [ ] recalculated:       Previous Nutrition Diagnosis:     [ x] Inadequate Protein-Energy Intake     Nutrition Diagnosis is [x ] ongoing, now addressed by nutrition diagnosis below.         New Nutrition Diagnosis: [ ] not applicable    Malnutrition (moderate-acute)     Related to: inability to consume sufficient energy due to dysphagia and aspiration risk    As evidenced by: pt meeting <50% estimated needs x >7 days, mild muscle mass depletion, wt loss in house     Interventions:     Recommend    [ ] Other:     1. As medically feasible, consider increasing tube feed of Glucerna 1.2 Flako  via NGT slowly 10 mL q 24h to new goal of 60 mL/h x 24 h. This regimen at goal provides 1440 mL total volume 1728 Kcal, 86g protein, meets >100% micronutrient needs and 1159 mL free water (25 kcal/Kg, 1.2g protein/Kg based on weight 11/18 70.4 Kg). If unable to advance tube feeding via NGT, may consider post pyloric feeding to reduce aspiration risk. If post pyloric feeding is not feasible and plan to continue tube feedings at 10 mL/h may consider changing formula to Two [place]ako HN @ 10 mL/h to provide 240 mL total volume, 480 Kcal, 20 g protein (noted this regimen will not meet estimated nutrient needs however will provide slightly more than current regimen of Glucerna 1.2 Flako @ 10 mL/h x 24 h)-discussed with DILMA Rivers.   2. Noted given prolonged hypocaloric feeding pt is at risk for refeeding syndrome, closely monitor electrolytes K, Mg, PO4 daily and replete as needed. Consider adding thiamine and multivitamin daily-discussed with DILMA Rivers         Monitoring and Evaluation:     [ ] PO intake [ x] Tolerance to diet prescription [x ] weights [x ] follow up per protocol    [ ] other:

## 2017-11-20 NOTE — CONSULT NOTE ADULT - SUBJECTIVE AND OBJECTIVE BOX
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 He was treated for COPD exacerbation with steroids and immunesuppression. Hospital course was complicated with septic shock, pseudomonas bacteremia/pneumonia requiring MICU stay, intubation and blood pressure support. Patient is extubated, out on the regular floor, off antibiotics. Patient with oral ulcers unable to swallow post NGT placement. Kidney function worsening with creatinine of 1.99 today for which nephrology was consulted.     Patient feels weak, mouth pain and unable to take PO. Denies sob or chest pain.     Patient seen in the floow. He looks   PAST MEDICAL & SURGICAL HISTORY:  Asbestos exposure  Kidney stones  Coronary artery disease involving native coronary artery of native heart, angina presence unspecified: S/p CABG 2016  Cataract  Rotator cuff rupture, right  GERD (gastroesophageal reflux disease)  Hyperlipemia  Diabetes mellitus  Encounter for removal of ureteral stent: Had a ureteral stent for kidney stones. Now removed.  S/P CABG (coronary artery bypass graft)  S/P rotator cuff surgery  S/P lens implant: 2000 &amp; 2012  S/P appendectomy: over 50 years ago      MEDICATIONS  (STANDING):  aspirin  chewable 81 milliGRAM(s) Oral daily  digoxin     Tablet 0.125 milliGRAM(s) Oral daily  heparin  Injectable 5000 Unit(s) SubCutaneous every 8 hours  influenza   Vaccine 0.5 milliLiter(s) IntraMuscular once  insulin lispro (HumaLOG) corrective regimen sliding scale   SubCutaneous every 6 hours  metoprolol     tartrate 25 milliGRAM(s) Oral two times a day  polyethylene glycol 3350 17 Gram(s) Oral two times a day  predniSONE   Tablet 10 milliGRAM(s) Oral daily  saliva substitute (BIOTENE MOISTURIZING MOUTH SPRAY) 1 [place](s) Topical three times a day  senna Syrup 10 milliLiter(s) Oral at bedtime  sodium chloride 0.9%. 1000 milliLiter(s) (60 mL/Hr) IV Continuous <Continuous>  tiotropium 18 MICROgram(s) Capsule 1 Capsule(s) Inhalation daily      Allergies    No Known Allergies    Intolerances        SOCIAL HISTORY:  Denies ETOh,Smoking,     FAMILY HISTORY:  Family history of breast cancer (Mother)  Family history of liver disease (Sibling)  Family history of diabetes mellitus      REVIEW OF SYSTEMS:  12 ROS negative except what is stated in the HPI    VITAL:  T(C): , Max: 36.7 (11-19-17 @ 16:48)  T(F): , Max: 98.1 (11-19-17 @ 16:48)  HR: 109 (11-20-17 @ 08:36)  BP: 151/90 (11-20-17 @ 08:36)  BP(mean): --  RR: 18 (11-20-17 @ 08:36)  SpO2: 97% (11-20-17 @ 08:36)  Wt(kg): --    PHYSICAL EXAM:  Constitutional: awake and alert; no distress, coherent  HEENT: EOMI, clear conj, anicteric sclera; NGT in place, mouth sores and a scab on the lower lip  Neck: Supple, No JVD  Respiratory: CTA-b/l, no wrc, normal effort  Cardiovascular: RRR s1s2, no m/r/g, no peripheral edema  Gastrointestinal: BS+, soft, NT/ND  Extremities: No peripheral edema b/l  Neurological: no focal deficits; strength grossly intact  Skin: warm and dry, no rash on visible skin    LABS:                        9.6    5.6   )-----------( 252      ( 20 Nov 2017 11:18 )             28.6     Na(153)/K(5.3)/Cl(116)/HCO3(23)/BUN(54)/Cr(1.99)Glu(175)/Ca(8.7)/Mg(--)/PO4(--)    11-20 @ 11:19  Na(148)/K(4.9)/Cl(111)/HCO3(26)/BUN(48)/Cr(1.62)Glu(203)/Ca(8.5)/Mg(2.3)/PO4(4.0)    11-18 @ 03:24      IMAGING: Reviewed- see records    ASSESSMENT: 77 year old male with PMH of "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 He was treated for COPD exacerbation with steroids and immunesuppression. Hospital course was complicated with septic shock, pseudomonas bacteremia/pneumonia requiring MICU stay, intubation and blood pressure support. Patient is extubated, out on the regular floor, off antibiotics. Patient with oral ulcers unable to swallow post NGT placement. Kidney function worsening with creatinine of 1.99 today for which nephrology was consulted.     1. CHRISTIANE likely prerenal/dehydration considering low PO intake and Hypernatremia: Consider postobstruction nephropathy considering significant urinary retention  2. Hypernatremia likely due to low free water intake/dehydration; feeding from NGT   3. Anemia likely multifactorial: acute illness, AOCD etc  4. Sepsis/pseudomonas bacteremia off antibiotics; plan per primary team    RECOMMEND:  - increase free water intake to 250ml every 4 hrs  - adjust NGT feeding  - low sodium  - will check UA/urine lytes, urine osmolality  - monitor ins and outs and keep even at least for the next 2-3 days; best with free water considering patient has diabetes and high glucose would promote water diuresis  - noted intermittent catheterization: bladder scan pre and postvoid and if more then 300 ml of urine retaining place a monzon  - will get renal US  - Avoid nephrotoxins, agree on holding ACEIs  - dose meds for GFR of 40 ml/min/1.73m2  - will check anemia profile    Thank you for involving [facility] in this patient's care.    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 He was treated for COPD exacerbation with steroids and immunesuppression. Hospital course was complicated with septic shock, pseudomonas bacteremia/pneumonia requiring MICU stay, intubation and blood pressure support. Patient is extubated, out on the regular floor, off antibiotics. Patient with oral ulcers unable to swallow post NGT placement. Kidney function worsening with creatinine of 1.99 today for which nephrology was consulted.     Patient feels weak, mouth pain and unable to take PO. Denies sob or chest pain.     Patient seen in the floow. He looks   PAST MEDICAL & SURGICAL HISTORY:  Asbestos exposure  Kidney stones  Coronary artery disease involving native coronary artery of native heart, angina presence unspecified: S/p CABG 2016  Cataract  Rotator cuff rupture, right  GERD (gastroesophageal reflux disease)  Hyperlipemia  Diabetes mellitus  Encounter for removal of ureteral stent: Had a ureteral stent for kidney stones. Now removed.  S/P CABG (coronary artery bypass graft)  S/P rotator cuff surgery  S/P lens implant: 2000 &amp; 2012  S/P appendectomy: over 50 years ago      MEDICATIONS  (STANDING):  aspirin  chewable 81 milliGRAM(s) Oral daily  digoxin     Tablet 0.125 milliGRAM(s) Oral daily  heparin  Injectable 5000 Unit(s) SubCutaneous every 8 hours  influenza   Vaccine 0.5 milliLiter(s) IntraMuscular once  insulin lispro (HumaLOG) corrective regimen sliding scale   SubCutaneous every 6 hours  metoprolol     tartrate 25 milliGRAM(s) Oral two times a day  polyethylene glycol 3350 17 Gram(s) Oral two times a day  predniSONE   Tablet 10 milliGRAM(s) Oral daily  saliva substitute (BIOTENE MOISTURIZING MOUTH SPRAY) 1 [place](s) Topical three times a day  senna Syrup 10 milliLiter(s) Oral at bedtime  sodium chloride 0.9%. 1000 milliLiter(s) (60 mL/Hr) IV Continuous <Continuous>  tiotropium 18 MICROgram(s) Capsule 1 Capsule(s) Inhalation daily      Allergies    No Known Allergies    Intolerances        SOCIAL HISTORY:  Denies ETOh,Smoking,     FAMILY HISTORY:  Family history of breast cancer (Mother)  Family history of liver disease (Sibling)  Family history of diabetes mellitus      REVIEW OF SYSTEMS:  12 ROS negative except what is stated in the HPI    VITAL:  T(C): , Max: 36.7 (11-19-17 @ 16:48)  T(F): , Max: 98.1 (11-19-17 @ 16:48)  HR: 109 (11-20-17 @ 08:36)  BP: 151/90 (11-20-17 @ 08:36)  BP(mean): --  RR: 18 (11-20-17 @ 08:36)  SpO2: 97% (11-20-17 @ 08:36)  Wt(kg): --    PHYSICAL EXAM:  Constitutional: awake and alert; no distress, coherent  HEENT: EOMI, clear conj, anicteric sclera; NGT in place, mouth sores and a scab on the lower lip  Neck: Supple, No JVD  Respiratory: CTA-b/l, no wrc, normal effort  Cardiovascular: RRR s1s2, no m/r/g, no peripheral edema  Gastrointestinal: BS+, soft, NT/ND  Extremities: No peripheral edema b/l  Neurological: no focal deficits; strength grossly intact  Skin: warm and dry, no rash on visible skin    LABS:                        9.6    5.6   )-----------( 252      ( 20 Nov 2017 11:18 )             28.6     Na(153)/K(5.3)/Cl(116)/HCO3(23)/BUN(54)/Cr(1.99)Glu(175)/Ca(8.7)/Mg(--)/PO4(--)    11-20 @ 11:19  Na(148)/K(4.9)/Cl(111)/HCO3(26)/BUN(48)/Cr(1.62)Glu(203)/Ca(8.5)/Mg(2.3)/PO4(4.0)    11-18 @ 03:24      IMAGING: Reviewed- see records    ASSESSMENT: 77 year old male with PMH of "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 He was treated for COPD exacerbation with steroids and immunesuppression. Hospital course was complicated with septic shock, pseudomonas bacteremia/pneumonia requiring MICU stay, intubation and blood pressure support. Patient is extubated, out on the regular floor, off antibiotics. Patient with oral ulcers unable to swallow post NGT placement. Kidney function worsening with creatinine of 1.99 today for which nephrology was consulted.     1. CHRISTIANE likely prerenal/dehydration considering low PO intake and Hypernatremia: Consider postobstruction nephropathy considering significant urinary retention  2. Hypernatremia likely due to low free water intake/dehydration; feeding from NGT   3. Anemia likely multifactorial: acute illness, AOCD etc  4. Sepsis/pseudomonas bacteremia off antibiotics; plan per primary team    RECOMMEND:  - increase free water intake to 250ml every 4 hrs  - adjust NGT feeding  - low sodium  - will check UA/urine lytes, urine osmolality  - monitor ins and outs and keep even at least for the next 2-3 days; best with free water considering patient has diabetes and high glucose would promote water diuresis  - noted intermittent catheterization: bladder scan pre and postvoid and if more then 300 ml of urine retaining place a monzon  - will get renal US  - Avoid nephrotoxins, agree on holding ACEIs  - dose meds for GFR of 40 ml/min/1.73m2  - will check anemia profile    Thank you for involving [facility] in this patient's care.    With warm regards,    Ju Davis MD   [facility]  [phone]

## 2017-11-20 NOTE — PROGRESS NOTE ADULT - SUBJECTIVE AND OBJECTIVE BOX
Follow-up Pulm Progress Note  Amauri Jackson MD  [phone]  Creatinine 1.6      Remains Extubated  NPO after FEEST  Breathing comfortably supine on nasal oxygen : stable respiratory status  Dyspnea resolved at rest  Afebrile off antibiotics    CXR: unchanged reticular opacities: no gross new pulm edema  TTE: Mild AS, hyperdynamic LV, PA 40-50  Physical Examination:  PULM: Bibasilar crackles   CVS: Regular rate and rhythm, no murmurs, rubs, or gallops  ABD: Soft, non-tender  EXT:  Without sign edema    RADIOLOGY REVIEWED  CXR: No change in bilateral reticular opacities    CT chest: see above    TTE:

## 2017-11-20 NOTE — PROGRESS NOTE ADULT - SUBJECTIVE AND OBJECTIVE BOX
[facility], Division of Infectious Diseases  LOGAN Chavarria A. Lee  [phone]    Name: MADDIE MALLOY  Age: 77y  Gender: Male  MRN: [id number]    Interval History--  Notes reviewed  c/o mouth dry and scabs, cough is better  sitting in chair this morning    Past Medical History--  Asbestos exposure  Kidney stones  Coronary artery disease involving native coronary artery of native heart, angina presence unspecified  Cataract  Rotator cuff rupture, right  GERD (gastroesophageal reflux disease)  Hyperlipemia  Diabetes mellitus  Encounter for removal of ureteral stent  S/P CABG (coronary artery bypass graft)  S/P rotator cuff surgery  S/P lens implant  S/P appendectomy      For details regarding the patient's social history, family history, and other miscellaneous elements, please refer the initial infectious diseases consultation and/or the admitting history and physical examination for this admission.    Allergies    No Known Allergies    Intolerances        Medications--  Antibiotics:    Immunologic:  influenza   Vaccine 0.5 milliLiter(s) IntraMuscular once    Other:  aspirin  chewable  bisacodyl Suppository PRN  digoxin     Tablet  heparin  Injectable  insulin lispro (HumaLOG) corrective regimen sliding scale  polyethylene glycol 3350  predniSONE   Tablet  saliva substitute (BIOTENE MOISTURIZING MOUTH SPRAY)  senna Syrup  sodium chloride 0.9%.  tiotropium 18 MICROgram(s) Capsule  traMADol PRN      Review of Systems--  A 10-point review of systems was obtained.     Pertinent positives and negatives--  Constitutional: No fevers. No Chills. No Rigors.   Cardiovascular: No chest pain. No palpitations.  Respiratory: No shortness of breath. No cough.  Gastrointestinal: No nausea or vomiting. No diarrhea or constipation.   Psychiatric: + depression  Review of systems otherwise negative except as previously noted.    Physical Examination--  Vital Signs: T(F): 97.7 (11-20-17 @ 08:36), Max: 98.1 (11-19-17 @ 16:48)  HR: 109 (11-20-17 @ 08:36)  BP: 151/90 (11-20-17 @ 08:36)  RR: 18 (11-20-17 @ 08:36)  SpO2: 97% (11-20-17 @ 08:36)  Wt(kg): --  General: Nontoxic-appearing Male in no acute distress.  HEENT: AT/NC.  Anicteric. mouth with scab on lower lip. Dry  Neck: Not rigid. No sense of mass.  Nodes: None palpable.  Lungs: Clear bilaterally without rales, wheezing or rhonchi  Heart: Regular rate and rhythm. No Murmur. No rub. No gallop. No palpable thrill.  Abdomen: Bowel sounds present and normoactive. Soft. Nondistended. Nontender.   Extremities: No cyanosis or clubbing. trace edema.   Skin: Warm. Dry. Good turgor. No rash. No vasculitic stigmata.  Psychiatric: Appropriate affect and mood for situation.         Laboratory Studies--  CBC                        9.6    5.6   )-----------( 252      ( 20 Nov 2017 11:18 )             28.6       Chemistries  11-20    153<H>  |  116<H>  |  54<H>  ----------------------------<  175<H>  5.3   |  23  |  1.99<H>    Ca    8.7      20 Nov 2017 11:19        Culture Data    Culture - Sputum . (11.12.17 @ 12:32)    Gram Stain:   Numerous polymorphonuclear leukocytes per low power field  Rare Squamous epithelial cells per low power field  Numerous Gram Negative Rods per oil power field  Rare Yeast like cells per oil power field    -  Amikacin: S <=8    -  Aztreonam: R >16    -  Cefepime: R >16    -  Ceftazidime: R >16    -  Ciprofloxacin: I 2    -  Gentamicin: S 4    -  Levofloxacin: R >4    -  Meropenem: R >8    -  Imipenem: R >8    -  Piperacillin/Tazobactam: R >64    -  Tobramycin: R >8    Specimen Source: .Sputum Sputum    Culture Results:   Moderate Pseudomonas aeruginosa (Carbapenem Resistant)  No routine respiratory claus Isolated    Organism Identification: Pseudomonas aeruginosa (Carbapenem Resistant)    Organism: Pseudomonas aeruginosa (Carbapenem Resistant)    Method Type: PAUL

## 2017-11-20 NOTE — PROGRESS NOTE ADULT - CARDIOVASCULAR DETAILS
irregular rate and rhythm
positive S2/positive S1

## 2017-11-20 NOTE — SWALLOW BEDSIDE ASSESSMENT ADULT - ASR SWALLOW RECOMMEND DIAG
Repeat FEES exam to be scheduled for the end of the week
Given increased dyspnea and poor coordination of respiration with deglutition defer objective testing at present time
Given improved respiratory status compared with yesterday's exam, recommend FEESST. FEES exam. MD, please write order for exam./FEES

## 2017-11-20 NOTE — CHART NOTE - NSCHARTNOTEFT_GEN_A_CORE
Upon Nutritional Assessment by the Registered Dietitian your patient was determined to meet criteria / has evidence of the following diagnosis/diagnoses:          [ ]  Mild Protein Calorie Malnutrition        [ x]  Moderate Protein Calorie Malnutrition        [ ] Severe Protein Calorie Malnutrition        [ ] Unspecified Protein Calorie Malnutrition        [ ] Underweight / BMI <19        [ ] Morbid Obesity / BMI > 40      Findings as based on:  [x ] Comprehensive nutrition assessment: pt meeting <50% estimated needs x >7 days, wt loss in house  [ x] Nutrition Focused Physical Exam: mild muscle mass depletion   [ ] Other:       Nutrition Plan/Recommendations:  As medically feasible increase tube feeding Glucerna 1.2 Flako via NGT to 60 mL/h x 24 h. If unable to advance via NGT may consider post-pyloric feeding, if this is not possible consider changing formula to Two Flako HN @ 10 mL/h x 24 h        PROVIDER Section:     By signing this assessment you are acknowledging and agree with the diagnosis/diagnoses assigned by the Registered Dietitian    Comments:

## 2017-11-20 NOTE — PROGRESS NOTE ADULT - SUBJECTIVE AND OBJECTIVE BOX
Patient is a 77y old  Male who presents with a chief complaint of SOB (04 Nov 2017 14:33)      SUBJECTIVE / OVERNIGHT EVENTS:    pt seated in chair bedside.  no events overnight pt state he is feeling weak sob stable no cp abd pain n/v/d fever headache     Vital Signs Last 24 Hrs  T(C): 36.5 (20 Nov 2017 08:36), Max: 36.7 (19 Nov 2017 16:48)  T(F): 97.7 (20 Nov 2017 08:36), Max: 98.1 (19 Nov 2017 16:48)  HR: 109 (20 Nov 2017 08:36) (82 - 109)  BP: 151/90 (20 Nov 2017 08:36) (133/83 - 169/84)  BP(mean): --  RR: 18 (20 Nov 2017 08:36) (18 - 18)  SpO2: 97% (20 Nov 2017 08:36) (97% - 98%)  I&O's Summary    20 Nov 2017 07:01  -  20 Nov 2017 14:47  --------------------------------------------------------  IN: 0 mL / OUT: 950 mL / NET: -950 mL            LABS:                        9.6    5.6   )-----------( 252      ( 20 Nov 2017 11:18 )             28.6     11-20    153<H>  |  116<H>  |  54<H>  ----------------------------<  175<H>  5.3   |  23  |  1.99<H>    Ca    8.7      20 Nov 2017 11:19        CAPILLARY BLOOD GLUCOSE      POCT Blood Glucose.: 170 mg/dL (20 Nov 2017 12:39)  POCT Blood Glucose.: 215 mg/dL (20 Nov 2017 07:01)  POCT Blood Glucose.: 177 mg/dL (20 Nov 2017 01:57)  POCT Blood Glucose.: 144 mg/dL (19 Nov 2017 17:55)            RADIOLOGY & ADDITIONAL TESTS:    Imaging Personally Reviewed:  [x] YES  [ ] NO    Consultant(s) Notes Reviewed:  [x] YES  [ ] NO      MEDICATIONS  (STANDING):  aspirin  chewable 81 milliGRAM(s) Oral daily  BACItracin   Ointment 1 Application(s) Topical three times a day  digoxin     Tablet 0.125 milliGRAM(s) Oral daily  heparin  Injectable 5000 Unit(s) SubCutaneous every 8 hours  influenza   Vaccine 0.5 milliLiter(s) IntraMuscular once  insulin lispro (HumaLOG) corrective regimen sliding scale   SubCutaneous every 6 hours  metoprolol     tartrate 25 milliGRAM(s) Oral two times a day  multivitamin 1 Tablet(s) Oral daily  polyethylene glycol 3350 17 Gram(s) Oral two times a day  predniSONE   Tablet 10 milliGRAM(s) Oral daily  saliva substitute (BIOTENE MOISTURIZING MOUTH SPRAY) 1 [place](s) Topical three times a day  senna Syrup 10 milliLiter(s) Oral at bedtime  tiotropium 18 MICROgram(s) Capsule 1 Capsule(s) Inhalation daily    MEDICATIONS  (PRN):  bisacodyl Suppository 10 milliGRAM(s) Rectal daily PRN Constipation  traMADol 50 milliGRAM(s) Oral every 6 hours PRN Moderate Pain (4 - 6)      Care Discussed with Consultants/Other Providers [x] YES  [ ] NO    HEALTH ISSUES - PROBLEM Dx:  Oral ulcer: Oral ulcer  CHRISTIANE (acute kidney injury): CHRISTIANE (acute kidney injury)  Bacteremia: Bacteremia  Neutropenia: Neutropenia  Need for prophylactic measure: Need for prophylactic measure  Oral thrush: Oral thrush  Nasal discomfort: Nasal discomfort  Gastroesophageal reflux disease without esophagitis: Gastroesophageal reflux disease without esophagitis  Hyperlipidemia, unspecified hyperlipidemia type: Hyperlipidemia, unspecified hyperlipidemia type  Type 2 diabetes mellitus without complication, without long-term current use of insulin: Type 2 diabetes mellitus without complication, without long-term current use of insulin  Coronary artery disease involving native coronary artery of native heart, angina presence unspecified: Coronary artery disease involving native coronary artery of native heart, angina presence unspecified  Hyperkalemia: Hyperkalemia  Lactic acidosis: Lactic acidosis  Transaminitis: Transaminitis  Idiopathic pulmonary fibrosis: Idiopathic pulmonary fibrosis  Shortness of breath: Shortness of breath

## 2017-11-20 NOTE — SWALLOW BEDSIDE ASSESSMENT ADULT - SWALLOW EVAL: RECOMMENDED DIET
Continue NPO, with non-oral nutrition/hydration/medications.
NPO, with non-oral nutrition/hydration/medications.
Given HC and baseline elevated RR recommend NPO, with non-oral nutrition/hydration/medications pending objective testing.

## 2017-11-21 DIAGNOSIS — K13.0 DISEASES OF LIPS: ICD-10-CM

## 2017-11-21 LAB
ALBUMIN SERPL ELPH-MCNC: 2.3 G/DL — LOW (ref 3.3–5)
ALP SERPL-CCNC: 298 U/L — HIGH (ref 40–120)
ALT FLD-CCNC: 75 U/L RC — HIGH (ref 10–45)
ANION GAP SERPL CALC-SCNC: 9 MMOL/L — SIGNIFICANT CHANGE UP (ref 5–17)
AST SERPL-CCNC: 39 U/L — SIGNIFICANT CHANGE UP (ref 10–40)
BACTERIA # UR AUTO: ABNORMAL
BASOPHILS # BLD AUTO: 0 K/UL — SIGNIFICANT CHANGE UP (ref 0–0.2)
BASOPHILS NFR BLD AUTO: 0 % — SIGNIFICANT CHANGE UP (ref 0–2)
BILIRUB DIRECT SERPL-MCNC: 0.3 MG/DL — HIGH (ref 0–0.2)
BILIRUB INDIRECT FLD-MCNC: 0.3 MG/DL — SIGNIFICANT CHANGE UP (ref 0.2–1)
BILIRUB SERPL-MCNC: 0.6 MG/DL — SIGNIFICANT CHANGE UP (ref 0.2–1.2)
BUN SERPL-MCNC: 35 MG/DL — HIGH (ref 7–23)
CALCIUM SERPL-MCNC: 8.7 MG/DL — SIGNIFICANT CHANGE UP (ref 8.4–10.5)
CHLORIDE SERPL-SCNC: 120 MMOL/L — HIGH (ref 96–108)
CHLORIDE UR-SCNC: 71 MMOL/L — SIGNIFICANT CHANGE UP
CO2 SERPL-SCNC: 28 MMOL/L — SIGNIFICANT CHANGE UP (ref 22–31)
COMMENT - URINE: SIGNIFICANT CHANGE UP
CREAT ?TM UR-MCNC: 39 MG/DL — SIGNIFICANT CHANGE UP
CREAT SERPL-MCNC: 1.01 MG/DL — SIGNIFICANT CHANGE UP (ref 0.5–1.3)
EOSINOPHIL # BLD AUTO: 0 K/UL — SIGNIFICANT CHANGE UP (ref 0–0.5)
EOSINOPHIL NFR BLD AUTO: 0.1 % — SIGNIFICANT CHANGE UP (ref 0–6)
EPI CELLS # UR: 8 /HPF — HIGH (ref 0–5)
FERRITIN SERPL-MCNC: 1280 NG/ML — HIGH (ref 30–400)
FOLATE SERPL-MCNC: 11.9 NG/ML — SIGNIFICANT CHANGE UP (ref 4.8–24.2)
GLUCOSE BLDC GLUCOMTR-MCNC: 189 MG/DL — HIGH (ref 70–99)
GLUCOSE BLDC GLUCOMTR-MCNC: 193 MG/DL — HIGH (ref 70–99)
GLUCOSE BLDC GLUCOMTR-MCNC: 205 MG/DL — HIGH (ref 70–99)
GLUCOSE BLDC GLUCOMTR-MCNC: 228 MG/DL — HIGH (ref 70–99)
GLUCOSE BLDC GLUCOMTR-MCNC: 250 MG/DL — HIGH (ref 70–99)
GLUCOSE SERPL-MCNC: 198 MG/DL — HIGH (ref 70–99)
GRAN CASTS # UR COMP ASSIST: 1 /LPF — HIGH
HCT VFR BLD CALC: 26.8 % — LOW (ref 39–50)
HGB BLD-MCNC: 8.7 G/DL — LOW (ref 13–17)
HYALINE CASTS # UR AUTO: 2 /LPF — SIGNIFICANT CHANGE UP (ref 0–7)
IRON SATN MFR SERPL: 12 % — LOW (ref 16–55)
IRON SATN MFR SERPL: 15 UG/DL — LOW (ref 45–165)
LYMPHOCYTES # BLD AUTO: 0.6 K/UL — LOW (ref 1–3.3)
LYMPHOCYTES # BLD AUTO: 9.8 % — LOW (ref 13–44)
MAGNESIUM SERPL-MCNC: 2 MG/DL — SIGNIFICANT CHANGE UP (ref 1.6–2.6)
MCHC RBC-ENTMCNC: 32.3 GM/DL — SIGNIFICANT CHANGE UP (ref 32–36)
MCHC RBC-ENTMCNC: 35.1 PG — HIGH (ref 27–34)
MCV RBC AUTO: 109 FL — HIGH (ref 80–100)
MONOCYTES # BLD AUTO: 0.3 K/UL — SIGNIFICANT CHANGE UP (ref 0–0.9)
MONOCYTES NFR BLD AUTO: 5.2 % — SIGNIFICANT CHANGE UP (ref 2–14)
NEUTROPHILS # BLD AUTO: 5 K/UL — SIGNIFICANT CHANGE UP (ref 1.8–7.4)
NEUTROPHILS NFR BLD AUTO: 84.9 % — HIGH (ref 43–77)
OSMOLALITY UR: 453 MOS/KG — SIGNIFICANT CHANGE UP (ref 50–1200)
PHOSPHATE SERPL-MCNC: 2.6 MG/DL — SIGNIFICANT CHANGE UP (ref 2.5–4.5)
PLATELET # BLD AUTO: 226 K/UL — SIGNIFICANT CHANGE UP (ref 150–400)
POTASSIUM SERPL-MCNC: 4.6 MMOL/L — SIGNIFICANT CHANGE UP (ref 3.5–5.3)
POTASSIUM SERPL-SCNC: 4.6 MMOL/L — SIGNIFICANT CHANGE UP (ref 3.5–5.3)
POTASSIUM UR-SCNC: 33 MMOL/L — SIGNIFICANT CHANGE UP
PROT SERPL-MCNC: 6.1 G/DL — SIGNIFICANT CHANGE UP (ref 6–8.3)
RBC # BLD: 2.47 M/UL — LOW (ref 4.2–5.8)
RBC # FLD: 18.9 % — HIGH (ref 10.3–14.5)
RBC CASTS # UR COMP ASSIST: 15 /HPF — HIGH (ref 0–4)
SODIUM SERPL-SCNC: 157 MMOL/L — HIGH (ref 135–145)
SODIUM UR-SCNC: 83 MMOL/L — SIGNIFICANT CHANGE UP
TIBC SERPL-MCNC: 126 UG/DL — LOW (ref 220–430)
TRANSFERRIN SERPL-MCNC: 108 MG/DL — LOW (ref 200–360)
UIBC SERPL-MCNC: 111 UG/DL — SIGNIFICANT CHANGE UP (ref 110–370)
VIT B12 SERPL-MCNC: >2000 PG/ML — HIGH (ref 243–894)
WBC # BLD: 5.9 K/UL — SIGNIFICANT CHANGE UP (ref 3.8–10.5)
WBC # FLD AUTO: 5.9 K/UL — SIGNIFICANT CHANGE UP (ref 3.8–10.5)
WBC UR QL: 15 /HPF — HIGH (ref 0–5)

## 2017-11-21 PROCEDURE — 99222 1ST HOSP IP/OBS MODERATE 55: CPT

## 2017-11-21 RX ORDER — SODIUM CHLORIDE 0.65 %
1 AEROSOL, SPRAY (ML) NASAL THREE TIMES A DAY
Qty: 0 | Refills: 0 | Status: DISCONTINUED | OUTPATIENT
Start: 2017-11-21 | End: 2017-12-05

## 2017-11-21 RX ORDER — CHLORHEXIDINE GLUCONATE 213 G/1000ML
15 SOLUTION TOPICAL
Qty: 0 | Refills: 0 | Status: DISCONTINUED | OUTPATIENT
Start: 2017-11-21 | End: 2017-11-21

## 2017-11-21 RX ORDER — CHLORHEXIDINE GLUCONATE 213 G/1000ML
15 SOLUTION TOPICAL
Qty: 0 | Refills: 0 | Status: DISCONTINUED | OUTPATIENT
Start: 2017-11-21 | End: 2017-12-05

## 2017-11-21 RX ADMIN — Medication 4: at 06:11

## 2017-11-21 RX ADMIN — POLYETHYLENE GLYCOL 3350 17 GRAM(S): 17 POWDER, FOR SOLUTION ORAL at 18:33

## 2017-11-21 RX ADMIN — Medication 1 SPRAY(S): at 06:07

## 2017-11-21 RX ADMIN — HEPARIN SODIUM 5000 UNIT(S): 5000 INJECTION INTRAVENOUS; SUBCUTANEOUS at 13:19

## 2017-11-21 RX ADMIN — PANTOPRAZOLE SODIUM 40 MILLIGRAM(S): 20 TABLET, DELAYED RELEASE ORAL at 13:19

## 2017-11-21 RX ADMIN — Medication 1 APPLICATION(S): at 06:07

## 2017-11-21 RX ADMIN — Medication 4: at 18:32

## 2017-11-21 RX ADMIN — POLYETHYLENE GLYCOL 3350 17 GRAM(S): 17 POWDER, FOR SOLUTION ORAL at 06:08

## 2017-11-21 RX ADMIN — Medication 1 TABLET(S): at 13:19

## 2017-11-21 RX ADMIN — Medication 1 SPRAY(S): at 13:20

## 2017-11-21 RX ADMIN — Medication 25 MILLIGRAM(S): at 06:07

## 2017-11-21 RX ADMIN — Medication 0.12 MILLIGRAM(S): at 06:07

## 2017-11-21 RX ADMIN — Medication 4: at 13:18

## 2017-11-21 RX ADMIN — HEPARIN SODIUM 5000 UNIT(S): 5000 INJECTION INTRAVENOUS; SUBCUTANEOUS at 21:45

## 2017-11-21 RX ADMIN — Medication 1 SPRAY(S): at 21:44

## 2017-11-21 RX ADMIN — HEPARIN SODIUM 5000 UNIT(S): 5000 INJECTION INTRAVENOUS; SUBCUTANEOUS at 06:08

## 2017-11-21 RX ADMIN — CHLORHEXIDINE GLUCONATE 15 MILLILITER(S): 213 SOLUTION TOPICAL at 18:35

## 2017-11-21 RX ADMIN — Medication 10 MILLIGRAM(S): at 06:07

## 2017-11-21 RX ADMIN — Medication 1 SPRAY(S): at 21:45

## 2017-11-21 RX ADMIN — CHLORHEXIDINE GLUCONATE 15 MILLILITER(S): 213 SOLUTION TOPICAL at 21:44

## 2017-11-21 RX ADMIN — Medication 2: at 00:23

## 2017-11-21 RX ADMIN — Medication 81 MILLIGRAM(S): at 13:19

## 2017-11-21 RX ADMIN — TIOTROPIUM BROMIDE 1 CAPSULE(S): 18 CAPSULE ORAL; RESPIRATORY (INHALATION) at 13:19

## 2017-11-21 RX ADMIN — Medication 1 APPLICATION(S): at 13:19

## 2017-11-21 RX ADMIN — Medication 25 MILLIGRAM(S): at 18:33

## 2017-11-21 RX ADMIN — Medication 1 APPLICATION(S): at 21:45

## 2017-11-21 RX ADMIN — SENNA PLUS 10 MILLILITER(S): 8.6 TABLET ORAL at 21:44

## 2017-11-21 NOTE — PROGRESS NOTE ADULT - ASSESSMENT
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 CT chest shows bilateral IPF  treated for Pulmonary fibrosis / Intersitial pulmonary dz exacerbation.  pt developed ARF intubated transferred to MICU w/ acute hypoxic respiratory failure w/ septic shock 2/2 to pseudomonal bacteremia resolved off abx.   pt extubated downgraded to floor.  Family is refusing imuran will hold for now and can f/u with outpt pulm to discuss restarting     -Back pain - c/w  tramadol PRN.  neuro consult Dr Lowe if pain continues.  pt moving all extremities spontaneously.   PT consult prior to d/c    -Idiopathic pulmonary fibrosis / Acute hypoxic expiratory failure - improving, c/w steroids, hold imuran as per pt family wishes.  respiratory status remains precarious     -Nutrition - NG in place,  increase Feed rate discussed with pulm and nutrition.  monitor for aspiration. FEEST scheduled for friday     -CHRISTIANE / Hypernatremia - prerenal discussed with nephro attending free water increased     -Pseudomonas bacteremia off abx resolved      -Lip lesion - dry eschar from prolonged intubation, ENT appreciated, c/w bactroban    -Type 2 diabetes mellitus c/w insulin tx     -DVT ppx

## 2017-11-21 NOTE — PROGRESS NOTE ADULT - SUBJECTIVE AND OBJECTIVE BOX
Patient is a 77y old  Male who presents with a chief complaint of SOB (2017 14:33)      SUBJECTIVE / OVERNIGHT EVENTS:    patient seen and examined at bedside  feeling weak but otherwise better  no acute complaints  wants to eat, hates ng tube  no acute events overnight     Vital Signs Last 24 Hrs  T(C): 36.5 (2017 20:42), Max: 36.8 (2017 21:58)  T(F): 97.7 (2017 20:42), Max: 98.3 (2017 21:58)  HR: 70 (2017 20:42) (58 - 101)  BP: 115/71 (2017 20:42) (106/72 - 115/71)  BP(mean): --  RR: 18 (2017 20:42) (18 - 18)  SpO2: 100% (2017 20:42) (95% - 100%)  I&O's Summary    2017 07:  -  2017 07:00  --------------------------------------------------------  IN: 250 mL / OUT: 3525 mL / NET: -3275 mL    2017 07:01  -  2017 21:27  --------------------------------------------------------  IN: 1030 mL / OUT: 700 mL / NET: 330 mL        Physical Exam:   · Constitutional	detailed exam	  · Constitutional Details	Pale weak	  · Eyes	detailed exam	  · Eyes Details	PERRL; EOMI	  · ENMT	detailed exam	  · ENMT Details	nose	  · Nose	no discharge; no deviation, dry eschar, no bleeding or puss	  · Neck	detailed exam	  · Neck Details	supple; no JVD	  · Respiratory	detailed exam	  · Respiratory Details	airway patent; breath sounds equal	  · Cardiovascular	detailed exam	  · Cardiovascular Details	no rub	  · Cardiovascular Details	positive S1; positive S2	  · Neurological	detailed exam	  · Neurological Details	responds to pain; responds to verbal commands	        LABS:                        8.7    5.9   )-----------( 226      ( 2017 09:52 )             26.8         157<H>  |  120<H>  |  35<H>  ----------------------------<  198<H>  4.6   |  28  |  1.01    Ca    8.7      2017 09:53  Phos  2.6       Mg     2.0         TPro  6.1  /  Alb  2.3<L>  /  TBili  0.6  /  DBili  0.3<H>  /  AST  39  /  ALT  75<H>  /  AlkPhos  298<H>        CAPILLARY BLOOD GLUCOSE      POCT Blood Glucose.: 205 mg/dL (2017 17:10)  POCT Blood Glucose.: 250 mg/dL (2017 13:17)  POCT Blood Glucose.: 193 mg/dL (2017 08:12)  POCT Blood Glucose.: 228 mg/dL (2017 06:10)  POCT Blood Glucose.: 177 mg/dL (2017 23:56)        Urinalysis Basic - ( 2017 19:53 )    Color: Yellow / Appearance: Clear / S.013 / pH: x  Gluc: x / Ketone: Negative  / Bili: Negative / Urobili: Negative mg/dL   Blood: x / Protein: Trace mg/dL / Nitrite: Negative   Leuk Esterase: Negative / RBC: 15 /HPF / WBC 15 /HPF   Sq Epi: x / Non Sq Epi: 8 /HPF / Bacteria: Few        RADIOLOGY & ADDITIONAL TESTS:    Imaging Personally Reviewed:  [x] YES  [ ] NO    Consultant(s) Notes Reviewed:  [x] YES  [ ] NO      MEDICATIONS  (STANDING):  aspirin  chewable 81 milliGRAM(s) Oral daily  BACItracin   Ointment 1 Application(s) Topical three times a day  chlorhexidine 0.12% Liquid 15 milliLiter(s) Swish and Spit four times a day  digoxin     Tablet 0.125 milliGRAM(s) Oral daily  heparin  Injectable 5000 Unit(s) SubCutaneous every 8 hours  influenza   Vaccine 0.5 milliLiter(s) IntraMuscular once  insulin lispro (HumaLOG) corrective regimen sliding scale   SubCutaneous every 6 hours  metoprolol     tartrate 25 milliGRAM(s) Oral two times a day  multivitamin 1 Tablet(s) Oral daily  pantoprazole  Injectable 40 milliGRAM(s) IV Push daily  polyethylene glycol 3350 17 Gram(s) Oral two times a day  predniSONE   Tablet 10 milliGRAM(s) Oral daily  saliva substitute (BIOTENE MOISTURIZING MOUTH SPRAY) 1 [place](s) Topical three times a day  senna Syrup 10 milliLiter(s) Oral at bedtime  sodium chloride 0.65% Nasal 1 Spray(s) Both Nostrils three times a day  tiotropium 18 MICROgram(s) Capsule 1 Capsule(s) Inhalation daily    MEDICATIONS  (PRN):  bisacodyl Suppository 10 milliGRAM(s) Rectal daily PRN Constipation  traMADol 50 milliGRAM(s) Oral every 6 hours PRN Moderate Pain (4 - 6)      Care Discussed with Consultants/Other Providers [x] YES  [ ] NO    HEALTH ISSUES - PROBLEM Dx:  Lip lesion: Lip lesion  Oral ulcer: Oral ulcer  CHRISTIANE (acute kidney injury): CHRISTIANE (acute kidney injury)  Bacteremia: Bacteremia  Neutropenia: Neutropenia  Need for prophylactic measure: Need for prophylactic measure  Oral thrush: Oral thrush  Nasal discomfort: Nasal discomfort  Gastroesophageal reflux disease without esophagitis: Gastroesophageal reflux disease without esophagitis  Hyperlipidemia, unspecified hyperlipidemia type: Hyperlipidemia, unspecified hyperlipidemia type  Type 2 diabetes mellitus without complication, without long-term current use of insulin: Type 2 diabetes mellitus without complication, without long-term current use of insulin  Coronary artery disease involving native coronary artery of native heart, angina presence unspecified: Coronary artery disease involving native coronary artery of native heart, angina presence unspecified  Hyperkalemia: Hyperkalemia  Lactic acidosis: Lactic acidosis  Transaminitis: Transaminitis  Idiopathic pulmonary fibrosis: Idiopathic pulmonary fibrosis  Shortness of breath: Shortness of breath

## 2017-11-21 NOTE — CONSULT NOTE ADULT - ASSESSMENT
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 Pt with very poor oraal care, dry mucous membranes and difficulty clearing secretions likely due to dehydration and NPO status. 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 Pt with poor oral hygeine, dry mucous membranes and difficulty clearing secretions likely due to dehydration and NPO status.

## 2017-11-21 NOTE — PROGRESS NOTE ADULT - SUBJECTIVE AND OBJECTIVE BOX
Follow-up Pulm Progress Note  Amauri Jackson MD  [phone]      Creatinine 1.0/Na 157  Remains Extubated  NPO after FEEST  Breathing comfortably supine on nasal oxygen : stable respiratory status  Dyspnea resolved at rest  Afebrile off antibiotics  Lip and Soft palate ulcerations noted    Vital Signs Last 24 Hrs  T(C): 36.8 (21 Nov 2017 07:55), Max: 36.8 (20 Nov 2017 18:52)  T(F): 98.2 (21 Nov 2017 07:55), Max: 98.3 (20 Nov 2017 18:52)  HR: 58 (21 Nov 2017 07:55) (58 - 93)  BP: 113/68 (21 Nov 2017 07:55) (106/72 - 169/89)  BP(mean): --  RR: 18 (21 Nov 2017 07:55) (18 - 18)  SpO2: 100% (21 Nov 2017 07:55) (95% - 100%)                        8.7    5.9   )-----------( 226      ( 21 Nov 2017 09:52 )             26.8       11-21    157<H>  |  120<H>  |  35<H>  ----------------------------<  198<H>  4.6   |  28  |  1.01    Ca    8.7      21 Nov 2017 09:53  Phos  2.6     11-21  Mg     2.0     11-21    TPro  6.1  /  Alb  2.3<L>  /  TBili  0.6  /  DBili  0.3<H>  /  AST  39  /  ALT  75<H>  /  AlkPhos  298<H>  11-21    CXR: unchanged reticular opacities: no gross new pulm edema  TTE: Mild AS, hyperdynamic LV, PA 40-50  Physical Examination:  PULM: Bibasilar crackles   CVS: Regular rate and rhythm, no murmurs, rubs, or gallops  ABD: Soft, non-tender  EXT:  Without sign edema    RADIOLOGY REVIEWED  CXR: No change in bilateral reticular opacities    CT chest: see above    TTE:

## 2017-11-21 NOTE — CONSULT NOTE ADULT - SUBJECTIVE AND OBJECTIVE BOX
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 14, 2017 after being intubated for 7 days for respiratory distress. Ent was called due to 'worsening' and 'spreading' of the lesion. Pt was seen and examined at the bedside with wife present.     PAST MEDICAL & SURGICAL HISTORY:  Asbestos exposure  Kidney stones  Coronary artery disease involving native coronary artery of native heart, angina presence unspecified: S/p CABG 2016  Cataract  Rotator cuff rupture, right  GERD (gastroesophageal reflux disease)  Hyperlipemia  Diabetes mellitus  Encounter for removal of ureteral stent: Had a ureteral stent for kidney stones. Now removed.  S/P CABG (coronary artery bypass graft)  S/P rotator cuff surgery  S/P lens implant: 2000 &amp; 2012  S/P appendectomy: over 50 years ago    Allergies    No Known Allergies    Intolerances      MEDICATIONS  (STANDING):  aspirin  chewable 81 milliGRAM(s) Oral daily  BACItracin   Ointment 1 Application(s) Topical three times a day  digoxin     Tablet 0.125 milliGRAM(s) Oral daily  heparin  Injectable 5000 Unit(s) SubCutaneous every 8 hours  influenza   Vaccine 0.5 milliLiter(s) IntraMuscular once  insulin lispro (HumaLOG) corrective regimen sliding scale   SubCutaneous every 6 hours  metoprolol     tartrate 25 milliGRAM(s) Oral two times a day  multivitamin 1 Tablet(s) Oral daily  pantoprazole  Injectable 40 milliGRAM(s) IV Push daily  polyethylene glycol 3350 17 Gram(s) Oral two times a day  predniSONE   Tablet 10 milliGRAM(s) Oral daily  saliva substitute (BIOTENE MOISTURIZING MOUTH SPRAY) 1 [place](s) Topical three times a day  senna Syrup 10 milliLiter(s) Oral at bedtime  tiotropium 18 MICROgram(s) Capsule 1 Capsule(s) Inhalation daily    MEDICATIONS  (PRN):  bisacodyl Suppository 10 milliGRAM(s) Rectal daily PRN Constipation  traMADol 50 milliGRAM(s) Oral every 6 hours PRN Moderate Pain (4 - 6)    Social History: ????????    ROS: ENT, GI, , CV, Pulm, Neuro, Psych, MS, Heme, Endo, Constitutional; all negative except as noted in HPI    Vital Signs Last 24 Hrs  T(C): 36.8 (21 Nov 2017 07:55), Max: 36.8 (20 Nov 2017 18:52)  T(F): 98.2 (21 Nov 2017 07:55), Max: 98.3 (20 Nov 2017 18:52)  HR: 58 (21 Nov 2017 07:55) (58 - 93)  BP: 113/68 (21 Nov 2017 07:55) (106/72 - 169/89)  BP(mean): --  RR: 18 (21 Nov 2017 07:55) (18 - 18)  SpO2: 100% (21 Nov 2017 07:55) (95% - 100%)                          8.7    5.9   )-----------( 226      ( 21 Nov 2017 09:52 )             26.8    11-21    157<H>  |  120<H>  |  35<H>  ----------------------------<  198<H>  4.6   |  28  |  1.01    Ca    8.7      21 Nov 2017 09:53  Phos  2.6     11-21  Mg     2.0     11-21    TPro  6.1  /  Alb  2.3<L>  /  TBili  0.6  /  DBili  0.3<H>  /  AST  39  /  ALT  75<H>  /  AlkPhos  298<H>  11-21       PHYSICAL EXAM:  Gen: NAD, well-developed  Head: Normocephalic, Atraumatic  Face: no edema/erythema/fluctuance, parotid glands soft without mass  Eyes: PERRL, EOMI, no scleral injection  Ears: Right - ear canal clear, TM intact without effusion            Left - ear canal clear, TM intact without effusion  Nose: Nares bilaterally patent, no discharge  Mouth: Mucosa moist, tongue/uvula midline, oropharynx clear  Neck: Flat, supple, no lymphadenopathy, trachea midline, no masses  Resp: no use of accessory muscles, no stridor  CV: no peripheral edema/cyanosis CC: Lip lesion    HPI: Patient is a 77y old  Male with PMH of "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 14, 2017 after being intubated for 7 days for respiratory distress. Ent was called due to 'worsening' and 'spreading' of the lesion. Pt was seen and examined at the bedside with wife present. Pt states the lesion is painful and bleeds often. Wife states that the lesion 'spread' now into the mouth and tongue. Pt admits to dysphagia, dysphonia, difficulty clearing his secretions. Pt currently NPO with keofeed in place. Pt denies hemoptysis, epistaxis, rhinorrhea, headache, n/v.    PAST MEDICAL & SURGICAL HISTORY:  Asbestos exposure  Kidney stones  Coronary artery disease involving native coronary artery of native heart, angina presence unspecified: S/p CABG 2016  Cataract  Rotator cuff rupture, right  GERD (gastroesophageal reflux disease)  Hyperlipemia  Diabetes mellitus  Encounter for removal of ureteral stent: Had a ureteral stent for kidney stones. Now removed.  S/P CABG (coronary artery bypass graft)  S/P rotator cuff surgery  S/P lens implant: 2000 &amp; 2012  S/P appendectomy: over 50 years ago    Allergies    No Known Allergies    Intolerances      MEDICATIONS  (STANDING):  aspirin  chewable 81 milliGRAM(s) Oral daily  BACItracin   Ointment 1 Application(s) Topical three times a day  digoxin     Tablet 0.125 milliGRAM(s) Oral daily  heparin  Injectable 5000 Unit(s) SubCutaneous every 8 hours  influenza   Vaccine 0.5 milliLiter(s) IntraMuscular once  insulin lispro (HumaLOG) corrective regimen sliding scale   SubCutaneous every 6 hours  metoprolol     tartrate 25 milliGRAM(s) Oral two times a day  multivitamin 1 Tablet(s) Oral daily  pantoprazole  Injectable 40 milliGRAM(s) IV Push daily  polyethylene glycol 3350 17 Gram(s) Oral two times a day  predniSONE   Tablet 10 milliGRAM(s) Oral daily  saliva substitute (BIOTENE MOISTURIZING MOUTH SPRAY) 1 [place](s) Topical three times a day  senna Syrup 10 milliLiter(s) Oral at bedtime  tiotropium 18 MICROgram(s) Capsule 1 Capsule(s) Inhalation daily    MEDICATIONS  (PRN):  bisacodyl Suppository 10 milliGRAM(s) Rectal daily PRN Constipation  traMADol 50 milliGRAM(s) Oral every 6 hours PRN Moderate Pain (4 - 6)    Social History: denies use of tobacco, illicit drugs or alcohol    ROS: ENT, GI, , CV, Pulm, Neuro, Psych, MS, Heme, Endo, Constitutional; all negative except as noted in HPI    Vital Signs Last 24 Hrs  T(C): 36.8 (21 Nov 2017 07:55), Max: 36.8 (20 Nov 2017 18:52)  T(F): 98.2 (21 Nov 2017 07:55), Max: 98.3 (20 Nov 2017 18:52)  HR: 58 (21 Nov 2017 07:55) (58 - 93)  BP: 113/68 (21 Nov 2017 07:55) (106/72 - 169/89)  BP(mean): --  RR: 18 (21 Nov 2017 07:55) (18 - 18)  SpO2: 100% (21 Nov 2017 07:55) (95% - 100%)                          8.7    5.9   )-----------( 226      ( 21 Nov 2017 09:52 )             26.8    11-21    157<H>  |  120<H>  |  35<H>  ----------------------------<  198<H>  4.6   |  28  |  1.01    Ca    8.7      21 Nov 2017 09:53  Phos  2.6     11-21  Mg     2.0     11-21    TPro  6.1  /  Alb  2.3<L>  /  TBili  0.6  /  DBili  0.3<H>  /  AST  39  /  ALT  75<H>  /  AlkPhos  298<H>  11-21       PHYSICAL EXAM:  Gen: NAD, well-developed, resting supine in an upright position, difficulty conversing  Head: Normocephalic, Atraumatic  Face: no edema/erythema/fluctuance, parotid glands soft without mass  Eyes: PERRL, EOMI, no scleral injection  Ears: Right - ear canal clear, moderate non-obstructing cerumen TM intact without effusion            Left - ear canal clear, mild non-obstructing cerumen, TM intact without effusion  Nose: Nares bilaterally patent, no discharge, dry crusting, no active bleeding, keofeed tubing in right nare, non-humidified O2 nasal cannula in place  Mouth: Mucosa dry, tongue/uvula midline,   	brown crusting stuck to the hard palette, healthy tissue revealed once crusting removed, no bleeding              black eschar lesion of the medial inferior lip, +auspitz sign  	gingiva pink, dentition crusted with dried secretions  Neck: Flat, supple, no lymphadenopathy, trachea midline, no masses  Resp: no use of accessory muscles, no stridor  CV: no peripheral edema/cyanosis    FOE/laryngoscopy: scope #3 No bleeding in nasal pharynx or Oral pharynx.  No foreign body or pooling of secretions.  No glottic / supraglottic swelling.  Vocal cords mobile in intact b/l.  Airway patent. 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 14, 2017 after being intubated for 7 days for respiratory distress. Ent was called due to 'worsening' and 'spreading' of the lesion. Pt was seen and examined at the bedside with wife present. Pt states the lesion is painful and bleeds often. Wife states that the lesion 'spread' now into the mouth and tongue. Pt admits to dysphagia, dysphonia, difficulty clearing his secretions. Pt currently NPO with keofeed in place. Pt denies hemoptysis, epistaxis, rhinorrhea, headache, n/v.    PAST MEDICAL & SURGICAL HISTORY:  Asbestos exposure  Kidney stones  Coronary artery disease involving native coronary artery of native heart, angina presence unspecified: S/p CABG 2016  Cataract  Rotator cuff rupture, right  GERD (gastroesophageal reflux disease)  Hyperlipemia  Diabetes mellitus  Encounter for removal of ureteral stent: Had a ureteral stent for kidney stones. Now removed.  S/P CABG (coronary artery bypass graft)  S/P rotator cuff surgery  S/P lens implant: 2000 &amp; 2012  S/P appendectomy: over 50 years ago    Allergies    No Known Allergies    Intolerances      MEDICATIONS  (STANDING):  aspirin  chewable 81 milliGRAM(s) Oral daily  BACItracin   Ointment 1 Application(s) Topical three times a day  digoxin     Tablet 0.125 milliGRAM(s) Oral daily  heparin  Injectable 5000 Unit(s) SubCutaneous every 8 hours  influenza   Vaccine 0.5 milliLiter(s) IntraMuscular once  insulin lispro (HumaLOG) corrective regimen sliding scale   SubCutaneous every 6 hours  metoprolol     tartrate 25 milliGRAM(s) Oral two times a day  multivitamin 1 Tablet(s) Oral daily  pantoprazole  Injectable 40 milliGRAM(s) IV Push daily  polyethylene glycol 3350 17 Gram(s) Oral two times a day  predniSONE   Tablet 10 milliGRAM(s) Oral daily  saliva substitute (BIOTENE MOISTURIZING MOUTH SPRAY) 1 [place](s) Topical three times a day  senna Syrup 10 milliLiter(s) Oral at bedtime  tiotropium 18 MICROgram(s) Capsule 1 Capsule(s) Inhalation daily    MEDICATIONS  (PRN):  bisacodyl Suppository 10 milliGRAM(s) Rectal daily PRN Constipation  traMADol 50 milliGRAM(s) Oral every 6 hours PRN Moderate Pain (4 - 6)    Social History: denies use of tobacco, illicit drugs or alcohol    ROS: ENT, GI, , CV, Pulm, Neuro, Psych, MS, Heme, Endo, Constitutional; all negative except as noted in HPI    Vital Signs Last 24 Hrs  T(C): 36.8 (21 Nov 2017 07:55), Max: 36.8 (20 Nov 2017 18:52)  T(F): 98.2 (21 Nov 2017 07:55), Max: 98.3 (20 Nov 2017 18:52)  HR: 58 (21 Nov 2017 07:55) (58 - 93)  BP: 113/68 (21 Nov 2017 07:55) (106/72 - 169/89)  BP(mean): --  RR: 18 (21 Nov 2017 07:55) (18 - 18)  SpO2: 100% (21 Nov 2017 07:55) (95% - 100%)                          8.7    5.9   )-----------( 226      ( 21 Nov 2017 09:52 )             26.8    11-21    157<H>  |  120<H>  |  35<H>  ----------------------------<  198<H>  4.6   |  28  |  1.01    Ca    8.7      21 Nov 2017 09:53  Phos  2.6     11-21  Mg     2.0     11-21    TPro  6.1  /  Alb  2.3<L>  /  TBili  0.6  /  DBili  0.3<H>  /  AST  39  /  ALT  75<H>  /  AlkPhos  298<H>  11-21       PHYSICAL EXAM:  Gen: NAD, well-developed, resting supine in an upright position, difficulty conversing  Head: Normocephalic, Atraumatic  Face: no edema/erythema/fluctuance, parotid glands soft without mass  Eyes: PERRL, EOMI, no scleral injection  Ears: Right - ear canal clear, moderate non-obstructing cerumen TM intact without effusion            Left - ear canal clear, mild non-obstructing cerumen, TM intact without effusion  Nose: Nares bilaterally patent, no discharge, dry crusting, no active bleeding, keofeed tubing in right nare, non-humidified O2 nasal cannula in place  Mouth: Mucosa dry, tongue/uvula midline,   	brown crusting stuck to the hard palette, healthy tissue revealed once crusting removed, no bleeding              black eschar lesion of the medial inferior lip, Bleeds on attempted removal  	gingiva pink, dentition crusted with dried secretions  Neck: Flat, supple, no lymphadenopathy, trachea midline, no masses  Resp: no use of accessory muscles, no stridor  CV: no peripheral edema/cyanosis    FOE/laryngoscopy: scope #3 No bleeding in nasal pharynx or Oral pharynx. No glottic / supraglottic swelling.  Vocal cords mobile in intact b/l.  Airway patent.  Significant pooling of secretions and unable to clear well. Patient will not allow deep suctioning.

## 2017-11-21 NOTE — PROGRESS NOTE ADULT - ASSESSMENT
Acute on chronic hypoxemic respiratory failure: clinically resoliving post extubation  Progressive ILD - unclear etiology  Pseudomonoas bacteremia with pneumonia: resolving  OP dysphagia  Oral/Lip ulcerations  CHRISTIANE - resolving  Hypernatremia    Septic Shock - clinically resolved    REC  Observe off antibiotics  Increase feeds and add free water  Repeat FEEST planned for friday  PT  Oral Surgery.ENT evaluation

## 2017-11-21 NOTE — CONSULT NOTE ADULT - PROBLEM SELECTOR RECOMMENDATION 9
dry eschar from trauma  -copious amounts of bacitracin to keep area moist at all times  -oral care: tooth brush and tooth paste at bedside, allow pt to swish and spit to keep oral cavity moist  -Clorhexadine swish and spit QID  -humidified nasal O2  -Nasal saline spray b/l nares TID dry eschar from trauma/recent intubation  -copious amounts of bacitracin to keep area moist at all times  -oral care: tooth brush and tooth paste at bedside, allow pt to swish and spit to keep oral cavity moist  -Chlorhexidine swish and spit QID  -humidified nasal O2  -Nasal saline spray b/l nares TID for nasal dryness

## 2017-11-21 NOTE — CHART NOTE - NSCHARTNOTEFT_GEN_A_CORE
Malnutrition follow up, refeeding risk. Pt with PMH of "asbestos related lung disease" admitted with shortness of breath, CT scan shows IPF S/P treatment, pt developed ARF and acute hypoxic respiratory failure with septic shock due to pseudomonal bacteremia pt intubated and transferred to MICU, pt now extubated and on floor S/P FEES  recommended NPO with non-oral nutrition/hydration with plan for re-evaluation.     Source: Patient [ ]    Family [ x]     other [ x] RN, Wife at bedside    Diet : Glucerna 1.2 Flako @ 20 mL/h increase by 10 every 24 h to goal of 60 mL/h x 24 h 1440 mL total volume 1728 Kcal, 86g protein, meets >100% micronutrient needs and 1159 mL free water (25 kcal/Kg, 1.2g protein/Kg based on weight  70.4 Kg)      Patient reports [ ] nausea  [ ] vomiting [ ] diarrhea [ ] constipation  [ ]chewing problems [ ] swallowing issues  [ ] other: No N+V, last BM today-noted to be large and loose       Enteral /Parenteral Nutrition: Per RN pt tolerating Glucerna 1.2 Flako at 20 mL/h, pt's wife reports patient with scabbing on lips and roof of mouth- RN informed      Current Weight: No new wt  % Weight Change    Pertinent Medications: MEDICATIONS  (STANDING):  aspirin  chewable 81 milliGRAM(s) Oral daily  BACItracin   Ointment 1 Application(s) Topical three times a day  digoxin     Tablet 0.125 milliGRAM(s) Oral daily  heparin  Injectable 5000 Unit(s) SubCutaneous every 8 hours  influenza   Vaccine 0.5 milliLiter(s) IntraMuscular once  insulin lispro (HumaLOG) corrective regimen sliding scale   SubCutaneous every 6 hours  metoprolol     tartrate 25 milliGRAM(s) Oral two times a day  multivitamin 1 Tablet(s) Oral daily  pantoprazole  Injectable 40 milliGRAM(s) IV Push daily  polyethylene glycol 3350 17 Gram(s) Oral two times a day  predniSONE   Tablet 10 milliGRAM(s) Oral daily  saliva substitute (BIOTENE MOISTURIZING MOUTH SPRAY) 1 [place](s) Topical three times a day  senna Syrup 10 milliLiter(s) Oral at bedtime  tiotropium 18 MICROgram(s) Capsule 1 Capsule(s) Inhalation daily    MEDICATIONS  (PRN):  bisacodyl Suppository 10 milliGRAM(s) Rectal daily PRN Constipation  traMADol 50 milliGRAM(s) Oral every 6 hours PRN Moderate Pain (4 - 6)    Pertinent Labs:   Na157 mmol/L<H> Glu 198 mg/dL<H> K+ 4.6 mmol/L Cr  1.01 mg/dL BUN 35 mg/dL<H> Phos 2.6 mg/dL Alb 2.3 g/dL<L> PAB n/a   M.0, POCT BG : 160-215 :193-228      Skin: 2+ dominique pressure injury behind ears    Estimated Needs:   [ x] no change since previous assessment  [ ] recalculated:       Previous Nutrition Diagnosis:      [x ] Malnutrition (moderate)         Nutrition Diagnosis is [x ] ongoing  [ ] resolved [ ] not applicable          New Nutrition Diagnosis: [x ] not applicable     Interventions:     Recommend    [ ] Change Diet To:    [ ] Nutrition Supplement    [ ] Nutrition Support    [ ] Other:     1. Continue slow advancement of Glucerna 1.2 Flako via NGT 10 mL/h every 24 h to goal of 60 mL/h x 24 h to provide 1440 mL total volume 1728 Kcal, 86g protein, meets >100% micronutrient needs and 1159 mL free water (25 kcal/Kg, 1.2g protein/Kg based on weight  70.4 Kg)  2. Pt remains at risk for refeeding, continue to monitor K, PO4, and Mg daily and replete as needed, continue multivitamin         Monitoring and Evaluation:     [ ] PO intake [x ] Tolerance to diet prescription [x ] weights [x] follow up per protocol    [ ] other:

## 2017-11-21 NOTE — PROGRESS NOTE ADULT - SUBJECTIVE AND OBJECTIVE BOX
Labs reviewed    Noted worsening of serum sodium  Noted 3.2 liters of urine output and significantly negative balance unless the input is not recorded  Noted high chlorides likely due to prior NS infusions  Noted increased bicarbonates likely due to intravascular volume depletion in the setting of likely post ATN diuresis  Noted urine osmolality appropriately high  Noted + granular cast consistent with some degree of ATN  Noted creatinine improved    Patient with water deficit of 4.9 liters    I will increase the free water intake to 200 ml q2 hrs which will make up for half of the loses  Better control of DM: high glucose can increase osmotic diuresis and contribute to water loss  I will follow with you

## 2017-11-21 NOTE — PROGRESS NOTE ADULT - SUBJECTIVE AND OBJECTIVE BOX
Nephrology Progress Note    No acute events overnight  Patient looks comfortable and not in distress  No chest pain or sob  feels thirsty/dry mouth      PHYSICAL EXAM  Vital Signs Last 24 Hrs  T(C): 36.8 (21 Nov 2017 07:55), Max: 36.8 (20 Nov 2017 18:52)  T(F): 98.2 (21 Nov 2017 07:55), Max: 98.3 (20 Nov 2017 18:52)  HR: 58 (21 Nov 2017 07:55) (58 - 109)  BP: 113/68 (21 Nov 2017 07:55) (106/72 - 169/89)  BP(mean): --  RR: 18 (21 Nov 2017 07:55) (18 - 18)  SpO2: 100% (21 Nov 2017 07:55) (95% - 100%)  I&O's Summary    20 Nov 2017 07:01  -  21 Nov 2017 07:00  --------------------------------------------------------  IN: 250 mL / OUT: 3525 mL / NET: -3275 mL    Constitutional: awake and alert; no distress, coherent  HEENT: EOMI, clear conj, anicteric sclera; NGT in place, mouth sores and a scab on the lower lip  Neck: Supple, No JVD  Respiratory: CTA-b/l, no wrc, normal effort  Cardiovascular: RRR s1s2, no m/r/g, no peripheral edema  Gastrointestinal: BS+, soft, NT/ND  Extremities: No peripheral edema b/l  Neurological: no focal deficits; strength grossly intact  Skin: warm and dry, no rash on visible skin  : nicolás in place       Labs: pending                     9.6    5.6   )-----------( 252      ( 20 Nov 2017 11:18 )             28.6     11-20    153<H>  |  116<H>  |  54<H>  ----------------------------<  175<H>  5.3   |  23  |  1.99<H>    Ca    8.7      20 Nov 2017 11:19        IMAGING:   Renal US:  IMPRESSION:  New mild to moderate right-sided hydronephrosis.  New mild fullness at the upper and lower calyces of the left renal  collecting system.      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 He was treated for COPD exacerbation with steroids and immunesuppression. Hospital course was complicated with septic shock, pseudomonas bacteremia/pneumonia requiring MICU stay, intubation and blood pressure support. Patient is extubated, out on the regular floor, off antibiotics. Patient with oral ulcers unable to swallow post NGT placement. Kidney function worsening with creatinine of 1.99 today for which nephrology was consulted.     1. CHRISTIANE likely prerenal/dehydration considering low PO intake and Hypernatremia: Postobstructive nephropathy as well - see renal US  2. Hypernatremia likely due to low free water intake/dehydration; feeding from NGT   3. Anemia likely multifactorial: acute illness, AOCD etc  4. Sepsis/pseudomonas bacteremia off antibiotics; plan per primary team    RECOMMEND:  Labs pending  - continue with free water intake to 250ml every 4 hrs  - adjust NGT feeding  - low sodium  - urine studies pending  - monitor ins and outs and keep even at least for the next 2-3 days; best with free water considering patient has diabetes and high glucose would promote water diuresis  - renal us with mild to moderate right hydronephrosis; would start tamsulosin; consider urology evaluation  - Avoid nephrotoxins, agree on holding ACEIs  - dose meds for GFR of 40 ml/min/1.73m2  - anemia profile pending      Ju Davis MD  [facility]  [phone]

## 2017-11-22 LAB
ANION GAP SERPL CALC-SCNC: 11 MMOL/L — SIGNIFICANT CHANGE UP (ref 5–17)
ANION GAP SERPL CALC-SCNC: 8 MMOL/L — SIGNIFICANT CHANGE UP (ref 5–17)
ANISOCYTOSIS BLD QL: SIGNIFICANT CHANGE UP
BASOPHILS # BLD AUTO: 0 K/UL — SIGNIFICANT CHANGE UP (ref 0–0.2)
BASOPHILS NFR BLD AUTO: 0 % — SIGNIFICANT CHANGE UP (ref 0–2)
BLASTS # FLD: 0 % — SIGNIFICANT CHANGE UP (ref 0–0)
BUN SERPL-MCNC: 33 MG/DL — HIGH (ref 7–23)
BUN SERPL-MCNC: 33 MG/DL — HIGH (ref 7–23)
CALCIUM SERPL-MCNC: 8.5 MG/DL — SIGNIFICANT CHANGE UP (ref 8.4–10.5)
CALCIUM SERPL-MCNC: 8.7 MG/DL — SIGNIFICANT CHANGE UP (ref 8.4–10.5)
CHLORIDE SERPL-SCNC: 113 MMOL/L — HIGH (ref 96–108)
CHLORIDE SERPL-SCNC: 113 MMOL/L — HIGH (ref 96–108)
CO2 SERPL-SCNC: 25 MMOL/L — SIGNIFICANT CHANGE UP (ref 22–31)
CO2 SERPL-SCNC: 29 MMOL/L — SIGNIFICANT CHANGE UP (ref 22–31)
CREAT SERPL-MCNC: 0.8 MG/DL — SIGNIFICANT CHANGE UP (ref 0.5–1.3)
CREAT SERPL-MCNC: 0.93 MG/DL — SIGNIFICANT CHANGE UP (ref 0.5–1.3)
EOSINOPHIL # BLD AUTO: 0.08 K/UL — SIGNIFICANT CHANGE UP (ref 0–0.5)
EOSINOPHIL NFR BLD AUTO: 1.7 — SIGNIFICANT CHANGE UP
GIANT PLATELETS BLD QL SMEAR: PRESENT — SIGNIFICANT CHANGE UP
GLUCOSE BLDC GLUCOMTR-MCNC: 227 MG/DL — HIGH (ref 70–99)
GLUCOSE BLDC GLUCOMTR-MCNC: 237 MG/DL — HIGH (ref 70–99)
GLUCOSE BLDC GLUCOMTR-MCNC: 242 MG/DL — HIGH (ref 70–99)
GLUCOSE BLDC GLUCOMTR-MCNC: 261 MG/DL — HIGH (ref 70–99)
GLUCOSE SERPL-MCNC: 249 MG/DL — HIGH (ref 70–99)
GLUCOSE SERPL-MCNC: 277 MG/DL — HIGH (ref 70–99)
HCT VFR BLD CALC: 24.3 % — LOW (ref 39–50)
HGB BLD-MCNC: 7.5 G/DL — LOW (ref 13–17)
HYPOCHROMIA BLD QL: SLIGHT — SIGNIFICANT CHANGE UP
LYMPHOCYTES # BLD AUTO: 0.29 K/UL — LOW (ref 1–3.3)
LYMPHOCYTES # BLD AUTO: 6.1 % — LOW (ref 13–44)
LYMPHOCYTES # SPEC AUTO: 0 % — SIGNIFICANT CHANGE UP (ref 0–0)
MACROCYTES BLD QL: SIGNIFICANT CHANGE UP
MAGNESIUM SERPL-MCNC: 1.7 MG/DL — SIGNIFICANT CHANGE UP (ref 1.6–2.6)
MAGNESIUM SERPL-MCNC: 1.8 MG/DL — SIGNIFICANT CHANGE UP (ref 1.6–2.6)
MANUAL SMEAR VERIFICATION: SIGNIFICANT CHANGE UP
MCHC RBC-ENTMCNC: 30.9 GM/DL — LOW (ref 32–36)
MCHC RBC-ENTMCNC: 32.6 PG — SIGNIFICANT CHANGE UP (ref 27–34)
MCV RBC AUTO: 105.7 FL — HIGH (ref 80–100)
METAMYELOCYTES # FLD: 2 % — HIGH (ref 0–0)
MONOCYTES # BLD AUTO: 0.37 K/UL — SIGNIFICANT CHANGE UP (ref 0–0.9)
MONOCYTES NFR BLD AUTO: 7.8 % — SIGNIFICANT CHANGE UP (ref 2–14)
MYELOCYTES NFR BLD: 0 % — SIGNIFICANT CHANGE UP (ref 0–0)
NEUTROPHILS # BLD AUTO: 3.86 K/UL — SIGNIFICANT CHANGE UP (ref 1.8–7.4)
NEUTROPHILS NFR BLD AUTO: 80.9 % — HIGH (ref 43–77)
NEUTS BAND # BLD: 0 % — SIGNIFICANT CHANGE UP (ref 0–8)
PHOSPHATE SERPL-MCNC: 2.1 MG/DL — LOW (ref 2.5–4.5)
PHOSPHATE SERPL-MCNC: 2.2 MG/DL — LOW (ref 2.5–4.5)
PLAT MORPH BLD: NORMAL — SIGNIFICANT CHANGE UP
PLATELET # BLD AUTO: 225 K/UL — SIGNIFICANT CHANGE UP (ref 150–400)
POTASSIUM SERPL-MCNC: 4.1 MMOL/L — SIGNIFICANT CHANGE UP (ref 3.5–5.3)
POTASSIUM SERPL-MCNC: 4.4 MMOL/L — SIGNIFICANT CHANGE UP (ref 3.5–5.3)
POTASSIUM SERPL-SCNC: 4.1 MMOL/L — SIGNIFICANT CHANGE UP (ref 3.5–5.3)
POTASSIUM SERPL-SCNC: 4.4 MMOL/L — SIGNIFICANT CHANGE UP (ref 3.5–5.3)
PROMYELOCYTES # FLD: 2 % — HIGH (ref 0–0)
RBC # BLD: 2.3 M/UL — LOW (ref 4.2–5.8)
RBC # FLD: 21.1 % — HIGH (ref 10.3–14.5)
RBC BLD AUTO: ABNORMAL
SODIUM SERPL-SCNC: 149 MMOL/L — HIGH (ref 135–145)
SODIUM SERPL-SCNC: 150 MMOL/L — HIGH (ref 135–145)
WBC # BLD: 4.77 K/UL — SIGNIFICANT CHANGE UP (ref 3.8–10.5)
WBC # FLD AUTO: 4.77 K/UL — SIGNIFICANT CHANGE UP (ref 3.8–10.5)

## 2017-11-22 PROCEDURE — 99232 SBSQ HOSP IP/OBS MODERATE 35: CPT

## 2017-11-22 RX ADMIN — Medication 1 APPLICATION(S): at 22:30

## 2017-11-22 RX ADMIN — Medication 1 SPRAY(S): at 15:27

## 2017-11-22 RX ADMIN — CHLORHEXIDINE GLUCONATE 15 MILLILITER(S): 213 SOLUTION TOPICAL at 17:41

## 2017-11-22 RX ADMIN — POLYETHYLENE GLYCOL 3350 17 GRAM(S): 17 POWDER, FOR SOLUTION ORAL at 05:53

## 2017-11-22 RX ADMIN — Medication 1 SPRAY(S): at 15:26

## 2017-11-22 RX ADMIN — Medication 1 SPRAY(S): at 22:30

## 2017-11-22 RX ADMIN — Medication 1 TABLET(S): at 15:27

## 2017-11-22 RX ADMIN — Medication 0.12 MILLIGRAM(S): at 05:52

## 2017-11-22 RX ADMIN — POLYETHYLENE GLYCOL 3350 17 GRAM(S): 17 POWDER, FOR SOLUTION ORAL at 18:02

## 2017-11-22 RX ADMIN — Medication 4: at 15:28

## 2017-11-22 RX ADMIN — Medication 1 SPRAY(S): at 05:53

## 2017-11-22 RX ADMIN — Medication 25 MILLIGRAM(S): at 05:52

## 2017-11-22 RX ADMIN — Medication 1 APPLICATION(S): at 15:34

## 2017-11-22 RX ADMIN — Medication 6: at 06:46

## 2017-11-22 RX ADMIN — Medication 25 MILLIGRAM(S): at 18:02

## 2017-11-22 RX ADMIN — Medication 4: at 01:27

## 2017-11-22 RX ADMIN — HEPARIN SODIUM 5000 UNIT(S): 5000 INJECTION INTRAVENOUS; SUBCUTANEOUS at 15:26

## 2017-11-22 RX ADMIN — TIOTROPIUM BROMIDE 1 CAPSULE(S): 18 CAPSULE ORAL; RESPIRATORY (INHALATION) at 15:27

## 2017-11-22 RX ADMIN — HEPARIN SODIUM 5000 UNIT(S): 5000 INJECTION INTRAVENOUS; SUBCUTANEOUS at 05:53

## 2017-11-22 RX ADMIN — PANTOPRAZOLE SODIUM 40 MILLIGRAM(S): 20 TABLET, DELAYED RELEASE ORAL at 15:26

## 2017-11-22 RX ADMIN — Medication 10 MILLIGRAM(S): at 05:53

## 2017-11-22 RX ADMIN — CHLORHEXIDINE GLUCONATE 15 MILLILITER(S): 213 SOLUTION TOPICAL at 05:53

## 2017-11-22 RX ADMIN — HEPARIN SODIUM 5000 UNIT(S): 5000 INJECTION INTRAVENOUS; SUBCUTANEOUS at 22:31

## 2017-11-22 RX ADMIN — Medication 1 APPLICATION(S): at 05:52

## 2017-11-22 RX ADMIN — Medication 81 MILLIGRAM(S): at 15:27

## 2017-11-22 RX ADMIN — CHLORHEXIDINE GLUCONATE 15 MILLILITER(S): 213 SOLUTION TOPICAL at 15:34

## 2017-11-22 RX ADMIN — Medication 4: at 18:10

## 2017-11-22 NOTE — PROGRESS NOTE ADULT - SUBJECTIVE AND OBJECTIVE BOX
[facility], Division of Infectious Diseases  LOGAN Chavarria A. Lee    Name: MADDIE MALLOY  Age: 77y  Gender: Male  MRN: [id number]    Interval History--  Notes reviewed. Lethargic but compfortable. No complaints.    Past Medical History--  Asbestos exposure  Kidney stones  Coronary artery disease involving native coronary artery of native heart, angina presence unspecified  Cataract  Rotator cuff rupture, right  GERD (gastroesophageal reflux disease)  Hyperlipemia  Diabetes mellitus  Encounter for removal of ureteral stent  S/P CABG (coronary artery bypass graft)  S/P rotator cuff surgery  S/P lens implant  S/P appendectomy      For details regarding the patient's social history, family history, and other miscellaneous elements, please refer the initial infectious diseases consultation and/or the admitting history and physical examination for this admission.    Allergies    No Known Allergies    Intolerances        Medications--  Antibiotics:    Immunologic:  influenza   Vaccine 0.5 milliLiter(s) IntraMuscular once    Other:  aspirin  chewable  BACItracin   Ointment  bisacodyl Suppository PRN  chlorhexidine 0.12% Liquid  digoxin     Tablet  heparin  Injectable  insulin lispro (HumaLOG) corrective regimen sliding scale  metoprolol     tartrate  multivitamin  pantoprazole  Injectable  polyethylene glycol 3350  predniSONE   Tablet  saliva substitute (BIOTENE MOISTURIZING MOUTH SPRAY)  senna Syrup  sodium chloride 0.65% Nasal  tiotropium 18 MICROgram(s) Capsule  traMADol PRN      Review of Systems--  Review of systems unable secondary to clinical condition.      Physical Examination--  Vital Signs: T(F): 97.8 (11-22-17 @ 07:54), Max: 97.9 (11-21-17 @ 22:31)  HR: 65 (11-22-17 @ 07:54)  BP: 126/74 (11-22-17 @ 07:54)  RR: 18 (11-22-17 @ 07:54)  SpO2: 100% (11-22-17 @ 07:54)  Wt(kg): --  General: Frail but nontoxic-appearing Male in no acute distress.  HEENT: AT/NC. Anicteric. Conjunctiva pink and moist. NGT  Neck: Not rigid. No sense of mass. CCL C/D/I  Nodes: None palpable.  Lungs: Diminished breath sounds bilaterally without rales, wheezing or rhonchi  Heart: Regular rate and rhythm. No Murmur. No rub. No gallop. No palpable thrill.  Abdomen: Bowel sounds present and normoactive. Soft. Not distended. Nontender. No sense of mass. No organomegaly.  Extremities: No cyanosis or clubbing. 1+edema.   Skin: Warm. Dry. Good turgor. No rash. No vasculitic stigmata.  Psychiatric: Lethargic        Laboratory Studies--  CBC                        7.5    4.77  )-----------( 225      ( 22 Nov 2017 09:06 )             24.3       Chemistries  11-22    150<H>  |  113<H>  |  33<H>  ----------------------------<  249<H>  4.4   |  29  |  0.80    Ca    8.5      22 Nov 2017 12:09  Phos  2.2     11-22  Mg     1.7     11-22    TPro  6.1  /  Alb  2.3<L>  /  TBili  0.6  /  DBili  0.3<H>  /  AST  39  /  ALT  75<H>  /  AlkPhos  298<H>  11-21      Culture Data  No new cx data

## 2017-11-22 NOTE — PROGRESS NOTE ADULT - ATTENDING COMMENTS
D/W Wife/son  Thank you for the courtesy of this referral.  I'll sign off at this time.     Sharan Scruggs MD  [phone]

## 2017-11-22 NOTE — PROGRESS NOTE ADULT - SUBJECTIVE AND OBJECTIVE BOX
Nephrology Progress Note    No acute events overnight  Patient looks comfortable and not in distress  No chest pain or sob  feels thirsty/dry mouth  Improved lower lip lesio/scab      PHYSICAL EXAM  Vital Signs Last 24 Hrs  T(C): 36.6 (22 Nov 2017 07:54), Max: 36.6 (21 Nov 2017 22:31)  T(F): 97.8 (22 Nov 2017 07:54), Max: 97.9 (21 Nov 2017 22:31)  HR: 65 (22 Nov 2017 07:54) (61 - 101)  BP: 126/74 (22 Nov 2017 07:54) (112/67 - 126/74)  BP(mean): --  RR: 18 (22 Nov 2017 07:54) (18 - 18)  SpO2: 100% (22 Nov 2017 07:54) (99% - 100%)  I&O's Summary    21 Nov 2017 07:01  -  22 Nov 2017 07:00  --------------------------------------------------------  IN: 2390 mL / OUT: 1425 mL / NET: 965 mL    22 Nov 2017 07:01  -  22 Nov 2017 09:13  --------------------------------------------------------  IN: 0 mL / OUT: 300 mL / NET: -300 mL        Constitutional: awake and alert; no distress, coherent  HEENT: EOMI, clear conj, anicteric sclera; NGT in place, mouth sores and a scab on the lower lip  Neck: Supple, No JVD  Respiratory: CTA-b/l, no wrc, normal effort  Cardiovascular: RRR s1s2, no m/r/g, no peripheral edema  Gastrointestinal: BS+, soft, NT/ND  Extremities: No peripheral edema b/l  Neurological: no focal deficits; strength grossly intact  Skin: warm and dry, no rash on visible skin  : nicolás in place       Labs: pending                                8.7    5.9   )-----------( 226      ( 21 Nov 2017 09:52 )             26.8     11-21    157<H>  |  120<H>  |  35<H>  ----------------------------<  198<H>  4.6   |  28  |  1.01    Ca    8.7      21 Nov 2017 09:53  Phos  2.6     11-21  Mg     2.0     11-21    TPro  6.1  /  Alb  2.3<L>  /  TBili  0.6  /  DBili  0.3<H>  /  AST  39  /  ALT  75<H>  /  AlkPhos  298<H>  11-21        IMAGING:   Renal US:  IMPRESSION:  New mild to moderate right-sided hydronephrosis.  New mild fullness at the upper and lower calyces of the left renal  collecting system.      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 He was treated for COPD exacerbation with steroids and immunesuppression. Hospital course was complicated with septic shock, pseudomonas bacteremia/pneumonia requiring MICU stay, intubation and blood pressure support. Patient is extubated, out on the regular floor, off antibiotics. Patient with oral ulcers unable to swallow post NGT placement. Kidney function worsening with creatinine of 1.99 today for which nephrology was consulted.     1. CHRISTIANE likely prerenal/dehydration considering low PO intake and Hypernatremia: Postobstructive nephropathy as well - see renal US  2. Hypernatremia likely due to low free water intake/dehydration; feeding from NGT   3. Anemia likely multifactorial: acute illness, AOCD etc  4. Sepsis/pseudomonas bacteremia off antibiotics; plan per primary team    RECOMMENDATIONS:  Labs pending; Noted Na 157 yesterday: patient with significant diuresis so free water was increased; UOP settling down  - continue with free water intake to 200ml every 2 hrs to make up for free water deficit of about 5 liters  - Monitor for fluid overload and adjust free water rate as indicated based on serum sodium  - adjust NGT feeding  - low sodium and low glucose; can promote osmotic diuresis  - monitor ins and outs and keep even at least for the next 2-3 days; best with free water considering patient has diabetes and high glucose would promote water diuresis  - renal us with mild to moderate right hydronephrosis; would start tamsulosin; consider urology evaluation  - Avoid nephrotoxins, agree on holding ACEIs  - dose meds for GFR of 40 ml/min/1.73m2  - anemia - notyed low transferin saturation and likely low iron stores but ferritin too high  - monitor H&H for now and transfuse if Htc less than 24      Ju Davis MD  [facility]  [phone]

## 2017-11-22 NOTE — PROGRESS NOTE ADULT - ASSESSMENT
Acute on chronic hypoxemic respiratory failure: clinically resoliving post extubation  Progressive ILD - unclear etiology  Pseudomonoas bacteremia with pneumonia: resolving  OP dysphagia  Oral/Lip ulcerations  CHRISTIANE - resolving  Hypernatremia    Septic Shock - clinically resolved    REC  Observe off antibiotics  Increase feeds and add free water  Repeat FEEST planned for friday  PT  Oral care

## 2017-11-22 NOTE — CHART NOTE - NSCHARTNOTEFT_GEN_A_CORE
Nutrition interim note.    Pt with PMH of "asbestos related lung disease" admitted with shortness of breath, CT scan shows IPF S/P treatment, pt developed ARF and acute hypoxic respiratory failure with septic shock due to pseudomonal bacteremia pt intubated and transferred to MICU, pt now extubated and on floor S/P FEES  recommended NPO with non-oral nutrition/hydration with plan for re-evaluation. Pt at risk for refeeding syndrome.    Per RN, pt is tolerating tube feed at 30 mL/h with no N+V, last BM yesterday x 3.    Electrolytes : K: 4.1, M.8, PO4: 2.1 (low)-replete as medically appropriate.     1. Continue slow advancement of Glucerna 1.2 Flako via NGT 10 mL/h every 24 h to goal of 60 mL/h x 24 h to provide 1440 mL total volume 1728 Kcal, 86g protein, meets >100% micronutrient needs and 1159 mL free water (25 kcal/Kg, 1.2g protein/Kg based on weight  70.4 Kg)  2. Pt remains at risk for refeeding, continue to monitor K, PO4, and Mg daily and replete as needed, continue multivitamin    RD to remain available as needed.    Yumiko Johns R.D., [facility], Pager #[phone]

## 2017-11-22 NOTE — PROGRESS NOTE ADULT - ASSESSMENT
77 M with CAD h/o CABG, NL LV systolic function, ILD on home O2 with hypoxic resp failure, septic shock on pressors with presumed PNA. In this setting, new onset AF now NSR  ·	Cardiac status without significant change  ·	Continue present cardiac medications.

## 2017-11-22 NOTE — PROGRESS NOTE ADULT - SUBJECTIVE AND OBJECTIVE BOX
Patient is a 77y old  Male who presents with a chief complaint of SOB (2017 14:33)      SUBJECTIVE / OVERNIGHT EVENTS:    patient seen and examined at bedside  lethargic but arousable and without specific complaints  no acute events overnight   breathing status improving    Vital Signs Last 24 Hrs  T(C): 37 (2017 16:49), Max: 37 (2017 16:49)  T(F): 98.6 (2017 16:49), Max: 98.6 (2017 16:49)  HR: 73 (2017 16:49) (61 - 73)  BP: 114/74 (2017 16:49) (112/67 - 126/74)  BP(mean): --  RR: 18 (2017 16:49) (18 - 18)  SpO2: 100% (2017 16:49) (99% - 100%)  I&O's Summary    2017 07:01  -  2017 07:00  --------------------------------------------------------  IN: 2390 mL / OUT: 1425 mL / NET: 965 mL    2017 07:01  -  2017 19:45  --------------------------------------------------------  IN: 0 mL / OUT: 300 mL / NET: -300 mL    Physical Exam:   · Constitutional	detailed exam	  · Constitutional Details	Pale weak lethargic	  · Eyes	detailed exam	  · Eyes Details	PERRL; EOMI	  · ENMT	detailed exam	  · ENMT Details	nose	  · Nose	no discharge; no deviation, dry eschar, no bleeding or puss	  · Neck	detailed exam	  · Neck Details	supple; no JVD	  · Respiratory	detailed exam	  · Respiratory Details	airway patent; breath sounds equal, no wheeze	  · Cardiovascular	detailed exam	  · Cardiovascular Details	no rub	  · Cardiovascular Details	positive S1; positive S2	  · Neurological	detailed exam	  · Neurological Details	responds to pain; responds to verbal commands	  LABS:                        7.5    4.77  )-----------( 225      ( 2017 09:06 )             24.3     11-    150<H>  |  113<H>  |  33<H>  ----------------------------<  249<H>  4.4   |  29  |  0.80    Ca    8.5      2017 12:09  Phos  2.2       Mg     1.7         TPro  6.1  /  Alb  2.3<L>  /  TBili  0.6  /  DBili  0.3<H>  /  AST  39  /  ALT  75<H>  /  AlkPhos  298<H>        CAPILLARY BLOOD GLUCOSE      POCT Blood Glucose.: 237 mg/dL (2017 18:05)  POCT Blood Glucose.: 242 mg/dL (2017 12:23)  POCT Blood Glucose.: 261 mg/dL (2017 06:23)  POCT Blood Glucose.: 227 mg/dL (2017 01:17)  POCT Blood Glucose.: 189 mg/dL (2017 21:59)        Urinalysis Basic - ( 2017 19:53 )    Color: Yellow / Appearance: Clear / S.013 / pH: x  Gluc: x / Ketone: Negative  / Bili: Negative / Urobili: Negative mg/dL   Blood: x / Protein: Trace mg/dL / Nitrite: Negative   Leuk Esterase: Negative / RBC: 15 /HPF / WBC 15 /HPF   Sq Epi: x / Non Sq Epi: 8 /HPF / Bacteria: Few        RADIOLOGY & ADDITIONAL TESTS:    Imaging Personally Reviewed:  [x] YES  [ ] NO    Consultant(s) Notes Reviewed:  [x] YES  [ ] NO      MEDICATIONS  (STANDING):  aspirin  chewable 81 milliGRAM(s) Oral daily  BACItracin   Ointment 1 Application(s) Topical three times a day  chlorhexidine 0.12% Liquid 15 milliLiter(s) Swish and Spit four times a day  digoxin     Tablet 0.125 milliGRAM(s) Oral daily  heparin  Injectable 5000 Unit(s) SubCutaneous every 8 hours  influenza   Vaccine 0.5 milliLiter(s) IntraMuscular once  insulin lispro (HumaLOG) corrective regimen sliding scale   SubCutaneous every 6 hours  metoprolol     tartrate 25 milliGRAM(s) Oral two times a day  multivitamin 1 Tablet(s) Oral daily  pantoprazole  Injectable 40 milliGRAM(s) IV Push daily  polyethylene glycol 3350 17 Gram(s) Oral two times a day  predniSONE   Tablet 10 milliGRAM(s) Oral daily  saliva substitute (BIOTENE MOISTURIZING MOUTH SPRAY) 1 [place](s) Topical three times a day  senna Syrup 10 milliLiter(s) Oral at bedtime  sodium chloride 0.65% Nasal 1 Spray(s) Both Nostrils three times a day  tiotropium 18 MICROgram(s) Capsule 1 Capsule(s) Inhalation daily    MEDICATIONS  (PRN):  bisacodyl Suppository 10 milliGRAM(s) Rectal daily PRN Constipation  traMADol 50 milliGRAM(s) Oral every 6 hours PRN Moderate Pain (4 - 6)      Care Discussed with Consultants/Other Providers [x] YES  [ ] NO    HEALTH ISSUES - PROBLEM Dx:  Lip lesion: Lip lesion  Oral ulcer: Oral ulcer  CHRISTIANE (acute kidney injury): CHRISTAINE (acute kidney injury)  Bacteremia: Bacteremia  Neutropenia: Neutropenia  Need for prophylactic measure: Need for prophylactic measure  Oral thrush: Oral thrush  Nasal discomfort: Nasal discomfort  Gastroesophageal reflux disease without esophagitis: Gastroesophageal reflux disease without esophagitis  Hyperlipidemia, unspecified hyperlipidemia type: Hyperlipidemia, unspecified hyperlipidemia type  Type 2 diabetes mellitus without complication, without long-term current use of insulin: Type 2 diabetes mellitus without complication, without long-term current use of insulin  Coronary artery disease involving native coronary artery of native heart, angina presence unspecified: Coronary artery disease involving native coronary artery of native heart, angina presence unspecified  Hyperkalemia: Hyperkalemia  Lactic acidosis: Lactic acidosis  Transaminitis: Transaminitis  Idiopathic pulmonary fibrosis: Idiopathic pulmonary fibrosis  Shortness of breath: Shortness of breath

## 2017-11-22 NOTE — PROGRESS NOTE ADULT - SUBJECTIVE AND OBJECTIVE BOX
CC: PAF, CAD    Interval History:     MEDICATIONS:  aspirin  chewable 81 milliGRAM(s) Oral daily  BACItracin   Ointment 1 Application(s) Topical three times a day  bisacodyl Suppository 10 milliGRAM(s) Rectal daily PRN  chlorhexidine 0.12% Liquid 15 milliLiter(s) Swish and Spit four times a day  digoxin     Tablet 0.125 milliGRAM(s) Oral daily  heparin  Injectable 5000 Unit(s) SubCutaneous every 8 hours  influenza   Vaccine 0.5 milliLiter(s) IntraMuscular once  insulin lispro (HumaLOG) corrective regimen sliding scale   SubCutaneous every 6 hours  metoprolol     tartrate 25 milliGRAM(s) Oral two times a day  multivitamin 1 Tablet(s) Oral daily  pantoprazole  Injectable 40 milliGRAM(s) IV Push daily  polyethylene glycol 3350 17 Gram(s) Oral two times a day  predniSONE   Tablet 10 milliGRAM(s) Oral daily  saliva substitute (BIOTENE MOISTURIZING MOUTH SPRAY) 1 [place](s) Topical three times a day  senna Syrup 10 milliLiter(s) Oral at bedtime  sodium chloride 0.65% Nasal 1 Spray(s) Both Nostrils three times a day  tiotropium 18 MICROgram(s) Capsule 1 Capsule(s) Inhalation daily  traMADol 50 milliGRAM(s) Oral every 6 hours PRN      LABS:      157<H>  |  120<H>  |  35<H>  ----------------------------<  198<H>  4.6   |  28  |  1.01    Ca    8.7      2017 09:53  Phos  2.6       Mg     2.0         TPro  6.1  /  Alb  2.3<L>  /  TBili  0.6  /  DBili  0.3<H>  /  AST  39  /  ALT  75<H>  /  AlkPhos  298<H>                            8.7    5.9   )-----------( 226      ( 2017 09:52 )             26.8               VITAL SIGNS:   T(C): 36.6 (17 @ 07:54), Max: 36.6 (17 @ 22:31)  HR: 65 (17 @ 07:54) (61 - 101)  BP: 126/74 (17 @ 07:54) (112/67 - 126/74)  RR: 18 (17 @ 07:54) (18 - 18)  SpO2: 100% (17 @ 07:54) (99% - 100%)  Daily     Daily Weight in k.2 (2017 07:54)  I&O's Summary    2017 07:  -  2017 07:00  --------------------------------------------------------  IN: 2390 mL / OUT: 1425 mL / NET: 965 mL    2017 07:01  -  2017 08:50  --------------------------------------------------------  IN: 0 mL / OUT: 300 mL / NET: -300 mL        TELE: CC: PAF, CAD    Interval History: No significant cardiac events overnight. Complains of thirst as patient is NPO due to oral lesions.     MEDICATIONS:  aspirin  chewable 81 milliGRAM(s) Oral daily  BACItracin   Ointment 1 Application(s) Topical three times a day  bisacodyl Suppository 10 milliGRAM(s) Rectal daily PRN  chlorhexidine 0.12% Liquid 15 milliLiter(s) Swish and Spit four times a day  digoxin     Tablet 0.125 milliGRAM(s) Oral daily  heparin  Injectable 5000 Unit(s) SubCutaneous every 8 hours  influenza   Vaccine 0.5 milliLiter(s) IntraMuscular once  insulin lispro (HumaLOG) corrective regimen sliding scale   SubCutaneous every 6 hours  metoprolol     tartrate 25 milliGRAM(s) Oral two times a day  multivitamin 1 Tablet(s) Oral daily  pantoprazole  Injectable 40 milliGRAM(s) IV Push daily  polyethylene glycol 3350 17 Gram(s) Oral two times a day  predniSONE   Tablet 10 milliGRAM(s) Oral daily  saliva substitute (BIOTENE MOISTURIZING MOUTH SPRAY) 1 [place](s) Topical three times a day  senna Syrup 10 milliLiter(s) Oral at bedtime  sodium chloride 0.65% Nasal 1 Spray(s) Both Nostrils three times a day  tiotropium 18 MICROgram(s) Capsule 1 Capsule(s) Inhalation daily  traMADol 50 milliGRAM(s) Oral every 6 hours PRN      LABS:      157<H>  |  120<H>  |  35<H>  ----------------------------<  198<H>  4.6   |  28  |  1.01    Ca    8.7      2017 09:53  Phos  2.6       Mg     2.0         TPro  6.1  /  Alb  2.3<L>  /  TBili  0.6  /  DBili  0.3<H>  /  AST  39  /  ALT  75<H>  /  AlkPhos  298<H>                            8.7    5.9   )-----------( 226      ( 2017 09:52 )             26.8       VITAL SIGNS:   T(C): 36.6 (17 @ 07:54), Max: 36.6 (11-21-17 @ 22:31)  HR: 65 (17 @ 07:54) (61 - 101)  BP: 126/74 (17 @ 07:54) (112/67 - 126/74)  RR: 18 (17 @ 07:54) (18 - 18)  SpO2: 100% (17 @ 07:54) (99% - 100%)  Daily     Daily Weight in k.2 (2017 07:54)  I&O's Summary    2017 07:01  -  2017 07:00  --------------------------------------------------------  IN: 2390 mL / OUT: 1425 mL / NET: 965 mL    2017 07:01  -  2017 08:50  --------------------------------------------------------  IN: 0 mL / OUT: 300 mL / NET: -300 mL        TELE: N/A

## 2017-11-22 NOTE — PROGRESS NOTE ADULT - SUBJECTIVE AND OBJECTIVE BOX
Follow-up Pulm Progress Note  Amauri Jackson MD  [phone]      Creatinine 1.0/Na 157  Remains Extubated  NPO after FEEST  Na 157 - free water increased  Breathing comfortably supine on nasal oxygen : stable respiratory status  Dyspnea resolved at rest  Afebrile off antibiotics  Lip and Soft palate ulcerations noted    Vital Signs Last 24 Hrs  T(C): 36.6 (22 Nov 2017 07:54), Max: 36.6 (21 Nov 2017 22:31)  T(F): 97.8 (22 Nov 2017 07:54), Max: 97.9 (21 Nov 2017 22:31)  HR: 65 (22 Nov 2017 07:54) (61 - 101)  BP: 126/74 (22 Nov 2017 07:54) (112/67 - 126/74)  BP(mean): --  RR: 18 (22 Nov 2017 07:54) (18 - 18)  SpO2: 100% (22 Nov 2017 07:54) (99% - 100%)                        8.7    5.9   )-----------( 226      ( 21 Nov 2017 09:52 )             26.8     11-21    157<H>  |  120<H>  |  35<H>  ----------------------------<  198<H>  4.6   |  28  |  1.01    Ca    8.7      21 Nov 2017 09:53  Phos  2.6     11-21  Mg     2.0     11-21    TPro  6.1  /  Alb  2.3<L>  /  TBili  0.6  /  DBili  0.3<H>  /  AST  39  /  ALT  75<H>  /  AlkPhos  298<H>  11-21    CXR: unchanged reticular opacities: no gross new pulm edema  TTE: Mild AS, hyperdynamic LV, PA 40-50  Physical Examination:  PULM: Bibasilar crackles   CVS: Regular rate and rhythm, no murmurs, rubs, or gallops  ABD: Soft, non-tender  EXT:  Without sign edema    RADIOLOGY REVIEWED  CXR: No change in bilateral reticular opacities    CT chest: see above    TTE:

## 2017-11-23 LAB
ANION GAP SERPL CALC-SCNC: 11 MMOL/L — SIGNIFICANT CHANGE UP (ref 5–17)
BASOPHILS # BLD AUTO: 0.02 K/UL — SIGNIFICANT CHANGE UP (ref 0–0.2)
BASOPHILS NFR BLD AUTO: 0.4 % — SIGNIFICANT CHANGE UP (ref 0–2)
BUN SERPL-MCNC: 33 MG/DL — HIGH (ref 7–23)
CALCIUM SERPL-MCNC: 8.3 MG/DL — LOW (ref 8.4–10.5)
CHLORIDE SERPL-SCNC: 108 MMOL/L — SIGNIFICANT CHANGE UP (ref 96–108)
CO2 SERPL-SCNC: 27 MMOL/L — SIGNIFICANT CHANGE UP (ref 22–31)
CREAT SERPL-MCNC: 0.93 MG/DL — SIGNIFICANT CHANGE UP (ref 0.5–1.3)
EOSINOPHIL # BLD AUTO: 0.05 K/UL — SIGNIFICANT CHANGE UP (ref 0–0.5)
EOSINOPHIL NFR BLD AUTO: 0.9 — SIGNIFICANT CHANGE UP
GLUCOSE BLDC GLUCOMTR-MCNC: 123 MG/DL — HIGH (ref 70–99)
GLUCOSE BLDC GLUCOMTR-MCNC: 160 MG/DL — HIGH (ref 70–99)
GLUCOSE BLDC GLUCOMTR-MCNC: 163 MG/DL — HIGH (ref 70–99)
GLUCOSE BLDC GLUCOMTR-MCNC: 193 MG/DL — HIGH (ref 70–99)
GLUCOSE BLDC GLUCOMTR-MCNC: 270 MG/DL — HIGH (ref 70–99)
GLUCOSE SERPL-MCNC: 194 MG/DL — HIGH (ref 70–99)
HCT VFR BLD CALC: 26.4 % — LOW (ref 39–50)
HGB BLD-MCNC: 8.3 G/DL — LOW (ref 13–17)
IMM GRANULOCYTES NFR BLD AUTO: 2 % — HIGH (ref 0–1.5)
LYMPHOCYTES # BLD AUTO: 1.16 K/UL — SIGNIFICANT CHANGE UP (ref 1–3.3)
LYMPHOCYTES # BLD AUTO: 21.3 % — SIGNIFICANT CHANGE UP (ref 13–44)
MAGNESIUM SERPL-MCNC: 1.7 MG/DL — SIGNIFICANT CHANGE UP (ref 1.6–2.6)
MCHC RBC-ENTMCNC: 31.4 GM/DL — LOW (ref 32–36)
MCHC RBC-ENTMCNC: 33.2 PG — SIGNIFICANT CHANGE UP (ref 27–34)
MCV RBC AUTO: 105.6 FL — HIGH (ref 80–100)
MONOCYTES # BLD AUTO: 0.6 K/UL — SIGNIFICANT CHANGE UP (ref 0–0.9)
MONOCYTES NFR BLD AUTO: 11 % — SIGNIFICANT CHANGE UP (ref 2–14)
NEUTROPHILS # BLD AUTO: 3.51 K/UL — SIGNIFICANT CHANGE UP (ref 1.8–7.4)
NEUTROPHILS NFR BLD AUTO: 64.4 % — SIGNIFICANT CHANGE UP (ref 43–77)
PHOSPHATE SERPL-MCNC: 2.1 MG/DL — LOW (ref 2.5–4.5)
PLATELET # BLD AUTO: 247 K/UL — SIGNIFICANT CHANGE UP (ref 150–400)
POTASSIUM SERPL-MCNC: 3.9 MMOL/L — SIGNIFICANT CHANGE UP (ref 3.5–5.3)
POTASSIUM SERPL-SCNC: 3.9 MMOL/L — SIGNIFICANT CHANGE UP (ref 3.5–5.3)
RBC # BLD: 2.5 M/UL — LOW (ref 4.2–5.8)
RBC # FLD: 19.5 % — HIGH (ref 10.3–14.5)
SODIUM SERPL-SCNC: 146 MMOL/L — HIGH (ref 135–145)
WBC # BLD: 5.45 K/UL — SIGNIFICANT CHANGE UP (ref 3.8–10.5)
WBC # FLD AUTO: 5.45 K/UL — SIGNIFICANT CHANGE UP (ref 3.8–10.5)

## 2017-11-23 PROCEDURE — 71010: CPT | Mod: 26

## 2017-11-23 PROCEDURE — 99233 SBSQ HOSP IP/OBS HIGH 50: CPT

## 2017-11-23 RX ORDER — MORPHINE SULFATE 50 MG/1
1 CAPSULE, EXTENDED RELEASE ORAL ONCE
Qty: 0 | Refills: 0 | Status: DISCONTINUED | OUTPATIENT
Start: 2017-11-23 | End: 2017-11-23

## 2017-11-23 RX ADMIN — Medication 25 MILLIGRAM(S): at 18:29

## 2017-11-23 RX ADMIN — PANTOPRAZOLE SODIUM 40 MILLIGRAM(S): 20 TABLET, DELAYED RELEASE ORAL at 13:12

## 2017-11-23 RX ADMIN — Medication 25 MILLIGRAM(S): at 07:03

## 2017-11-23 RX ADMIN — POLYETHYLENE GLYCOL 3350 17 GRAM(S): 17 POWDER, FOR SOLUTION ORAL at 07:00

## 2017-11-23 RX ADMIN — Medication 1 TABLET(S): at 13:12

## 2017-11-23 RX ADMIN — MORPHINE SULFATE 1 MILLIGRAM(S): 50 CAPSULE, EXTENDED RELEASE ORAL at 02:42

## 2017-11-23 RX ADMIN — Medication 1 SPRAY(S): at 13:12

## 2017-11-23 RX ADMIN — HEPARIN SODIUM 5000 UNIT(S): 5000 INJECTION INTRAVENOUS; SUBCUTANEOUS at 13:12

## 2017-11-23 RX ADMIN — Medication 10 MILLIGRAM(S): at 07:00

## 2017-11-23 RX ADMIN — HEPARIN SODIUM 5000 UNIT(S): 5000 INJECTION INTRAVENOUS; SUBCUTANEOUS at 06:57

## 2017-11-23 RX ADMIN — Medication 1 APPLICATION(S): at 13:13

## 2017-11-23 RX ADMIN — Medication 0.12 MILLIGRAM(S): at 06:59

## 2017-11-23 RX ADMIN — TRAMADOL HYDROCHLORIDE 50 MILLIGRAM(S): 50 TABLET ORAL at 09:21

## 2017-11-23 RX ADMIN — CHLORHEXIDINE GLUCONATE 15 MILLILITER(S): 213 SOLUTION TOPICAL at 06:56

## 2017-11-23 RX ADMIN — TIOTROPIUM BROMIDE 1 CAPSULE(S): 18 CAPSULE ORAL; RESPIRATORY (INHALATION) at 13:12

## 2017-11-23 RX ADMIN — MORPHINE SULFATE 1 MILLIGRAM(S): 50 CAPSULE, EXTENDED RELEASE ORAL at 03:00

## 2017-11-23 RX ADMIN — Medication 2: at 00:18

## 2017-11-23 RX ADMIN — CHLORHEXIDINE GLUCONATE 15 MILLILITER(S): 213 SOLUTION TOPICAL at 13:13

## 2017-11-23 RX ADMIN — Medication 6: at 18:28

## 2017-11-23 RX ADMIN — CHLORHEXIDINE GLUCONATE 15 MILLILITER(S): 213 SOLUTION TOPICAL at 00:19

## 2017-11-23 RX ADMIN — Medication 1 SPRAY(S): at 06:56

## 2017-11-23 RX ADMIN — Medication 2: at 07:01

## 2017-11-23 RX ADMIN — Medication 1 APPLICATION(S): at 22:53

## 2017-11-23 RX ADMIN — Medication 2: at 13:11

## 2017-11-23 RX ADMIN — TRAMADOL HYDROCHLORIDE 50 MILLIGRAM(S): 50 TABLET ORAL at 04:00

## 2017-11-23 RX ADMIN — TRAMADOL HYDROCHLORIDE 50 MILLIGRAM(S): 50 TABLET ORAL at 03:29

## 2017-11-23 RX ADMIN — Medication 1 APPLICATION(S): at 07:01

## 2017-11-23 RX ADMIN — CHLORHEXIDINE GLUCONATE 15 MILLILITER(S): 213 SOLUTION TOPICAL at 18:28

## 2017-11-23 RX ADMIN — Medication 81 MILLIGRAM(S): at 13:12

## 2017-11-23 RX ADMIN — Medication 1 SPRAY(S): at 22:53

## 2017-11-23 RX ADMIN — HEPARIN SODIUM 5000 UNIT(S): 5000 INJECTION INTRAVENOUS; SUBCUTANEOUS at 22:51

## 2017-11-23 NOTE — PROGRESS NOTE ADULT - NS NEC GEN PE MLT EXAM PC
No bruits; no thyromegaly or nodules
detailed exam
detailed exam

## 2017-11-23 NOTE — PROGRESS NOTE ADULT - SUBJECTIVE AND OBJECTIVE BOX
Patient is a 77y old  Male who presents with a chief complaint of SOB (04 Nov 2017 14:33)      SUBJECTIVE / OVERNIGHT EVENTS:    patient seen and examined at bedside in early am  weak and somnolent  no acute complaints, wants NG tube out and wants to eat  no acute events overnight     Vital Signs Last 24 Hrs  T(C): 36.7 (23 Nov 2017 15:50), Max: 37.1 (23 Nov 2017 08:51)  T(F): 98.1 (23 Nov 2017 15:50), Max: 98.8 (23 Nov 2017 08:51)  HR: 72 (23 Nov 2017 18:14) (65 - 80)  BP: 102/64 (23 Nov 2017 18:14) (95/61 - 125/74)  BP(mean): --  RR: 17 (23 Nov 2017 18:14) (16 - 18)  SpO2: 98% (23 Nov 2017 18:14) (97% - 100%)  I&O's Summary    22 Nov 2017 07:01  -  23 Nov 2017 07:00  --------------------------------------------------------  IN: 480 mL / OUT: 550 mL / NET: -70 mL    23 Nov 2017 07:01  -  23 Nov 2017 20:00  --------------------------------------------------------  IN: 0 mL / OUT: 100 mL / NET: -100 mL        Physical Exam:   · Constitutional	detailed exam	  · Constitutional Details	Pale weak lethargic	  · Eyes	detailed exam	  · Eyes Details	PERRL; EOMI	  · ENMT	detailed exam	  · ENMT Details	nose	  · Nose	no discharge; no deviation, dry eschar, no bleeding or puss	  · Neck	detailed exam	  · Neck Details	supple; no JVD	  · Respiratory	detailed exam	  · Respiratory Details	airway patent; breath sounds equal, no wheeze	  · Cardiovascular	detailed exam	  · Cardiovascular Details	no rub	  · Cardiovascular Details	positive S1; positive S2	  · Neurological	detailed exam	  · Neurological Details	responds to pain; responds to verbal commands	          LABS:                        8.3    5.45  )-----------( 247      ( 23 Nov 2017 09:54 )             26.4     11-23    146<H>  |  108  |  33<H>  ----------------------------<  194<H>  3.9   |  27  |  0.93    Ca    8.3<L>      23 Nov 2017 07:35  Phos  2.1     11-23  Mg     1.7     11-23        CAPILLARY BLOOD GLUCOSE      POCT Blood Glucose.: 270 mg/dL (23 Nov 2017 18:15)  POCT Blood Glucose.: 193 mg/dL (23 Nov 2017 12:12)  POCT Blood Glucose.: 163 mg/dL (23 Nov 2017 06:23)  POCT Blood Glucose.: 160 mg/dL (23 Nov 2017 00:14)            RADIOLOGY & ADDITIONAL TESTS:    Imaging Personally Reviewed:  [x] YES  [ ] NO    Consultant(s) Notes Reviewed:  [x] YES  [ ] NO      MEDICATIONS  (STANDING):  aspirin  chewable 81 milliGRAM(s) Oral daily  BACItracin   Ointment 1 Application(s) Topical three times a day  chlorhexidine 0.12% Liquid 15 milliLiter(s) Swish and Spit four times a day  digoxin     Tablet 0.125 milliGRAM(s) Oral daily  heparin  Injectable 5000 Unit(s) SubCutaneous every 8 hours  influenza   Vaccine 0.5 milliLiter(s) IntraMuscular once  insulin lispro (HumaLOG) corrective regimen sliding scale   SubCutaneous every 6 hours  metoprolol     tartrate 25 milliGRAM(s) Oral two times a day  multivitamin 1 Tablet(s) Oral daily  pantoprazole  Injectable 40 milliGRAM(s) IV Push daily  polyethylene glycol 3350 17 Gram(s) Oral two times a day  predniSONE   Tablet 10 milliGRAM(s) Oral daily  saliva substitute (BIOTENE MOISTURIZING MOUTH SPRAY) 1 [place](s) Topical three times a day  senna Syrup 10 milliLiter(s) Oral at bedtime  sodium chloride 0.65% Nasal 1 Spray(s) Both Nostrils three times a day  tiotropium 18 MICROgram(s) Capsule 1 Capsule(s) Inhalation daily    MEDICATIONS  (PRN):  bisacodyl Suppository 10 milliGRAM(s) Rectal daily PRN Constipation  traMADol 50 milliGRAM(s) Oral every 6 hours PRN Moderate Pain (4 - 6)      Care Discussed with Consultants/Other Providers [x] YES  [ ] NO    HEALTH ISSUES - PROBLEM Dx:  Lip lesion: Lip lesion  Oral ulcer: Oral ulcer  CHRISTIANE (acute kidney injury): CHRISTIANE (acute kidney injury)  Bacteremia: Bacteremia  Neutropenia: Neutropenia  Need for prophylactic measure: Need for prophylactic measure  Oral thrush: Oral thrush  Nasal discomfort: Nasal discomfort  Gastroesophageal reflux disease without esophagitis: Gastroesophageal reflux disease without esophagitis  Hyperlipidemia, unspecified hyperlipidemia type: Hyperlipidemia, unspecified hyperlipidemia type  Type 2 diabetes mellitus without complication, without long-term current use of insulin: Type 2 diabetes mellitus without complication, without long-term current use of insulin  Coronary artery disease involving native coronary artery of native heart, angina presence unspecified: Coronary artery disease involving native coronary artery of native heart, angina presence unspecified  Hyperkalemia: Hyperkalemia  Lactic acidosis: Lactic acidosis  Transaminitis: Transaminitis  Idiopathic pulmonary fibrosis: Idiopathic pulmonary fibrosis  Shortness of breath: Shortness of breath

## 2017-11-23 NOTE — PROGRESS NOTE ADULT - SUBJECTIVE AND OBJECTIVE BOX
CC: PAF, CAD, HLD    Interval History: Patient with PAF in setting of septic shock improved. No palpitations. Patient with CAD h/o CABG. No chest pain. HLD off statin due to elevated LFTs.     MEDICATIONS:  aspirin  chewable 81 milliGRAM(s) Oral daily  BACItracin   Ointment 1 Application(s) Topical three times a day  bisacodyl Suppository 10 milliGRAM(s) Rectal daily PRN  chlorhexidine 0.12% Liquid 15 milliLiter(s) Swish and Spit four times a day  digoxin     Tablet 0.125 milliGRAM(s) Oral daily  heparin  Injectable 5000 Unit(s) SubCutaneous every 8 hours  influenza   Vaccine 0.5 milliLiter(s) IntraMuscular once  insulin lispro (HumaLOG) corrective regimen sliding scale   SubCutaneous every 6 hours  metoprolol     tartrate 25 milliGRAM(s) Oral two times a day  multivitamin 1 Tablet(s) Oral daily  pantoprazole  Injectable 40 milliGRAM(s) IV Push daily  polyethylene glycol 3350 17 Gram(s) Oral two times a day  predniSONE   Tablet 10 milliGRAM(s) Oral daily  saliva substitute (BIOTENE MOISTURIZING MOUTH SPRAY) 1 [place](s) Topical three times a day  senna Syrup 10 milliLiter(s) Oral at bedtime  sodium chloride 0.65% Nasal 1 Spray(s) Both Nostrils three times a day  tiotropium 18 MICROgram(s) Capsule 1 Capsule(s) Inhalation daily  traMADol 50 milliGRAM(s) Oral every 6 hours PRN      LABS:      150<H>  |  113<H>  |  33<H>  ----------------------------<  249<H>  4.4   |  29  |  0.80    Ca    8.5      2017 12:09  Phos  2.2       Mg     1.7         TPro  6.1  /  Alb  2.3<L>  /  TBili  0.6  /  DBili  0.3<H>  /  AST  39  /  ALT  75<H>  /  AlkPhos  298<H>                            7.5    4.77  )-----------( 225      ( 2017 09:06 )             24.3               VITAL SIGNS:   T(C): 36.7 (17 @ 21:00), Max: 37 (17 @ 16:49)  HR: 80 (17 @ 21:00) (65 - 80)  BP: 125/74 (17 @ 21:00) (114/74 - 126/74)  RR: 18 (17 @ 21:00) (18 - 18)  SpO2: 100% (17 @ 21:00) (100% - 100%)  Daily     Daily Weight in k.2 (2017 07:54)  I&O's Summary    2017 07:01  -  2017 07:00  --------------------------------------------------------  IN: 0 mL / OUT: 550 mL / NET: -550 mL        TELE:

## 2017-11-23 NOTE — PROGRESS NOTE ADULT - CVS HE PE MLT D E PC
no rub
regular rate and rhythm
no murmur/regular rate and rhythm
no rub
no murmur
no rub

## 2017-11-23 NOTE — PROGRESS NOTE ADULT - SUBJECTIVE AND OBJECTIVE BOX
Nephrology Progress Note    No acute events overnight  Patient looks comfortable and not in distress  weaker per family  Interactive and complaining of back pain  Noted some retention per records  No sob or chest pain      PHYSICAL EXAM    Vital Signs Last 24 Hrs  T(C): 37.1 (23 Nov 2017 08:51), Max: 37.1 (23 Nov 2017 08:51)  T(F): 98.8 (23 Nov 2017 08:51), Max: 98.8 (23 Nov 2017 08:51)  HR: 76 (23 Nov 2017 08:51) (73 - 80)  BP: 111/69 (23 Nov 2017 08:51) (111/69 - 125/74)  BP(mean): --  RR: 18 (23 Nov 2017 08:51) (18 - 18)  SpO2: 99% (23 Nov 2017 08:51) (99% - 100%)  I&O's Summary    22 Nov 2017 07:01  -  23 Nov 2017 07:00  --------------------------------------------------------  IN: 480 mL / OUT: 550 mL / NET: -70 mL    Constitutional: awake and alert; no distress, coherent  HEENT: EOMI, clear conj, anicteric sclera; NGT in place, mouth sores and a scab on the lower lip  Neck: Supple, No JVD  Respiratory: CTA-b/l, no wrc, normal effort  Cardiovascular: RRR s1s2, no m/r/g, no peripheral edema  Gastrointestinal: BS+, soft, NT/ND  Extremities: No peripheral edema b/l  Neurological: no focal deficits; strength grossly intact  Skin: warm and dry, no rash on visible skin  : nicolás in place       Labs:                        7.5    4.77  )-----------( 225      ( 22 Nov 2017 09:06 )             24.3     11-23    146<H>  |  108  |  33<H>  ----------------------------<  194<H>  3.9   |  27  |  0.93    Ca    8.3<L>      23 Nov 2017 07:35  Phos  2.2     11-22  Mg     1.7     11-22    IMAGING:   Renal US:  IMPRESSION:  New mild to moderate right-sided hydronephrosis.  New mild fullness at the upper and lower calyces of the left renal  collecting system.      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 He was treated for COPD exacerbation with steroids and immunesuppression. Hospital course was complicated with septic shock, pseudomonas bacteremia/pneumonia requiring MICU stay, intubation and blood pressure support. Patient is extubated, out on the regular floor, off antibiotics. Patient with oral ulcers unable to swallow post NGT placement. Kidney function worsening with creatinine of 1.99 today for which nephrology was consulted.     1. CHRISTIANE likely prerenal/dehydration considering low PO intake and Hypernatremia: Postobstructive nephropathy as well - see renal US  2. Hypernatremia likely due to low free water intake/dehydration; feeding from NGT   3. Anemia likely multifactorial: acute illness, AOCD etc  4. Sepsis/pseudomonas bacteremia off antibiotics; plan per primary team    RECOMMENDATIONS:  - improved sodium level to 146 - patient still with NGT and no po intake otherwise    ; will continue with free water 200 cc every 3 hrs through NGT  - monitor for any fluid overload- would keep him even  - bladder scan and if more than 300 cc retention place a monzon ; patient is very weak and it will take some time to mobilize his muscles  - OOB to chair and PT as able  - Avoid nephrotoxins, agree on holding ACEIs  - dose meds for GFR of 40 ml/min/1.73m2  - anemia - noted low transferrin saturation and likely low iron stores but ferritin too high  - monitor H&H for now and transfuse if Htc less than 24      Ju Davis MD  [facility]  [phone]

## 2017-11-24 LAB
ANION GAP SERPL CALC-SCNC: 10 MMOL/L — SIGNIFICANT CHANGE UP (ref 5–17)
ANISOCYTOSIS BLD QL: SLIGHT — SIGNIFICANT CHANGE UP
BASOPHILS # BLD AUTO: 0.05 K/UL — SIGNIFICANT CHANGE UP (ref 0–0.2)
BASOPHILS NFR BLD AUTO: 0.9 % — SIGNIFICANT CHANGE UP (ref 0–2)
BLASTS # FLD: 0 % — SIGNIFICANT CHANGE UP (ref 0–0)
BUN SERPL-MCNC: 36 MG/DL — HIGH (ref 7–23)
CALCIUM SERPL-MCNC: 8.3 MG/DL — LOW (ref 8.4–10.5)
CHLORIDE SERPL-SCNC: 107 MMOL/L — SIGNIFICANT CHANGE UP (ref 96–108)
CO2 SERPL-SCNC: 28 MMOL/L — SIGNIFICANT CHANGE UP (ref 22–31)
CREAT SERPL-MCNC: 1.09 MG/DL — SIGNIFICANT CHANGE UP (ref 0.5–1.3)
EOSINOPHIL # BLD AUTO: 0.29 K/UL — SIGNIFICANT CHANGE UP (ref 0–0.5)
EOSINOPHIL NFR BLD AUTO: 5.3 — SIGNIFICANT CHANGE UP
GIANT PLATELETS BLD QL SMEAR: PRESENT — SIGNIFICANT CHANGE UP
GLUCOSE BLDC GLUCOMTR-MCNC: 213 MG/DL — HIGH (ref 70–99)
GLUCOSE BLDC GLUCOMTR-MCNC: 217 MG/DL — HIGH (ref 70–99)
GLUCOSE BLDC GLUCOMTR-MCNC: 227 MG/DL — HIGH (ref 70–99)
GLUCOSE BLDC GLUCOMTR-MCNC: 231 MG/DL — HIGH (ref 70–99)
GLUCOSE BLDC GLUCOMTR-MCNC: 288 MG/DL — HIGH (ref 70–99)
GLUCOSE BLDC GLUCOMTR-MCNC: 53 MG/DL — LOW (ref 70–99)
GLUCOSE SERPL-MCNC: 243 MG/DL — HIGH (ref 70–99)
HCT VFR BLD CALC: 23 % — LOW (ref 39–50)
HGB BLD-MCNC: 7.4 G/DL — LOW (ref 13–17)
LYMPHOCYTES # BLD AUTO: 0.67 K/UL — LOW (ref 1–3.3)
LYMPHOCYTES # BLD AUTO: 12.3 % — LOW (ref 13–44)
LYMPHOCYTES # SPEC AUTO: 0 % — SIGNIFICANT CHANGE UP (ref 0–0)
MAGNESIUM SERPL-MCNC: 1.9 MG/DL — SIGNIFICANT CHANGE UP (ref 1.6–2.6)
MANUAL SMEAR VERIFICATION: SIGNIFICANT CHANGE UP
MCHC RBC-ENTMCNC: 32.2 GM/DL — SIGNIFICANT CHANGE UP (ref 32–36)
MCHC RBC-ENTMCNC: 33.2 PG — SIGNIFICANT CHANGE UP (ref 27–34)
MCV RBC AUTO: 103.1 FL — HIGH (ref 80–100)
METAMYELOCYTES # FLD: 0 % — SIGNIFICANT CHANGE UP (ref 0–0)
MONOCYTES # BLD AUTO: 0.48 K/UL — SIGNIFICANT CHANGE UP (ref 0–0.9)
MONOCYTES NFR BLD AUTO: 8.8 % — SIGNIFICANT CHANGE UP (ref 2–14)
MYELOCYTES NFR BLD: 1 % — HIGH (ref 0–0)
NEUTROPHILS # BLD AUTO: 3.9 K/UL — SIGNIFICANT CHANGE UP (ref 1.8–7.4)
NEUTROPHILS NFR BLD AUTO: 71.9 % — SIGNIFICANT CHANGE UP (ref 43–77)
NEUTS BAND # BLD: 0 % — SIGNIFICANT CHANGE UP (ref 0–8)
PHOSPHATE SERPL-MCNC: 3.1 MG/DL — SIGNIFICANT CHANGE UP (ref 2.5–4.5)
PLAT MORPH BLD: NORMAL — SIGNIFICANT CHANGE UP
PLATELET # BLD AUTO: 212 K/UL — SIGNIFICANT CHANGE UP (ref 150–400)
POTASSIUM SERPL-MCNC: 4.5 MMOL/L — SIGNIFICANT CHANGE UP (ref 3.5–5.3)
POTASSIUM SERPL-SCNC: 4.5 MMOL/L — SIGNIFICANT CHANGE UP (ref 3.5–5.3)
PROMYELOCYTES # FLD: 0 % — SIGNIFICANT CHANGE UP (ref 0–0)
RBC # BLD: 2.23 M/UL — LOW (ref 4.2–5.8)
RBC # FLD: 19.4 % — HIGH (ref 10.3–14.5)
RBC BLD AUTO: ABNORMAL
SODIUM SERPL-SCNC: 145 MMOL/L — SIGNIFICANT CHANGE UP (ref 135–145)
WBC # BLD: 5.42 K/UL — SIGNIFICANT CHANGE UP (ref 3.8–10.5)
WBC # FLD AUTO: 5.42 K/UL — SIGNIFICANT CHANGE UP (ref 3.8–10.5)

## 2017-11-24 PROCEDURE — 92612 ENDOSCOPY SWALLOW (FEES) VID: CPT | Mod: GC

## 2017-11-24 PROCEDURE — 99232 SBSQ HOSP IP/OBS MODERATE 35: CPT

## 2017-11-24 RX ORDER — SENNA PLUS 8.6 MG/1
10 TABLET ORAL AT BEDTIME
Qty: 0 | Refills: 0 | Status: DISCONTINUED | OUTPATIENT
Start: 2017-11-24 | End: 2017-12-05

## 2017-11-24 RX ORDER — POLYETHYLENE GLYCOL 3350 17 G/17G
17 POWDER, FOR SOLUTION ORAL
Qty: 0 | Refills: 0 | Status: DISCONTINUED | OUTPATIENT
Start: 2017-11-24 | End: 2017-12-05

## 2017-11-24 RX ADMIN — Medication 25 MILLIGRAM(S): at 06:48

## 2017-11-24 RX ADMIN — HEPARIN SODIUM 5000 UNIT(S): 5000 INJECTION INTRAVENOUS; SUBCUTANEOUS at 13:41

## 2017-11-24 RX ADMIN — Medication 1 SPRAY(S): at 06:47

## 2017-11-24 RX ADMIN — Medication 1 APPLICATION(S): at 21:50

## 2017-11-24 RX ADMIN — CHLORHEXIDINE GLUCONATE 15 MILLILITER(S): 213 SOLUTION TOPICAL at 18:00

## 2017-11-24 RX ADMIN — Medication 1 SPRAY(S): at 13:42

## 2017-11-24 RX ADMIN — Medication: at 06:48

## 2017-11-24 RX ADMIN — Medication 10 MILLIGRAM(S): at 06:46

## 2017-11-24 RX ADMIN — POLYETHYLENE GLYCOL 3350 17 GRAM(S): 17 POWDER, FOR SOLUTION ORAL at 06:46

## 2017-11-24 RX ADMIN — CHLORHEXIDINE GLUCONATE 15 MILLILITER(S): 213 SOLUTION TOPICAL at 12:09

## 2017-11-24 RX ADMIN — Medication 0.12 MILLIGRAM(S): at 07:49

## 2017-11-24 RX ADMIN — Medication 4: at 12:30

## 2017-11-24 RX ADMIN — Medication 1 SPRAY(S): at 21:50

## 2017-11-24 RX ADMIN — Medication 1 APPLICATION(S): at 13:40

## 2017-11-24 RX ADMIN — CHLORHEXIDINE GLUCONATE 15 MILLILITER(S): 213 SOLUTION TOPICAL at 06:50

## 2017-11-24 RX ADMIN — PANTOPRAZOLE SODIUM 40 MILLIGRAM(S): 20 TABLET, DELAYED RELEASE ORAL at 12:08

## 2017-11-24 RX ADMIN — Medication 1 APPLICATION(S): at 06:50

## 2017-11-24 RX ADMIN — CHLORHEXIDINE GLUCONATE 15 MILLILITER(S): 213 SOLUTION TOPICAL at 00:01

## 2017-11-24 RX ADMIN — HEPARIN SODIUM 5000 UNIT(S): 5000 INJECTION INTRAVENOUS; SUBCUTANEOUS at 06:49

## 2017-11-24 RX ADMIN — TIOTROPIUM BROMIDE 1 CAPSULE(S): 18 CAPSULE ORAL; RESPIRATORY (INHALATION) at 12:08

## 2017-11-24 RX ADMIN — Medication 4: at 18:00

## 2017-11-24 RX ADMIN — Medication 81 MILLIGRAM(S): at 12:09

## 2017-11-24 RX ADMIN — Medication 1 TABLET(S): at 12:09

## 2017-11-24 RX ADMIN — HEPARIN SODIUM 5000 UNIT(S): 5000 INJECTION INTRAVENOUS; SUBCUTANEOUS at 21:51

## 2017-11-24 RX ADMIN — TRAMADOL HYDROCHLORIDE 50 MILLIGRAM(S): 50 TABLET ORAL at 17:58

## 2017-11-24 NOTE — PROGRESS NOTE ADULT - SUBJECTIVE AND OBJECTIVE BOX
Nephrology Progress Note    No acute events overnight  Patient looks comfortable and not in distress  continues weak  significant sores on his mouth and with some bleeding, scar smaller and softer  Patient denies sob or chest pain      PHYSICAL EXAM    Vital Signs Last 24 Hrs  T(C): 36.8 (24 Nov 2017 07:41), Max: 37.1 (23 Nov 2017 08:51)  T(F): 98.3 (24 Nov 2017 07:41), Max: 98.8 (23 Nov 2017 08:51)  HR: 72 (24 Nov 2017 07:41) (60 - 76)  BP: 100/64 (24 Nov 2017 07:41) (95/61 - 111/69)  BP(mean): --  RR: 18 (24 Nov 2017 07:41) (16 - 18)  SpO2: 98% (24 Nov 2017 07:41) (97% - 99%)  I&O's Summary    23 Nov 2017 07:01  -  24 Nov 2017 07:00  --------------------------------------------------------  IN: 1500 mL / OUT: 1900 mL / NET: -400 mL      Constitutional: awake and alert; no distress, coherent  HEENT: EOMI, clear conj, anicteric sclera; NGT in place, mouth sores and a scab on the lower lip; bloody saliva  Neck: Supple, No JVD  Respiratory: CTA-b/l, no wrc, normal effort  Cardiovascular: RRR s1s2, no m/r/g, no peripheral edema  Gastrointestinal: BS+, soft, NT/ND  Extremities: No peripheral edema b/l  Neurological: no focal deficits; strength grossly intact  Skin: warm and dry, no rash on visible skin       Labs:                                   8.3    5.45  )-----------( 247      ( 23 Nov 2017 09:54 )             26.4     11-23    146<H>  |  108  |  33<H>  ----------------------------<  194<H>  3.9   |  27  |  0.93    Ca    8.3<L>      23 Nov 2017 07:35  Phos  2.1     11-23  Mg     1.7     11-23          IMAGING:   Renal US:  IMPRESSION:  New mild to moderate right-sided hydronephrosis.  New mild fullness at the upper and lower calyces of the left renal  collecting system.      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 He was treated for COPD exacerbation with steroids and immunosuppression Hospital course was complicated with septic shock, pseudomonas bacteremia/pneumonia requiring MICU stay, intubation and blood pressure support. Patient is extubated, out on the regular floor, off antibiotics. Patient with oral ulcers unable to swallow post NGT placement. Kidney function worsening with creatinine of 1.99 today for which nephrology was consulted.     1. CHRISTIANE likely prerenal/dehydration considering low PO intake and Hypernatremia: Postobstructive nephropathy as well - see renal US - improved creatinine- labs pending for today  2. Hypernatremia likely due to low free water intake/dehydration; feeding from NGT   3. Anemia likely multifactorial: acute illness, AOCD etc  4. Sepsis/pseudomonas bacteremia off antibiotics; plan per primary team    RECOMMENDATIONS:  - improved sodium level to 146 - patient still with NGT and no po intake otherwise; labs pending ; if sodium less than 145 change free water to 200 q 4 hrs  - monitor for any fluid overload- would keep him even  - OOB to chair and PT as able  - Avoid nephrotoxins, agree on holding ACEIs  - dose meds for GFR of 40 ml/min/1.73m2  - anemia - noted low transferrin saturation and likely low iron stores but ferritin too high  - monitor H&H for now and transfuse if Htc less than 24      Ju Davis MD  [facility]  [phone]

## 2017-11-24 NOTE — PROGRESS NOTE ADULT - RESPIRATORY
detailed exam
Breath Sounds equal & clear to percussion & auscultation, no accessory muscle use
detailed exam

## 2017-11-24 NOTE — CHART NOTE - NSCHARTNOTEFT_GEN_A_CORE
As per RN at bedside, NG Tube came out 5cm. Pt is on NG Tube for Nutrition, and FEEST scheduled for friday.   - NG Tube advanced.   - Good gurgling sounds  heard in stomach.   - CXR urgent ordered for NG Tube placement.   - CXR [Prelim]: NG Tube in Stomach.   - Will endorse to primary team in am.     NITZA. AMERICA SONI  # [id number]  Medicine PA.

## 2017-11-24 NOTE — SWALLOW FEES ASSESSMENT ADULT - CONSISTENCIES ADMINISTERED
honey thick/~ 2 ounces of thickened apple juice puree thin/~ 1 ounce applesauce puree thick/~ 1 ounce ensure pudding

## 2017-11-24 NOTE — PROGRESS NOTE ADULT - ASSESSMENT
77 M with CAD h/o CABG, NL LV systolic function, ILD on home O2 with hypoxic resp failure, septic shock on pressors with presumed PNA. In this setting, new onset AF now NSR  ·	Cardiac status without significant change  ·	Continue present cardiac medications.   ·	Plan for FEEST today.

## 2017-11-24 NOTE — SWALLOW FEES ASSESSMENT ADULT - ROSENBEK'S PENETRATION ASPIRATION SCALE
(3) material remains above the vocal cords, visible residue remains (penetration) (4) material contacts vocal cords, no residue remains (penetration)

## 2017-11-24 NOTE — PROGRESS NOTE ADULT - SUBJECTIVE AND OBJECTIVE BOX
CC: PAF, CAD    Interval History: No significant cardiac events overnight.     MEDICATIONS:  aspirin  chewable 81 milliGRAM(s) Oral daily  BACItracin   Ointment 1 Application(s) Topical three times a day  bisacodyl Suppository 10 milliGRAM(s) Rectal daily PRN  chlorhexidine 0.12% Liquid 15 milliLiter(s) Swish and Spit four times a day  digoxin     Tablet 0.125 milliGRAM(s) Oral daily  heparin  Injectable 5000 Unit(s) SubCutaneous every 8 hours  influenza   Vaccine 0.5 milliLiter(s) IntraMuscular once  insulin lispro (HumaLOG) corrective regimen sliding scale   SubCutaneous every 6 hours  metoprolol     tartrate 25 milliGRAM(s) Oral two times a day  multivitamin 1 Tablet(s) Oral daily  pantoprazole  Injectable 40 milliGRAM(s) IV Push daily  polyethylene glycol 3350 17 Gram(s) Oral two times a day  predniSONE   Tablet 10 milliGRAM(s) Oral daily  saliva substitute (BIOTENE MOISTURIZING MOUTH SPRAY) 1 [place](s) Topical three times a day  senna Syrup 10 milliLiter(s) Oral at bedtime  sodium chloride 0.65% Nasal 1 Spray(s) Both Nostrils three times a day  tiotropium 18 MICROgram(s) Capsule 1 Capsule(s) Inhalation daily  traMADol 50 milliGRAM(s) Oral every 6 hours PRN      LABS:  11-23    146<H>  |  108  |  33<H>  ----------------------------<  194<H>  3.9   |  27  |  0.93    Ca    8.3<L>      23 Nov 2017 07:35  Phos  2.1     11-23  Mg     1.7     11-23                            7.4    5.42  )-----------( 212      ( 24 Nov 2017 08:41 )             23.0       VITAL SIGNS:   T(C): 36.8 (11-24-17 @ 07:41), Max: 36.8 (11-24-17 @ 07:41)  HR: 72 (11-24-17 @ 07:41) (60 - 72)  BP: 100/64 (11-24-17 @ 07:41) (95/61 - 102/64)  RR: 18 (11-24-17 @ 07:41) (16 - 18)  SpO2: 98% (11-24-17 @ 07:41) (97% - 98%)  Daily     Daily   I&O's Summary    23 Nov 2017 07:01  -  24 Nov 2017 07:00  --------------------------------------------------------  IN: 1500 mL / OUT: 1900 mL / NET: -400 mL        TELE:

## 2017-11-24 NOTE — SWALLOW FEES ASSESSMENT ADULT - SLP GENERAL OBSERVATIONS
Pt awake OOB in chair, O2 via NC, KFT in place, family present at bedside. Poor coordination of respiration with phonation and deglutition.
Pt awake in bed, improved tachypnea compared with yesterday (RR ~ 32). Pt able to communicate needs and follow directions for exam. + 4L O2 via NC. Family present at bedside during exam.

## 2017-11-24 NOTE — PROGRESS NOTE ADULT - CONSTITUTIONAL
detailed exam
detailed exam
Well-developed, well nourished
detailed exam

## 2017-11-24 NOTE — SWALLOW FEES ASSESSMENT ADULT - DIAGNOSTIC IMPRESSIONS
Pt known to this service and continues to present with an oropharyngeal dysphagia superimposed upon poor coordination of respiration with deglutition. The swallow is marked by delayed oral transit time, uncontrolled loss, post swallow pharyngeal residue, silent laryngeal penetration with all consistencies administered, and deep silent laryngeal penetration with incomplete retrieval with thick puree.  Disorders: reduced lingual strength/ROM/Rate of motion, reduced tongue to palate contact, reduced BOT to posterior pharyngeal wall contact, delay in trigger of the swallow reflex, reduced hyo-laryngeal excursion, reduced laryngeal closure, reduced pharyngeal contractility, and signs of reduced supraglottic sensation. Pt known to this service and continues to present with an oropharyngeal dysphagia superimposed upon poor coordination of respiration with deglutition. The swallow is marked by delayed oral transit time, uncontrolled loss, post swallow pharyngeal residue, silent laryngeal penetration with all consistencies administered, and deep silent laryngeal penetration with incomplete retrieval with thick puree.  Disorders: reduced lingual strength/ROM/Rate of motion, reduced tongue to palate contact, reduced BOT to posterior pharyngeal wall contact, delay in trigger of the swallow reflex, reduced hyo-laryngeal excursion, reduced laryngeal closure, reduced pharyngeal contractility, and signs of reduced supraglottic sensation.     + CN  + CN  + CN

## 2017-11-24 NOTE — SWALLOW FEES ASSESSMENT ADULT - THE ABOVE FINDINGS WERE DISCUSSED WITH
Attending MD, RN: Rachael pt's spouse, son/Nursing/Patient/Physician/Family
Patient/RN: Tenisha, NP: Aditi, pt's spouse and son/Nursing/Family

## 2017-11-24 NOTE — SWALLOW FEES ASSESSMENT ADULT - ORAL PHASE
Uncontrolled bolus/spillover in vamsi-pharynx/moderate to the valleculae Uncontrolled bolus/spillover in vamsi-pharynx/moderate-maximal to the valleculae

## 2017-11-24 NOTE — SWALLOW FEES ASSESSMENT ADULT - ADDITIONAL RECOMMENDATIONS
Maintain good oral hygiene   Consider trial of restorative swallow therapy.
Maintain good oral hygiene

## 2017-11-24 NOTE — SWALLOW FEES ASSESSMENT ADULT - RECOMMENDED CONSISTENCY
NPO, with non-oral nutrition/hydration/medications.
Continue NPO, with non-oral nutrition/hydration/medications.

## 2017-11-24 NOTE — SWALLOW FEES ASSESSMENT ADULT - LARYNGEAL PENETRATION AFTER SWALLOW - SILENT
Trace/over the interarytenoid space with incomplete retrieval. Residue descends to the vocal folds during cued cough.

## 2017-11-24 NOTE — SWALLOW FEES ASSESSMENT ADULT - PHARYNGEAL PHASE COMMENTS
Trace residue along the laryngeal surface of bilateral aryepiglottic folds after the swallow suggestive of silent laryngeal penetration during the swallow with incomplete retrieval. Trace residue  noted along the laryngeal surface of the epiglottis and aryepiglottic folds suggestive of silent laryngeal penetration during the swallow with incomplete retrieval. Residue noted along the laryngeal surface of the epiglottis and deep over the laryngeal surface of the aryepiglottic folds suggestive of silent laryngeal penetration during the swallow with incomplete retrieval.

## 2017-11-24 NOTE — PROGRESS NOTE ADULT - SUBJECTIVE AND OBJECTIVE BOX
Follow-up Pulm Progress Note  Amauri Jackson MD  [phone]      Creatinine 1.0/Na 157  Remains Extubated  NPO after FEEST  Na 145  Breathing comfortably supine on nasal oxygen : stable respiratory status  Dyspnea resolved at rest  Afebrile off antibiotics  Awake, alert, and non toxic    Vital Signs Last 24 Hrs  T(C): 36.8 (24 Nov 2017 07:41), Max: 36.8 (24 Nov 2017 07:41)  T(F): 98.3 (24 Nov 2017 07:41), Max: 98.3 (24 Nov 2017 07:41)  HR: 88 (24 Nov 2017 11:45) (60 - 88)  BP: 138/66 (24 Nov 2017 11:45) (95/61 - 138/66)  BP(mean): --  RR: 18 (24 Nov 2017 07:41) (16 - 18)  SpO2: 93% (24 Nov 2017 11:45) (93% - 98%)                        7.4    5.42  )-----------( 212      ( 24 Nov 2017 08:41 )             23.0     11-24    145  |  107  |  36<H>  ----------------------------<  243<H>  4.5   |  28  |  1.09    Ca    8.3<L>      24 Nov 2017 08:41  Phos  3.1     11-24  Mg     1.9     11-24    CXR: unchanged reticular opacities: no gross new pulm edema  TTE: Mild AS, hyperdynamic LV, PA 40-50  Physical Examination:  PULM: Bibasilar crackles   CVS: Regular rate and rhythm, no murmurs, rubs, or gallops  ABD: Soft, non-tender  EXT:  Without sign edema    RADIOLOGY REVIEWED  CXR: No change in bilateral reticular opacities    CT chest: see above    TTE:

## 2017-11-24 NOTE — PROGRESS NOTE ADULT - ASSESSMENT
Acute on chronic hypoxemic respiratory failure: clinically resoliving post extubation  Progressive ILD - unclear etiology  Pseudomonoas bacteremia with pneumonia: resolving  OP dysphagia  Oral/Lip ulcerations  CHRISTIANE - resolving  Hypernatremia: now resolved    Septic Shock - clinically resolved    REC  Observe off antibiotics  Repeat FEEST planned for today  PT  Oral care  DC planning for CODIE

## 2017-11-25 LAB
ANION GAP SERPL CALC-SCNC: 8 MMOL/L — SIGNIFICANT CHANGE UP (ref 5–17)
ANISOCYTOSIS BLD QL: SLIGHT — SIGNIFICANT CHANGE UP
BASOPHILS # BLD AUTO: 0.05 K/UL — SIGNIFICANT CHANGE UP (ref 0–0.2)
BASOPHILS NFR BLD AUTO: 1 % — SIGNIFICANT CHANGE UP (ref 0–2)
BUN SERPL-MCNC: 30 MG/DL — HIGH (ref 7–23)
CALCIUM SERPL-MCNC: 8.4 MG/DL — SIGNIFICANT CHANGE UP (ref 8.4–10.5)
CHLORIDE SERPL-SCNC: 106 MMOL/L — SIGNIFICANT CHANGE UP (ref 96–108)
CO2 SERPL-SCNC: 27 MMOL/L — SIGNIFICANT CHANGE UP (ref 22–31)
CREAT SERPL-MCNC: 0.91 MG/DL — SIGNIFICANT CHANGE UP (ref 0.5–1.3)
EOSINOPHIL # BLD AUTO: 0.14 K/UL — SIGNIFICANT CHANGE UP (ref 0–0.5)
EOSINOPHIL NFR BLD AUTO: 3 % — SIGNIFICANT CHANGE UP (ref 0–6)
GLUCOSE BLDC GLUCOMTR-MCNC: 240 MG/DL — HIGH (ref 70–99)
GLUCOSE BLDC GLUCOMTR-MCNC: 266 MG/DL — HIGH (ref 70–99)
GLUCOSE BLDC GLUCOMTR-MCNC: 328 MG/DL — HIGH (ref 70–99)
GLUCOSE SERPL-MCNC: 272 MG/DL — HIGH (ref 70–99)
HCT VFR BLD CALC: 23.3 % — LOW (ref 39–50)
HGB BLD-MCNC: 7.1 G/DL — LOW (ref 13–17)
HYPOCHROMIA BLD QL: SLIGHT — SIGNIFICANT CHANGE UP
LG PLATELETS BLD QL AUTO: SLIGHT — SIGNIFICANT CHANGE UP
LYMPHOCYTES # BLD AUTO: 0.8 K/UL — LOW (ref 1–3.3)
LYMPHOCYTES # BLD AUTO: 17 % — SIGNIFICANT CHANGE UP (ref 13–44)
MACROCYTES BLD QL: SIGNIFICANT CHANGE UP
MAGNESIUM SERPL-MCNC: 1.8 MG/DL — SIGNIFICANT CHANGE UP (ref 1.6–2.6)
MANUAL SMEAR VERIFICATION: SIGNIFICANT CHANGE UP
MCHC RBC-ENTMCNC: 30.5 GM/DL — LOW (ref 32–36)
MCHC RBC-ENTMCNC: 32.6 PG — SIGNIFICANT CHANGE UP (ref 27–34)
MCV RBC AUTO: 106.9 FL — HIGH (ref 80–100)
METAMYELOCYTES # FLD: 1 — SIGNIFICANT CHANGE UP
MONOCYTES # BLD AUTO: 0.28 K/UL — SIGNIFICANT CHANGE UP (ref 0–0.9)
MONOCYTES NFR BLD AUTO: 6 % — SIGNIFICANT CHANGE UP (ref 2–14)
MYELOCYTES NFR BLD: 2 — SIGNIFICANT CHANGE UP
NEUTROPHILS # BLD AUTO: 3.22 K/UL — SIGNIFICANT CHANGE UP (ref 1.8–7.4)
NEUTROPHILS NFR BLD AUTO: 68 % — SIGNIFICANT CHANGE UP (ref 43–77)
NRBC # BLD: 2 — SIGNIFICANT CHANGE UP
PHOSPHATE SERPL-MCNC: 2.7 MG/DL — SIGNIFICANT CHANGE UP (ref 2.5–4.5)
PLAT MORPH BLD: NORMAL — SIGNIFICANT CHANGE UP
PLATELET # BLD AUTO: 175 K/UL — SIGNIFICANT CHANGE UP (ref 150–400)
POTASSIUM SERPL-MCNC: 5 MMOL/L — SIGNIFICANT CHANGE UP (ref 3.5–5.3)
POTASSIUM SERPL-SCNC: 5 MMOL/L — SIGNIFICANT CHANGE UP (ref 3.5–5.3)
PROMYELOCYTES # FLD: 2 — SIGNIFICANT CHANGE UP
RBC # BLD: 2.18 M/UL — LOW (ref 4.2–5.8)
RBC # FLD: 19 % — HIGH (ref 10.3–14.5)
RBC BLD AUTO: ABNORMAL
SODIUM SERPL-SCNC: 141 MMOL/L — SIGNIFICANT CHANGE UP (ref 135–145)
TOXIC GRANULES BLD QL SMEAR: PRESENT — SIGNIFICANT CHANGE UP
WBC # BLD: 4.73 K/UL — SIGNIFICANT CHANGE UP (ref 3.8–10.5)
WBC # FLD AUTO: 4.73 K/UL — SIGNIFICANT CHANGE UP (ref 3.8–10.5)

## 2017-11-25 RX ADMIN — HEPARIN SODIUM 5000 UNIT(S): 5000 INJECTION INTRAVENOUS; SUBCUTANEOUS at 13:17

## 2017-11-25 RX ADMIN — TRAMADOL HYDROCHLORIDE 50 MILLIGRAM(S): 50 TABLET ORAL at 00:40

## 2017-11-25 RX ADMIN — TRAMADOL HYDROCHLORIDE 50 MILLIGRAM(S): 50 TABLET ORAL at 13:04

## 2017-11-25 RX ADMIN — TRAMADOL HYDROCHLORIDE 50 MILLIGRAM(S): 50 TABLET ORAL at 14:01

## 2017-11-25 RX ADMIN — Medication 10 MILLIGRAM(S): at 06:32

## 2017-11-25 RX ADMIN — TIOTROPIUM BROMIDE 1 CAPSULE(S): 18 CAPSULE ORAL; RESPIRATORY (INHALATION) at 13:17

## 2017-11-25 RX ADMIN — Medication 1 TABLET(S): at 13:05

## 2017-11-25 RX ADMIN — Medication 1 APPLICATION(S): at 06:33

## 2017-11-25 RX ADMIN — CHLORHEXIDINE GLUCONATE 15 MILLILITER(S): 213 SOLUTION TOPICAL at 23:48

## 2017-11-25 RX ADMIN — Medication 1 SPRAY(S): at 06:32

## 2017-11-25 RX ADMIN — Medication 1 APPLICATION(S): at 21:54

## 2017-11-25 RX ADMIN — PANTOPRAZOLE SODIUM 40 MILLIGRAM(S): 20 TABLET, DELAYED RELEASE ORAL at 13:08

## 2017-11-25 RX ADMIN — HEPARIN SODIUM 5000 UNIT(S): 5000 INJECTION INTRAVENOUS; SUBCUTANEOUS at 06:31

## 2017-11-25 RX ADMIN — Medication 4: at 06:32

## 2017-11-25 RX ADMIN — Medication 81 MILLIGRAM(S): at 13:05

## 2017-11-25 RX ADMIN — Medication 1 SPRAY(S): at 13:17

## 2017-11-25 RX ADMIN — Medication 6: at 19:03

## 2017-11-25 RX ADMIN — Medication 25 MILLIGRAM(S): at 06:32

## 2017-11-25 RX ADMIN — CHLORHEXIDINE GLUCONATE 15 MILLILITER(S): 213 SOLUTION TOPICAL at 00:02

## 2017-11-25 RX ADMIN — CHLORHEXIDINE GLUCONATE 15 MILLILITER(S): 213 SOLUTION TOPICAL at 13:17

## 2017-11-25 RX ADMIN — Medication 1 SPRAY(S): at 21:54

## 2017-11-25 RX ADMIN — Medication 0.12 MILLIGRAM(S): at 06:32

## 2017-11-25 RX ADMIN — HEPARIN SODIUM 5000 UNIT(S): 5000 INJECTION INTRAVENOUS; SUBCUTANEOUS at 21:55

## 2017-11-25 RX ADMIN — Medication 1 APPLICATION(S): at 13:16

## 2017-11-25 RX ADMIN — Medication 1 SPRAY(S): at 21:53

## 2017-11-25 RX ADMIN — TRAMADOL HYDROCHLORIDE 50 MILLIGRAM(S): 50 TABLET ORAL at 00:02

## 2017-11-25 RX ADMIN — Medication 6: at 00:02

## 2017-11-25 RX ADMIN — CHLORHEXIDINE GLUCONATE 15 MILLILITER(S): 213 SOLUTION TOPICAL at 18:01

## 2017-11-25 RX ADMIN — Medication 8: at 13:05

## 2017-11-25 RX ADMIN — Medication 1 SPRAY(S): at 13:16

## 2017-11-25 RX ADMIN — TRAMADOL HYDROCHLORIDE 50 MILLIGRAM(S): 50 TABLET ORAL at 23:37

## 2017-11-25 RX ADMIN — CHLORHEXIDINE GLUCONATE 15 MILLILITER(S): 213 SOLUTION TOPICAL at 06:32

## 2017-11-26 LAB
ANION GAP SERPL CALC-SCNC: 8 MMOL/L — SIGNIFICANT CHANGE UP (ref 5–17)
ANISOCYTOSIS BLD QL: SLIGHT — SIGNIFICANT CHANGE UP
BASOPHILS # BLD AUTO: 0.09 K/UL — SIGNIFICANT CHANGE UP (ref 0–0.2)
BASOPHILS NFR BLD AUTO: 1.7 % — SIGNIFICANT CHANGE UP (ref 0–2)
BLASTS # FLD: 0 % — SIGNIFICANT CHANGE UP (ref 0–0)
BUN SERPL-MCNC: 28 MG/DL — HIGH (ref 7–23)
CALCIUM SERPL-MCNC: 8.2 MG/DL — LOW (ref 8.4–10.5)
CHLORIDE SERPL-SCNC: 103 MMOL/L — SIGNIFICANT CHANGE UP (ref 96–108)
CO2 SERPL-SCNC: 30 MMOL/L — SIGNIFICANT CHANGE UP (ref 22–31)
CREAT SERPL-MCNC: 0.84 MG/DL — SIGNIFICANT CHANGE UP (ref 0.5–1.3)
DIGOXIN SERPL-MCNC: 0.9 NG/ML — SIGNIFICANT CHANGE UP (ref 0.8–2)
EOSINOPHIL # BLD AUTO: 0.19 K/UL — SIGNIFICANT CHANGE UP (ref 0–0.5)
EOSINOPHIL NFR BLD AUTO: 3.4 — SIGNIFICANT CHANGE UP
GLUCOSE BLDC GLUCOMTR-MCNC: 169 MG/DL — HIGH (ref 70–99)
GLUCOSE BLDC GLUCOMTR-MCNC: 203 MG/DL — HIGH (ref 70–99)
GLUCOSE BLDC GLUCOMTR-MCNC: 245 MG/DL — HIGH (ref 70–99)
GLUCOSE BLDC GLUCOMTR-MCNC: 361 MG/DL — HIGH (ref 70–99)
GLUCOSE SERPL-MCNC: 262 MG/DL — HIGH (ref 70–99)
HCT VFR BLD CALC: 22.9 % — LOW (ref 39–50)
HGB BLD-MCNC: 7.2 G/DL — LOW (ref 13–17)
LYMPHOCYTES # BLD AUTO: 0.48 K/UL — LOW (ref 1–3.3)
LYMPHOCYTES # BLD AUTO: 8.6 % — LOW (ref 13–44)
LYMPHOCYTES # SPEC AUTO: 0 % — SIGNIFICANT CHANGE UP (ref 0–0)
MACROCYTES BLD QL: SLIGHT — SIGNIFICANT CHANGE UP
MANUAL SMEAR VERIFICATION: SIGNIFICANT CHANGE UP
MCHC RBC-ENTMCNC: 31.4 GM/DL — LOW (ref 32–36)
MCHC RBC-ENTMCNC: 32.9 PG — SIGNIFICANT CHANGE UP (ref 27–34)
MCV RBC AUTO: 104.6 FL — HIGH (ref 80–100)
METAMYELOCYTES # FLD: 1 % — HIGH (ref 0–0)
MONOCYTES # BLD AUTO: 0.19 K/UL — SIGNIFICANT CHANGE UP (ref 0–0.9)
MONOCYTES NFR BLD AUTO: 3.4 % — SIGNIFICANT CHANGE UP (ref 2–14)
MYELOCYTES NFR BLD: 2 % — HIGH (ref 0–0)
NEUTROPHILS # BLD AUTO: 4.3 K/UL — SIGNIFICANT CHANGE UP (ref 1.8–7.4)
NEUTROPHILS NFR BLD AUTO: 72.4 % — SIGNIFICANT CHANGE UP (ref 43–77)
NEUTS BAND # BLD: 5 % — SIGNIFICANT CHANGE UP (ref 0–8)
PLAT MORPH BLD: NORMAL — SIGNIFICANT CHANGE UP
PLATELET # BLD AUTO: 187 K/UL — SIGNIFICANT CHANGE UP (ref 150–400)
POTASSIUM SERPL-MCNC: 4.7 MMOL/L — SIGNIFICANT CHANGE UP (ref 3.5–5.3)
POTASSIUM SERPL-SCNC: 4.7 MMOL/L — SIGNIFICANT CHANGE UP (ref 3.5–5.3)
PROMYELOCYTES # FLD: 2 % — HIGH (ref 0–0)
RBC # BLD: 2.19 M/UL — LOW (ref 4.2–5.8)
RBC # FLD: 18.5 % — HIGH (ref 10.3–14.5)
RBC BLD AUTO: ABNORMAL
SODIUM SERPL-SCNC: 141 MMOL/L — SIGNIFICANT CHANGE UP (ref 135–145)
VARIANT LYMPHS # BLD: 0.9 % — SIGNIFICANT CHANGE UP (ref 0–6)
WBC # BLD: 5.54 K/UL — SIGNIFICANT CHANGE UP (ref 3.8–10.5)
WBC # FLD AUTO: 5.54 K/UL — SIGNIFICANT CHANGE UP (ref 3.8–10.5)

## 2017-11-26 RX ADMIN — Medication 0.12 MILLIGRAM(S): at 06:21

## 2017-11-26 RX ADMIN — HEPARIN SODIUM 5000 UNIT(S): 5000 INJECTION INTRAVENOUS; SUBCUTANEOUS at 21:54

## 2017-11-26 RX ADMIN — Medication 10 MILLIGRAM(S): at 06:22

## 2017-11-26 RX ADMIN — CHLORHEXIDINE GLUCONATE 15 MILLILITER(S): 213 SOLUTION TOPICAL at 17:52

## 2017-11-26 RX ADMIN — Medication 1 SPRAY(S): at 06:22

## 2017-11-26 RX ADMIN — TIOTROPIUM BROMIDE 1 CAPSULE(S): 18 CAPSULE ORAL; RESPIRATORY (INHALATION) at 12:32

## 2017-11-26 RX ADMIN — HEPARIN SODIUM 5000 UNIT(S): 5000 INJECTION INTRAVENOUS; SUBCUTANEOUS at 06:21

## 2017-11-26 RX ADMIN — Medication 1: at 12:32

## 2017-11-26 RX ADMIN — Medication 1 APPLICATION(S): at 14:55

## 2017-11-26 RX ADMIN — Medication 1 APPLICATION(S): at 06:21

## 2017-11-26 RX ADMIN — TRAMADOL HYDROCHLORIDE 50 MILLIGRAM(S): 50 TABLET ORAL at 12:50

## 2017-11-26 RX ADMIN — Medication 4: at 06:23

## 2017-11-26 RX ADMIN — Medication 81 MILLIGRAM(S): at 12:31

## 2017-11-26 RX ADMIN — CHLORHEXIDINE GLUCONATE 15 MILLILITER(S): 213 SOLUTION TOPICAL at 12:52

## 2017-11-26 RX ADMIN — Medication 1 TABLET(S): at 12:32

## 2017-11-26 RX ADMIN — PANTOPRAZOLE SODIUM 40 MILLIGRAM(S): 20 TABLET, DELAYED RELEASE ORAL at 12:31

## 2017-11-26 RX ADMIN — Medication 1 SPRAY(S): at 21:53

## 2017-11-26 RX ADMIN — TRAMADOL HYDROCHLORIDE 50 MILLIGRAM(S): 50 TABLET ORAL at 13:56

## 2017-11-26 RX ADMIN — Medication 1 SPRAY(S): at 16:56

## 2017-11-26 RX ADMIN — CHLORHEXIDINE GLUCONATE 15 MILLILITER(S): 213 SOLUTION TOPICAL at 06:22

## 2017-11-26 RX ADMIN — Medication 2: at 00:31

## 2017-11-26 RX ADMIN — Medication 1 APPLICATION(S): at 21:53

## 2017-11-26 RX ADMIN — HEPARIN SODIUM 5000 UNIT(S): 5000 INJECTION INTRAVENOUS; SUBCUTANEOUS at 17:52

## 2017-11-26 RX ADMIN — TRAMADOL HYDROCHLORIDE 50 MILLIGRAM(S): 50 TABLET ORAL at 00:07

## 2017-11-26 RX ADMIN — Medication 1 SPRAY(S): at 21:52

## 2017-11-26 RX ADMIN — Medication 25 MILLIGRAM(S): at 06:22

## 2017-11-26 RX ADMIN — Medication 1 SPRAY(S): at 06:23

## 2017-11-26 RX ADMIN — Medication 4: at 17:50

## 2017-11-26 NOTE — PROGRESS NOTE ADULT - SUBJECTIVE AND OBJECTIVE BOX
Patient is a 77y old  Male who presents with a chief complaint of SOB (04 Nov 2017 14:33)      SUBJECTIVE / OVERNIGHT EVENTS:    patient seen and examined at bedside  alert and awake  frustrated at NPO status s/p repeat FEEST  no acute events overnight     Vital Signs Last 24 Hrs  T(C): 37.4 (26 Nov 2017 15:52), Max: 37.4 (26 Nov 2017 15:52)  T(F): 99.4 (26 Nov 2017 15:52), Max: 99.4 (26 Nov 2017 15:52)  HR: 88 (26 Nov 2017 15:52) (84 - 92)  BP: 109/67 (26 Nov 2017 15:52) (99/64 - 121/74)  BP(mean): --  RR: 18 (26 Nov 2017 15:52) (16 - 18)  SpO2: 98% (26 Nov 2017 15:52) (95% - 98%)  I&O's Summary    25 Nov 2017 07:01  -  26 Nov 2017 07:00  --------------------------------------------------------  IN: 1460 mL / OUT: 1650 mL / NET: -190 mL    26 Nov 2017 07:01  -  26 Nov 2017 20:14  --------------------------------------------------------  IN: 0 mL / OUT: 800 mL / NET: -800 mL    Physical Exam:   · Constitutional	detailed exam	  · Constitutional Details	Pale weak alert	  · Eyes	detailed exam	  · Eyes Details	PERRL; EOMI	  · ENMT	detailed exam	  · ENMT Details	nose	  · Nose	no discharge; no deviation, dry eschar, no bleeding or puss	  · Neck	detailed exam	  · Neck Details	supple; no JVD	  · Respiratory	detailed exam	  · Respiratory Details	airway patent; breath sounds equal, no wheeze	  · Cardiovascular	detailed exam	  · Cardiovascular Details	no rub	  · Cardiovascular Details	positive S1; positive S2	  · Neurological	detailed exam	  · Neurological Details	responds to pain; responds to verbal commands	              LABS:                        7.2    5.54  )-----------( 187      ( 26 Nov 2017 09:29 )             22.9     11-26    141  |  103  |  28<H>  ----------------------------<  262<H>  4.7   |  30  |  0.84    Ca    8.2<L>      26 Nov 2017 09:44  Phos  2.7     11-25  Mg     1.8     11-25        CAPILLARY BLOOD GLUCOSE      POCT Blood Glucose.: 203 mg/dL (26 Nov 2017 17:02)  POCT Blood Glucose.: 361 mg/dL (26 Nov 2017 11:40)  POCT Blood Glucose.: 245 mg/dL (26 Nov 2017 06:20)  POCT Blood Glucose.: 169 mg/dL (26 Nov 2017 00:23)            RADIOLOGY & ADDITIONAL TESTS:    Imaging Personally Reviewed:  [x] YES  [ ] NO    Consultant(s) Notes Reviewed:  [x] YES  [ ] NO      MEDICATIONS  (STANDING):  aspirin  chewable 81 milliGRAM(s) Oral daily  BACItracin   Ointment 1 Application(s) Topical three times a day  chlorhexidine 0.12% Liquid 15 milliLiter(s) Swish and Spit four times a day  digoxin     Tablet 0.125 milliGRAM(s) Oral daily  heparin  Injectable 5000 Unit(s) SubCutaneous every 8 hours  influenza   Vaccine 0.5 milliLiter(s) IntraMuscular once  insulin lispro (HumaLOG) corrective regimen sliding scale   SubCutaneous every 6 hours  metoprolol     tartrate 25 milliGRAM(s) Oral two times a day  multivitamin 1 Tablet(s) Oral daily  pantoprazole  Injectable 40 milliGRAM(s) IV Push daily  predniSONE   Tablet 10 milliGRAM(s) Oral daily  saliva substitute (BIOTENE MOISTURIZING MOUTH SPRAY) 1 [place](s) Topical three times a day  sodium chloride 0.65% Nasal 1 Spray(s) Both Nostrils three times a day  tiotropium 18 MICROgram(s) Capsule 1 Capsule(s) Inhalation daily    MEDICATIONS  (PRN):  bisacodyl Suppository 10 milliGRAM(s) Rectal daily PRN Constipation  polyethylene glycol 3350 17 Gram(s) Oral two times a day PRN Constipation  senna Syrup 10 milliLiter(s) Oral at bedtime PRN Constipation  traMADol 50 milliGRAM(s) Oral every 6 hours PRN Moderate Pain (4 - 6)      Care Discussed with Consultants/Other Providers [x] YES  [ ] NO    HEALTH ISSUES - PROBLEM Dx:  Lip lesion: Lip lesion  Oral ulcer: Oral ulcer  CHRISTIANE (acute kidney injury): CHRISTIANE (acute kidney injury)  Bacteremia: Bacteremia  Neutropenia: Neutropenia  Need for prophylactic measure: Need for prophylactic measure  Oral thrush: Oral thrush  Nasal discomfort: Nasal discomfort  Gastroesophageal reflux disease without esophagitis: Gastroesophageal reflux disease without esophagitis  Hyperlipidemia, unspecified hyperlipidemia type: Hyperlipidemia, unspecified hyperlipidemia type  Type 2 diabetes mellitus without complication, without long-term current use of insulin: Type 2 diabetes mellitus without complication, without long-term current use of insulin  Coronary artery disease involving native coronary artery of native heart, angina presence unspecified: Coronary artery disease involving native coronary artery of native heart, angina presence unspecified  Hyperkalemia: Hyperkalemia  Lactic acidosis: Lactic acidosis  Transaminitis: Transaminitis  Idiopathic pulmonary fibrosis: Idiopathic pulmonary fibrosis  Shortness of breath: Shortness of breath

## 2017-11-26 NOTE — PROGRESS NOTE ADULT - ASSESSMENT
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 CT chest shows bilateral IPF  treated for Pulmonary fibrosis / Intersitial pulmonary dz exacerbation.  pt developed ARF intubated transferred to MICU w/ acute hypoxic respiratory failure w/ septic shock 2/2 to pseudomonal bacteremia resolved off abx.   pt extubated downgraded to floor.  Family is refusing imuran will hold for now and can f/u with outpt pulm to discuss restarting     -Back pain - c/w  tramadol PRN.  neuro consult Dr Lowe if pain continues.  pt moving all extremities spontaneously.   PT consult prior to d/c    -Idiopathic pulmonary fibrosis / Acute hypoxic expiratory failure - improving, c/w steroids, hold imuran as per pt family wishes.  respiratory status remains precarious     -Nutrition - again failed FEEST and recommended for NPO, will discuss with family tomorrow     -CHRISTIANE / Hypernatremia - prerenal discussed with nephro attending free water increased     -Pseudomonas bacteremia off abx resolved      -Lip lesion - dry eschar from prolonged intubation, ENT appreciated, c/w bactroban    -Type 2 diabetes mellitus c/w insulin tx     -DVT ppx

## 2017-11-27 LAB
ANION GAP SERPL CALC-SCNC: 10 MMOL/L — SIGNIFICANT CHANGE UP (ref 5–17)
BUN SERPL-MCNC: 27 MG/DL — HIGH (ref 7–23)
CALCIUM SERPL-MCNC: 8.3 MG/DL — LOW (ref 8.4–10.5)
CHLORIDE SERPL-SCNC: 96 MMOL/L — SIGNIFICANT CHANGE UP (ref 96–108)
CO2 SERPL-SCNC: 28 MMOL/L — SIGNIFICANT CHANGE UP (ref 22–31)
CREAT SERPL-MCNC: 0.82 MG/DL — SIGNIFICANT CHANGE UP (ref 0.5–1.3)
GLUCOSE BLDC GLUCOMTR-MCNC: 181 MG/DL — HIGH (ref 70–99)
GLUCOSE BLDC GLUCOMTR-MCNC: 213 MG/DL — HIGH (ref 70–99)
GLUCOSE BLDC GLUCOMTR-MCNC: 235 MG/DL — HIGH (ref 70–99)
GLUCOSE BLDC GLUCOMTR-MCNC: 261 MG/DL — HIGH (ref 70–99)
GLUCOSE BLDC GLUCOMTR-MCNC: 366 MG/DL — HIGH (ref 70–99)
GLUCOSE SERPL-MCNC: 262 MG/DL — HIGH (ref 70–99)
HCT VFR BLD CALC: 23.8 % — LOW (ref 39–50)
HGB BLD-MCNC: 7.7 G/DL — LOW (ref 13–17)
MCHC RBC-ENTMCNC: 32.4 GM/DL — SIGNIFICANT CHANGE UP (ref 32–36)
MCHC RBC-ENTMCNC: 33.6 PG — SIGNIFICANT CHANGE UP (ref 27–34)
MCV RBC AUTO: 103.9 FL — HIGH (ref 80–100)
NRBC # BLD: 2 /100 WBCS — HIGH (ref 0–0)
PLATELET # BLD AUTO: 187 K/UL — SIGNIFICANT CHANGE UP (ref 150–400)
POTASSIUM SERPL-MCNC: 4.7 MMOL/L — SIGNIFICANT CHANGE UP (ref 3.5–5.3)
POTASSIUM SERPL-SCNC: 4.7 MMOL/L — SIGNIFICANT CHANGE UP (ref 3.5–5.3)
RBC # BLD: 2.29 M/UL — LOW (ref 4.2–5.8)
RBC # FLD: 18.9 % — HIGH (ref 10.3–14.5)
SODIUM SERPL-SCNC: 134 MMOL/L — LOW (ref 135–145)
WBC # BLD: 7.63 K/UL — SIGNIFICANT CHANGE UP (ref 3.8–10.5)
WBC # FLD AUTO: 7.63 K/UL — SIGNIFICANT CHANGE UP (ref 3.8–10.5)

## 2017-11-27 PROCEDURE — 99232 SBSQ HOSP IP/OBS MODERATE 35: CPT

## 2017-11-27 PROCEDURE — 93971 EXTREMITY STUDY: CPT | Mod: 26

## 2017-11-27 RX ORDER — INSULIN GLARGINE 100 [IU]/ML
10 INJECTION, SOLUTION SUBCUTANEOUS AT BEDTIME
Qty: 0 | Refills: 0 | Status: DISCONTINUED | OUTPATIENT
Start: 2017-11-27 | End: 2017-11-28

## 2017-11-27 RX ORDER — TRAMADOL HYDROCHLORIDE 50 MG/1
25 TABLET ORAL ONCE
Qty: 0 | Refills: 0 | Status: DISCONTINUED | OUTPATIENT
Start: 2017-11-27 | End: 2017-11-27

## 2017-11-27 RX ORDER — TRAMADOL HYDROCHLORIDE 50 MG/1
50 TABLET ORAL EVERY 6 HOURS
Qty: 0 | Refills: 0 | Status: DISCONTINUED | OUTPATIENT
Start: 2017-11-27 | End: 2017-11-27

## 2017-11-27 RX ORDER — TRAMADOL HYDROCHLORIDE 50 MG/1
50 TABLET ORAL EVERY 6 HOURS
Qty: 0 | Refills: 0 | Status: DISCONTINUED | OUTPATIENT
Start: 2017-11-27 | End: 2017-11-28

## 2017-11-27 RX ORDER — TRAMADOL HYDROCHLORIDE 50 MG/1
50 TABLET ORAL ONCE
Qty: 0 | Refills: 0 | Status: DISCONTINUED | OUTPATIENT
Start: 2017-11-27 | End: 2017-11-27

## 2017-11-27 RX ORDER — LIDOCAINE 4 G/100G
1 CREAM TOPICAL DAILY
Qty: 0 | Refills: 0 | Status: DISCONTINUED | OUTPATIENT
Start: 2017-11-27 | End: 2017-12-05

## 2017-11-27 RX ADMIN — Medication 1 SPRAY(S): at 12:53

## 2017-11-27 RX ADMIN — Medication 10: at 12:50

## 2017-11-27 RX ADMIN — Medication 1 TABLET(S): at 12:45

## 2017-11-27 RX ADMIN — PANTOPRAZOLE SODIUM 40 MILLIGRAM(S): 20 TABLET, DELAYED RELEASE ORAL at 12:45

## 2017-11-27 RX ADMIN — Medication 0.12 MILLIGRAM(S): at 05:23

## 2017-11-27 RX ADMIN — Medication 2: at 00:24

## 2017-11-27 RX ADMIN — HEPARIN SODIUM 5000 UNIT(S): 5000 INJECTION INTRAVENOUS; SUBCUTANEOUS at 12:52

## 2017-11-27 RX ADMIN — Medication 1 APPLICATION(S): at 12:53

## 2017-11-27 RX ADMIN — TRAMADOL HYDROCHLORIDE 50 MILLIGRAM(S): 50 TABLET ORAL at 09:22

## 2017-11-27 RX ADMIN — INSULIN GLARGINE 10 UNIT(S): 100 INJECTION, SOLUTION SUBCUTANEOUS at 21:42

## 2017-11-27 RX ADMIN — Medication 4: at 17:46

## 2017-11-27 RX ADMIN — Medication 10 MILLIGRAM(S): at 05:23

## 2017-11-27 RX ADMIN — LIDOCAINE 1 PATCH: 4 CREAM TOPICAL at 12:38

## 2017-11-27 RX ADMIN — HEPARIN SODIUM 5000 UNIT(S): 5000 INJECTION INTRAVENOUS; SUBCUTANEOUS at 21:44

## 2017-11-27 RX ADMIN — Medication 1 APPLICATION(S): at 05:24

## 2017-11-27 RX ADMIN — CHLORHEXIDINE GLUCONATE 15 MILLILITER(S): 213 SOLUTION TOPICAL at 17:47

## 2017-11-27 RX ADMIN — Medication 6: at 06:28

## 2017-11-27 RX ADMIN — TRAMADOL HYDROCHLORIDE 50 MILLIGRAM(S): 50 TABLET ORAL at 00:44

## 2017-11-27 RX ADMIN — Medication 1 SPRAY(S): at 21:43

## 2017-11-27 RX ADMIN — Medication 25 MILLIGRAM(S): at 17:47

## 2017-11-27 RX ADMIN — Medication 1 SPRAY(S): at 12:54

## 2017-11-27 RX ADMIN — TRAMADOL HYDROCHLORIDE 50 MILLIGRAM(S): 50 TABLET ORAL at 23:29

## 2017-11-27 RX ADMIN — TRAMADOL HYDROCHLORIDE 50 MILLIGRAM(S): 50 TABLET ORAL at 10:22

## 2017-11-27 RX ADMIN — CHLORHEXIDINE GLUCONATE 15 MILLILITER(S): 213 SOLUTION TOPICAL at 05:23

## 2017-11-27 RX ADMIN — TRAMADOL HYDROCHLORIDE 50 MILLIGRAM(S): 50 TABLET ORAL at 01:44

## 2017-11-27 RX ADMIN — Medication 81 MILLIGRAM(S): at 12:45

## 2017-11-27 RX ADMIN — TIOTROPIUM BROMIDE 1 CAPSULE(S): 18 CAPSULE ORAL; RESPIRATORY (INHALATION) at 12:46

## 2017-11-27 RX ADMIN — Medication 25 MILLIGRAM(S): at 05:23

## 2017-11-27 RX ADMIN — TRAMADOL HYDROCHLORIDE 50 MILLIGRAM(S): 50 TABLET ORAL at 12:35

## 2017-11-27 RX ADMIN — CHLORHEXIDINE GLUCONATE 15 MILLILITER(S): 213 SOLUTION TOPICAL at 12:45

## 2017-11-27 RX ADMIN — HEPARIN SODIUM 5000 UNIT(S): 5000 INJECTION INTRAVENOUS; SUBCUTANEOUS at 05:22

## 2017-11-27 RX ADMIN — Medication 1 APPLICATION(S): at 21:44

## 2017-11-27 RX ADMIN — Medication 1 SPRAY(S): at 05:23

## 2017-11-27 RX ADMIN — TRAMADOL HYDROCHLORIDE 50 MILLIGRAM(S): 50 TABLET ORAL at 13:35

## 2017-11-27 RX ADMIN — CHLORHEXIDINE GLUCONATE 15 MILLILITER(S): 213 SOLUTION TOPICAL at 00:23

## 2017-11-27 NOTE — CHART NOTE - NSCHARTNOTEFT_GEN_A_CORE
Pt with PMH of "asbestos related lung disease" admitted with shortness of breath, CT scan shows IPF S/P treatment, pt developed ARF and acute hypoxic respiratory failure with septic shock due to pseudomonal bacteremia pt intubated and transferred to MICU, pt now extubated and on floor S/P FEES 11/17 and 11/24 recommended NPO with non-oral nutrition/hydration, pt and family wish to initiate pleasure feeds and accept aspiration risk at this time, plan for dysphagia 1 diet with nectar thickened liquids.     Source: Patient [ x]    Family [ x]     other [x ] RN, NP, wife María at bedside    Diet : NPO with Glucerna 1.2 Flako @ 60 mL/h x 24 h      Patient reports [ ] nausea  [ ] vomiting [ ] diarrhea [ ] constipation  [ ]chewing problems [ ] swallowing issues  [ ] other: No N+V, last BM 11/25     Enteral /Parenteral Nutrition: Pt tolerating tube feeding well with no GI distress at goal of 60 mL/h x 24 h      Current Weight:  lbs, 154.9 lbs (11/18), 165.7 lbs (11/22), wt fluctuations noted ? related to scale error vs fluids  % Weight Change    Pertinent Medications: MEDICATIONS  (STANDING):  aspirin  chewable 81 milliGRAM(s) Oral daily  BACItracin   Ointment 1 Application(s) Topical three times a day  chlorhexidine 0.12% Liquid 15 milliLiter(s) Swish and Spit four times a day  digoxin     Tablet 0.125 milliGRAM(s) Oral daily  heparin  Injectable 5000 Unit(s) SubCutaneous every 8 hours  influenza   Vaccine 0.5 milliLiter(s) IntraMuscular once  insulin lispro (HumaLOG) corrective regimen sliding scale   SubCutaneous every 6 hours  lidocaine   Patch 1 Patch Transdermal daily  metoprolol     tartrate 25 milliGRAM(s) Oral two times a day  multivitamin 1 Tablet(s) Oral daily  pantoprazole  Injectable 40 milliGRAM(s) IV Push daily  predniSONE   Tablet 10 milliGRAM(s) Oral daily  saliva substitute (BIOTENE MOISTURIZING MOUTH SPRAY) 1 [place](s) Topical three times a day  sodium chloride 0.65% Nasal 1 Spray(s) Both Nostrils three times a day  tiotropium 18 MICROgram(s) Capsule 1 Capsule(s) Inhalation daily  traMADol 25 milliGRAM(s) Oral once    MEDICATIONS  (PRN):  bisacodyl Suppository 10 milliGRAM(s) Rectal daily PRN Constipation  polyethylene glycol 3350 17 Gram(s) Oral two times a day PRN Constipation  senna Syrup 10 milliLiter(s) Oral at bedtime PRN Constipation  traMADol 50 milliGRAM(s) Oral every 6 hours PRN Moderate Pain (4 - 6)    Pertinent Labs:  11-27 Na134 mmol/L<L> Glu 262 mg/dL<H> K+ 4.7 mmol/L Cr  0.82 mg/dL BUN 27 mg/dL<H> Phos n/a   Alb n/a   PAB n/a       POCT BG 11/26: 169-361, 11/27: 261-366      Skin: No edema,     Estimated Needs:   [ x] no change since previous assessment  [ ] recalculated:       Previous Nutrition Diagnosis:      [ x] Malnutrition (moderate)         Nutrition Diagnosis is [x ] ongoing addressed with tube feeding         New Nutrition Diagnosis: [x ] not applicable       Interventions:     Recommend    [ ] Change Diet To:    [ ] Nutrition Supplement    [ ] Nutrition Support    [ ] Other:     1. Diet deferred to medical team, currently dysphagia 1 diet with nectar thickened liquids, may consider adding consistent carbohydrate diet restriction given elevated blood glucose-NP made aware  2. Consider changing tube feeding to intermittent feeds of Glucerna 1.2 flako @ 60 mL/h x 18 h 3pm-9am to provide 1296 Kcal, 65g protein (17 Kcal/Kg, 0.9g protein/Kg UBW 75 Kg)  3. 3 day calorie count ordered, sheets posted by bedside, RN informed, RD to follow up on completion.   4. Continue to encourage po intake and obtain/honor food preferences as able        Monitoring and Evaluation:     [x ] PO intake [ x] Tolerance to diet prescription [x] weights [x ] follow up per protocol    [ ] other: Pt with PMH of "asbestos related lung disease" admitted with shortness of breath, CT scan shows IPF S/P treatment, pt developed ARF and acute hypoxic respiratory failure with septic shock due to pseudomonal bacteremia pt intubated and transferred to MICU, pt now extubated and on floor S/P FEES 11/17 and 11/24 recommended NPO with non-oral nutrition/hydration, pt and family wish to initiate pleasure feeds and accept aspiration risk at this time, plan for dysphagia 1 diet with nectar thickened liquids.     Source: Patient [ x]    Family [ x]     other [x ] RN, NP, wife María at bedside    Diet : NPO with Glucerna 1.2 Flako @ 60 mL/h x 24 h      Patient reports [ ] nausea  [ ] vomiting [ ] diarrhea [ ] constipation  [ ]chewing problems [ ] swallowing issues  [ ] other: No N+V, last BM 11/25     Enteral /Parenteral Nutrition: Pt tolerating tube feeding well with no GI distress at goal of 60 mL/h x 24 h      Current Weight:  lbs, 154.9 lbs (11/18), 165.7 lbs (11/22), wt fluctuations noted ? related to scale error vs fluids  % Weight Change    Pertinent Medications: MEDICATIONS  (STANDING):  aspirin  chewable 81 milliGRAM(s) Oral daily  BACItracin   Ointment 1 Application(s) Topical three times a day  chlorhexidine 0.12% Liquid 15 milliLiter(s) Swish and Spit four times a day  digoxin     Tablet 0.125 milliGRAM(s) Oral daily  heparin  Injectable 5000 Unit(s) SubCutaneous every 8 hours  influenza   Vaccine 0.5 milliLiter(s) IntraMuscular once  insulin lispro (HumaLOG) corrective regimen sliding scale   SubCutaneous every 6 hours  lidocaine   Patch 1 Patch Transdermal daily  metoprolol     tartrate 25 milliGRAM(s) Oral two times a day  multivitamin 1 Tablet(s) Oral daily  pantoprazole  Injectable 40 milliGRAM(s) IV Push daily  predniSONE   Tablet 10 milliGRAM(s) Oral daily  saliva substitute (BIOTENE MOISTURIZING MOUTH SPRAY) 1 [place](s) Topical three times a day  sodium chloride 0.65% Nasal 1 Spray(s) Both Nostrils three times a day  tiotropium 18 MICROgram(s) Capsule 1 Capsule(s) Inhalation daily  traMADol 25 milliGRAM(s) Oral once    MEDICATIONS  (PRN):  bisacodyl Suppository 10 milliGRAM(s) Rectal daily PRN Constipation  polyethylene glycol 3350 17 Gram(s) Oral two times a day PRN Constipation  senna Syrup 10 milliLiter(s) Oral at bedtime PRN Constipation  traMADol 50 milliGRAM(s) Oral every 6 hours PRN Moderate Pain (4 - 6)    Pertinent Labs:  11-27 Na134 mmol/L<L> Glu 262 mg/dL<H> K+ 4.7 mmol/L Cr  0.82 mg/dL BUN 27 mg/dL<H> Phos n/a   Alb n/a   PAB n/a       POCT BG 11/26: 169-361, 11/27: 261-366      Skin: No edema,     Estimated Needs:   [ x] no change since previous assessment  [ ] recalculated:       Previous Nutrition Diagnosis:      [ x] Malnutrition (moderate)         Nutrition Diagnosis is [x ] ongoing addressed with tube feeding         New Nutrition Diagnosis: [x ] not applicable       Interventions:     Recommend    [ ] Change Diet To:    [ ] Nutrition Supplement    [ ] Nutrition Support    [ ] Other:     1. Diet deferred to medical team, currently dysphagia 1 diet with nectar thickened liquids, may consider adding consistent carbohydrate diet restriction given elevated blood glucose-NP made aware  2. Consider changing tube feeding to intermittent feeds of Glucerna 1.2 flaok @ 60 mL/h x 18 h 3pm-9am to provide 1296 Kcal, 65g protein (17 Kcal/Kg, 0.9g protein/Kg UBW 75 Kg)  3. Consider adding glucerna 2 x daily orally to further maximize oral intake  4. 3 day calorie count ordered, sheets posted by bedside, RN informed, RD to follow up on completion.   5. Continue to encourage po intake and obtain/honor food preferences as able        Monitoring and Evaluation:     [x ] PO intake [ x] Tolerance to diet prescription [x] weights [x ] follow up per protocol    [ ] other: Pt with PMH of "asbestos related lung disease" admitted with shortness of breath, CT scan shows IPF S/P treatment, pt developed ARF and acute hypoxic respiratory failure with septic shock due to pseudomonal bacteremia pt intubated and transferred to MICU, pt now extubated and on floor S/P FEES 11/17 and 11/24 recommended NPO with non-oral nutrition/hydration, pt and family wish to initiate pleasure feeds and accept aspiration risk at this time, plan for dysphagia 1 diet with nectar thickened liquids.     Source: Patient [ x]    Family [ x]     other [x ] RN, NP, wife María at bedside    Diet : NPO with Glucerna 1.2 Flako @ 60 mL/h x 24 h      Patient reports [ ] nausea  [ ] vomiting [ ] diarrhea [ ] constipation  [ ]chewing problems [ ] swallowing issues  [ ] other: No N+V, last BM 11/25     Enteral /Parenteral Nutrition: Pt tolerating tube feeding well with no GI distress at goal of 60 mL/h x 24 h      Current Weight:  lbs, 154.9 lbs (11/18), 165.7 lbs (11/22), wt fluctuations noted ? related to scale error vs fluids  % Weight Change    Pertinent Medications: MEDICATIONS  (STANDING):  aspirin  chewable 81 milliGRAM(s) Oral daily  BACItracin   Ointment 1 Application(s) Topical three times a day  chlorhexidine 0.12% Liquid 15 milliLiter(s) Swish and Spit four times a day  digoxin     Tablet 0.125 milliGRAM(s) Oral daily  heparin  Injectable 5000 Unit(s) SubCutaneous every 8 hours  influenza   Vaccine 0.5 milliLiter(s) IntraMuscular once  insulin lispro (HumaLOG) corrective regimen sliding scale   SubCutaneous every 6 hours  lidocaine   Patch 1 Patch Transdermal daily  metoprolol     tartrate 25 milliGRAM(s) Oral two times a day  multivitamin 1 Tablet(s) Oral daily  pantoprazole  Injectable 40 milliGRAM(s) IV Push daily  predniSONE   Tablet 10 milliGRAM(s) Oral daily  saliva substitute (BIOTENE MOISTURIZING MOUTH SPRAY) 1 [place](s) Topical three times a day  sodium chloride 0.65% Nasal 1 Spray(s) Both Nostrils three times a day  tiotropium 18 MICROgram(s) Capsule 1 Capsule(s) Inhalation daily  traMADol 25 milliGRAM(s) Oral once    MEDICATIONS  (PRN):  bisacodyl Suppository 10 milliGRAM(s) Rectal daily PRN Constipation  polyethylene glycol 3350 17 Gram(s) Oral two times a day PRN Constipation  senna Syrup 10 milliLiter(s) Oral at bedtime PRN Constipation  traMADol 50 milliGRAM(s) Oral every 6 hours PRN Moderate Pain (4 - 6)    Pertinent Labs:  11-27 Na134 mmol/L<L> Glu 262 mg/dL<H> K+ 4.7 mmol/L Cr  0.82 mg/dL BUN 27 mg/dL<H> Phos n/a   Alb n/a   PAB n/a       POCT BG 11/26: 169-361, 11/27: 261-366      Skin: No edema, per RN pt with stage 2 pressure injury to sacrum    Estimated Needs:   [ x] no change since previous assessment  [ ] recalculated:       Previous Nutrition Diagnosis:      [ x] Malnutrition (moderate)         Nutrition Diagnosis is [x ] ongoing addressed with tube feeding         New Nutrition Diagnosis: [x ] not applicable       Interventions:     Recommend    [ ] Change Diet To:    [ ] Nutrition Supplement    [ ] Nutrition Support    [ ] Other:     1. Diet deferred to medical team, currently dysphagia 1 diet with nectar thickened liquids, may consider adding consistent carbohydrate diet restriction given elevated blood glucose-NP made aware  2. Consider changing tube feeding to intermittent feeds of Glucerna 1.2 flako @ 60 mL/h x 18 h 3pm-9am to provide 1296 Kcal, 65g protein (17 Kcal/Kg, 0.9g protein/Kg UBW 75 Kg)  3. Consider adding glucerna 2 x daily orally to further maximize oral intake  4. 3 day calorie count ordered, sheets posted by bedside, RN informed, RD to follow up on completion.   5. Continue to encourage po intake and obtain/honor food preferences as able        Monitoring and Evaluation:     [x ] PO intake [ x] Tolerance to diet prescription [x] weights [x ] follow up per protocol    [ ] other:

## 2017-11-27 NOTE — PROGRESS NOTE ADULT - ATTENDING COMMENTS
Patient with significantly improved oral care. Lower lip dry eschar and discussed applying bacitracin to it regularly to keep moist however pt is refusing that. Explained will help reduce cracking/bleeding if kept moist and he is agreeable to resume bacitracin. Will need to f/u with ENT in 2-3 weeks to ensure completely healed. Patient should follow up in ENT office as an outpatient. May see Dr. Graf or Oni. Call [phone].

## 2017-11-27 NOTE — PROGRESS NOTE ADULT - SUBJECTIVE AND OBJECTIVE BOX
Patient is a 77y old  Male who presents with a chief complaint of SOB (04 Nov 2017 14:33)      SUBJECTIVE / OVERNIGHT EVENTS:    patient seen and examined at bedside  awake and alert, no specific new complaints  no acute events overnight    Vital Signs Last 24 Hrs  T(C): 36.7 (27 Nov 2017 09:00), Max: 37.4 (26 Nov 2017 15:52)  T(F): 98.1 (27 Nov 2017 09:00), Max: 99.4 (26 Nov 2017 15:52)  HR: 71 (27 Nov 2017 09:00) (71 - 102)  BP: 109/69 (27 Nov 2017 09:00) (99/64 - 119/73)  BP(mean): --  RR: 18 (27 Nov 2017 09:00) (18 - 18)  SpO2: 97% (27 Nov 2017 09:00) (94% - 98%)  I&O's Summary    26 Nov 2017 07:01  -  27 Nov 2017 07:00  --------------------------------------------------------  IN: 1520 mL / OUT: 1850 mL / NET: -330 mL    Physical Exam:   · Constitutional	detailed exam	  · Constitutional Details	Pale weak lethargic	  · Eyes	detailed exam	  · Eyes Details	PERRL; EOMI	  · ENMT	detailed exam	  · ENMT Details	nose	  · Nose	no discharge; no deviation, dry eschar, no bleeding or puss	  · Neck	detailed exam	  · Neck Details	supple; no JVD	  · Respiratory	detailed exam	  · Respiratory Details	airway patent; breath sounds equal, no wheeze	  · Cardiovascular	detailed exam	  · Cardiovascular Details	no rub	  · Cardiovascular Details	positive S1; positive S2	  · Neurological	detailed exam	  · Neurological Details	responds to pain; responds to verbal commands	        LABS:                        7.7    7.63  )-----------( 187      ( 27 Nov 2017 08:45 )             23.8     11-27    134<L>  |  96  |  27<H>  ----------------------------<  262<H>  4.7   |  28  |  0.82    Ca    8.3<L>      27 Nov 2017 08:45        CAPILLARY BLOOD GLUCOSE      POCT Blood Glucose.: 261 mg/dL (27 Nov 2017 05:57)  POCT Blood Glucose.: 181 mg/dL (26 Nov 2017 23:59)  POCT Blood Glucose.: 203 mg/dL (26 Nov 2017 17:02)  POCT Blood Glucose.: 361 mg/dL (26 Nov 2017 11:40)            RADIOLOGY & ADDITIONAL TESTS:    Imaging Personally Reviewed:  [x] YES  [ ] NO    Consultant(s) Notes Reviewed:  [x] YES  [ ] NO      MEDICATIONS  (STANDING):  aspirin  chewable 81 milliGRAM(s) Oral daily  BACItracin   Ointment 1 Application(s) Topical three times a day  chlorhexidine 0.12% Liquid 15 milliLiter(s) Swish and Spit four times a day  digoxin     Tablet 0.125 milliGRAM(s) Oral daily  heparin  Injectable 5000 Unit(s) SubCutaneous every 8 hours  influenza   Vaccine 0.5 milliLiter(s) IntraMuscular once  insulin lispro (HumaLOG) corrective regimen sliding scale   SubCutaneous every 6 hours  metoprolol     tartrate 25 milliGRAM(s) Oral two times a day  multivitamin 1 Tablet(s) Oral daily  pantoprazole  Injectable 40 milliGRAM(s) IV Push daily  predniSONE   Tablet 10 milliGRAM(s) Oral daily  saliva substitute (BIOTENE MOISTURIZING MOUTH SPRAY) 1 Aurora(s) Topical three times a day  sodium chloride 0.65% Nasal 1 Spray(s) Both Nostrils three times a day  tiotropium 18 MICROgram(s) Capsule 1 Capsule(s) Inhalation daily    MEDICATIONS  (PRN):  bisacodyl Suppository 10 milliGRAM(s) Rectal daily PRN Constipation  polyethylene glycol 3350 17 Gram(s) Oral two times a day PRN Constipation  senna Syrup 10 milliLiter(s) Oral at bedtime PRN Constipation  traMADol 50 milliGRAM(s) Oral every 6 hours PRN Moderate Pain (4 - 6)      Care Discussed with Consultants/Other Providers [x] YES  [ ] NO    HEALTH ISSUES - PROBLEM Dx:  Lip lesion: Lip lesion  Oral ulcer: Oral ulcer  CHRISTIANE (acute kidney injury): CHRISTIANE (acute kidney injury)  Bacteremia: Bacteremia  Neutropenia: Neutropenia  Need for prophylactic measure: Need for prophylactic measure  Oral thrush: Oral thrush  Nasal discomfort: Nasal discomfort  Gastroesophageal reflux disease without esophagitis: Gastroesophageal reflux disease without esophagitis  Hyperlipidemia, unspecified hyperlipidemia type: Hyperlipidemia, unspecified hyperlipidemia type  Type 2 diabetes mellitus without complication, without long-term current use of insulin: Type 2 diabetes mellitus without complication, without long-term current use of insulin  Coronary artery disease involving native coronary artery of native heart, angina presence unspecified: Coronary artery disease involving native coronary artery of native heart, angina presence unspecified  Hyperkalemia: Hyperkalemia  Lactic acidosis: Lactic acidosis  Transaminitis: Transaminitis  Idiopathic pulmonary fibrosis: Idiopathic pulmonary fibrosis  Shortness of breath: Shortness of breath

## 2017-11-27 NOTE — PROGRESS NOTE ADULT - SUBJECTIVE AND OBJECTIVE BOX
POD/STATUS POST/ENT ISSUE: Lip eschar    INTERVAL HPI: Pt with resolving kip eschar s/p 7 day intubation. Pts wife states they have been keeping up with oral care, rinsing and spitting with the chlorhexadine wash, and bacitracin to the exterior lip to keep the area moist. No epistaxis.     Vital Signs Last 24 Hrs  T(C): 36.7 (27 Nov 2017 09:00), Max: 37.4 (26 Nov 2017 15:52)  T(F): 98.1 (27 Nov 2017 09:00), Max: 99.4 (26 Nov 2017 15:52)  HR: 71 (27 Nov 2017 09:00) (71 - 102)  BP: 109/69 (27 Nov 2017 09:00) (109/67 - 119/73)  BP(mean): --  RR: 18 (27 Nov 2017 09:00) (18 - 18)  SpO2: 97% (27 Nov 2017 09:00) (94% - 98%)    PHYSICAL EXAM:  Gen: NAD, well-developed  Head: Normocephalic, Atraumatic  Eyes: PERRL, EOMI, no scleral injection  Nose: Nares bilaterally patent, no discharge, humidified O2 nasal cannula in place, no active bleeding  Mouth: Mucosa moist, tongue/uvula midline, scant dry blood in the oropharynx, no BRB or clots, lower lip with eschar +auspitz   Neck: Flat, supple, no lymphadenopathy, trachea midline, no masses  Resp: breathing easily, no stridor  CV: no peripheral edema/cyanosis    LABS:                        7.7    7.63  )-----------( 187      ( 27 Nov 2017 08:45 )             23.8 POD/STATUS POST/ENT ISSUE: Lip eschar    INTERVAL HPI: Pt with resolving kip eschar s/p 7 day intubation. Pts wife states they have been keeping up with oral care, rinsing and spitting with the chlorhexadine wash, and bacitracin to the exterior lip to keep the area moist. No epistaxis.     Vital Signs Last 24 Hrs  T(C): 36.7 (27 Nov 2017 09:00), Max: 37.4 (26 Nov 2017 15:52)  T(F): 98.1 (27 Nov 2017 09:00), Max: 99.4 (26 Nov 2017 15:52)  HR: 71 (27 Nov 2017 09:00) (71 - 102)  BP: 109/69 (27 Nov 2017 09:00) (109/67 - 119/73)  BP(mean): --  RR: 18 (27 Nov 2017 09:00) (18 - 18)  SpO2: 97% (27 Nov 2017 09:00) (94% - 98%)    PHYSICAL EXAM:  Gen: NAD, well-developed  Head: Normocephalic, Atraumatic  Eyes: PERRL, EOMI, no scleral injection  Nose: Nares bilaterally patent, no discharge, humidified O2 nasal cannula in place, no active bleeding  Mouth: Mucosa moist, tongue/uvula midline, scant dry blood in the oropharynx, no BRB or clots, lower lip with eschar  Neck: Flat, supple, no lymphadenopathy, trachea midline, no masses  Resp: breathing easily, no stridor  CV: no peripheral edema/cyanosis    LABS:                        7.7    7.63  )-----------( 187      ( 27 Nov 2017 08:45 )             23.8

## 2017-11-27 NOTE — PROGRESS NOTE ADULT - ASSESSMENT
Pt with eschar to the lower lip s/p intubation and extubation. Pts family educated on oral care and state they have been compliant.

## 2017-11-27 NOTE — PROGRESS NOTE ADULT - SUBJECTIVE AND OBJECTIVE BOX
Follow-up Pulm Progress Note  Amauri Jackson MD  [phone]      kAfebrile off antibiotics: appears weak, debilitated yet with stable resp status on nasal oxygen  NGT in place: plans for trial oral feeds    Vital Signs Last 24 Hrs  T(C): 36.8 (24 Nov 2017 07:41), Max: 36.8 (24 Nov 2017 07:41)  T(F): 98.3 (24 Nov 2017 07:41), Max: 98.3 (24 Nov 2017 07:41)  HR: 88 (24 Nov 2017 11:45) (60 - 88)  BP: 138/66 (24 Nov 2017 11:45) (95/61 - 138/66)  BP(mean): --  RR: 18 (24 Nov 2017 07:41) (16 - 18)  SpO2: 93% (24 Nov 2017 11:45) (93% - 98%)                        7.4    5.42  )-----------( 212      ( 24 Nov 2017 08:41 )             23.0     11-24    145  |  107  |  36<H>  ----------------------------<  243<H>  4.5   |  28  |  1.09    Ca    8.3<L>      24 Nov 2017 08:41  Phos  3.1     11-24  Mg     1.9     11-24    CXR: unchanged reticular opacities: no gross new pulm edema  TTE: Mild AS, hyperdynamic LV, PA 40-50  Physical Examination:  PULM: Bibasilar crackles   CVS: Regular rate and rhythm, no murmurs, rubs, or gallops  ABD: Soft, non-tender  EXT:  Without sign edema    RADIOLOGY REVIEWED  CXR: No change in bilateral reticular opacities    CT chest: see above    TTE: Follow-up Pulm Progress Note  Amauri Jackson MD  [phone]      Afebrile off antibiotics: appears weak, debilitated yet with stable resp status on nasal oxygen  NGT in place: plans for trial oral feeds  L arm swelling noted    Vital Signs Last 24 Hrs  T(C): 36.8 (24 Nov 2017 07:41), Max: 36.8 (24 Nov 2017 07:41)  T(F): 98.3 (24 Nov 2017 07:41), Max: 98.3 (24 Nov 2017 07:41)  HR: 88 (24 Nov 2017 11:45) (60 - 88)  BP: 138/66 (24 Nov 2017 11:45) (95/61 - 138/66)  BP(mean): --  RR: 18 (24 Nov 2017 07:41) (16 - 18)  SpO2: 93% (24 Nov 2017 11:45) (93% - 98%)                        7.4    5.42  )-----------( 212      ( 24 Nov 2017 08:41 )             23.0     11-24    145  |  107  |  36<H>  ----------------------------<  243<H>  4.5   |  28  |  1.09    Ca    8.3<L>      24 Nov 2017 08:41  Phos  3.1     11-24  Mg     1.9     11-24    CXR: unchanged reticular opacities: no gross new pulm edema  TTE: Mild AS, hyperdynamic LV, PA 40-50  Physical Examination:  PULM: Bibasilar crackles   CVS: Regular rate and rhythm, no murmurs, rubs, or gallops  ABD: Soft, non-tender  EXT:  LUE edema    RADIOLOGY REVIEWED  CXR: No change in bilateral reticular opacities    CT chest: see above    TTE:

## 2017-11-27 NOTE — PROGRESS NOTE ADULT - SUBJECTIVE AND OBJECTIVE BOX
Nephrology Progress Note    No acute events overnight  Patient looks comfortable and not in distress  continues weak  improved overall, with improvement of mouth sores      PHYSICAL EXAM    Vital Signs Last 24 Hrs  T(C): 36.7 (27 Nov 2017 09:00), Max: 37.4 (26 Nov 2017 15:52)  T(F): 98.1 (27 Nov 2017 09:00), Max: 99.4 (26 Nov 2017 15:52)  HR: 71 (27 Nov 2017 09:00) (71 - 102)  BP: 109/69 (27 Nov 2017 09:00) (109/67 - 119/73)  BP(mean): --  RR: 18 (27 Nov 2017 09:00) (18 - 18)  SpO2: 97% (27 Nov 2017 09:00) (94% - 98%)  I&O's Summary    26 Nov 2017 07:01  -  27 Nov 2017 07:00  --------------------------------------------------------  IN: 1520 mL / OUT: 1850 mL / NET: -330 mL    27 Nov 2017 07:01  -  27 Nov 2017 14:23  --------------------------------------------------------  IN: 0 mL / OUT: 350 mL / NET: -350 mL      Constitutional: awake and alert; no distress, coherent  HEENT: EOMI, clear conj, anicteric sclera; NGT in place, mouth sores and a scab on the lower lip; bloody saliva  Neck: Supple, No JVD  Respiratory: good air movement; faint crackles on bases; normal effort  Cardiovascular: RRR s1s2, no m/r/g, no peripheral edema  Gastrointestinal: BS+, soft, NT/ND  Extremities: No peripheral edema b/l except left UE swelling  Neurological: no focal deficits; strength grossly intact  Skin: warm and dry, no rash on visible skin       Labs:                               7.7    7.63  )-----------( 187      ( 27 Nov 2017 08:45 )             23.8     11-27    134<L>  |  96  |  27<H>  ----------------------------<  262<H>  4.7   |  28  |  0.82    Ca    8.3<L>      27 Nov 2017 08:45          IMAGING:   Renal US:  IMPRESSION:  New mild to moderate right-sided hydronephrosis.  New mild fullness at the upper and lower calyces of the left renal  collecting system.      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 He was treated for COPD exacerbation with steroids and immunosuppression Hospital course was complicated with septic shock, pseudomonas bacteremia/pneumonia requiring MICU stay, intubation and blood pressure support. Patient is extubated, out on the regular floor, off antibiotics. Patient with oral ulcers unable to swallow post NGT placement. Kidney function worsening with creatinine of 1.99 today for which nephrology was consulted.     1. CHRISTIANE likely prerenal/dehydration considering low PO intake and Hypernatremia: Postobstructive nephropathy as well - see renal US - improved creatinine-  2. Hypernatremia likely due to low free water intake/dehydration; feeding from NGT - improved and with mild hyponatremia now  3. Anemia likely multifactorial: acute illness, AOCD etc; H&H decreasing  4. Sepsis/pseudomonas bacteremia off antibiotics; plan per primary team    RECOMMENDATIONS:  - change free water to 250 ml every 6 hrs considering mild hyponatremia- adjust if sodium lower tomorrow  - H&h trending down and patient weak/ill appearing- would benefit from one PRBC; would keep Htc more than 24  - monitor for any fluid overload-   - OOB to chair and PT as able  - Avoid nephrotoxins, agree on holding ACEIs  - dose meds for GFR of 40 ml/min/1.73m2      Ju Davis MD  [facility]  [phone]

## 2017-11-27 NOTE — PROGRESS NOTE ADULT - ASSESSMENT
Acute on chronic hypoxemic respiratory failure: clinically resoliving post extubation  Progressive ILD - unclear etiology  Pseudomonoas bacteremia with pneumonia: resolving  OP dysphagia  Oral/Lip ulcerations  CHRISTIANE - resolving  Hypernatremia: now resolved    Septic Shock - clinically resolved    REC  Observe off antibiotics  No need to resume imuran: would continue prednisone at 10 mg qd  PT  Oral care  DC NGT if tolerates oral intake  DC planning for CODIE Acute on chronic hypoxemic respiratory failure: clinically resoliving post extubation  LUE edema: r/o DVT  Pseudomonoas bacteremia with pneumonia: resolved  OP dysphagia  Oral/Lip ulcerations  CHRISTIANE - resolving  Hypernatremia: now resolved    Septic Shock - clinically resolved    REC  Observe off antibiotics  LUE doppler ordered  No need to resume imuran: would continue prednisone at 10 mg qd  PT  Oral care  DC NGT if tolerates oral intake  DC planning for CODIE

## 2017-11-28 DIAGNOSIS — F45.8 OTHER SOMATOFORM DISORDERS: ICD-10-CM

## 2017-11-28 LAB
ANION GAP SERPL CALC-SCNC: 8 MMOL/L — SIGNIFICANT CHANGE UP (ref 5–17)
APPEARANCE UR: ABNORMAL
BACTERIA # UR AUTO: ABNORMAL
BILIRUB UR-MCNC: NEGATIVE — SIGNIFICANT CHANGE UP
BUN SERPL-MCNC: 32 MG/DL — HIGH (ref 7–23)
CALCIUM SERPL-MCNC: 8.3 MG/DL — LOW (ref 8.4–10.5)
CHLORIDE SERPL-SCNC: 98 MMOL/L — SIGNIFICANT CHANGE UP (ref 96–108)
CO2 SERPL-SCNC: 31 MMOL/L — SIGNIFICANT CHANGE UP (ref 22–31)
COLOR SPEC: YELLOW — SIGNIFICANT CHANGE UP
COMMENT - URINE: SIGNIFICANT CHANGE UP
CREAT SERPL-MCNC: 0.91 MG/DL — SIGNIFICANT CHANGE UP (ref 0.5–1.3)
DIFF PNL FLD: ABNORMAL
EPI CELLS # UR: 2 /HPF — SIGNIFICANT CHANGE UP (ref 0–5)
GLUCOSE BLDC GLUCOMTR-MCNC: 167 MG/DL — HIGH (ref 70–99)
GLUCOSE BLDC GLUCOMTR-MCNC: 175 MG/DL — HIGH (ref 70–99)
GLUCOSE BLDC GLUCOMTR-MCNC: 189 MG/DL — HIGH (ref 70–99)
GLUCOSE BLDC GLUCOMTR-MCNC: 235 MG/DL — HIGH (ref 70–99)
GLUCOSE BLDC GLUCOMTR-MCNC: 238 MG/DL — HIGH (ref 70–99)
GLUCOSE BLDC GLUCOMTR-MCNC: 284 MG/DL — HIGH (ref 70–99)
GLUCOSE SERPL-MCNC: 227 MG/DL — HIGH (ref 70–99)
GLUCOSE UR QL: NEGATIVE MG/DL — SIGNIFICANT CHANGE UP
HCT VFR BLD CALC: 24.8 % — LOW (ref 39–50)
HCT VFR BLD CALC: 25.3 % — LOW (ref 39–50)
HGB BLD-MCNC: 7.9 G/DL — LOW (ref 13–17)
HGB BLD-MCNC: 8.3 G/DL — LOW (ref 13–17)
HYALINE CASTS # UR AUTO: 0 /LPF — SIGNIFICANT CHANGE UP (ref 0–7)
KETONES UR-MCNC: NEGATIVE — SIGNIFICANT CHANGE UP
LEUKOCYTE ESTERASE UR-ACNC: ABNORMAL
MCHC RBC-ENTMCNC: 31.9 GM/DL — LOW (ref 32–36)
MCHC RBC-ENTMCNC: 32.8 PG — SIGNIFICANT CHANGE UP (ref 27–34)
MCHC RBC-ENTMCNC: 32.9 GM/DL — SIGNIFICANT CHANGE UP (ref 32–36)
MCHC RBC-ENTMCNC: 34.6 PG — HIGH (ref 27–34)
MCV RBC AUTO: 102.9 FL — HIGH (ref 80–100)
MCV RBC AUTO: 105 FL — HIGH (ref 80–100)
NITRITE UR-MCNC: NEGATIVE — SIGNIFICANT CHANGE UP
PH UR: 7.5 — SIGNIFICANT CHANGE UP (ref 5–8)
PLATELET # BLD AUTO: 156 K/UL — SIGNIFICANT CHANGE UP (ref 150–400)
PLATELET # BLD AUTO: 181 K/UL — SIGNIFICANT CHANGE UP (ref 150–400)
POTASSIUM SERPL-MCNC: 5.1 MMOL/L — SIGNIFICANT CHANGE UP (ref 3.5–5.3)
POTASSIUM SERPL-SCNC: 5.1 MMOL/L — SIGNIFICANT CHANGE UP (ref 3.5–5.3)
PROT UR-MCNC: 100 MG/DL
RBC # BLD: 2.41 M/UL — LOW (ref 4.2–5.8)
RBC # BLD: 2.41 M/UL — LOW (ref 4.2–5.8)
RBC # FLD: 17.6 % — HIGH (ref 10.3–14.5)
RBC # FLD: 18.6 % — HIGH (ref 10.3–14.5)
RBC CASTS # UR COMP ASSIST: 114 /HPF — HIGH (ref 0–4)
SODIUM SERPL-SCNC: 137 MMOL/L — SIGNIFICANT CHANGE UP (ref 135–145)
SP GR SPEC: 1.01 — SIGNIFICANT CHANGE UP (ref 1.01–1.02)
UROBILINOGEN FLD QL: 1 MG/DL — SIGNIFICANT CHANGE UP
WBC # BLD: 7.8 K/UL — SIGNIFICANT CHANGE UP (ref 3.8–10.5)
WBC # FLD AUTO: 7.8 K/UL — SIGNIFICANT CHANGE UP (ref 3.8–10.5)
WBC UR QL: >720 /HPF — HIGH (ref 0–5)

## 2017-11-28 PROCEDURE — 72100 X-RAY EXAM L-S SPINE 2/3 VWS: CPT | Mod: 26

## 2017-11-28 PROCEDURE — 99232 SBSQ HOSP IP/OBS MODERATE 35: CPT | Mod: 25

## 2017-11-28 PROCEDURE — 31575 DIAGNOSTIC LARYNGOSCOPY: CPT

## 2017-11-28 RX ORDER — INSULIN GLARGINE 100 [IU]/ML
12 INJECTION, SOLUTION SUBCUTANEOUS AT BEDTIME
Qty: 0 | Refills: 0 | Status: DISCONTINUED | OUTPATIENT
Start: 2017-11-28 | End: 2017-12-04

## 2017-11-28 RX ORDER — MORPHINE SULFATE 50 MG/1
1 CAPSULE, EXTENDED RELEASE ORAL ONCE
Qty: 0 | Refills: 0 | Status: DISCONTINUED | OUTPATIENT
Start: 2017-11-28 | End: 2017-11-28

## 2017-11-28 RX ORDER — TRAMADOL HYDROCHLORIDE 50 MG/1
50 TABLET ORAL EVERY 6 HOURS
Qty: 0 | Refills: 0 | Status: DISCONTINUED | OUTPATIENT
Start: 2017-11-28 | End: 2017-11-29

## 2017-11-28 RX ORDER — ACETAMINOPHEN 500 MG
1000 TABLET ORAL ONCE
Qty: 0 | Refills: 0 | Status: DISCONTINUED | OUTPATIENT
Start: 2017-11-28 | End: 2017-11-28

## 2017-11-28 RX ORDER — BACLOFEN 100 %
10 POWDER (GRAM) MISCELLANEOUS THREE TIMES A DAY
Qty: 0 | Refills: 0 | Status: DISCONTINUED | OUTPATIENT
Start: 2017-11-28 | End: 2017-11-29

## 2017-11-28 RX ORDER — MORPHINE SULFATE 50 MG/1
1 CAPSULE, EXTENDED RELEASE ORAL EVERY 4 HOURS
Qty: 0 | Refills: 0 | Status: DISCONTINUED | OUTPATIENT
Start: 2017-11-28 | End: 2017-11-28

## 2017-11-28 RX ORDER — SODIUM CHLORIDE 9 MG/ML
250 INJECTION INTRAMUSCULAR; INTRAVENOUS; SUBCUTANEOUS ONCE
Qty: 0 | Refills: 0 | Status: COMPLETED | OUTPATIENT
Start: 2017-11-28 | End: 2017-11-28

## 2017-11-28 RX ORDER — MORPHINE SULFATE 50 MG/1
0.5 CAPSULE, EXTENDED RELEASE ORAL ONCE
Qty: 0 | Refills: 0 | Status: DISCONTINUED | OUTPATIENT
Start: 2017-11-28 | End: 2017-11-28

## 2017-11-28 RX ORDER — MORPHINE SULFATE 50 MG/1
2 CAPSULE, EXTENDED RELEASE ORAL EVERY 4 HOURS
Qty: 0 | Refills: 0 | Status: DISCONTINUED | OUTPATIENT
Start: 2017-11-28 | End: 2017-11-28

## 2017-11-28 RX ADMIN — Medication 1 APPLICATION(S): at 05:49

## 2017-11-28 RX ADMIN — INSULIN GLARGINE 12 UNIT(S): 100 INJECTION, SOLUTION SUBCUTANEOUS at 22:41

## 2017-11-28 RX ADMIN — Medication 4: at 05:59

## 2017-11-28 RX ADMIN — Medication 1 SPRAY(S): at 11:35

## 2017-11-28 RX ADMIN — Medication 10 MILLIGRAM(S): at 19:44

## 2017-11-28 RX ADMIN — Medication 0.12 MILLIGRAM(S): at 05:50

## 2017-11-28 RX ADMIN — LIDOCAINE 1 PATCH: 4 CREAM TOPICAL at 00:34

## 2017-11-28 RX ADMIN — TRAMADOL HYDROCHLORIDE 50 MILLIGRAM(S): 50 TABLET ORAL at 00:29

## 2017-11-28 RX ADMIN — Medication 81 MILLIGRAM(S): at 11:30

## 2017-11-28 RX ADMIN — MORPHINE SULFATE 0.5 MILLIGRAM(S): 50 CAPSULE, EXTENDED RELEASE ORAL at 11:45

## 2017-11-28 RX ADMIN — Medication 1 SPRAY(S): at 05:50

## 2017-11-28 RX ADMIN — Medication 1 SPRAY(S): at 22:43

## 2017-11-28 RX ADMIN — TRAMADOL HYDROCHLORIDE 50 MILLIGRAM(S): 50 TABLET ORAL at 13:22

## 2017-11-28 RX ADMIN — Medication 2: at 17:42

## 2017-11-28 RX ADMIN — LIDOCAINE 1 PATCH: 4 CREAM TOPICAL at 11:59

## 2017-11-28 RX ADMIN — MORPHINE SULFATE 1 MILLIGRAM(S): 50 CAPSULE, EXTENDED RELEASE ORAL at 02:23

## 2017-11-28 RX ADMIN — HEPARIN SODIUM 5000 UNIT(S): 5000 INJECTION INTRAVENOUS; SUBCUTANEOUS at 11:59

## 2017-11-28 RX ADMIN — HEPARIN SODIUM 5000 UNIT(S): 5000 INJECTION INTRAVENOUS; SUBCUTANEOUS at 22:41

## 2017-11-28 RX ADMIN — Medication 4: at 00:43

## 2017-11-28 RX ADMIN — Medication 1 APPLICATION(S): at 22:41

## 2017-11-28 RX ADMIN — CHLORHEXIDINE GLUCONATE 15 MILLILITER(S): 213 SOLUTION TOPICAL at 05:49

## 2017-11-28 RX ADMIN — MORPHINE SULFATE 1 MILLIGRAM(S): 50 CAPSULE, EXTENDED RELEASE ORAL at 02:38

## 2017-11-28 RX ADMIN — SODIUM CHLORIDE 500 MILLILITER(S): 9 INJECTION INTRAMUSCULAR; INTRAVENOUS; SUBCUTANEOUS at 14:10

## 2017-11-28 RX ADMIN — Medication 6: at 12:22

## 2017-11-28 RX ADMIN — TIOTROPIUM BROMIDE 1 CAPSULE(S): 18 CAPSULE ORAL; RESPIRATORY (INHALATION) at 11:30

## 2017-11-28 RX ADMIN — Medication 10 MILLIGRAM(S): at 05:50

## 2017-11-28 RX ADMIN — CHLORHEXIDINE GLUCONATE 15 MILLILITER(S): 213 SOLUTION TOPICAL at 11:30

## 2017-11-28 RX ADMIN — TRAMADOL HYDROCHLORIDE 50 MILLIGRAM(S): 50 TABLET ORAL at 12:22

## 2017-11-28 RX ADMIN — MORPHINE SULFATE 0.5 MILLIGRAM(S): 50 CAPSULE, EXTENDED RELEASE ORAL at 11:23

## 2017-11-28 RX ADMIN — HEPARIN SODIUM 5000 UNIT(S): 5000 INJECTION INTRAVENOUS; SUBCUTANEOUS at 05:49

## 2017-11-28 RX ADMIN — CHLORHEXIDINE GLUCONATE 15 MILLILITER(S): 213 SOLUTION TOPICAL at 17:44

## 2017-11-28 RX ADMIN — Medication 1 APPLICATION(S): at 11:35

## 2017-11-28 RX ADMIN — PANTOPRAZOLE SODIUM 40 MILLIGRAM(S): 20 TABLET, DELAYED RELEASE ORAL at 11:30

## 2017-11-28 RX ADMIN — Medication 1 TABLET(S): at 11:31

## 2017-11-28 RX ADMIN — Medication 1 SPRAY(S): at 11:36

## 2017-11-28 RX ADMIN — Medication 25 MILLIGRAM(S): at 05:50

## 2017-11-28 NOTE — PROGRESS NOTE ADULT - SUBJECTIVE AND OBJECTIVE BOX
Follow-up Pulm Progress Note  Amauri Jackson MD  [phone]      Afebrile off antibiotics: appears weak, debilitated yet with stable resp status on nasal oxygen  NGT in place: plans for trial oral feeds  UE doppler negative for DVT  c/o back pain today    Vital Signs Last 24 Hrs  T(C): 36.4 (28 Nov 2017 07:51), Max: 36.7 (27 Nov 2017 17:34)  T(F): 97.6 (28 Nov 2017 07:51), Max: 98 (27 Nov 2017 17:34)  HR: 72 (28 Nov 2017 07:51) (72 - 110)  BP: 99/62 (28 Nov 2017 07:51) (99/62 - 134/107)  BP(mean): --  RR: 18 (28 Nov 2017 07:51) (16 - 18)  SpO2: 95% (28 Nov 2017 07:51) (95% - 99%)                         7.7    7.63  )-----------( 187      ( 27 Nov 2017 08:45 )             23.8     11-28    137  |  98  |  32<H>  ----------------------------<  227<H>  5.1   |  31  |  0.91    Ca    8.3<L>      28 Nov 2017 08:55      CXR: unchanged reticular opacities: no gross new pulm edema  TTE: Mild AS, hyperdynamic LV, PA 40-50  Physical Examination:  PULM: Bibasilar crackles   CVS: Regular rate and rhythm, no murmurs, rubs, or gallops  ABD: Soft, non-tender  EXT:  LUE edema    RADIOLOGY REVIEWED  CXR: No change in bilateral reticular opacities    CT chest: see above    TTE:

## 2017-11-28 NOTE — PROGRESS NOTE ADULT - SUBJECTIVE AND OBJECTIVE BOX
POD/STATUS POST/ENT ISSUE: Globus sensation    INTERVAL HPI: Pt originally evaluated by ENT for lip eschar s/p intubation. Pt now states he feels like "theres a lump in his throat". Per wife, pt began complaining about this this am. Pt tolerating dysphagia I diet during the day with supplemental tube feeds via keofeed at night. Wife denies changes in pts voice. Pt breathing well on O2 nasal cannula.    Vital Signs Last 24 Hrs  T(C): 36.6 (28 Nov 2017 15:10), Max: 36.7 (27 Nov 2017 17:34)  T(F): 97.9 (28 Nov 2017 15:10), Max: 98 (27 Nov 2017 17:34)  HR: 76 (28 Nov 2017 15:10) (72 - 110)  BP: 105/67 (28 Nov 2017 15:10) (92/62 - 134/107)  BP(mean): --  RR: 19 (28 Nov 2017 15:10) (16 - 19)  SpO2: 100% (28 Nov 2017 15:10) (95% - 100%)    PHYSICAL EXAM:  Gen: NAD, well-developed,   Head: Normocephalic, Atraumatic  Eyes: PERRL, EOMI, no scleral injection  Nose: Nares bilaterally patent, no discharge, keofeed in the right nare, no epistaxis  Mouth: Mucosa moist, tongue/uvula midline, oropharynx clear, healing eschar on the lower lip  Neck: Flat, supple, no lymphadenopathy, trachea midline, no masses  Resp:  no stridor, humidified O2 nasal cannula  CV: no peripheral edema/cyanosis    LABS:                        8.3    7.8   )-----------( 156      ( 28 Nov 2017 14:38 )             25.3     FOE/larungoscopy: scope #4 No bleeding in nasal pharynx or Oral pharynx.  No foreign body or pooling of secretions.  No glottic / supraglottic swelling.  Vocal cords mobile in intact b/l.  Airway patent.  No masses. Keofeed seen in the posterior nasopharynx extending down into the esophagus. POD/STATUS POST/ENT ISSUE: Globus sensation    INTERVAL HPI: Pt originally evaluated by ENT for lip eschar s/p intubation. Pt now states he feels like "theres a lump in his throat". Per wife, pt began complaining about this this am. Pt tolerating dysphagia I diet during the day with supplemental tube feeds via keofeed at night. Wife denies changes in pts voice. Pt breathing well on O2 nasal cannula.    Vital Signs Last 24 Hrs  T(C): 36.6 (28 Nov 2017 15:10), Max: 36.7 (27 Nov 2017 17:34)  T(F): 97.9 (28 Nov 2017 15:10), Max: 98 (27 Nov 2017 17:34)  HR: 76 (28 Nov 2017 15:10) (72 - 110)  BP: 105/67 (28 Nov 2017 15:10) (92/62 - 134/107)  BP(mean): --  RR: 19 (28 Nov 2017 15:10) (16 - 19)  SpO2: 100% (28 Nov 2017 15:10) (95% - 100%)    PHYSICAL EXAM:  Gen: NAD, well-developed,   Head: Normocephalic, Atraumatic  Eyes: PERRL, EOMI, no scleral injection  Nose: Nares bilaterally patent, no discharge, keofeed in the right nare, no epistaxis  Mouth: Mucosa moist, tongue/uvula midline, oropharynx clear, healing eschar on the lower lip  Neck: Flat, supple, no lymphadenopathy, trachea midline, no masses  Resp:  no stridor, humidified O2 nasal cannula  CV: no peripheral edema/cyanosis    LABS:                        8.3    7.8   )-----------( 156      ( 28 Nov 2017 14:38 )             25.3     FOE/laryngoscopy: scope #4 No bleeding in nasal pharynx or Oral pharynx.  No foreign body or pooling of secretions.  No glottic / supraglottic swelling.  Vocal cords mobile in intact b/l.  Airway patent.  No masses. Keofeed seen in the posterior nasopharynx extending down into the esophagus.

## 2017-11-28 NOTE — PROGRESS NOTE ADULT - ASSESSMENT
Acute on chronic hypoxemic respiratory failure: clinically resoliving post extubation  LUE edema: r/o DVT  Pseudomonoas bacteremia with pneumonia: resolved  OP dysphagia  Oral/Lip ulcerations  CHRISTIANE - resolving  Hypernatremia: now resolved    Septic Shock - clinically resolved    REC  Observe off antibiotics  No need to resume imuran: would continue prednisone at 10 mg qd  PT  Oral care  DC NGT if tolerates oral intake  DC planning for CODIE

## 2017-11-28 NOTE — PROGRESS NOTE ADULT - ASSESSMENT
76 yo male being followed by ENT for healing lip eschar s/p intubation now complaining of globus sensation. Patient with significantly improved oral care. Lower lip dry eschar and discussed applying bacitracin to it regularly. Explained will help reduce cracking/bleeding if kept moist and he is agreeable to resume bacitracin.Tolerating dysphagia I diet during the day, tube feeds via keofeed at night. No abnormalities seen on FOE/laryngoscopy.

## 2017-11-28 NOTE — PROGRESS NOTE ADULT - ASSESSMENT
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 CT chest shows bilateral IPF  treated for Pulmonary fibrosis / Intersitial pulmonary dz exacerbation.  pt developed ARF intubated transferred to MICU w/ acute hypoxic respiratory failure w/ septic shock 2/2 to pseudomonal bacteremia resolved off abx.   pt extubated downgraded to floor.  Family is refusing imuran will hold for now and can f/u with outpt pulm to discuss restarting     -Back pain - c/w  tramadol PRN.  neuro consult Dr Lowe if pain continues.  pt moving all extremities spontaneously.   PT consult prior to d/c    -Idiopathic pulmonary fibrosis / Acute hypoxic expiratory failure - improving, c/w steroids, hold imuran as per pt family wishes.  respiratory status remains precarious     -Nutrition - NG in place, patient failed FEEST but still wants PO, patient understands risks of aspiration, ENT appreciated    -CHRISTIANE / Hypernatremia - prerenal discussed with nephro attending free water continues    -Pseudomonas bacteremia off abx resolved      -Lip lesion - dry eschar from prolonged intubation, ENT appreciated, c/w bactroban    -Type 2 diabetes mellitus c/w insulin tx     -DVT ppx

## 2017-11-28 NOTE — PROGRESS NOTE ADULT - SUBJECTIVE AND OBJECTIVE BOX
Patient seen and examined  Sleeping, received pain meds for back pain  Monzon re inserted for retention with sediment noted, UA pending    MEDICATIONS  (STANDING):  aspirin  chewable 81 milliGRAM(s) Oral daily  BACItracin   Ointment 1 Application(s) Topical three times a day  baclofen 10 milliGRAM(s) Oral three times a day  chlorhexidine 0.12% Liquid 15 milliLiter(s) Swish and Spit four times a day  digoxin     Tablet 0.125 milliGRAM(s) Oral daily  heparin  Injectable 5000 Unit(s) SubCutaneous every 8 hours  influenza   Vaccine 0.5 milliLiter(s) IntraMuscular once  insulin glargine Injectable (LANTUS) 12 Unit(s) SubCutaneous at bedtime  insulin lispro (HumaLOG) corrective regimen sliding scale   SubCutaneous every 6 hours  lidocaine   Patch 1 Patch Transdermal daily  metoprolol     tartrate 25 milliGRAM(s) Oral two times a day  multivitamin 1 Tablet(s) Oral daily  pantoprazole  Injectable 40 milliGRAM(s) IV Push daily  predniSONE   Tablet 10 milliGRAM(s) Oral daily  saliva substitute (BIOTENE MOISTURIZING MOUTH SPRAY) 1 [place](s) Topical three times a day  sodium chloride 0.65% Nasal 1 Spray(s) Both Nostrils three times a day  sodium chloride 0.9% Bolus 250 milliLiter(s) IV Bolus once  tiotropium 18 MICROgram(s) Capsule 1 Capsule(s) Inhalation daily      VITAL:  T(C): , Max: 36.7 (17 @ 17:34)  T(F): , Max: 98 (17 @ 17:34)  HR: 89 (17 @ 14:05)  BP: 92/62 (17 @ 14:05)  BP(mean): --  RR: 18 (17 @ 14:05)  SpO2: 95% (17 @ 14:05)  Wt(kg): --    I and O's:     @ 07:01  -   @ 07:00  --------------------------------------------------------  IN: 1220 mL / OUT: 1150 mL / NET: 70 mL          PHYSICAL EXAM:    Constitutional: NAD  Respiratory: CTABL  Cardiovascular: S1 and S2  Gastrointestinal: BS+, soft, NT/ND  Extremities: No peripheral edema  : + Monzon  Skin: No rashes  Access: Not applicable    LABS:                        8.3    7.8   )-----------( 156      ( 2017 14:38 )             25.3     11-    137  |  98  |  32<H>  ----------------------------<  227<H>  5.1   |  31  |  0.91    Ca    8.3<L>      2017 08:55        Urine Studies:  Urinalysis Basic - ( 2017 12:44 )    Color: Yellow / Appearance: Turbid / S.014 / pH: x  Gluc: x / Ketone: Negative  / Bili: Negative / Urobili: 1.0 mg/dL   Blood: x / Protein: 100 mg/dL / Nitrite: Negative   Leuk Esterase: Large / RBC: x / WBC x   Sq Epi: x / Non Sq Epi: x / Bacteria: x            RADIOLOGY & ADDITIONAL STUDIES:        ASSESSMENT  77 year old male with PMH of "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 He was treated for COPD exacerbation with steroids and immunosuppression Hospital course was complicated with septic shock, pseudomonas bacteremia/pneumonia requiring MICU stay, intubation and blood pressure support. Patient is extubated, out on the regular floor, off antibiotics. Patient with oral ulcers unable to swallow post NGT placement. CHRISTIANE,    1. CHRISTIANE likely prerenal+ obstrcutive< resolving  2. Hyponatremia: improved with reduction in free water  3. Anemia likely multifactorial: acute illness, AOCD etc; H&H decreasing  4. Sepsis/pseudomonas bacteremia off antibiotics; plan per primary team  5. Acute retention: monzon re inserted    RECOMMENDATIONS:  - Continue free water to 250 ml every 6 hrs   - Keep monzon for now  - F/U UA and culture  - monitor for any fluid overload-   - OOB to chair and PT as able  - Avoid nephrotoxins, agree on holding ACEIs  - dose meds for GFR of 40 ml/min/1.73m2

## 2017-11-28 NOTE — PROGRESS NOTE ADULT - ATTENDING COMMENTS
Patient with significantly improved lower lip lesion. Healing well with most of eschar gone. Laryngoscopy normal as noted above. Sensation likely d/t feeding tube. If patient unable to maintain adequate nutrition orally recommend PEG so nasal tube can be removed. Discussed with wife that a PEG can be temporary and he is still permitted to eat orally while one is in place. Discussed our findings with team.

## 2017-11-28 NOTE — PROGRESS NOTE ADULT - SUBJECTIVE AND OBJECTIVE BOX
Patient is a 77y old  Male who presents with a chief complaint of SOB (2017 14:33)      SUBJECTIVE / OVERNIGHT EVENTS:    patient seen and examined at bedside  minimal PO intake due to globus sensation  otherwise breathing has improved  no acute events overnight     Vital Signs Last 24 Hrs  T(C): 36.7 (2017 22:19), Max: 36.7 (2017 22:19)  T(F): 98 (2017 22:19), Max: 98 (2017 22:19)  HR: 78 (2017 22:19) (72 - 110)  BP: 112/72 (2017 22:19) (92/62 - 134/107)  BP(mean): --  RR: 19 (2017 22:19) (16 - 19)  SpO2: 100% (2017 22:19) (95% - 100%)  I&O's Summary    2017 07:01  -  2017 07:00  --------------------------------------------------------  IN: 1220 mL / OUT: 1150 mL / NET: 70 mL    2017 07:01  -  2017 23:31  --------------------------------------------------------  IN: 0 mL / OUT: 1100 mL / NET: -1100 mL      Physical Exam:   · Constitutional	detailed exam	  · Constitutional Details	Pale weak lethargic	  · Eyes	detailed exam	  · Eyes Details	PERRL; EOMI	  · ENMT	detailed exam	  · ENMT Details	NGT still inplace	  · Nose	no discharge; no deviation, dry eschar, no bleeding or puss	  · Neck	detailed exam	  · Neck Details	supple; no JVD	  · Respiratory	detailed exam	  · Respiratory Details	airway patent; breath sounds equal, no wheeze	  · Cardiovascular	detailed exam	  · Cardiovascular Details	no rub	  · Cardiovascular Details	positive S1; positive S2	  · Neurological	detailed exam	  · Neurological Details	responds to pain; responds to verbal commands	            LABS:                        8.3    7.8   )-----------( 156      ( 2017 14:38 )             25.3     11    137  |  98  |  32<H>  ----------------------------<  227<H>  5.1   |  31  |  0.91    Ca    8.3<L>      2017 08:55        CAPILLARY BLOOD GLUCOSE      POCT Blood Glucose.: 189 mg/dL (2017 21:14)  POCT Blood Glucose.: 167 mg/dL (2017 16:40)  POCT Blood Glucose.: 284 mg/dL (2017 11:26)  POCT Blood Glucose.: 235 mg/dL (2017 05:56)  POCT Blood Glucose.: 238 mg/dL (2017 00:13)        Urinalysis Basic - ( 2017 12:44 )    Color: Yellow / Appearance: Turbid / S.014 / pH: x  Gluc: x / Ketone: Negative  / Bili: Negative / Urobili: 1.0 mg/dL   Blood: x / Protein: 100 mg/dL / Nitrite: Negative   Leuk Esterase: Large / RBC: 114 /HPF / WBC >720 /HPF   Sq Epi: x / Non Sq Epi: 2 /HPF / Bacteria: Occasional        RADIOLOGY & ADDITIONAL TESTS:    Imaging Personally Reviewed:  [x] YES  [ ] NO    Consultant(s) Notes Reviewed:  [x] YES  [ ] NO      MEDICATIONS  (STANDING):  aspirin  chewable 81 milliGRAM(s) Oral daily  BACItracin   Ointment 1 Application(s) Topical three times a day  baclofen 10 milliGRAM(s) Oral three times a day  chlorhexidine 0.12% Liquid 15 milliLiter(s) Swish and Spit four times a day  digoxin     Tablet 0.125 milliGRAM(s) Oral daily  heparin  Injectable 5000 Unit(s) SubCutaneous every 8 hours  influenza   Vaccine 0.5 milliLiter(s) IntraMuscular once  insulin glargine Injectable (LANTUS) 12 Unit(s) SubCutaneous at bedtime  insulin lispro (HumaLOG) corrective regimen sliding scale   SubCutaneous every 6 hours  lidocaine   Patch 1 Patch Transdermal daily  metoprolol     tartrate 25 milliGRAM(s) Oral two times a day  multivitamin 1 Tablet(s) Oral daily  pantoprazole  Injectable 40 milliGRAM(s) IV Push daily  predniSONE   Tablet 10 milliGRAM(s) Oral daily  saliva substitute (BIOTENE MOISTURIZING MOUTH SPRAY) 1 [place](s) Topical three times a day  sodium chloride 0.65% Nasal 1 Spray(s) Both Nostrils three times a day  tiotropium 18 MICROgram(s) Capsule 1 Capsule(s) Inhalation daily    MEDICATIONS  (PRN):  bisacodyl Suppository 10 milliGRAM(s) Rectal daily PRN Constipation  polyethylene glycol 3350 17 Gram(s) Oral two times a day PRN Constipation  senna Syrup 10 milliLiter(s) Oral at bedtime PRN Constipation  traMADol 50 milliGRAM(s) Oral every 6 hours PRN Mild Pain (1 - 3)      Care Discussed with Consultants/Other Providers [x] YES  [ ] NO    HEALTH ISSUES - PROBLEM Dx:  Globus sensation: Globus sensation  Lip lesion: Lip lesion  Oral ulcer: Oral ulcer  CHRISTIANE (acute kidney injury): CHRISTIANE (acute kidney injury)  Bacteremia: Bacteremia  Neutropenia: Neutropenia  Need for prophylactic measure: Need for prophylactic measure  Oral thrush: Oral thrush  Nasal discomfort: Nasal discomfort  Gastroesophageal reflux disease without esophagitis: Gastroesophageal reflux disease without esophagitis  Hyperlipidemia, unspecified hyperlipidemia type: Hyperlipidemia, unspecified hyperlipidemia type  Type 2 diabetes mellitus without complication, without long-term current use of insulin: Type 2 diabetes mellitus without complication, without long-term current use of insulin  Coronary artery disease involving native coronary artery of native heart, angina presence unspecified: Coronary artery disease involving native coronary artery of native heart, angina presence unspecified  Hyperkalemia: Hyperkalemia  Lactic acidosis: Lactic acidosis  Transaminitis: Transaminitis  Idiopathic pulmonary fibrosis: Idiopathic pulmonary fibrosis  Shortness of breath: Shortness of breath

## 2017-11-29 DIAGNOSIS — M54.9 DORSALGIA, UNSPECIFIED: ICD-10-CM

## 2017-11-29 LAB
AMMONIA BLD-MCNC: 7 UMOL/L — LOW (ref 11–55)
ANION GAP SERPL CALC-SCNC: 10 MMOL/L — SIGNIFICANT CHANGE UP (ref 5–17)
ANION GAP SERPL CALC-SCNC: 9 MMOL/L — SIGNIFICANT CHANGE UP (ref 5–17)
BUN SERPL-MCNC: 29 MG/DL — HIGH (ref 7–23)
BUN SERPL-MCNC: 32 MG/DL — HIGH (ref 7–23)
CALCIUM SERPL-MCNC: 8.4 MG/DL — SIGNIFICANT CHANGE UP (ref 8.4–10.5)
CALCIUM SERPL-MCNC: 8.7 MG/DL — SIGNIFICANT CHANGE UP (ref 8.4–10.5)
CHLORIDE SERPL-SCNC: 99 MMOL/L — SIGNIFICANT CHANGE UP (ref 96–108)
CHLORIDE SERPL-SCNC: 99 MMOL/L — SIGNIFICANT CHANGE UP (ref 96–108)
CO2 SERPL-SCNC: 29 MMOL/L — SIGNIFICANT CHANGE UP (ref 22–31)
CO2 SERPL-SCNC: 31 MMOL/L — SIGNIFICANT CHANGE UP (ref 22–31)
CREAT SERPL-MCNC: 0.74 MG/DL — SIGNIFICANT CHANGE UP (ref 0.5–1.3)
CREAT SERPL-MCNC: 0.83 MG/DL — SIGNIFICANT CHANGE UP (ref 0.5–1.3)
GLUCOSE BLDC GLUCOMTR-MCNC: 108 MG/DL — HIGH (ref 70–99)
GLUCOSE BLDC GLUCOMTR-MCNC: 198 MG/DL — HIGH (ref 70–99)
GLUCOSE BLDC GLUCOMTR-MCNC: 266 MG/DL — HIGH (ref 70–99)
GLUCOSE BLDC GLUCOMTR-MCNC: 388 MG/DL — HIGH (ref 70–99)
GLUCOSE BLDC GLUCOMTR-MCNC: 424 MG/DL — HIGH (ref 70–99)
GLUCOSE BLDC GLUCOMTR-MCNC: 448 MG/DL — HIGH (ref 70–99)
GLUCOSE BLDC GLUCOMTR-MCNC: 98 MG/DL — SIGNIFICANT CHANGE UP (ref 70–99)
GLUCOSE SERPL-MCNC: 102 MG/DL — HIGH (ref 70–99)
GLUCOSE SERPL-MCNC: 184 MG/DL — HIGH (ref 70–99)
HCT VFR BLD CALC: 23.2 % — LOW (ref 39–50)
HGB BLD-MCNC: 7.4 G/DL — LOW (ref 13–17)
LACTATE SERPL-SCNC: 1.9 MMOL/L — SIGNIFICANT CHANGE UP (ref 0.7–2)
MCHC RBC-ENTMCNC: 31.9 GM/DL — LOW (ref 32–36)
MCHC RBC-ENTMCNC: 33.6 PG — SIGNIFICANT CHANGE UP (ref 27–34)
MCV RBC AUTO: 105.5 FL — HIGH (ref 80–100)
NRBC # BLD: 1 /100 WBCS — HIGH (ref 0–0)
NRBC # BLD: 2 /100 WBCS — HIGH (ref 0–0)
PLATELET # BLD AUTO: 165 K/UL — SIGNIFICANT CHANGE UP (ref 150–400)
POTASSIUM SERPL-MCNC: 4.9 MMOL/L — SIGNIFICANT CHANGE UP (ref 3.5–5.3)
POTASSIUM SERPL-MCNC: 5.5 MMOL/L — HIGH (ref 3.5–5.3)
POTASSIUM SERPL-SCNC: 4.9 MMOL/L — SIGNIFICANT CHANGE UP (ref 3.5–5.3)
POTASSIUM SERPL-SCNC: 5.5 MMOL/L — HIGH (ref 3.5–5.3)
RBC # BLD: 2.2 M/UL — LOW (ref 4.2–5.8)
RBC # FLD: 19.1 % — HIGH (ref 10.3–14.5)
SODIUM SERPL-SCNC: 137 MMOL/L — SIGNIFICANT CHANGE UP (ref 135–145)
SODIUM SERPL-SCNC: 140 MMOL/L — SIGNIFICANT CHANGE UP (ref 135–145)
WBC # BLD: 5.76 K/UL — SIGNIFICANT CHANGE UP (ref 3.8–10.5)
WBC # BLD: 8.19 K/UL — SIGNIFICANT CHANGE UP (ref 3.8–10.5)
WBC # FLD AUTO: 5.76 K/UL — SIGNIFICANT CHANGE UP (ref 3.8–10.5)
WBC # FLD AUTO: 8.19 K/UL — SIGNIFICANT CHANGE UP (ref 3.8–10.5)

## 2017-11-29 PROCEDURE — 70450 CT HEAD/BRAIN W/O DYE: CPT | Mod: 26

## 2017-11-29 RX ORDER — BACLOFEN 100 %
10 POWDER (GRAM) MISCELLANEOUS ONCE
Qty: 0 | Refills: 0 | Status: COMPLETED | OUTPATIENT
Start: 2017-11-29 | End: 2017-12-01

## 2017-11-29 RX ORDER — INSULIN LISPRO 100/ML
7 VIAL (ML) SUBCUTANEOUS
Qty: 0 | Refills: 0 | Status: DISCONTINUED | OUTPATIENT
Start: 2017-11-29 | End: 2017-12-04

## 2017-11-29 RX ORDER — BACLOFEN 100 %
10 POWDER (GRAM) MISCELLANEOUS ONCE
Qty: 0 | Refills: 0 | Status: COMPLETED | OUTPATIENT
Start: 2017-11-29 | End: 2017-11-29

## 2017-11-29 RX ORDER — ACETAMINOPHEN 500 MG
1000 TABLET ORAL ONCE
Qty: 0 | Refills: 0 | Status: COMPLETED | OUTPATIENT
Start: 2017-11-29

## 2017-11-29 RX ORDER — BACLOFEN 100 %
20 POWDER (GRAM) MISCELLANEOUS THREE TIMES A DAY
Qty: 0 | Refills: 0 | Status: DISCONTINUED | OUTPATIENT
Start: 2017-11-29 | End: 2017-12-01

## 2017-11-29 RX ADMIN — Medication 1 TABLET(S): at 12:22

## 2017-11-29 RX ADMIN — Medication 7 UNIT(S): at 17:36

## 2017-11-29 RX ADMIN — Medication 1 SPRAY(S): at 05:03

## 2017-11-29 RX ADMIN — Medication 25 MILLIGRAM(S): at 05:06

## 2017-11-29 RX ADMIN — Medication 1 SPRAY(S): at 22:16

## 2017-11-29 RX ADMIN — CHLORHEXIDINE GLUCONATE 15 MILLILITER(S): 213 SOLUTION TOPICAL at 17:37

## 2017-11-29 RX ADMIN — Medication 6: at 17:36

## 2017-11-29 RX ADMIN — HEPARIN SODIUM 5000 UNIT(S): 5000 INJECTION INTRAVENOUS; SUBCUTANEOUS at 15:11

## 2017-11-29 RX ADMIN — CHLORHEXIDINE GLUCONATE 15 MILLILITER(S): 213 SOLUTION TOPICAL at 12:23

## 2017-11-29 RX ADMIN — Medication 1 APPLICATION(S): at 15:11

## 2017-11-29 RX ADMIN — Medication 1 SPRAY(S): at 15:19

## 2017-11-29 RX ADMIN — Medication 1 SPRAY(S): at 15:11

## 2017-11-29 RX ADMIN — CHLORHEXIDINE GLUCONATE 15 MILLILITER(S): 213 SOLUTION TOPICAL at 00:23

## 2017-11-29 RX ADMIN — LIDOCAINE 1 PATCH: 4 CREAM TOPICAL at 12:22

## 2017-11-29 RX ADMIN — Medication 0.12 MILLIGRAM(S): at 05:06

## 2017-11-29 RX ADMIN — Medication 10 MILLIGRAM(S): at 05:04

## 2017-11-29 RX ADMIN — Medication 2: at 00:28

## 2017-11-29 RX ADMIN — PANTOPRAZOLE SODIUM 40 MILLIGRAM(S): 20 TABLET, DELAYED RELEASE ORAL at 12:22

## 2017-11-29 RX ADMIN — HEPARIN SODIUM 5000 UNIT(S): 5000 INJECTION INTRAVENOUS; SUBCUTANEOUS at 22:13

## 2017-11-29 RX ADMIN — Medication 2: at 06:02

## 2017-11-29 RX ADMIN — Medication 12: at 12:36

## 2017-11-29 RX ADMIN — INSULIN GLARGINE 12 UNIT(S): 100 INJECTION, SOLUTION SUBCUTANEOUS at 22:13

## 2017-11-29 RX ADMIN — CHLORHEXIDINE GLUCONATE 15 MILLILITER(S): 213 SOLUTION TOPICAL at 05:02

## 2017-11-29 RX ADMIN — Medication 10 MILLIGRAM(S): at 05:02

## 2017-11-29 RX ADMIN — Medication 1 SPRAY(S): at 22:14

## 2017-11-29 RX ADMIN — Medication 1 APPLICATION(S): at 05:04

## 2017-11-29 RX ADMIN — Medication 10 MILLIGRAM(S): at 12:53

## 2017-11-29 RX ADMIN — HEPARIN SODIUM 5000 UNIT(S): 5000 INJECTION INTRAVENOUS; SUBCUTANEOUS at 05:02

## 2017-11-29 RX ADMIN — Medication 25 MILLIGRAM(S): at 17:37

## 2017-11-29 RX ADMIN — TIOTROPIUM BROMIDE 1 CAPSULE(S): 18 CAPSULE ORAL; RESPIRATORY (INHALATION) at 15:11

## 2017-11-29 RX ADMIN — Medication 20 MILLIGRAM(S): at 15:11

## 2017-11-29 RX ADMIN — Medication 81 MILLIGRAM(S): at 12:22

## 2017-11-29 RX ADMIN — LIDOCAINE 1 PATCH: 4 CREAM TOPICAL at 00:28

## 2017-11-29 RX ADMIN — Medication 1 APPLICATION(S): at 22:14

## 2017-11-29 NOTE — CONSULT NOTE ADULT - ASSESSMENT
Assessment  DMT2: 77y Male with DM T2 with hyperglycemia on insulin, blood sugars trending down, no hypoglycemia,  eating meals, compliant with low carb diet.  HTN: Controlled, On med.  CAD: On medications, monitored.

## 2017-11-29 NOTE — CONSULT NOTE ADULT - PROBLEM SELECTOR RECOMMENDATION 9
Patient reports good relief with Baclofen.  Would suggest to continue Baclofen 20mg po Q8 hrs for treatment of muscle spasm/myalgia.  Consider Ofirmev 1gm IVSS q8hrs prn pain if no contraindications per primary team.   Physical Therapy - evaluation and treatment of left foot drop, OOB as tolerated.   Consider Neurology consult  discussed with DILMA Schultz.

## 2017-11-29 NOTE — CONSULT NOTE ADULT - SUBJECTIVE AND OBJECTIVE BOX
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 The patient reports requiring more oxygen with ambulation and becoming very SOB and fatigued with ambulation, making it very difficult for him to walk. He reports chronic cough.   He reports that about 1-2 weeks ago he developed burning/skin irritation under his nose after using vaseline with the nasal cannula. He saw a dermatologist who prescribed mupirocin and mometasone topicals which helped. He was also prescribed clotrimazole lozenges for some ?tongue irritation. He hasn't started taking it yet.   He denies HA, dizziness, CP, N/V, diarrhea, fevers, sore throat, constipation, LE edema, or abdominal pain.   The patient reports that he went to his cardiologist about a week ago and reportedly an echocardiogram at that time was normal.   Received vancomycin and zosyn and 3LNS in the ED. (01 Nov 2017 13:52)  Patient has history of diabetes, on oral meds at home, no recent hypoglycemic episodes, no polyuria polydipsia. Patient follows up with PCP for diabetes management.    PAST MEDICAL & SURGICAL HISTORY:  Asbestos exposure  Kidney stones  Coronary artery disease involving native coronary artery of native heart, angina presence unspecified: S/p CABG 2016  Cataract  Rotator cuff rupture, right  GERD (gastroesophageal reflux disease)  Hyperlipemia  Diabetes mellitus  Encounter for removal of ureteral stent: Had a ureteral stent for kidney stones. Now removed.  S/P CABG (coronary artery bypass graft)  S/P rotator cuff surgery  S/P lens implant: 2000 &amp; 2012  S/P appendectomy: over 50 years ago      FAMILY HISTORY:  Family history of breast cancer (Mother)  Family history of liver disease (Sibling)  Family history of diabetes mellitus      Social History:    Outpatient Medications:    MEDICATIONS  (STANDING):  acetaminophen  IVPB. 1000 milliGRAM(s) IV Intermittent once  aspirin  chewable 81 milliGRAM(s) Oral daily  BACItracin   Ointment 1 Application(s) Topical three times a day  baclofen 10 milliGRAM(s) Oral once  baclofen 20 milliGRAM(s) Oral three times a day  chlorhexidine 0.12% Liquid 15 milliLiter(s) Swish and Spit four times a day  digoxin     Tablet 0.125 milliGRAM(s) Oral daily  heparin  Injectable 5000 Unit(s) SubCutaneous every 8 hours  influenza   Vaccine 0.5 milliLiter(s) IntraMuscular once  insulin glargine Injectable (LANTUS) 12 Unit(s) SubCutaneous at bedtime  insulin lispro (HumaLOG) corrective regimen sliding scale   SubCutaneous every 6 hours  insulin lispro Injectable (HumaLOG) 7 Unit(s) SubCutaneous three times a day before meals  lidocaine   Patch 1 Patch Transdermal daily  metoprolol     tartrate 25 milliGRAM(s) Oral two times a day  multivitamin 1 Tablet(s) Oral daily  pantoprazole  Injectable 40 milliGRAM(s) IV Push daily  predniSONE   Tablet 10 milliGRAM(s) Oral daily  saliva substitute (BIOTENE MOISTURIZING MOUTH SPRAY) 1 [place](s) Topical three times a day  sodium chloride 0.65% Nasal 1 Spray(s) Both Nostrils three times a day  tiotropium 18 MICROgram(s) Capsule 1 Capsule(s) Inhalation daily    MEDICATIONS  (PRN):  bisacodyl Suppository 10 milliGRAM(s) Rectal daily PRN Constipation  polyethylene glycol 3350 17 Gram(s) Oral two times a day PRN Constipation  senna Syrup 10 milliLiter(s) Oral at bedtime PRN Constipation      Allergies    Vaseline (Pruritus; Urticaria)    Intolerances      Review of Systems:  Constitutional: No fever, no chills  Eyes: No blurry vision  Neuro: No tremors  HEENT: No pain, no neck swelling  Cardiovascular: No chest pain, no palpitations  Respiratory: Has SOB, no cough  GI: No nausea, vomiting, abdominal pain  : No dysuria  Skin: no rash  MSK: Has leg swelling, no foot ulcers.  Psych: no depression  Endocrine: no polyuria, polydipsia    ALL OTHER SYSTEMS REVIEWED AND NEGATIVE    UNABLE TO OBTAIN    PHYSICAL EXAM:  VITALS: T(C): 37.2 (11-29-17 @ 22:10)  T(F): 99 (11-29-17 @ 22:10), Max: 99 (11-29-17 @ 22:10)  HR: 89 (11-29-17 @ 22:10) (72 - 103)  BP: 118/57 (11-29-17 @ 22:10) (103/64 - 118/57)  RR:  (18 - 19)  SpO2:  (96% - 100%)  Wt(kg): --  GENERAL: NAD, well-groomed, well-developed  EYES: No proptosis, no lid lag  HEENT:  Atraumatic, Normocephalic  THYROID: Normal size, no palpable nodules  RESPIRATORY: Clear to auscultation bilaterally; No rales, rhonchi, wheezing  CARDIOVASCULAR: Si S2, No murmurs;  GI: Soft, non distended, normal bowel sounds  SKIN: Dry, intact, No rashes or lesions  MUSCULOSKELETAL: Has BL lower extremity edema.  NEURO:  no tremor, sensation decreased in feet BL,    POCT Blood Glucose.: 98 mg/dL (11-29-17 @ 21:27)  POCT Blood Glucose.: 266 mg/dL (11-29-17 @ 17:04)  POCT Blood Glucose.: 388 mg/dL (11-29-17 @ 14:40)  POCT Blood Glucose.: 448 mg/dL (11-29-17 @ 12:14)  POCT Blood Glucose.: 424 mg/dL (11-29-17 @ 12:12)  POCT Blood Glucose.: 198 mg/dL (11-29-17 @ 05:58)  POCT Blood Glucose.: 175 mg/dL (11-28-17 @ 23:41)  POCT Blood Glucose.: 189 mg/dL (11-28-17 @ 21:14)  POCT Blood Glucose.: 167 mg/dL (11-28-17 @ 16:40)  POCT Blood Glucose.: 284 mg/dL (11-28-17 @ 11:26)  POCT Blood Glucose.: 235 mg/dL (11-28-17 @ 05:56)  POCT Blood Glucose.: 238 mg/dL (11-28-17 @ 00:13)  POCT Blood Glucose.: 213 mg/dL (11-27-17 @ 21:21)  POCT Blood Glucose.: 235 mg/dL (11-27-17 @ 17:38)  POCT Blood Glucose.: 366 mg/dL (11-27-17 @ 12:32)  POCT Blood Glucose.: 261 mg/dL (11-27-17 @ 05:57)  POCT Blood Glucose.: 181 mg/dL (11-26-17 @ 23:59)                            8.3    7.8   )-----------( 156      ( 28 Nov 2017 14:38 )             25.3       11-29    140  |  99  |  32<H>  ----------------------------<  184<H>  4.9   |  31  |  0.74    EGFR if : 105  EGFR if non : 90    Ca    8.4      11-29        Thyroid Function Tests:  11-01 @ 16:16 TSH 1.06 FreeT4 -- T3 -- Anti TPO -- Anti Thyroglobulin Ab -- TSI --      Hemoglobin A1C, Whole Blood: 7.5 % <H> [4.0 - 5.6] (11-02-17 @ 12:49)      11-09 Chol -- LDL -- HDL -- Trig 182<H>, 11-02 Chol 107 LDL 60 HDL 26<L> Trig 103    Radiology:

## 2017-11-29 NOTE — PROGRESS NOTE ADULT - ASSESSMENT
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 CT chest shows bilateral IPF  treated for Pulmonary fibrosis / Intersitial pulmonary dz exacerbation.  Pt developed ARF intubated transferred to MICU w/ acute hypoxic respiratory failure w/ septic shock 2/2 to pseudomonal bacteremia resolved off abx.  Pt extubated, downgraded to floor.  Family is refusing Imuran will hold for now and can f/u with outpt pulm to discuss restarting.    # back pain   - cw lidocaine patch and baclofan  - c/w  tramadol PRN  - pain management consult  - PT  - neuro consult Dr Lowe if pain continues  - lumbar xray neg for fx, DJD    # Idiopathic pulmonary fibrosis / Acute hypoxic expiratory failure   - improving, c/w steroids, hold imuran as per pt family wishes.  - NGT removed, encourage oral intake, family aware of aspiration risk   - remove central line    # CHRISTIANE / Hypernatremia  - appreciate renal recs, encourage oral intake     # Pseudomonas bacteremia  -  off abx, resolved      # Lip lesion   - dry eschar from prolonged intubation, ENT appreciated, c/w bactroban    # Type 2 diabetes mellitus   - poorly controlled likely 2/2 recent DC of tube feeds and eating PO  - endo consult Dr. Craft  - c/w insulin tx     DVT ppx HSQ  dispo: rehab planning

## 2017-11-29 NOTE — CONSULT NOTE ADULT - PROBLEM SELECTOR PROBLEM 1
Shortness of breath
Back pain, unspecified back location, unspecified back pain laterality, unspecified chronicity
Type 2 diabetes mellitus without complication, without long-term current use of insulin
Lip lesion

## 2017-11-29 NOTE — PROGRESS NOTE ADULT - SUBJECTIVE AND OBJECTIVE BOX
Patient is a 77y old  Male who presents with a chief complaint of SOB (2017 14:33)      SUBJECTIVE / OVERNIGHT EVENTS:    Patient seen and examined. co pain all over especially back. is eating well orally. no acute events.      Vital Signs Last 24 Hrs  T(C): 36.7 (2017 07:41), Max: 36.7 (2017 22:19)  T(F): 98.1 (2017 07:41), Max: 98.1 (2017 07:41)  HR: 72 (2017 07:41) (72 - 97)  BP: 110/74 (2017 07:41) (92/62 - 112/72)  BP(mean): --  RR: 18 (2017 07:41) (18 - 19)  SpO2: 100% (2017 07:41) (95% - 100%)  I&O's Summary    2017 07:01  -  2017 07:00  --------------------------------------------------------  IN: 1220 mL / OUT: 1600 mL / NET: -380 mL    PE:  GENERAL: NAD, AAOx2  HEAD:  Atraumatic, Normocephalic, lip with ecchymosis some dried blood around nares, right CVC  EYES: EOMI, PERRLA, conjunctiva and sclera clear  NECK: Supple, No JVD  CHEST/LUNG: CTABL, No wheeze  HEART: Regular rate and rhythm; + murmur  ABDOMEN: Soft, Nontender, Nondistended; Bowel sounds present  : monzon  EXTREMITIES:  2+ Peripheral Pulses, No clubbing, cyanosis, or edema  SKIN: No rashes or lesions  NEURO: No focal deficits    LABS:                        8.3    7.8   )-----------( 156      ( 2017 14:38 )             25.3     11-    140  |  99  |  32<H>  ----------------------------<  184<H>  4.9   |  31  |  0.74    Ca    8.4      2017 07:46        CAPILLARY BLOOD GLUCOSE      POCT Blood Glucose.: 448 mg/dL (2017 12:14)  POCT Blood Glucose.: 424 mg/dL (2017 12:12)  POCT Blood Glucose.: 198 mg/dL (2017 05:58)  POCT Blood Glucose.: 175 mg/dL (2017 23:41)  POCT Blood Glucose.: 189 mg/dL (2017 21:14)  POCT Blood Glucose.: 167 mg/dL (2017 16:40)        Urinalysis Basic - ( 2017 12:44 )    Color: Yellow / Appearance: Turbid / S.014 / pH: x  Gluc: x / Ketone: Negative  / Bili: Negative / Urobili: 1.0 mg/dL   Blood: x / Protein: 100 mg/dL / Nitrite: Negative   Leuk Esterase: Large / RBC: 114 /HPF / WBC >720 /HPF   Sq Epi: x / Non Sq Epi: 2 /HPF / Bacteria: Occasional        RADIOLOGY & ADDITIONAL TESTS:    Imaging Personally Reviewed:  [x] YES  [ ] NO    Consultant(s) Notes Reviewed:  [x] YES  [ ] NO    MEDICATIONS  (STANDING):  aspirin  chewable 81 milliGRAM(s) Oral daily  BACItracin   Ointment 1 Application(s) Topical three times a day  baclofen 20 milliGRAM(s) Oral three times a day  chlorhexidine 0.12% Liquid 15 milliLiter(s) Swish and Spit four times a day  digoxin     Tablet 0.125 milliGRAM(s) Oral daily  heparin  Injectable 5000 Unit(s) SubCutaneous every 8 hours  influenza   Vaccine 0.5 milliLiter(s) IntraMuscular once  insulin glargine Injectable (LANTUS) 12 Unit(s) SubCutaneous at bedtime  insulin lispro (HumaLOG) corrective regimen sliding scale   SubCutaneous every 6 hours  lidocaine   Patch 1 Patch Transdermal daily  metoprolol     tartrate 25 milliGRAM(s) Oral two times a day  multivitamin 1 Tablet(s) Oral daily  pantoprazole  Injectable 40 milliGRAM(s) IV Push daily  predniSONE   Tablet 10 milliGRAM(s) Oral daily  saliva substitute (BIOTENE MOISTURIZING MOUTH SPRAY) 1 [place](s) Topical three times a day  sodium chloride 0.65% Nasal 1 Spray(s) Both Nostrils three times a day  tiotropium 18 MICROgram(s) Capsule 1 Capsule(s) Inhalation daily    MEDICATIONS  (PRN):  bisacodyl Suppository 10 milliGRAM(s) Rectal daily PRN Constipation  polyethylene glycol 3350 17 Gram(s) Oral two times a day PRN Constipation  senna Syrup 10 milliLiter(s) Oral at bedtime PRN Constipation  traMADol 50 milliGRAM(s) Oral every 6 hours PRN Mild Pain (1 - 3)      Care Discussed with Consultants/Other Providers [x] YES  [ ] NO    HEALTH ISSUES - PROBLEM Dx:  Globus sensation: Globus sensation  Lip lesion: Lip lesion  Oral ulcer: Oral ulcer  CHRISTIANE (acute kidney injury): CHRISTIANE (acute kidney injury)  Bacteremia: Bacteremia  Neutropenia: Neutropenia  Need for prophylactic measure: Need for prophylactic measure  Oral thrush: Oral thrush  Nasal discomfort: Nasal discomfort  Gastroesophageal reflux disease without esophagitis: Gastroesophageal reflux disease without esophagitis  Hyperlipidemia, unspecified hyperlipidemia type: Hyperlipidemia, unspecified hyperlipidemia type  Type 2 diabetes mellitus without complication, without long-term current use of insulin: Type 2 diabetes mellitus without complication, without long-term current use of insulin  Coronary artery disease involving native coronary artery of native heart, angina presence unspecified: Coronary artery disease involving native coronary artery of native heart, angina presence unspecified  Hyperkalemia: Hyperkalemia  Lactic acidosis: Lactic acidosis  Transaminitis: Transaminitis  Idiopathic pulmonary fibrosis: Idiopathic pulmonary fibrosis  Shortness of breath: Shortness of breath

## 2017-11-29 NOTE — CONSULT NOTE ADULT - ASSESSMENT
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  Patient with prolonged hospital stay requiring treatment in intensive care unit and intubation due to respiratory failure.  Patient now with complaints of back pain.

## 2017-11-29 NOTE — PROGRESS NOTE ADULT - SUBJECTIVE AND OBJECTIVE BOX
Patient seen and examined today, wife at bedside  Patient is doing better, able to eat some and drink liquids today  Feels better  Denies sob or chest pain  Monzon placed as significant urinary retention.    MEDICATIONS  (STANDING):  aspirin  chewable 81 milliGRAM(s) Oral daily  BACItracin   Ointment 1 Application(s) Topical three times a day  baclofen 10 milliGRAM(s) Oral three times a day  chlorhexidine 0.12% Liquid 15 milliLiter(s) Swish and Spit four times a day  digoxin     Tablet 0.125 milliGRAM(s) Oral daily  heparin  Injectable 5000 Unit(s) SubCutaneous every 8 hours  influenza   Vaccine 0.5 milliLiter(s) IntraMuscular once  insulin glargine Injectable (LANTUS) 12 Unit(s) SubCutaneous at bedtime  insulin lispro (HumaLOG) corrective regimen sliding scale   SubCutaneous every 6 hours  lidocaine   Patch 1 Patch Transdermal daily  metoprolol     tartrate 25 milliGRAM(s) Oral two times a day  multivitamin 1 Tablet(s) Oral daily  pantoprazole  Injectable 40 milliGRAM(s) IV Push daily  predniSONE   Tablet 10 milliGRAM(s) Oral daily  saliva substitute (BIOTENE MOISTURIZING MOUTH SPRAY) 1 [place](s) Topical three times a day  sodium chloride 0.65% Nasal 1 Spray(s) Both Nostrils three times a day  sodium chloride 0.9% Bolus 250 milliLiter(s) IV Bolus once  tiotropium 18 MICROgram(s) Capsule 1 Capsule(s) Inhalation daily      VITAL:  T(C): , Max: 36.7 (17 @ 17:34)  T(F): , Max: 98 (17 @ 17:34)  HR: 89 (17 @ 14:05)  BP: 92/62 (17 @ 14:05)  BP(mean): --  RR: 18 (17 @ 14:05)  SpO2: 95% (17 @ 14:05)  Wt(kg): --    I and O's:     @ 07:01  -   @ 07:00  --------------------------------------------------------  IN: 1220 mL / OUT: 1150 mL / NET: 70 mL          PHYSICAL EXAM:    Constitutional: NAD  Respiratory: CTABL  Cardiovascular: S1 and S2  Gastrointestinal: BS+, soft, NT/ND  Extremities: No peripheral edema  : + Monzon  Skin: No rashes  Access: Not applicable    LABS:                        8.3    7.8   )-----------( 156      ( 2017 14:38 )             25.3     11-    137  |  98  |  32<H>  ----------------------------<  227<H>  5.1   |  31  |  0.91    Ca    8.3<L>      2017 08:55        Urine Studies:  Urinalysis Basic - ( 2017 12:44 )    Color: Yellow / Appearance: Turbid / S.014 / pH: x  Gluc: x / Ketone: Negative  / Bili: Negative / Urobili: 1.0 mg/dL   Blood: x / Protein: 100 mg/dL / Nitrite: Negative   Leuk Esterase: Large / RBC: x / WBC x   Sq Epi: x / Non Sq Epi: x / Bacteria: x      RADIOLOGY & ADDITIONAL STUDIES:        ASSESSMENT  77 year old male with PMH of "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 He was treated for COPD exacerbation with steroids and immunosuppression Hospital course was complicated with septic shock, pseudomonas bacteremia/pneumonia requiring MICU stay, intubation and blood pressure support. Patient is extubated, out on the regular floor, off antibiotics. Patient with oral ulcers unable to swallow post NGT placement. CHRISTIANE,    1. CHRISTIANE likely prerenal+ obstructive - resolving  2. Hyponatremia: improved with reduction in free water  3. Anemia likely multifactorial: acute illness, AOCD etc; H&H low but stable today  4. Sepsis/pseudomonas bacteremia off antibiotics; plan per primary team  5. Acute retention: monzon in place - noted UA with significant WBCs and LE    RECOMMENDATIONS:  - Continue free water to 250 ml every 6 hrs   - encourage PO intake  - Keep monzon for now  - monitor ins and outs  - F/U UA and culture  - OOB to chair and PT as able  - Avoid nephrotoxins, agree on holding ACEIs  - dose meds for GFR of 40 ml/min/1.73m2    Ju Davis MD  [facility]

## 2017-11-29 NOTE — CONSULT NOTE ADULT - SUBJECTIVE AND OBJECTIVE BOX
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 Patient has been hospitalized for one month, requiring intubation and treatment in intensive care unit due to respiratory failure.  Patient reports he began to complain of back pain after being in bed for a prolonged period of time while intubated and receiving treatment in intensive care unit.  Patient reports achiness throughout upper and lower back.  Pain is most severe across lower back.  Denies radiation to lower extremities or paresthesias.  Pain is described as aching, currently 10/10 on pain scale.  Patient does not have history of chronic back pain.  Patient does not take pain medications as out-patient.  Patient received Baclofen yesterday with good relief.  Last BM yesterday.  Appetite fair.  Wife at bedside.       PAST MEDICAL & SURGICAL HISTORY:  Asbestos exposure  Kidney stones  Coronary artery disease involving native coronary artery of native heart, angina presence unspecified: S/p CABG 2016  Cataract  Rotator cuff rupture, right  GERD (gastroesophageal reflux disease)  Hyperlipemia  Diabetes mellitus  Encounter for removal of ureteral stent: Had a ureteral stent for kidney stones. Now removed.  S/P CABG (coronary artery bypass graft)  S/P rotator cuff surgery  S/P lens implant:  &amp;   S/P appendectomy: over 50 years ago      FAMILY HISTORY:  Family history of breast cancer (Mother)  Family history of liver disease (Sibling)  Family history of diabetes mellitus      Allergies    Vaseline (Pruritus; Urticaria)      PAIN MEDICATIONS:  baclofen 20 milliGRAM(s) Oral three times a day    Heme:  aspirin  chewable 81 milliGRAM(s) Oral daily  heparin  Injectable 5000 Unit(s) SubCutaneous every 8 hours    Cardiovascular:  digoxin     Tablet 0.125 milliGRAM(s) Oral daily  metoprolol     tartrate 25 milliGRAM(s) Oral two times a day    GI:  bisacodyl Suppository 10 milliGRAM(s) Rectal daily PRN  pantoprazole  Injectable 40 milliGRAM(s) IV Push daily  polyethylene glycol 3350 17 Gram(s) Oral two times a day PRN  senna Syrup 10 milliLiter(s) Oral at bedtime PRN    Endocrine:  insulin glargine Injectable (LANTUS) 12 Unit(s) SubCutaneous at bedtime  insulin lispro (HumaLOG) corrective regimen sliding scale   SubCutaneous every 6 hours  insulin lispro Injectable (HumaLOG) 7 Unit(s) SubCutaneous three times a day before meals  predniSONE   Tablet 10 milliGRAM(s) Oral daily    All Other Medications:  BACItracin   Ointment 1 Application(s) Topical three times a day  chlorhexidine 0.12% Liquid 15 milliLiter(s) Swish and Spit four times a day  influenza   Vaccine 0.5 milliLiter(s) IntraMuscular once  lidocaine   Patch 1 Patch Transdermal daily  multivitamin 1 Tablet(s) Oral daily  saliva substitute (BIOTENE MOISTURIZING MOUTH SPRAY) 1 [place](s) Topical three times a day  sodium chloride 0.65% Nasal 1 Spray(s) Both Nostrils three times a day      Vital Signs Last 24 Hrs  T(C): 36.6 (2017 15:08), Max: 36.7 (2017 22:19)  T(F): 97.9 (2017 15:08), Max: 98.1 (2017 07:41)  HR: 103 (2017 15:08) (72 - 103)  BP: 103/64 (2017 15:08) (103/64 - 112/72)  BP(mean): --  RR: 18 (2017 15:08) (18 - 19)  SpO2: 96% (2017 15:08) (96% - 100%)    PAIN SCORE:   10    SCALE USED: (1-10 VNRS)             PHYSICAL EXAM:    GENERAL: NAD, sitting in recliner, alert, monzon in place  NECK: Supple, No JVD, Normal thyroid  NERVOUS SYSTEM:  Alert & Oriented X3, Good concentration; Motor Strength 5/5 B/L upper extremities;  Sensation intact b/l upper and lower extremities.    - Lower Ext Power:  L2 hip flex: 5/5 b/l  L3 knee ext 5/5 b/l  L4 foot dorsiflex: 3/5 b/l  L5 hallux dorsiflex: 3/5 b/l  S1 hallux plantar flex: 5/5 b/l.   Tenderness on palpation of paravertebral muscles throughout cervical, thoracic and lumbar spine  CHEST/LUNG: respiratory rate and effort WNL  HEART: Regular rate   ABDOMEN: Soft, present  EXTREMITIES:   No clubbing, cyanosis, or edema, left calf sca        LABS:                          8.3    7.8   )-----------( 156      ( 2017 14:38 )             25.3         140  |  99  |  32<H>  ----------------------------<  184<H>  4.9   |  31  |  0.74    Ca    8.4      2017 07:46        Urinalysis Basic - ( 2017 12:44 )    Color: Yellow / Appearance: Turbid / S.014 / pH: x  Gluc: x / Ketone: Negative  / Bili: Negative / Urobili: 1.0 mg/dL   Blood: x / Protein: 100 mg/dL / Nitrite: Negative   Leuk Esterase: Large / RBC: 114 /HPF / WBC >720 /HPF   Sq Epi: x / Non Sq Epi: 2 /HPF / Bacteria: Occasional        RADIOLOGY:  EXAM:  LUMBAR SPINE AP AND LATERAL                            PROCEDURE DATE:  2017            INTERPRETATION:  CLINICAL INDICATION: Pseudomonas bacteremia, evaluate   for fracture    TECHNIQUE: 2 views of the lumbar spine     COMPARISON: CT abdomen and pelvis from 10/31/2017    FINDINGS:    The vertebral body heights are maintained. No displaced fracture   identified. There is mild disc height loss at L5-S1. There is mild   straightening of the normal lumbar lordosis. Mild anterior disc   osteophyte complexes are noted. Vascular calcifications are noted.    COMPARISON:    1.  Vertebral body heights are maintained. No displaced fracture   identified.  2.  Mild degenerative disc disease at L5-S1.                MARYJO RESENDEZ M.D., RADIOLOGY RESIDENT  This document has been electronically signed.  IVON SMITH M.D., ATTENDING RADIOLOGIST  This document has been electronically signed. 2017 12:52PM                      [ ]  JORDAN  Reviewed and Copied to Chart  [id number]

## 2017-11-30 DIAGNOSIS — R41.82 ALTERED MENTAL STATUS, UNSPECIFIED: ICD-10-CM

## 2017-11-30 LAB
ALBUMIN SERPL ELPH-MCNC: 2.3 G/DL — LOW (ref 3.3–5)
ALP SERPL-CCNC: 163 U/L — HIGH (ref 40–120)
ALT FLD-CCNC: 44 U/L RC — SIGNIFICANT CHANGE UP (ref 10–45)
ANION GAP SERPL CALC-SCNC: 9 MMOL/L — SIGNIFICANT CHANGE UP (ref 5–17)
APPEARANCE UR: ABNORMAL
AST SERPL-CCNC: 36 U/L — SIGNIFICANT CHANGE UP (ref 10–40)
BASE EXCESS BLDA CALC-SCNC: 8 MMOL/L — HIGH (ref -2–2)
BILIRUB SERPL-MCNC: 0.4 MG/DL — SIGNIFICANT CHANGE UP (ref 0.2–1.2)
BILIRUB UR-MCNC: NEGATIVE — SIGNIFICANT CHANGE UP
BUN SERPL-MCNC: 28 MG/DL — HIGH (ref 7–23)
CALCIUM SERPL-MCNC: 8.1 MG/DL — LOW (ref 8.4–10.5)
CHLORIDE SERPL-SCNC: 99 MMOL/L — SIGNIFICANT CHANGE UP (ref 96–108)
CO2 BLDA-SCNC: 32 MMOL/L — HIGH (ref 22–30)
CO2 SERPL-SCNC: 31 MMOL/L — SIGNIFICANT CHANGE UP (ref 22–31)
COLOR SPEC: YELLOW — SIGNIFICANT CHANGE UP
CREAT SERPL-MCNC: 0.83 MG/DL — SIGNIFICANT CHANGE UP (ref 0.5–1.3)
CULTURE RESULTS: SIGNIFICANT CHANGE UP
DIFF PNL FLD: ABNORMAL
GAS PNL BLDA: SIGNIFICANT CHANGE UP
GLUCOSE BLDC GLUCOMTR-MCNC: 131 MG/DL — HIGH (ref 70–99)
GLUCOSE BLDC GLUCOMTR-MCNC: 169 MG/DL — HIGH (ref 70–99)
GLUCOSE BLDC GLUCOMTR-MCNC: 182 MG/DL — HIGH (ref 70–99)
GLUCOSE BLDC GLUCOMTR-MCNC: 192 MG/DL — HIGH (ref 70–99)
GLUCOSE BLDC GLUCOMTR-MCNC: 77 MG/DL — SIGNIFICANT CHANGE UP (ref 70–99)
GLUCOSE SERPL-MCNC: 168 MG/DL — HIGH (ref 70–99)
GLUCOSE UR QL: 500
HCO3 BLDA-SCNC: 31 MMOL/L — HIGH (ref 21–29)
HCT VFR BLD CALC: 25.9 % — LOW (ref 39–50)
HGB BLD-MCNC: 8.8 G/DL — LOW (ref 13–17)
KETONES UR-MCNC: NEGATIVE — SIGNIFICANT CHANGE UP
LACTATE SERPL-SCNC: 2 MMOL/L — SIGNIFICANT CHANGE UP (ref 0.7–2)
LEUKOCYTE ESTERASE UR-ACNC: ABNORMAL
MCHC RBC-ENTMCNC: 33.9 GM/DL — SIGNIFICANT CHANGE UP (ref 32–36)
MCHC RBC-ENTMCNC: 35.4 PG — HIGH (ref 27–34)
MCV RBC AUTO: 105 FL — HIGH (ref 80–100)
NITRITE UR-MCNC: NEGATIVE — SIGNIFICANT CHANGE UP
PCO2 BLDA: 38 MMHG — SIGNIFICANT CHANGE UP (ref 32–46)
PH BLDA: 7.52 — HIGH (ref 7.35–7.45)
PH UR: 7.5 — SIGNIFICANT CHANGE UP (ref 5–8)
PLATELET # BLD AUTO: 191 K/UL — SIGNIFICANT CHANGE UP (ref 150–400)
PO2 BLDA: 168 MMHG — HIGH (ref 74–108)
POTASSIUM SERPL-MCNC: 4.4 MMOL/L — SIGNIFICANT CHANGE UP (ref 3.5–5.3)
POTASSIUM SERPL-SCNC: 4.4 MMOL/L — SIGNIFICANT CHANGE UP (ref 3.5–5.3)
PROT SERPL-MCNC: 6.6 G/DL — SIGNIFICANT CHANGE UP (ref 6–8.3)
PROT UR-MCNC: 30 MG/DL
RBC # BLD: 2.48 M/UL — LOW (ref 4.2–5.8)
RBC # FLD: 17.9 % — HIGH (ref 10.3–14.5)
SAO2 % BLDA: 100 % — HIGH (ref 92–96)
SODIUM SERPL-SCNC: 139 MMOL/L — SIGNIFICANT CHANGE UP (ref 135–145)
SP GR SPEC: 1.01 — SIGNIFICANT CHANGE UP (ref 1.01–1.02)
SPECIMEN SOURCE: SIGNIFICANT CHANGE UP
UROBILINOGEN FLD QL: NEGATIVE — SIGNIFICANT CHANGE UP
WBC # BLD: 6 K/UL — SIGNIFICANT CHANGE UP (ref 3.8–10.5)
WBC # FLD AUTO: 6 K/UL — SIGNIFICANT CHANGE UP (ref 3.8–10.5)

## 2017-11-30 PROCEDURE — 71010: CPT | Mod: 26

## 2017-11-30 RX ORDER — CEFTRIAXONE 500 MG/1
INJECTION, POWDER, FOR SOLUTION INTRAMUSCULAR; INTRAVENOUS
Qty: 0 | Refills: 0 | Status: DISCONTINUED | OUTPATIENT
Start: 2017-11-30 | End: 2017-12-01

## 2017-11-30 RX ORDER — INSULIN LISPRO 100/ML
VIAL (ML) SUBCUTANEOUS
Qty: 0 | Refills: 0 | Status: DISCONTINUED | OUTPATIENT
Start: 2017-11-30 | End: 2017-12-04

## 2017-11-30 RX ORDER — ACETAMINOPHEN 500 MG
1000 TABLET ORAL ONCE
Qty: 0 | Refills: 0 | Status: COMPLETED | OUTPATIENT
Start: 2017-11-30 | End: 2017-11-30

## 2017-11-30 RX ORDER — CEFTRIAXONE 500 MG/1
1 INJECTION, POWDER, FOR SOLUTION INTRAMUSCULAR; INTRAVENOUS ONCE
Qty: 0 | Refills: 0 | Status: COMPLETED | OUTPATIENT
Start: 2017-11-30 | End: 2017-11-30

## 2017-11-30 RX ORDER — CEFTRIAXONE 500 MG/1
1 INJECTION, POWDER, FOR SOLUTION INTRAMUSCULAR; INTRAVENOUS EVERY 24 HOURS
Qty: 0 | Refills: 0 | Status: DISCONTINUED | OUTPATIENT
Start: 2017-12-01 | End: 2017-12-01

## 2017-11-30 RX ORDER — SODIUM CHLORIDE 9 MG/ML
500 INJECTION INTRAMUSCULAR; INTRAVENOUS; SUBCUTANEOUS
Qty: 0 | Refills: 0 | Status: DISCONTINUED | OUTPATIENT
Start: 2017-11-30 | End: 2017-12-05

## 2017-11-30 RX ADMIN — CEFTRIAXONE 100 GRAM(S): 500 INJECTION, POWDER, FOR SOLUTION INTRAMUSCULAR; INTRAVENOUS at 03:41

## 2017-11-30 RX ADMIN — CHLORHEXIDINE GLUCONATE 15 MILLILITER(S): 213 SOLUTION TOPICAL at 06:27

## 2017-11-30 RX ADMIN — Medication 1 APPLICATION(S): at 23:05

## 2017-11-30 RX ADMIN — Medication 25 MILLIGRAM(S): at 06:28

## 2017-11-30 RX ADMIN — TIOTROPIUM BROMIDE 1 CAPSULE(S): 18 CAPSULE ORAL; RESPIRATORY (INHALATION) at 13:23

## 2017-11-30 RX ADMIN — Medication 400 MILLIGRAM(S): at 17:56

## 2017-11-30 RX ADMIN — HEPARIN SODIUM 5000 UNIT(S): 5000 INJECTION INTRAVENOUS; SUBCUTANEOUS at 23:05

## 2017-11-30 RX ADMIN — HEPARIN SODIUM 5000 UNIT(S): 5000 INJECTION INTRAVENOUS; SUBCUTANEOUS at 13:35

## 2017-11-30 RX ADMIN — HEPARIN SODIUM 5000 UNIT(S): 5000 INJECTION INTRAVENOUS; SUBCUTANEOUS at 06:27

## 2017-11-30 RX ADMIN — CHLORHEXIDINE GLUCONATE 15 MILLILITER(S): 213 SOLUTION TOPICAL at 17:57

## 2017-11-30 RX ADMIN — PANTOPRAZOLE SODIUM 40 MILLIGRAM(S): 20 TABLET, DELAYED RELEASE ORAL at 13:22

## 2017-11-30 RX ADMIN — Medication 10 MILLIGRAM(S): at 06:28

## 2017-11-30 RX ADMIN — Medication 1 TABLET(S): at 13:22

## 2017-11-30 RX ADMIN — Medication 1 SPRAY(S): at 17:25

## 2017-11-30 RX ADMIN — LIDOCAINE 1 PATCH: 4 CREAM TOPICAL at 00:01

## 2017-11-30 RX ADMIN — Medication 7 UNIT(S): at 17:57

## 2017-11-30 RX ADMIN — CEFTRIAXONE 100 GRAM(S): 500 INJECTION, POWDER, FOR SOLUTION INTRAMUSCULAR; INTRAVENOUS at 11:40

## 2017-11-30 RX ADMIN — Medication 1 APPLICATION(S): at 13:26

## 2017-11-30 RX ADMIN — Medication 25 MILLIGRAM(S): at 17:57

## 2017-11-30 RX ADMIN — Medication 81 MILLIGRAM(S): at 13:22

## 2017-11-30 RX ADMIN — Medication 1 APPLICATION(S): at 06:26

## 2017-11-30 RX ADMIN — Medication 1 SPRAY(S): at 23:07

## 2017-11-30 RX ADMIN — CHLORHEXIDINE GLUCONATE 15 MILLILITER(S): 213 SOLUTION TOPICAL at 23:06

## 2017-11-30 RX ADMIN — CHLORHEXIDINE GLUCONATE 15 MILLILITER(S): 213 SOLUTION TOPICAL at 13:22

## 2017-11-30 RX ADMIN — Medication 7 UNIT(S): at 09:09

## 2017-11-30 RX ADMIN — Medication 2: at 17:57

## 2017-11-30 RX ADMIN — Medication 1 SPRAY(S): at 06:26

## 2017-11-30 RX ADMIN — LIDOCAINE 1 PATCH: 4 CREAM TOPICAL at 13:22

## 2017-11-30 RX ADMIN — Medication 1 SPRAY(S): at 06:28

## 2017-11-30 RX ADMIN — Medication 1 SPRAY(S): at 23:08

## 2017-11-30 RX ADMIN — Medication 400 MILLIGRAM(S): at 12:20

## 2017-11-30 RX ADMIN — INSULIN GLARGINE 12 UNIT(S): 100 INJECTION, SOLUTION SUBCUTANEOUS at 21:47

## 2017-11-30 RX ADMIN — Medication 0.12 MILLIGRAM(S): at 06:27

## 2017-11-30 NOTE — CONSULT NOTE ADULT - CONSULT REASON
Lip lesion
ams, weakness
AF
Dyspnea
Dyspnea / Sepsis
High Blood Sugars/DMT2
back pain
CHRISTIANE and hypernatremia
dyspnea
history of probable idiopathic pulmonary fibrosis on azathioprine and white cell count of 1.4 with ANC of about 1120.

## 2017-11-30 NOTE — PROGRESS NOTE ADULT - SUBJECTIVE AND OBJECTIVE BOX
Follow-up Pulm Progress Note  Amauri Jackson MD  [phone]      Events noted  Depressed mental status 11/29 popst baclofen  CT head negative acute changes, cultures pending  Mental status improved today  Empiric ceftriaxone started pending cultures  Ongoing back pain - s/p IV tylenol today  Repeat CXR unchanged with bilateral reticular opacities    Vital Signs Last 24 Hrs  T(C): 36.4 (28 Nov 2017 07:51), Max: 36.7 (27 Nov 2017 17:34)  T(F): 97.6 (28 Nov 2017 07:51), Max: 98 (27 Nov 2017 17:34)  HR: 72 (28 Nov 2017 07:51) (72 - 110)  BP: 99/62 (28 Nov 2017 07:51) (99/62 - 134/107)  BP(mean): --  RR: 18 (28 Nov 2017 07:51) (16 - 18)  SpO2: 95% (28 Nov 2017 07:51) (95% - 99%)                         7.7    7.63  )-----------( 187      ( 27 Nov 2017 08:45 )             23.8     11-28    137  |  98  |  32<H>  ----------------------------<  227<H>  5.1   |  31  |  0.91    Ca    8.3<L>      28 Nov 2017 08:55      CXR: unchanged reticular opacities: no gross new pulm edema  TTE: Mild AS, hyperdynamic LV, PA 40-50  Physical Examination:  PULM: No wheeze or rhonchi   CVS: Regular rate and rhythm, no murmurs, rubs, or gallops  ABD: Soft, non-tender  EXT:  LUE edema    RADIOLOGY REVIEWED  CXR: No change in bilateral reticular opacities    CT chest: see above    TTE:

## 2017-11-30 NOTE — CONSULT NOTE ADULT - PROVIDER SPECIALTY LIST ADULT
Cardiology
Endocrinology
MICU
Pain Medicine
Pulmonology
Nephrology
ENT
Infectious Disease
Neurology
Heme/Onc

## 2017-11-30 NOTE — PROGRESS NOTE ADULT - SUBJECTIVE AND OBJECTIVE BOX
Chief complaint  Patient is a 77y old  Male who presents with a chief complaint of SOB (04 Nov 2017 14:33)   Review of systems  Patient in bed, looks comfortable, no fever, no hypoglycemia.    Labs and Fingersticks    CAPILLARY BLOOD GLUCOSE    Anion Gap, Serum: 9 (11-30 @ 10:54)  Anion Gap, Serum: 9 (11-29 @ 23:08)  Anion Gap, Serum: 10 (11-29 @ 07:46)      Calcium, Total Serum: 8.1 <L> (11-30 @ 10:54)  Calcium, Total Serum: 8.7 (11-29 @ 23:08)  Calcium, Total Serum: 8.4 (11-29 @ 07:46)  Albumin, Serum: 2.3 <L> (11-30 @ 10:54)    Alanine Aminotransferase (ALT/SGPT): 44 (11-30 @ 10:54)  Alkaline Phosphatase, Serum: 163 <H> (11-30 @ 10:54)  Aspartate Aminotransferase (AST/SGOT): 36 (11-30 @ 10:54)        11-30    139  |  99  |  28<H>  ----------------------------<  168<H>  4.4   |  31  |  0.83    Ca    8.1<L>      30 Nov 2017 10:54    TPro  6.6  /  Alb  2.3<L>  /  TBili  0.4  /  DBili  x   /  AST  36  /  ALT  44  /  AlkPhos  163<H>  11-30                        8.8    6.0   )-----------( 191      ( 30 Nov 2017 10:54 )             25.9     Medications  MEDICATIONS  (STANDING):  aspirin  chewable 81 milliGRAM(s) Oral daily  BACItracin   Ointment 1 Application(s) Topical three times a day  baclofen 10 milliGRAM(s) Oral once  baclofen 20 milliGRAM(s) Oral three times a day  cefTRIAXone   IVPB      chlorhexidine 0.12% Liquid 15 milliLiter(s) Swish and Spit four times a day  digoxin     Tablet 0.125 milliGRAM(s) Oral daily  heparin  Injectable 5000 Unit(s) SubCutaneous every 8 hours  influenza   Vaccine 0.5 milliLiter(s) IntraMuscular once  insulin glargine Injectable (LANTUS) 12 Unit(s) SubCutaneous at bedtime  insulin lispro (HumaLOG) corrective regimen sliding scale   SubCutaneous Before meals and at bedtime  insulin lispro Injectable (HumaLOG) 7 Unit(s) SubCutaneous three times a day before meals  lidocaine   Patch 1 Patch Transdermal daily  metoprolol     tartrate 25 milliGRAM(s) Oral two times a day  multivitamin 1 Tablet(s) Oral daily  pantoprazole  Injectable 40 milliGRAM(s) IV Push daily  predniSONE   Tablet 10 milliGRAM(s) Oral daily  saliva substitute (BIOTENE MOISTURIZING MOUTH SPRAY) 1 [place](s) Topical three times a day  sodium chloride 0.65% Nasal 1 Spray(s) Both Nostrils three times a day  sodium chloride 0.9%. 500 milliLiter(s) (75 mL/Hr) IV Continuous <Continuous>  tiotropium 18 MICROgram(s) Capsule 1 Capsule(s) Inhalation daily      Physical Exam  General: Patient comfortable in bed  Vital Signs Last 12 Hrs  T(F): 97.5 (11-30-17 @ 21:40), Max: 98.1 (11-30-17 @ 16:56)  HR: 62 (11-30-17 @ 21:40) (62 - 86)  BP: 103/65 (11-30-17 @ 21:40) (103/65 - 117/72)  BP(mean): --  RR: 18 (11-30-17 @ 21:40) (18 - 18)  SpO2: 99% (11-30-17 @ 21:40) (99% - 99%)  Neck: No palpable thyroid nodules.  CVS: S1S2, No murmurs  Respiratory: No wheezing, no crepitations  GI: Abdomen soft, bowel sounds positive  Musculoskeletal: Positive edema lower extremities.   Skin: No skin rashes, no ecchymosis    Diagnostics    Thyroid Stimulating Hormone, Serum: AM Sched. Collection: 01-Dec-2017 06:00 (11-30 @ 10:30)  Free Thyroxine, Serum: AM Sched. Collection: 01-Dec-2017 06:00 (11-30 @ 10:30)

## 2017-11-30 NOTE — CHART NOTE - NSCHARTNOTEFT_GEN_A_CORE
MEDICINE PA     Patient is a 77y old  Male who presents with a chief complaint of SOB (04 Nov 2017 14:33)     Event Summary: Called to bedside by RN because son reports that patient is agitated/pulling at IV/nasal cannula. History obtained by son at bedside. States that pt recently started on baclofen for control of chronic LBP. Pt's dose of baclofen increased today from 10 mg TID to 20 mg TID. Son reports that patient got 30 mg baclofen in a 2-3 hour window during the day. States that around 4:30 PM, it seemed as if patient was "high off the baclofen" as he was slow to answer. Pt became fidgety, tremulous in b/l upper extremity- which is new. Around 5-6 began showing slight agitation and pulling at nasal cannula and around 6:30/7 noticed change with mental status where patient not expressing himself /conversing as he is usually able to.     Vital Signs Last 24 Hrs  T(C): 37.2 (29 Nov 2017 22:10), Max: 37.2 (29 Nov 2017 22:10)  T(F): 99 (29 Nov 2017 22:10), Max: 99 (29 Nov 2017 22:10)  HR: 89 (29 Nov 2017 22:10) (72 - 103)  BP: 118/57 (29 Nov 2017 22:10) (103/64 - 118/57)  RR: 18 (29 Nov 2017 22:10) (18 - 18)  SpO2: 96% (29 Nov 2017 22:10) (96% - 100%)    Physical Assessment:  General: Lethargic  Neurology: PERRL. Unable to complete neuro exam as patient not following all commands  CV: +S1S2, +murmur.  No peripheral edema  Respiratory: Even, shallow.  CTA B/L.  No appreciable wheeze  Abdomen:  Soft, non distended  MSK: L foot drop. Able  Skin: Warm and Dry         A/P:                Jaleesa Green PA-C   [facility]     Patient is a 77y old  Male who presents with a chief complaint of SOB (04 Nov 2017 14:33)     Event Summary: Called to bedside by RN because son reports that patient is agitated/pulling at IV/nasal cannula. History obtained by son at bedside. States that pt recently started on baclofen for control of chronic LBP. Pt's dose of baclofen increased today from 10 mg TID to 20 mg TID. Son reports that patient got 30 mg baclofen in a 2-3 hour window during the day. States that around 4:30 PM, it seemed as if patient was "high off the baclofen" as he was slow to answer. Pt became fidgety, tremulous in b/l upper extremity- which is new. Around 5-6 began showing slight agitation and pulling at nasal cannula and around 6:30/7 noticed change with mental status where patient not expressing himself /conversing as he is usually able to.     Vital Signs Last 24 Hrs  T(C): 37.2 (29 Nov 2017 22:10), Max: 37.2 (29 Nov 2017 22:10)  T(F): 99 (29 Nov 2017 22:10), Max: 99 (29 Nov 2017 22:10)  HR: 89 (29 Nov 2017 22:10) (72 - 103)  BP: 118/57 (29 Nov 2017 22:10) (103/64 - 118/57)  RR: 18 (29 Nov 2017 22:10) (18 - 18)  SpO2: 96% (29 Nov 2017 22:10) (96% - 100%)    Physical Assessment:  General: Lethargic  Neurology: PERRL. Unable to complete neuro exam as patient not following all commands  CV: +S1S2, +murmur.  No peripheral edema  Respiratory: Even, shallow.  CTA B/L.  No appreciable wheeze  Abdomen:  Soft, non distended. [place] draining light yellow fluid  MSK: L foot drop. Able to squeeze my fingers. Strength L<R. Unable to assess sensation   Skin: Warm and Dry     A/P:  HPI:  77 year old male with PMH of "asbestos-related lung disease" on home O2 (3L), CAD s/p CABG (2016), DM-2, GERD, kidney stones admitted with 2-3 weeks worsening dyspnea on exertion. During admission, CT chest showed bilateral IPF  treated for Pulmonary fibrosis / Intersitial pulmonary dz exacerbation.  Hospital course complicated by ARF intubated transferred to MICU w/ acute hypoxic respiratory failure w/ septic shock 2/2 to pseudomonal bacteremia resolved off abx.  Pt extubated, downgraded to floor.  Acutely presenting with AMS.   1. AMS of unknown etiology   - CTH Urgent- Prelim read with no acute/emergent findings per radiology Dr. Choi   - ABG: ABG - ( 30 Nov 2017 00:33 ) pH: 7.52  /  pCO2: 38    /  pO2: 168   / HCO3: 31    / Base Excess: 8.0   /  SaO2: 100     - UA: +leukocyte esterase. 1 gm ceftriaxone ordered. Sample from [place]zon- please follow up urine culture and continue abx if appropriate  - CXR STAT: r/o aspiration as patient was previously on tube feeds and restarted on PO feeds today   - Vital signs stable   - Neuro checks   - Consider Neuro consult if AMS continues   - Limit opioids/sedatives  - MICU consult if pt becomes unstable/unable to protect airway  - Work up per clinical course   - Discussed with Dr. Negrete and family at bedside.     Jaleesa Green PA-C   [facility] [id number] [facility]ICINE [facility]     Patient is a 77y old  Male who presents with a chief complaint of SOB (04 Nov 2017 14:33)     Event Summary: Called to bedside by RN because son reports that patient is agitated/pulling at IV/nasal cannula. History obtained by son at bedside. States that pt recently started on baclofen for control of chronic LBP. Pt's dose of baclofen increased today from 10 mg TID to 20 mg TID. Son reports that patient got 30 mg baclofen in a 2-3 hour window during the day. States that around 4:30 PM, it seemed as if patient was "high off the baclofen" as he was slow to answer. Pt became fidgety, tremulous in b/l upper extremity- which is new. Around 5-6 began showing slight agitation and pulling at nasal cannula and around 6:30/7 noticed change with mental status where patient not expressing himself /conversing as he is usually able to. Pt unable to hold conversation on exam, and not following commands. Able to tell me his last name is Catarino. Unresponsive to most questions on interview, however answering some yes/no questions.     Vital Signs Last 24 Hrs  T(C): 37.2 (29 Nov 2017 22:10), Max: 37.2 (29 Nov 2017 22:10)  T(F): 99 (29 Nov 2017 22:10), Max: 99 (29 Nov 2017 22:10)  HR: 89 (29 Nov 2017 22:10) (72 - 103)  BP: 118/57 (29 Nov 2017 22:10) (103/64 - 118/57)  RR: 18 (29 Nov 2017 22:10) (18 - 18)  SpO2: 96% (29 Nov 2017 22:10) (96% - 100%)    Physical Assessment:  General: Lethargic  Neurology: PERRL. No facial droop, Unable to complete neuro exam as patient not following all commands.   CV: +S1S2, +murmur.  No peripheral edema   ENT: bottom lip blood crusting and ecchymosis 2/2 previous intubation  Respiratory: Even, shallow.  CTA B/L.  No appreciable wheeze  Abdomen:  Soft, non distended. Villagran draining light yellow fluid  MSK: L foot drop. Able to squeeze my fingers. Strength L<R. Unable to assess sensation   Skin: Warm and Dry     A/P:  HPI:  77 year old male with PMH of pulmonary fibrosis on home O2 (3L), CAD s/p CABG (2016), DM-2, GERD, kidney stones admitted with 2-3 weeks worsening dyspnea on exertion.  Hospital course complicated by ARF intubated transferred to MICU w/ acute hypoxic respiratory failure w/ septic shock 2/2 to pseudomonal bacteremia resolved off abx.  Pt extubated, downgraded to floor.  Acutely presenting with AMS 2/2 to unclear etiology.   1. AMS of unknown etiology   - CTH Urgent- Prelim read with no acute/emergent findings per radiology Dr. Choi. Please follow up official read. Consider MR Brain if AMS continues.     - ABG: ABG - ( 30 Nov 2017 00:33 ) pH: 7.52  /  pCO2: 38    /  pO2: 168   / HCO3: 31    / Base Excess: 8.0   /  SaO2: 100     - UA: +leukocyte esterase- sample from [place]. Please follow up urine culture results. 1 gm ceftriaxone ordered  - CXR STAT: r/o aspiration as patient was previously on tube feeds and restarted on PO feeds today   - Vital signs stable   - Neuro checks   - Consider Neuro consult if AMS continues   - Limit opioids/sedatives  - MICU consult if pt becomes unstable/unable to protect airway  - Work up per clinical course   - Discussed with Dr. Negrete and family at bedside.     Jaleesa Green PA-C   [facility] [id number] MEDICINE [facility]     Patient is a 77y old  Male who presents with a chief complaint of SOB (04 Nov 2017 14:33)     Event Summary: Called to bedside by RN because son reports that patient is agitated/pulling at IV/nasal cannula. History obtained by son at bedside. States that pt recently started on baclofen for control of chronic LBP. Pt's dose of baclofen increased today from 10 mg TID to 20 mg TID. Son reports that patient got 30 mg baclofen in a 2-3 hour window during the day. States that around 4:30 PM, it seemed as if patient was "high off the baclofen" as he was slow to answer. Pt became fidgety, tremulous in b/l upper extremity- which was new. Around 5-6 began showing slight agitation and pulling at nasal cannula and around 6:30/7 noticed change with mental status where patient not expressing himself /conversing as he is usually able to. Pt unable to hold conversation on exam, and not following commands. Able to tell me his last name is Catarino. Unresponsive to most questions on interview, however answering some yes/no questions.     Vital Signs Last 24 Hrs  T(C): 37.2 (29 Nov 2017 22:10), Max: 37.2 (29 Nov 2017 22:10)  T(F): 99 (29 Nov 2017 22:10), Max: 99 (29 Nov 2017 22:10)  HR: 89 (29 Nov 2017 22:10) (72 - 103)  BP: 118/57 (29 Nov 2017 22:10) (103/64 - 118/57)  RR: 18 (29 Nov 2017 22:10) (18 - 18)  SpO2: 96% (29 Nov 2017 22:10) (96% - 100%)    Physical Assessment:  General: Lethargic  Neurology: PERRL. No facial droop, Unable to complete neuro exam as patient not following all commands.   CV: +S1S2, +murmur.  No peripheral edema   ENT: bottom lip blood crusting and ecchymosis 2/2 previous intubation  Respiratory: Even, shallow.  CTA B/L.  No appreciable wheeze  Abdomen:  Soft, non distended. [place] draining light yellow fluid  MSK: L foot drop. Able to squeeze my fingers. Strength L<R. Unable to assess sensation   Skin: Warm and Dry     A/P:  HPI:  77 year old male with PMH of pulmonary fibrosis on home O2 (3L), CAD s/p CABG (2016), DM-2, GERD, kidney stones admitted with 2-3 weeks worsening dyspnea on exertion.  Hospital course complicated by ARF intubated transferred to MICU w/ acute hypoxic respiratory failure w/ septic shock 2/2 to pseudomonal bacteremia resolved off abx.  Pt extubated, downgraded to floor.  Acutely presenting with AMS 2/2 to unclear etiology.   1. AMS of unknown etiology   - CTH Urgent- Prelim read with no acute/emergent findings per radiology Dr. hCoi. Please follow up official read. Consider MR Brain if AMS continues.     - ABG: ABG - ( 30 Nov 2017 00:33 ) pH: 7.52  /  pCO2: 38    /  pO2: 168   / HCO3: 31    / Base Excess: 8.0   /  SaO2: 100     - UA: +leukocyte esterase- sample from [place]. Please follow up urine culture results. 1 gm ceftriaxone ordered  - CXR STAT: r/o aspiration as patient was previously on tube feeds and restarted on PO feeds today   - Vital signs stable   - Neuro checks ordered  - Neuro consult if AMS continues   - Limit opioids/sedatives  - MICU consult if pt becomes HD unstable/unable to protect airway  - Blood cultures sent. Please follow up results.   - Work up per clinical course   - Discussed with Dr. Negrete- will call neurology consults.     Discussed with wife and son at bedside.     Jaleesa Green PA-C   [facility] [id number] [facility]IC[facility]     Patient is a 77y old  Male who presents with a chief complaint of SOB (04 Nov 2017 14:33)     Event Summary: Called to bedside by RN because son reports that patient is agitated/pulling at IV/nasal cannula. History obtained by son at bedside. States that pt recently started on baclofen for control of chronic LBP. Pt's dose of baclofen increased today from 10 mg TID to 20 mg TID. Son reports that patient got 30 mg baclofen in a 2-3 hour window during the day. States that around 4:30 PM, it seemed as if patient was "high off the baclofen" as he was slow to answer. Pt became fidgety, tremulous in b/l upper extremity- which was new. Around 5-6 began showing slight agitation and pulling at nasal cannula and around 6:30/7 noticed change with mental status where patient not expressing himself /conversing as he is usually able to. Pt unable to hold conversation on exam, and not following commands. Able to tell me his last name is Catarino. Unresponsive to most questions on interview, however answering some yes/no questions.     Vital Signs Last 24 Hrs  T(C): 37.2 (29 Nov 2017 22:10), Max: 37.2 (29 Nov 2017 22:10)  T(F): 99 (29 Nov 2017 22:10), Max: 99 (29 Nov 2017 22:10)  HR: 89 (29 Nov 2017 22:10) (72 - 103)  BP: 118/57 (29 Nov 2017 22:10) (103/64 - 118/57)  RR: 18 (29 Nov 2017 22:10) (18 - 18)  SpO2: 96% (29 Nov 2017 22:10) (96% - 100%)    Physical Assessment:  General: Lethargic  Neurology: PERRL. No facial droop, Unable to complete neuro exam as patient not following all commands.   CV: +S1S2, +murmur.  No peripheral edema   ENT: bottom lip blood crusting and ecchymosis 2/2 previous intubation  Respiratory: Even, shallow.  CTA B/L.  No appreciable wheeze  Abdomen:  Soft, non distended. Villagran draining light yellow fluid  MSK: L foot drop. Able to squeeze my fingers. Strength L<R. Unable to assess sensation   Skin: Warm and Dry     A/P:  HPI:  77 year old male with PMH of pulmonary fibrosis on home O2 (3L), CAD s/p CABG (2016), DM-2, GERD, kidney stones admitted with 2-3 weeks worsening dyspnea on exertion.  Hospital course complicated by ARF intubated transferred to MICU w/ acute hypoxic respiratory failure w/ septic shock 2/2 to pseudomonal bacteremia resolved off abx.  Pt extubated, downgraded to floor.  Acutely presenting with AMS 2/2 to unclear etiology.   1. AMS of unknown etiology   - CTH Urgent- Prelim read with no acute/emergent findings per radiology Dr. Choi. Please follow up official read. Consider MR Brain if AMS continues.     - ABG: ABG - ( 30 Nov 2017 00:33 ) pH: 7.52  /  pCO2: 38    /  pO2: 168   / HCO3: 31    / Base Excess: 8.0   /  SaO2: 100     - UA: +leukocyte esterase- sample from [place]. Please follow up urine culture results. 1 gm ceftriaxone ordered  - CXR STAT: r/o aspiration as patient was previously on tube feeds and restarted on PO feeds today   - Vital signs stable   - Neuro checks ordered  - Neuro consult if AMS continues   - Limit opioids/sedatives  - MICU consult if pt becomes HD unstable/unable to protect airway  - Blood cultures sent. Please follow up results.   - Work up per clinical course   - Discussed with Dr. Negrete- will call neurology consult/ID consult    Discussed with wife and son at bedside.     SHERLEY Hall-FLORENTINO   [facility] [id number] [facility]IC[facility]     Patient is a 77y old  Male who presents with a chief complaint of SOB (04 Nov 2017 14:33)     Event Summary: Called to bedside by RN because son reports that patient is agitated/pulling at IV/nasal cannula. History obtained by son at bedside. States that pt recently started on baclofen for control of chronic LBP. Pt's dose of baclofen increased today from 10 mg TID to 20 mg TID. Son reports that patient got 30 mg baclofen in a 2-3 hour window during the day. States that around 4:30 PM, it seemed as if patient was "high off the baclofen" as he was slow to answer. Pt became fidgety, tremulous in b/l upper extremity- which was new. Around 5-6 began showing slight agitation and pulling at nasal cannula and around 6:30/7 noticed change with mental status where patient not expressing himself /conversing as he is usually able to. Pt unable to hold conversation on exam, and not following commands. Able to tell me his last name is Catarino. Unresponsive to most questions on interview, however answering some yes/no questions.     Vital Signs Last 24 Hrs  T(C): 37.2 (29 Nov 2017 22:10), Max: 37.2 (29 Nov 2017 22:10)  T(F): 99 (29 Nov 2017 22:10), Max: 99 (29 Nov 2017 22:10)  HR: 89 (29 Nov 2017 22:10) (72 - 103)  BP: 118/57 (29 Nov 2017 22:10) (103/64 - 118/57)  RR: 18 (29 Nov 2017 22:10) (18 - 18)  SpO2: 96% (29 Nov 2017 22:10) (96% - 100%)    Physical Assessment:  General: Lethargic  Neurology: PERRL. No facial droop, Unable to complete neuro exam as patient not following all commands.   CV: +S1S2, +murmur.  No peripheral edema   ENT: bottom lip blood crusting and ecchymosis 2/2 previous intubation  Respiratory: Even, shallow.  CTA B/L.  No appreciable wheeze  Abdomen:  Soft, non distended. Villagran draining light yellow fluid  MSK: L foot drop. Able to squeeze my fingers. Strength L<R. Unable to assess sensation   Skin: Warm and Dry     A/P:  HPI:  77 year old male with PMH of pulmonary fibrosis on home O2 (3L), CAD s/p CABG (2016), DM-2, GERD, kidney stones admitted with 2-3 weeks worsening dyspnea on exertion.  Hospital course complicated by ARF intubated transferred to MICU w/ acute hypoxic respiratory failure w/ septic shock 2/2 to pseudomonal bacteremia resolved off abx.  Pt extubated, downgraded to floor.  Acutely presenting with AMS 2/2 to unclear etiology.   1. AMS of unknown etiology   - CTH Urgent- Prelim read with no acute/emergent findings per radiology Dr. Choi. Please follow up official read. Consider MR Brain if AMS continues.     - ABG: ABG - ( 30 Nov 2017 00:33 ) pH: 7.52  /  pCO2: 38    /  pO2: 168   / HCO3: 31    / Base Excess: 8.0   /  SaO2: 100     - UA: +leukocyte esterase- sample from [place]. Please follow up urine culture results. 1 gm ceftriaxone ordered  - CXR STAT: r/o aspiration as patient was previously on tube feeds and restarted on PO feeds today   - FS: 108  - Vital signs stable. MICU consult if pt unable to protect airway/HD unstable  - Neuro checks ordered  - Neuro consult if AMS continues   - Limit opioids/sedatives  - Blood cultures sent. Please follow up results.   - Work up per clinical course   - Discussed with Dr. Negrete- will call neurology consult/ID consult    Discussed with wife and son at bedside.     Jaleesa Green PA-C   [facility] [id number] [facility]     Patient is a 77y old  Male who presents with a chief complaint of SOB (04 Nov 2017 14:33)     Event Summary: Called to bedside by RN because son reports that patient is agitated/pulling at IV/nasal cannula. History obtained by son at bedside. States that pt recently started on baclofen for control of chronic LBP. Pt's dose of baclofen increased today from 10 mg TID to 20 mg TID. Son reports that patient got 30 mg baclofen in a 2-3 hour window during the day. States that around 4:30 PM, it seemed as if patient was "high off the baclofen" as he was slow to answer. Pt became fidgety, tremulous in b/l upper extremity- which was new. Around 5-6 began showing slight agitation and pulling at nasal cannula and around 6:30/7 noticed change with mental status where patient not expressing himself /conversing as he is usually able to. Pt unable to hold conversation on exam, and not following commands. Able to tell me his last name is Catarino. Unresponsive to most questions on interview, however answering some yes/no questions.     Vital Signs Last 24 Hrs  T(C): 37.2 (29 Nov 2017 22:10), Max: 37.2 (29 Nov 2017 22:10)  T(F): 99 (29 Nov 2017 22:10), Max: 99 (29 Nov 2017 22:10)  HR: 89 (29 Nov 2017 22:10) (72 - 103)  BP: 118/57 (29 Nov 2017 22:10) (103/64 - 118/57)  RR: 18 (29 Nov 2017 22:10) (18 - 18)  SpO2: 96% (29 Nov 2017 22:10) (96% - 100%)    Physical Assessment:  General: Lethargic  Neurology: PERRL. No facial droop, Unable to complete neuro exam as patient not following all commands.   CV: +S1S2, +murmur.  No peripheral edema   ENT: bottom lip blood crusting and ecchymosis 2/2 previous intubation  Respiratory: Even, shallow.  CTA B/L.  No appreciable wheeze  Abdomen:  Soft, non distended. Villagran draining light yellow fluid  MSK: Able to squeeze my fingers, strength L<R in UE. +foot drop. Unable to assess sensation   Skin: Warm and Dry     A/P:  HPI:  77 year old male with PMH of pulmonary fibrosis on home O2 (3L), CAD s/p CABG (2016), DM-2, GERD, kidney stones admitted with 2-3 weeks worsening dyspnea on exertion.  Hospital course complicated by ARF intubated transferred to MICU w/ acute hypoxic respiratory failure w/ septic shock 2/2 to pseudomonal bacteremia resolved off abx.  Pt extubated, downgraded to floor.  Acutely presenting with AMS 2/2 to unclear etiology.   1. AMS of unknown etiology   - CTH Urgent- Prelim read with no acute/emergent findings per radiology Dr. Choi. Please follow up official read. Consider MR Brain if AMS continues.     - ABG: ABG - ( 30 Nov 2017 00:33 ) pH: 7.52  /  pCO2: 38    /  pO2: 168   / HCO3: 31    / Base Excess: 8.0   /  SaO2: 100     - UA: +leukocyte esterase- sample from [facility]. Please follow up urine culture results. 1 gm ceftriaxone ordered  - CXR STAT: r/o aspiration as patient was previously on tube feeds and restarted on PO feeds today   - FS: 108  - Vital signs stable. MICU consult if pt unable to protect airway/HD unstable  - Neuro checks ordered  - Neuro consult if AMS continues   - Limit opioids/sedatives  - Blood cultures ordered. Please follow up results.   - Work up per clinical course   - Discussed with Dr. Negrete- will call neurology consult/ID consult    Discussed with wife and son at bedside.     SHERLEY Hall-FLORENTINO   [employer] [id number]

## 2017-11-30 NOTE — CONSULT NOTE ADULT - SUBJECTIVE AND OBJECTIVE BOX
Admitting Diagnosis:  Wheezing (R06.2): WHEEZING      HPI:  This is a 77y year old Male with the below past medical history who presents with the chief complaint of altered mental status.  Has pmhx of 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 Pt presented with SOB, required intubation for pseudomonas bacteremia, came out of MICU last week.  Has been seen by pain management for his low back pain.  he was given baclofen yesterday first 10mg then 20mg.  Then yesterday afternoon he was noted to be confused.  This am, wife says he is speaking to her and makes sense, but acting child like.  He cannot provide much detail.  the wife says he is not yet at baseline.  She also notes that his legs have gotten weaker since leaving the micu, he has lost weight. ******    Past Medical History:  Asbestos exposure (Z77.090)  Kidney stones (N20.0)  Coronary artery disease involving native coronary artery of native heart, angina presence unspecified (I25.10): S/p CABG 2016  Cataract (366.9)  Rotator cuff rupture, right (840.4)  GERD (gastroesophageal reflux disease) (530.81)  Hyperlipemia (272.4)  Diabetes mellitus (250.00)      Past Surgical History:  Encounter for removal of ureteral stent (Z46.6): Had a ureteral stent for kidney stones. Now removed.  S/P CABG (coronary artery bypass graft) (Z95.1)  S/P rotator cuff surgery (Z98.89)  S/P lens implant (V43.1):  &amp;   S/P appendectomy (V45.89): over 50 years ago      Social History:  No toxic habits    Family History:  FAMILY HISTORY:  Family history of breast cancer (Mother)  Family history of liver disease (Sibling)  Family history of diabetes mellitus      Allergies:  Vaseline (Pruritus; Urticaria)      ROS:  Constitutional: Patient offers no complaints of fevers or significant weight loss  Ears, Nose, Mouth and Throat: The patient presents with no abnormalities of the head, ears, eyes, nose or throat  Skin: Patient offers no concerns of new rashes or lesions  Respiratory: The patient presents with no abnormalities of the respiratory tract  Cardiovascular: The patient presents with no cardiac abnormalities  Gastrointestinal: The patient presents with no abnormalities of the GI system  Genitourinary: The patient presents with no dysuria, hematuria or frequent urination  Neurological: See HPI  Endocrine: Patient offers no complaints of excessive thirst, urination, or heat/cold intolerance    Advanced care planning reviewed and noted in the chart.    Medications:  acetaminophen  IVPB. 1000 milliGRAM(s) IV Intermittent once  aspirin  chewable 81 milliGRAM(s) Oral daily  BACItracin   Ointment 1 Application(s) Topical three times a day  baclofen 10 milliGRAM(s) Oral once  baclofen 20 milliGRAM(s) Oral three times a day  bisacodyl Suppository 10 milliGRAM(s) Rectal daily PRN  chlorhexidine 0.12% Liquid 15 milliLiter(s) Swish and Spit four times a day  digoxin     Tablet 0.125 milliGRAM(s) Oral daily  heparin  Injectable 5000 Unit(s) SubCutaneous every 8 hours  influenza   Vaccine 0.5 milliLiter(s) IntraMuscular once  insulin glargine Injectable (LANTUS) 12 Unit(s) SubCutaneous at bedtime  insulin lispro (HumaLOG) corrective regimen sliding scale   SubCutaneous Before meals and at bedtime  insulin lispro Injectable (HumaLOG) 7 Unit(s) SubCutaneous three times a day before meals  lidocaine   Patch 1 Patch Transdermal daily  metoprolol     tartrate 25 milliGRAM(s) Oral two times a day  multivitamin 1 Tablet(s) Oral daily  pantoprazole  Injectable 40 milliGRAM(s) IV Push daily  polyethylene glycol 3350 17 Gram(s) Oral two times a day PRN  predniSONE   Tablet 10 milliGRAM(s) Oral daily  saliva substitute (BIOTENE MOISTURIZING MOUTH SPRAY) 1 [place](s) Topical three times a day  senna Syrup 10 milliLiter(s) Oral at bedtime PRN  sodium chloride 0.65% Nasal 1 Spray(s) Both Nostrils three times a day  sodium chloride 0.9%. 500 milliLiter(s) IV Continuous <Continuous>  tiotropium 18 MICROgram(s) Capsule 1 Capsule(s) Inhalation daily      Labs:  CBC Full  -  ( 2017 07:19 )  WBC Count : 5.76 K/uL  Hemoglobin : 7.4 g/dL  Hematocrit : 23.2 %  Platelet Count - Automated : 165 K/uL  Mean Cell Volume : 105.5 fl  Mean Cell Hemoglobin : 33.6 pg  Mean Cell Hemoglobin Concentration : 31.9 gm/dL  Auto Neutrophil # : x  Auto Lymphocyte # : x  Auto Monocyte # : x  Auto Eosinophil # : x  Auto Basophil # : x  Auto Neutrophil % : x  Auto Lymphocyte % : x  Auto Monocyte % : x  Auto Eosinophil % : x  Auto Basophil % : x    11-29    137  |  99  |  29<H>  ----------------------------<  102<H>  5.5<H>   |  29  |  0.83    Ca    8.7      2017 23:08      CAPILLARY BLOOD GLUCOSE      POCT Blood Glucose.: 192 mg/dL (2017 08:57)  POCT Blood Glucose.: 182 mg/dL (2017 06:45)  POCT Blood Glucose.: 108 mg/dL (2017 22:21)  POCT Blood Glucose.: 98 mg/dL (2017 21:27)  POCT Blood Glucose.: 266 mg/dL (2017 17:04)  POCT Blood Glucose.: 388 mg/dL (2017 14:40)  POCT Blood Glucose.: 448 mg/dL (2017 12:14)  POCT Blood Glucose.: 424 mg/dL (2017 12:12)        Urinalysis Basic - ( 2017 00:45 )    Color: Yellow / Appearance: SL Turbid / S.013 / pH: x  Gluc: x / Ketone: Negative  / Bili: Negative / Urobili: Negative   Blood: x / Protein: 30 mg/dL / Nitrite: Negative   Leuk Esterase: Large / RBC: 5-10 /HPF / WBC >50 /HPF   Sq Epi: x / Non Sq Epi: Occasional /HPF / Bacteria: Few /HPF      Male    Vitals:  Vital Signs Last 24 Hrs  T(C): 36.4 (2017 08:28), Max: 37.2 (2017 22:10)  T(F): 97.6 (2017 08:28), Max: 99 (2017 22:10)  HR: 71 (2017 08:28) (71 - 103)  BP: 116/68 (2017 08:28) (103/64 - 127/68)  BP(mean): --  RR: 18 (2017 08:28) (18 - 18)  SpO2: 99% (2017 08:28) (95% - 99%)    NEUROLOGICAL EXAM:    Mental status: Awake, alert, and in no apparent distress. Oriented to person, place - hospital not year. not answer many questions, speaks very softly for unclear reason Language function is normal. cannot assess memory    Cranial Nerves: Pupils were equal, round, reactive to light. Extraocular movements were intact. Visual field were full. Fundoscopic exam was deferred. Facial sensation was intact to light touch. There was no facial asymmetry. The palate was upgoing symmetrically and tongue was midline. Hearing acuity was intact to finger rub AU. Shoulder shrug was full bilaterally    Motor exam: Bulk and tone were normal. Strength was 5/5 in arms, legs 4/5 but left foot has 3/5 foot drop. Fine finger movements were symmetric and normal. There was no pronator drift    Reflexes: 2+ in the bilateral upper extremities. 0 in the bilateral lower extremities. Toes were downgoing bilaterally.     Sensation: difficult to assess.     Coordination: Finger-nose-finger no gross dymetria dysmetria.     Gait: defferred

## 2017-11-30 NOTE — PROGRESS NOTE ADULT - ASSESSMENT
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 CT chest shows bilateral IPF  treated for Pulmonary fibrosis / Intersitial pulmonary dz exacerbation.  Pt developed ARF intubated transferred to MICU w/ acute hypoxic respiratory failure w/ septic shock 2/2 to pseudomonal bacteremia resolved off abx.  Pt extubated, downgraded to floor.  Family is refusing Imuran will hold for now and can f/u with outpt pulm to discuss restarting.    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

## 2017-11-30 NOTE — CHART NOTE - NSCHARTNOTEFT_GEN_A_CORE
Pt seen for malnutrition follow-up. 76 Y/O male with PMH of "asbestos-related lung disease" on home O2 (3L), CAD S/P CABG (2016), DM type 2, GERD, kidney stones; presents with 2-3 weeks worsening dyspnea on exertion. He was treated for COPD exacerbation with steroids and immunosuppression Hospital course was complicated with septic shock, pseudomonas bacteremia/pneumonia requiring MICU stay, intubation and blood pressure support. Patient is extubated, on 8MON, off antibiotics. Patient with oral ulcers unable to swallow S/P NGT placement with enteral nutrition, now S/P NGT removal. S/P FEES 11/17 and 11/24 recommended NPO with non-oral nutrition/hydration, pt and family wish to initiate pleasure feeds and accept aspiration risk at this time. Pt currently on po diet. Calorie count in place. RD to follow-up on 12/1 upon completion of calorie count.      Source: Patient [ ]    Family [X]     other [X]: Per RN/medicine PA note, patient has been agitated/pulling at IV/nasal cannula this morning on 11/30. Wife at bedside. Spoke with RN.    Diet : dysphagia 1, pureed, nectar thickened liquids, consistent CHO (snack) + 2 Glucerna per day      Patient reports [ ] nausea  [ ] vomiting [ ] diarrhea [ ] constipation  [ ]chewing problems [ ] swallowing issues  [ ] other: Per RN, pt with no GI distress at this time. Last BM 11/30 per Wife and RN.     PO intake:  < 50% [ ] 50-75% [ ]   % [ ]  other : Pt with variable po intake per calorie count. Per calorie count sheet, pt seems to consume 100% of foods such as yogurt, apple sauce, and pudding.     Source for PO intake [ ] Patient [ ] family [ ] chart [ ] staff [ ] other     Enteral /Parenteral Nutrition: Pt was receiving Glucerna 1.2cal/ml at 60ml/hours x 18 hours. NGT removed. Pt currently on po diet listed above.      Current Weight: (11/2) 80.6kg, (11/29) 88kg; bed weights noted. ?accuracy of weights. No edema noted. Monitor wt trends.      Pertinent Medications: MEDICATIONS  (STANDING):  acetaminophen  IVPB. 1000 milliGRAM(s) IV Intermittent once  aspirin  chewable 81 milliGRAM(s) Oral daily  BACItracin   Ointment 1 Application(s) Topical three times a day  baclofen 10 milliGRAM(s) Oral once  baclofen 20 milliGRAM(s) Oral three times a day  chlorhexidine 0.12% Liquid 15 milliLiter(s) Swish and Spit four times a day  digoxin     Tablet 0.125 milliGRAM(s) Oral daily  heparin  Injectable 5000 Unit(s) SubCutaneous every 8 hours  influenza   Vaccine 0.5 milliLiter(s) IntraMuscular once  insulin glargine Injectable (LANTUS) 12 Unit(s) SubCutaneous at bedtime  insulin lispro (HumaLOG) corrective regimen sliding scale   SubCutaneous Before meals and at bedtime  insulin lispro Injectable (HumaLOG) 7 Unit(s) SubCutaneous three times a day before meals  lidocaine   Patch 1 Patch Transdermal daily  metoprolol     tartrate 25 milliGRAM(s) Oral two times a day  multivitamin 1 Tablet(s) Oral daily  pantoprazole  Injectable 40 milliGRAM(s) IV Push daily  predniSONE   Tablet 10 milliGRAM(s) Oral daily  saliva substitute (BIOTENE MOISTURIZING MOUTH SPRAY) 1 [place](s) Topical three times a day  sodium chloride 0.65% Nasal 1 Spray(s) Both Nostrils three times a day  sodium chloride 0.9%. 500 milliLiter(s) (75 mL/Hr) IV Continuous <Continuous>  tiotropium 18 MICROgram(s) Capsule 1 Capsule(s) Inhalation daily    MEDICATIONS  (PRN):  bisacodyl Suppository 10 milliGRAM(s) Rectal daily PRN Constipation  polyethylene glycol 3350 17 Gram(s) Oral two times a day PRN Constipation  senna Syrup 10 milliLiter(s) Oral at bedtime PRN Constipation    Pertinent Labs:  11-29 Na137 mmol/L Glu 102 mg/dL<H> K+ 5.5 mmol/L<H> Cr  0.83 mg/dL BUN 29 mg/dL<H> Phos n/a   Alb n/a   PAB n/a     Fingersticks: (11/30) 182-192, (11/29) , (11/28) 167-284    Skin: healed pressure injuries per flowsheets    Estimated Needs:   [X] no change since previous assessment  [ ] recalculated:       Previous Nutrition Diagnosis:  [X] Malnutrition (moderate)    Nutrition Diagnosis is [X] ongoing- being addressed with oral nutrition supplement    New Nutrition Diagnosis: [X] not applicable    Interventions: Continue to encourage po intake, consumption of meals/supplements to meet nutrient needs, optimization of intake during periods of increased appetite, and consuming small frequent healthy balanced meals.     Recommend: Continue current diet order as medically feasible. RD to follow-up on calorie count on 12/1.    Monitoring and Evaluation:     [X] PO intake [X] Tolerance to diet prescription [X] weights [X] follow up per protocol    [X] other: RD remains available as needed and per follow-up protocol. Sherrie Wong MS, RDN, CDN Pager # [phone]

## 2017-11-30 NOTE — PROGRESS NOTE ADULT - SUBJECTIVE AND OBJECTIVE BOX
Patient is a 77y old  Male who presents with a chief complaint of SOB (2017 14:33)      SUBJECTIVE / OVERNIGHT EVENTS:    Patient seen and examined. overnight, called that patient has change in mental status sp marileeofan.      Vital Signs Last 24 Hrs  T(C): 36.4 (2017 08:28), Max: 37.2 (2017 22:10)  T(F): 97.6 (2017 08:28), Max: 99 (2017 22:10)  HR: 71 (2017 08:28) (71 - 103)  BP: 116/68 (2017 08:28) (103/64 - 127/68)  BP(mean): --  RR: 18 (2017 08:28) (18 - 18)  SpO2: 99% (2017 08:28) (95% - 99%)  I&O's Summary    2017 07:  -  2017 07:00  --------------------------------------------------------  IN: 250 mL / OUT: 2075 mL / NET: -1825 mL    2017 07:01  -  2017 10:23  --------------------------------------------------------  IN: 120 mL / OUT: 0 mL / NET: 120 mL        PE:  GENERAL: NAD, awake and alert but not oriented  HEAD:  Atraumatic, Normocephalic  EYES: EOMI, PERRLA, conjunctiva and sclera clear  NECK: Supple, No JVD  CHEST/LUNG: CTABL, No wheeze  HEART: Regular rate and rhythm; + murmur  ABDOMEN: Soft, Nontender, Nondistended; Bowel sounds present  EXTREMITIES:  2+ Peripheral Pulses, No clubbing, cyanosis, or edema  SKIN: No rashes or lesions  NEURO: confused, awake but not oriented, answers yes and no    LABS:                        7.4    5.76  )-----------( 165      ( 2017 07:19 )             23.2         137  |  99  |  29<H>  ----------------------------<  102<H>  5.5<H>   |  29  |  0.83    Ca    8.7      2017 23:08        CAPILLARY BLOOD GLUCOSE      POCT Blood Glucose.: 192 mg/dL (2017 08:57)  POCT Blood Glucose.: 182 mg/dL (2017 06:45)  POCT Blood Glucose.: 108 mg/dL (2017 22:21)  POCT Blood Glucose.: 98 mg/dL (2017 21:27)  POCT Blood Glucose.: 266 mg/dL (2017 17:04)  POCT Blood Glucose.: 388 mg/dL (2017 14:40)  POCT Blood Glucose.: 448 mg/dL (2017 12:14)  POCT Blood Glucose.: 424 mg/dL (2017 12:12)        Urinalysis Basic - ( 2017 00:45 )    Color: Yellow / Appearance: SL Turbid / S.013 / pH: x  Gluc: x / Ketone: Negative  / Bili: Negative / Urobili: Negative   Blood: x / Protein: 30 mg/dL / Nitrite: Negative   Leuk Esterase: Large / RBC: 5-10 /HPF / WBC >50 /HPF   Sq Epi: x / Non Sq Epi: Occasional /HPF / Bacteria: Few /HPF        RADIOLOGY & ADDITIONAL TESTS:    Imaging Personally Reviewed:  [x] YES  [ ] NO    Consultant(s) Notes Reviewed:  [x] YES  [ ] NO    MEDICATIONS  (STANDING):  acetaminophen  IVPB. 1000 milliGRAM(s) IV Intermittent once  aspirin  chewable 81 milliGRAM(s) Oral daily  BACItracin   Ointment 1 Application(s) Topical three times a day  baclofen 10 milliGRAM(s) Oral once  baclofen 20 milliGRAM(s) Oral three times a day  cefTRIAXone   IVPB      cefTRIAXone   IVPB 1 Gram(s) IV Intermittent once  chlorhexidine 0.12% Liquid 15 milliLiter(s) Swish and Spit four times a day  digoxin     Tablet 0.125 milliGRAM(s) Oral daily  heparin  Injectable 5000 Unit(s) SubCutaneous every 8 hours  influenza   Vaccine 0.5 milliLiter(s) IntraMuscular once  insulin glargine Injectable (LANTUS) 12 Unit(s) SubCutaneous at bedtime  insulin lispro (HumaLOG) corrective regimen sliding scale   SubCutaneous Before meals and at bedtime  insulin lispro Injectable (HumaLOG) 7 Unit(s) SubCutaneous three times a day before meals  lidocaine   Patch 1 Patch Transdermal daily  metoprolol     tartrate 25 milliGRAM(s) Oral two times a day  multivitamin 1 Tablet(s) Oral daily  pantoprazole  Injectable 40 milliGRAM(s) IV Push daily  predniSONE   Tablet 10 milliGRAM(s) Oral daily  saliva substitute (BIOTENE MOISTURIZING MOUTH SPRAY) 1 [place](s) Topical three times a day  sodium chloride 0.65% Nasal 1 Spray(s) Both Nostrils three times a day  sodium chloride 0.9%. 500 milliLiter(s) (75 mL/Hr) IV Continuous <Continuous>  tiotropium 18 MICROgram(s) Capsule 1 Capsule(s) Inhalation daily    MEDICATIONS  (PRN):  bisacodyl Suppository 10 milliGRAM(s) Rectal daily PRN Constipation  polyethylene glycol 3350 17 Gram(s) Oral two times a day PRN Constipation  senna Syrup 10 milliLiter(s) Oral at bedtime PRN Constipation      Care Discussed with Consultants/Other Providers [x] YES  [ ] NO    HEALTH ISSUES - PROBLEM Dx:  Mental status change: Mental status change  Back pain, unspecified back location, unspecified back pain laterality, unspecified chronicity: Back pain, unspecified back location, unspecified back pain laterality, unspecified chronicity  Globus sensation: Globus sensation  Lip lesion: Lip lesion  Oral ulcer: Oral ulcer  CHRISTIANE (acute kidney injury): CHRISTIANE (acute kidney injury)  Bacteremia: Bacteremia  Neutropenia: Neutropenia  Need for prophylactic measure: Need for prophylactic measure  Oral thrush: Oral thrush  Nasal discomfort: Nasal discomfort  Gastroesophageal reflux disease without esophagitis: Gastroesophageal reflux disease without esophagitis  Hyperlipidemia, unspecified hyperlipidemia type: Hyperlipidemia, unspecified hyperlipidemia type  Type 2 diabetes mellitus without complication, without long-term current use of insulin: Type 2 diabetes mellitus without complication, without long-term current use of insulin  Coronary artery disease involving native coronary artery of native heart, angina presence unspecified: Coronary artery disease involving native coronary artery of native heart, angina presence unspecified  Hyperkalemia: Hyperkalemia  Lactic acidosis: Lactic acidosis  Transaminitis: Transaminitis  Idiopathic pulmonary fibrosis: Idiopathic pulmonary fibrosis  Shortness of breath: Shortness of breath

## 2017-11-30 NOTE — CONSULT NOTE ADULT - ASSESSMENT
a/p - 79 yo M px with sepsis, acute hypoxic respiratory failure in unit.  he comes out of unit about one week ago, on limited po intake.  Complaining of low back pain, given baclofen 10 then 20 and became confused.  As per wife, he has improved but not to baseline acting "childlike"  exam notable that he will not answer most questions, speaks very softly but follows commands.  Legs with weakness, left foot drop evident    ams - likely multifactorial delirium - can be meds, infection, hospitalization.  ct neg, doubt new stroke.  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  Needs PT, not clear how imaging of lower spine will change treatment as once mentation improves, needs vigourous PT    Dvt proplyaxis    d/w wife at bedside

## 2017-11-30 NOTE — PROGRESS NOTE ADULT - SUBJECTIVE AND OBJECTIVE BOX
Patient seen this morning  Patient looks in pain, c/o back pain and feeling tired  Sitting in the chair but uncomfortable  Wife at bedside reports some confusion last night    MEDICATIONS  (STANDING):  aspirin  chewable 81 milliGRAM(s) Oral daily  BACItracin   Ointment 1 Application(s) Topical three times a day  baclofen 10 milliGRAM(s) Oral three times a day  chlorhexidine 0.12% Liquid 15 milliLiter(s) Swish and Spit four times a day  digoxin     Tablet 0.125 milliGRAM(s) Oral daily  heparin  Injectable 5000 Unit(s) SubCutaneous every 8 hours  influenza   Vaccine 0.5 milliLiter(s) IntraMuscular once  insulin glargine Injectable (LANTUS) 12 Unit(s) SubCutaneous at bedtime  insulin lispro (HumaLOG) corrective regimen sliding scale   SubCutaneous every 6 hours  lidocaine   Patch 1 Patch Transdermal daily  metoprolol     tartrate 25 milliGRAM(s) Oral two times a day  multivitamin 1 Tablet(s) Oral daily  pantoprazole  Injectable 40 milliGRAM(s) IV Push daily  predniSONE   Tablet 10 milliGRAM(s) Oral daily  saliva substitute (BIOTENE MOISTURIZING MOUTH SPRAY) 1 [place](s) Topical three times a day  sodium chloride 0.65% Nasal 1 Spray(s) Both Nostrils three times a day  sodium chloride 0.9% Bolus 250 milliLiter(s) IV Bolus once  tiotropium 18 MICROgram(s) Capsule 1 Capsule(s) Inhalation daily      VITAL:  Vital Signs Last 24 Hrs  T(C): 36.4 (2017 08:28), Max: 37.2 (2017 22:10)  T(F): 97.6 (2017 08:28), Max: 99 (2017 22:10)  HR: 71 (2017 08:28) (71 - 103)  BP: 116/68 (2017 08:28) (103/64 - 127/68)  BP(mean): --  RR: 18 (2017 08:28) (18 - 18)  SpO2: 99% (2017 08:28) (95% - 99%)  I&O's Summary    2017 07:01  -  2017 07:00  --------------------------------------------------------  IN: 250 mL / OUT: 2075 mL / NET: -1825 mL    2017 07:01  -  2017 13:00  --------------------------------------------------------  IN: 120 mL / OUT: 0 mL / NET: 120 mL      PHYSICAL EXAM:    GA: uncomfortable and mild distress due to pain; mouth sores improving  LUNGS: CTABL  HEART: S1 and S2  ABD: BS+, soft, NT/ND  Ext: No peripheral edema  : + Mon[place]  Skin: No rashes  Access: Not applicable    LABS:                        8.8    6.0   )-----------( 191      ( 2017 10:54 )             25.9     11-30    139  |  99  |  28<H>  ----------------------------<  168<H>  4.4   |  31  |  0.83    Ca    8.1<L>      2017 10:54    TPro  6.6  /  Alb  2.3<L>  /  TBili  0.4  /  DBili  x   /  AST  36  /  ALT  44  /  AlkPhos  163<H>  30        Urine Studies:  Urinalysis Basic - ( 2017 12:44 )    Color: Yellow / Appearance: Turbid / S.014 / pH: x  Gluc: x / Ketone: Negative  / Bili: Negative / Urobili: 1.0 mg/dL   Blood: x / Protein: 100 mg/dL / Nitrite: Negative   Leuk Esterase: Large / RBC: x / WBC x   Sq Epi: x / Non Sq Epi: x / Bacteria: x      RADIOLOGY & ADDITIONAL STUDIES:        ASSESSMENT  77 year old male with PMH of "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 He was treated for COPD exacerbation with steroids and immunosuppression Hospital course was complicated with septic shock, pseudomonas bacteremia/pneumonia requiring MICU stay, intubation and blood pressure support. Patient is extubated, out on the regular floor, off antibiotics. Patient with oral ulcers unable to swallow post NGT placement. CHRISTIANE,    1. CHRISTIANE likely prerenal+ obstructive - resolving  2. Hyponatremia: improved with reduction in free water  3. Anemia likely multifactorial: acute illness, AOCD etc; H&H low but stable   4. Sepsis/pseudomonas bacteremia off antibiotics; plan per primary team  5. Acute retention: monzon in place - noted UA with significant WBCs and LE - treated with ceftriaxone for UTI  6. Hypolabuminemia    RECOMMENDATIONS:  - discontinue standing free water unless Na increases; encourage fluid intake  - Keep monozn for now as patient failed voiding trials  - monitor ins and outs  - OOB to chair and PT as able  - Avoid nephrotoxins, agree on holding ACEIs as BP soft  - dose meds for GFR of 40 ml/min/1.73m2  - increase protein intake - nutrition following    Ju Davis MD  [facility]

## 2017-11-30 NOTE — PROGRESS NOTE ADULT - ASSESSMENT
77M with ILD on home O2, DM, GERd, admitted with dyspnea and treated for exacerbation of chronic lung disease with steroids and immunesuppression  complicated hospital course with septic shock, MICU stay requiring intubation and blood pressure support secondary to pseudomonas bacteremia / pneumonia  Now extubated, out of ICU, and off antibiotics  NGT placed, ongoing reassessment re: ability to take PO  oral ulcers now scabbed  11-29 acute mental status change after higher dose delayed baclofen but also with pyuria and suprapubic tenderness

## 2017-11-30 NOTE — CONSULT NOTE ADULT - CONSULT REQUESTED DATE/TIME
21-Nov-2017
30-Nov-2017 09:21
01-Nov-2017 13:55
07-Nov-2017 05:08
09-Nov-2017 17:03
29-Nov-2017 15:30
29-Nov-2017 22:11
20-Nov-2017 13:52
01-Nov-2017 11:37
02-Nov-2017 00:00

## 2017-11-30 NOTE — PROGRESS NOTE ADULT - ASSESSMENT
Acute on chronic hypoxemic respiratory failure: clinically resoliving post extubation  LUE edema: r/o DVT  Pseudomonoas bacteremia with pneumonia: resolved  OP dysphagia  Oral/Lip ulcerations  CHRISTIANE - resolving  r/o new UTI, bacteremia  Back pain    Septic Shock - clinically resolved    REC  Continue ceftriaxone per ID, pending cultures  No need to resume imuran: would continue prednisone at 10 mg qd  PT  Oral care  DC NGT if tolerates oral intake  Pain management  DC planning for CODIE

## 2017-12-01 LAB
CULTURE RESULTS: SIGNIFICANT CHANGE UP
GLUCOSE BLDC GLUCOMTR-MCNC: 134 MG/DL — HIGH (ref 70–99)
GLUCOSE BLDC GLUCOMTR-MCNC: 141 MG/DL — HIGH (ref 70–99)
GLUCOSE BLDC GLUCOMTR-MCNC: 143 MG/DL — HIGH (ref 70–99)
GLUCOSE BLDC GLUCOMTR-MCNC: 85 MG/DL — SIGNIFICANT CHANGE UP (ref 70–99)
SPECIMEN SOURCE: SIGNIFICANT CHANGE UP

## 2017-12-01 PROCEDURE — 70552 MRI BRAIN STEM W/DYE: CPT | Mod: 26

## 2017-12-01 RX ORDER — ACETAMINOPHEN 500 MG
1000 TABLET ORAL ONCE
Qty: 0 | Refills: 0 | Status: COMPLETED | OUTPATIENT
Start: 2017-12-01 | End: 2017-12-01

## 2017-12-01 RX ORDER — BACLOFEN 100 %
10 POWDER (GRAM) MISCELLANEOUS THREE TIMES A DAY
Qty: 0 | Refills: 0 | Status: DISCONTINUED | OUTPATIENT
Start: 2017-12-01 | End: 2017-12-02

## 2017-12-01 RX ORDER — MORPHINE SULFATE 50 MG/1
1 CAPSULE, EXTENDED RELEASE ORAL ONCE
Qty: 0 | Refills: 0 | Status: DISCONTINUED | OUTPATIENT
Start: 2017-12-01 | End: 2017-12-01

## 2017-12-01 RX ADMIN — CHLORHEXIDINE GLUCONATE 15 MILLILITER(S): 213 SOLUTION TOPICAL at 17:47

## 2017-12-01 RX ADMIN — Medication 10 MILLIGRAM(S): at 13:29

## 2017-12-01 RX ADMIN — SODIUM CHLORIDE 75 MILLILITER(S): 9 INJECTION INTRAMUSCULAR; INTRAVENOUS; SUBCUTANEOUS at 17:52

## 2017-12-01 RX ADMIN — Medication 10 MILLIGRAM(S): at 21:28

## 2017-12-01 RX ADMIN — CHLORHEXIDINE GLUCONATE 15 MILLILITER(S): 213 SOLUTION TOPICAL at 06:34

## 2017-12-01 RX ADMIN — HEPARIN SODIUM 5000 UNIT(S): 5000 INJECTION INTRAVENOUS; SUBCUTANEOUS at 21:29

## 2017-12-01 RX ADMIN — Medication 1 SPRAY(S): at 06:33

## 2017-12-01 RX ADMIN — Medication 400 MILLIGRAM(S): at 19:30

## 2017-12-01 RX ADMIN — Medication 7 UNIT(S): at 17:45

## 2017-12-01 RX ADMIN — Medication 1 SPRAY(S): at 13:42

## 2017-12-01 RX ADMIN — HEPARIN SODIUM 5000 UNIT(S): 5000 INJECTION INTRAVENOUS; SUBCUTANEOUS at 13:27

## 2017-12-01 RX ADMIN — Medication 25 MILLIGRAM(S): at 06:34

## 2017-12-01 RX ADMIN — Medication 1 APPLICATION(S): at 21:27

## 2017-12-01 RX ADMIN — Medication 1 APPLICATION(S): at 13:28

## 2017-12-01 RX ADMIN — PANTOPRAZOLE SODIUM 40 MILLIGRAM(S): 20 TABLET, DELAYED RELEASE ORAL at 13:25

## 2017-12-01 RX ADMIN — MORPHINE SULFATE 1 MILLIGRAM(S): 50 CAPSULE, EXTENDED RELEASE ORAL at 00:35

## 2017-12-01 RX ADMIN — LIDOCAINE 1 PATCH: 4 CREAM TOPICAL at 13:28

## 2017-12-01 RX ADMIN — Medication 1 TABLET(S): at 13:27

## 2017-12-01 RX ADMIN — Medication 10 MILLIGRAM(S): at 10:20

## 2017-12-01 RX ADMIN — TIOTROPIUM BROMIDE 1 CAPSULE(S): 18 CAPSULE ORAL; RESPIRATORY (INHALATION) at 13:41

## 2017-12-01 RX ADMIN — Medication 10 MILLIGRAM(S): at 06:35

## 2017-12-01 RX ADMIN — Medication 7 UNIT(S): at 09:43

## 2017-12-01 RX ADMIN — MORPHINE SULFATE 1 MILLIGRAM(S): 50 CAPSULE, EXTENDED RELEASE ORAL at 00:50

## 2017-12-01 RX ADMIN — Medication 81 MILLIGRAM(S): at 13:26

## 2017-12-01 RX ADMIN — Medication 0.12 MILLIGRAM(S): at 06:34

## 2017-12-01 RX ADMIN — Medication 1 SPRAY(S): at 21:29

## 2017-12-01 RX ADMIN — CHLORHEXIDINE GLUCONATE 15 MILLILITER(S): 213 SOLUTION TOPICAL at 13:26

## 2017-12-01 RX ADMIN — SENNA PLUS 10 MILLILITER(S): 8.6 TABLET ORAL at 13:26

## 2017-12-01 RX ADMIN — HEPARIN SODIUM 5000 UNIT(S): 5000 INJECTION INTRAVENOUS; SUBCUTANEOUS at 06:34

## 2017-12-01 RX ADMIN — Medication 1 APPLICATION(S): at 06:35

## 2017-12-01 RX ADMIN — Medication 1 SPRAY(S): at 06:35

## 2017-12-01 RX ADMIN — Medication 7 UNIT(S): at 13:24

## 2017-12-01 RX ADMIN — INSULIN GLARGINE 12 UNIT(S): 100 INJECTION, SOLUTION SUBCUTANEOUS at 21:27

## 2017-12-01 RX ADMIN — Medication 25 MILLIGRAM(S): at 17:46

## 2017-12-01 RX ADMIN — Medication 1 SPRAY(S): at 13:28

## 2017-12-01 NOTE — PROGRESS NOTE ADULT - PROBLEM SELECTOR PROBLEM 5
Oral ulcer
Oral ulcer
Transaminitis
Coronary artery disease involving native coronary artery of native heart, angina presence unspecified
Transaminitis
Transaminitis

## 2017-12-01 NOTE — PROGRESS NOTE ADULT - PROBLEM SELECTOR PLAN 4
As per pulmonary
As per pulmonary
-Likely a Type 2 acidosis
-Still elevated this AM  -Etiology remains unclear  -Trend lactate.
Improving  Care per ENT
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
stable monitor bmp
-Downtrending  -Monitor BMP closely.
-type 2  -continue bmp monitoring
stable montior bmp

## 2017-12-01 NOTE — SWALLOW BEDSIDE ASSESSMENT ADULT - SPECIFY REASON(S)
to assess the swallow mechanism; r/o dysphagia.

## 2017-12-01 NOTE — SWALLOW BEDSIDE ASSESSMENT ADULT - NS ASR SWALLOW FINDINGS DISCUS
NP: Aditi
NP: Tita
Patient/Family/RN: Rachael pt's spouse, son, brother, attending MD and resident MD: Dimitry/Nursing
Nursing/Patient/Family/RN: Rachael, attending MD and resident MD: Alexis/

## 2017-12-01 NOTE — PROGRESS NOTE ADULT - ASSESSMENT
Acute on chronic hypoxemic respiratory failure: clinically resoliving post extubation  LUE edema: r/o DVT  Pseudomonoas bacteremia with pneumonia: resolved  OP dysphagia  Oral/Lip ulcerations  CHRISTIANE - resolving  r/o new UTI, bacteremia  Back pain    Septic Shock - clinically resolved    REC  Continue ceftriaxone per ID, pending cultures  No need to resume imuran: would continue prednisone at 10 mg qd  PT  Oral care  DC NGT if tolerates oral intake  Pain management  DC planning for CODIE Acute on chronic hypoxemic respiratory failure: clinically resoliving post extubation  LUE edema: r/o DVT  Pseudomonoas bacteremia with pneumonia: resolved  OP dysphagia  Oral/Lip ulcerations  CHRISTIANE - resolving  r/o new UTI, bacteremia  Back pain    Septic Shock - clinically resolved    REC  Observe off antibiotics  No need to resume imuran: would continue prednisone at 10 mg qd  PT  Oral care  DC NGT if tolerates oral intake  Pain management  DC planning for CODIE

## 2017-12-01 NOTE — CHART NOTE - NSCHARTNOTEFT_GEN_A_CORE
CALORIE COUNT FOLLOW UP    Calorie count due today, 11/28 partially complete, 11/29 complete, and 11/30 not complete. Per RN, EN stopped 11/28 when NGT pulled. Confusion regarding if calorie count to be continued once NGT pulled. Pt's wife at bedside able to report intake from 11/30. Wife stated pt with AMS yesterday and unable to consume much. Results of calorie count below. SLP to reevaluate pt's swallowing abilities. Pt consuming breakfast at time of visit. Pt consuming thin water, wife endorses accepting risk as she believes he can tolerate thin liquids. Per FEES 11/24, pt recommended for NPO with non-oral hydration/nutrition. Pt's wife with questions regarding mechanical soft diet - defer to SLP and primary team.    Calorie Count:  11/28 - 40 kcal, 1gm Protein (PO intake) + 720mL Glucena via NGT (864kcal, 43.2gm Protein) - 60%kcal, 59% Pro  11/29 - 1039kcal, 37.7gm Protein - 69%kcal, 50%Pro  11/130 - 681kcal, 23.9gm Protein - 46%kcal, 32%Pro    Source: Patient [X]    Family [X]     other [ ]    Diet : Consistent CHO wit HS snack + Dysphagia 1 with nectar thick fluids + Glucerna 2x/day + Enteral Feeds      Patient reports [ ] nausea  [ ] vomiting [ ] diarrhea [ ] constipation  [ ]chewing problems [ ] swallowing issues  [ ] other:      PO intake:  < 50% [ ] 50-75% [ ]   % [ ]  other :See above     Source for PO intake [ ] Patient [X] family [X] chart [ ] staff [X] other: calorie count     Enteral /Parenteral Nutrition: Glucerna 1.2 @60mL/hr x18hrs (not running - NGT pulled) - Provides 1296kcal, 65gm Protein      Current Weight: no updated weight since last RD note  % Weight Change    Pertinent Medications: MEDICATIONS  (STANDING):  aspirin  chewable 81 milliGRAM(s) Oral daily  BACItracin   Ointment 1 Application(s) Topical three times a day  baclofen 10 milliGRAM(s) Oral three times a day  cefTRIAXone   IVPB      cefTRIAXone   IVPB 1 Gram(s) IV Intermittent every 24 hours  chlorhexidine 0.12% Liquid 15 milliLiter(s) Swish and Spit four times a day  digoxin     Tablet 0.125 milliGRAM(s) Oral daily  heparin  Injectable 5000 Unit(s) SubCutaneous every 8 hours  influenza   Vaccine 0.5 milliLiter(s) IntraMuscular once  insulin glargine Injectable (LANTUS) 12 Unit(s) SubCutaneous at bedtime  insulin lispro (HumaLOG) corrective regimen sliding scale   SubCutaneous Before meals and at bedtime  insulin lispro Injectable (HumaLOG) 7 Unit(s) SubCutaneous three times a day before meals  lidocaine   Patch 1 Patch Transdermal daily  metoprolol     tartrate 25 milliGRAM(s) Oral two times a day  multivitamin 1 Tablet(s) Oral daily  pantoprazole  Injectable 40 milliGRAM(s) IV Push daily  predniSONE   Tablet 10 milliGRAM(s) Oral daily  saliva substitute (BIOTENE MOISTURIZING MOUTH SPRAY) 1 [place](s) Topical three times a day  sodium chloride 0.65% Nasal 1 Spray(s) Both Nostrils three times a day  sodium chloride 0.9%. 500 milliLiter(s) (75 mL/Hr) IV Continuous <Continuous>  tiotropium 18 MICROgram(s) Capsule 1 Capsule(s) Inhalation daily    MEDICATIONS  (PRN):  bisacodyl Suppository 10 milliGRAM(s) Rectal daily PRN Constipation  polyethylene glycol 3350 17 Gram(s) Oral two times a day PRN Constipation  senna Syrup 10 milliLiter(s) Oral at bedtime PRN Constipation    Pertinent Labs:  POCT Glucose  (11/130-12/1)    Skin: no pressure injuries noted  Edema: none noted    Estimated Needs:   [X] no change since previous assessment  [ ] recalculated:       Previous Nutrition Diagnosis: [X]Moderate Malnutrition          Nutrition Diagnosis is [X] ongoing  [ ] resolved [ ] not applicable          New Nutrition Diagnosis: [X] not applicable     Interventions:     Recommend    [X] Change Diet To: Defer diet/fluid consistency to SLP and primary team. Continue Consistent CHO with HS snack    [X] Nutrition Supplement: As pt not meeting 75% of estimated needs, Recommend to increase Glucerna tid thickened appropriately (Provides additional 660kcal, 29.7gm Protein) - Pt prefers vanilla    [X] Nutrition Support: As NGT pulled with no plan for replacement, recommend to d/c enteral nutrition order    [X] Other: Encourage good po intake, food preferences updated       Monitoring and Evaluation:     [X] PO intake [X] Tolerance to diet prescription [X] weights [X] follow up per protocol    [X] other: RD remains available Caroline Nevarez MBA RDN CDN Pager [phone] CALORIE COUNT FOLLOW UP    Calorie count due today, 11/28 partially complete, 11/29 complete, and 11/30 not complete. Per RN, EN stopped 11/28 when NGT pulled. Confusion regarding if calorie count to be continued once NGT pulled. Pt's wife at bedside able to report intake from 11/30. Wife stated pt with AMS yesterday and unable to consume much. Results of calorie count below. SLP to reevaluate pt's swallowing abilities. Pt consuming breakfast at time of visit. Pt consuming thin water, wife endorses accepting risk as she believes he can tolerate thin liquids. Per FEES 11/24, pt recommended for NPO with non-oral hydration/nutrition. Pt's wife with questions regarding mechanical soft diet - defer to SLP and primary team.    Calorie Count:  11/28 - 40 kcal, 1gm Protein (PO intake) + 720mL Glucena via NGT (864kcal, 43.2gm Protein) - 60%kcal, 59% Pro  11/29 - 1039kcal, 37.7gm Protein - 69%kcal, 50%Pro  11/130 - 681kcal, 23.9gm Protein - 46%kcal, 32%Pro  As pt not meeting 75% of estimated needs, recommend to increase to glucerna 3x/day.     Source: Patient [X]    Family [X]     other [ ]    Diet : Consistent CHO wit HS snack + Dysphagia 1 with nectar thick fluids + Glucerna 2x/day + Enteral Feeds      Patient reports [ ] nausea  [ ] vomiting [ ] diarrhea [ ] constipation  [ ]chewing problems [X] swallowing issues  [X] other: SLP following     PO intake:  < 50% [ ] 50-75% [ ]   % [ ]  other :See above     Source for PO intake [ ] Patient [X] family [X] chart [ ] staff [X] other: calorie count     Enteral /Parenteral Nutrition: Glucerna 1.2 @60mL/hr x18hrs (not running - NGT pulled) - Provides 1296kcal, 65gm Protein      Current Weight: no updated weight since last RD note  % Weight Change    Pertinent Medications: MEDICATIONS  (STANDING):  aspirin  chewable 81 milliGRAM(s) Oral daily  BACItracin   Ointment 1 Application(s) Topical three times a day  baclofen 10 milliGRAM(s) Oral three times a day  cefTRIAXone   IVPB      cefTRIAXone   IVPB 1 Gram(s) IV Intermittent every 24 hours  chlorhexidine 0.12% Liquid 15 milliLiter(s) Swish and Spit four times a day  digoxin     Tablet 0.125 milliGRAM(s) Oral daily  heparin  Injectable 5000 Unit(s) SubCutaneous every 8 hours  influenza   Vaccine 0.5 milliLiter(s) IntraMuscular once  insulin glargine Injectable (LANTUS) 12 Unit(s) SubCutaneous at bedtime  insulin lispro (HumaLOG) corrective regimen sliding scale   SubCutaneous Before meals and at bedtime  insulin lispro Injectable (HumaLOG) 7 Unit(s) SubCutaneous three times a day before meals  lidocaine   Patch 1 Patch Transdermal daily  metoprolol     tartrate 25 milliGRAM(s) Oral two times a day  multivitamin 1 Tablet(s) Oral daily  pantoprazole  Injectable 40 milliGRAM(s) IV Push daily  predniSONE   Tablet 10 milliGRAM(s) Oral daily  saliva substitute (BIOTENE MOISTURIZING MOUTH SPRAY) 1 [place](s) Topical three times a day  sodium chloride 0.65% Nasal 1 Spray(s) Both Nostrils three times a day  sodium chloride 0.9%. 500 milliLiter(s) (75 mL/Hr) IV Continuous <Continuous>  tiotropium 18 MICROgram(s) Capsule 1 Capsule(s) Inhalation daily    MEDICATIONS  (PRN):  bisacodyl Suppository 10 milliGRAM(s) Rectal daily PRN Constipation  polyethylene glycol 3350 17 Gram(s) Oral two times a day PRN Constipation  senna Syrup 10 milliLiter(s) Oral at bedtime PRN Constipation    Pertinent Labs:  POCT Glucose  (11/130-12/1)    Skin: no pressure injuries noted  Edema: none noted    Estimated Needs:   [X] no change since previous assessment  [ ] recalculated:       Previous Nutrition Diagnosis: [X]Moderate Malnutrition          Nutrition Diagnosis is [X] ongoing - addressed with po supplements [ ] resolved [ ] not applicable          New Nutrition Diagnosis: [X] not applicable     Interventions:     Recommend    [X] Change Diet To: Defer diet/fluid consistency to SLP and primary team. Continue Consistent CHO with HS snack. Encourage pt to consume food on trays prior to consumption of glucerna.    [X] Nutrition Supplement: As pt not meeting 75% of estimated needs, Recommend to increase Glucerna tid thickened appropriately (Provides additional 660kcal, 29.7gm Protein) - Pt prefers vanilla    [X] Nutrition Support: As NGT pulled with no plan for replacement, per family no replacement of NGT, recommend to d/c enteral nutrition order    [X] Other: Encourage good po intake, food preferences updated       Monitoring and Evaluation:     [X] PO intake [X] Tolerance to diet prescription [X] weights [X] follow up per protocol    [X] other: RD remains available Caroline Nevarez MBA RDN CDN Pager [phone]

## 2017-12-01 NOTE — PROGRESS NOTE ADULT - ASSESSMENT
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 CT chest shows bilateral IPF  treated for Pulmonary fibrosis / Intersitial pulmonary dz exacerbation.  Pt developed ARF,  transferred to MICU w/ acute hypoxic respiratory failure sp intubation w/ septic shock 2/2 to pseudomonal bacteremia resolve.  Pt extubated, downgraded to floor. patient developed AMS. possible 2/2 delirium vs. infectious UTI.    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  - appreciate ID recs, cont IV ceftriaxone, culture growing yeast  - appreciate neuro recs  - dw son who requests MRI brain  - pain control, monitor for sedation  - PT    # Idiopathic pulmonary fibrosis / Acute hypoxic expiratory failure   - improving, c/w steroids, hold imuran  - appreciate pulm recs    # CHRISTIANE / Hypernatremia  - appreciate renal recs, encourage oral intake     # Pseudomonas bacteremia  - resolved      # Lip lesion   - dry eschar from prolonged intubation, ENT appreciated, c/w bactroban    # Type 2 diabetes mellitus   - endo following    DVT ppx HSQ    dw son over the phone [phone] 77 year old male with PMH of "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 CT chest shows bilateral IPF  treated for Pulmonary fibrosis / Intersitial pulmonary dz exacerbation.  Pt developed ARF,  transferred to MICU w/ acute hypoxic respiratory failure sp intubation w/ septic shock 2/2 to pseudomonal bacteremia resolve.  Pt extubated, downgraded to floor. patient developed AMS. possible 2/2 delirium vs. infectious UTI.    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  - mental status improved  - appreciate ID recs, cont IV ceftriaxone, culture growing yeast  - appreciate neuro recs  - dw son who requests MRI brain prior to DC, explained likely will not change plan of care  - pain control, monitor for sedation  - PT    # Idiopathic pulmonary fibrosis / Acute hypoxic expiratory failure   - improving, c/w steroids, hold imuran  - appreciate pulm recs    # CHRISTIANE / Hypernatremia  - appreciate renal recs, encourage oral intake     # Pseudomonas bacteremia  - resolved      # Lip lesion   - dry eschar from prolonged intubation, ENT appreciated, c/w bactroban    # Type 2 diabetes mellitus   - endo following    DVT ppx HSQ    dw son over the phone [phone]

## 2017-12-01 NOTE — PROGRESS NOTE ADULT - SUBJECTIVE AND OBJECTIVE BOX
Admitting Diagnosis:  Wheezing (R06.2): WHEEZING      HPI:  This is a 77y year old Male with the below past medical history who presents with the chief complaint of altered mental status.  Has pmhx of asbestos-related lung disease" on home O2 (3L), CAD s/p CABG (), DM-2, GERD, kidney stones; presents with 2-3 weeks worsening dyspnea on exertion. Pt presented with SOB, required intubation for pseudomonas bacteremia, came out of MICU last week.  Has been seen by pain management for his low back pain.  he was given baclofen yesterday first 10mg then 20mg.  Then yesterday afternoon he was noted to be confused.  This am, wife says he is speaking to her and makes sense, but acting child like.  He cannot provide much detail.  the wife says he is not yet at baseline.  She also notes that his legs have gotten weaker since leaving the micu, he has lost weight.     This am, he has improved, his voice is stronger, says feels better except back pain    Past Medical History:  Asbestos exposure (Z77.090)  Kidney stones (N20.0)  Coronary artery disease involving native coronary artery of native heart, angina presence unspecified (I25.10): S/p CABG 2016  Cataract (366.9)  Rotator cuff rupture, right (840.4)  GERD (gastroesophageal reflux disease) (530.81)  Hyperlipemia (272.4)  Diabetes mellitus (250.00)      Past Surgical History:  Encounter for removal of ureteral stent (Z46.6): Had a ureteral stent for kidney stones. Now removed.  S/P CABG (coronary artery bypass graft) (Z95.1)  S/P rotator cuff surgery (Z98.89)  S/P lens implant (V43.1):  &amp;   S/P appendectomy (V45.89): over 50 years ago      Social History:  No toxic habits    Family History:  FAMILY HISTORY:  Family history of breast cancer (Mother)  Family history of liver disease (Sibling)  Family history of diabetes mellitus      Allergies:  Vaseline (Pruritus; Urticaria)      ROS:  Constitutional: Patient offers no complaints of fevers or significant weight loss  Ears, Nose, Mouth and Throat: The patient presents with no abnormalities of the head, ears, eyes, nose or throat  Skin: Patient offers no concerns of new rashes or lesions  Respiratory: The patient presents with no abnormalities of the respiratory tract  Cardiovascular: The patient presents with no cardiac abnormalities  Gastrointestinal: The patient presents with no abnormalities of the GI system  Genitourinary: The patient presents with no dysuria, hematuria or frequent urination  Neurological: See HPI  Endocrine: Patient offers no complaints of excessive thirst, urination, or heat/cold intolerance    Advanced care planning reviewed and noted in the chart.    Medications:  aspirin  chewable 81 milliGRAM(s) Oral daily  BACItracin   Ointment 1 Application(s) Topical three times a day  baclofen 10 milliGRAM(s) Oral once  baclofen 20 milliGRAM(s) Oral three times a day  bisacodyl Suppository 10 milliGRAM(s) Rectal daily PRN  cefTRIAXone   IVPB      cefTRIAXone   IVPB 1 Gram(s) IV Intermittent every 24 hours  chlorhexidine 0.12% Liquid 15 milliLiter(s) Swish and Spit four times a day  digoxin     Tablet 0.125 milliGRAM(s) Oral daily  heparin  Injectable 5000 Unit(s) SubCutaneous every 8 hours  influenza   Vaccine 0.5 milliLiter(s) IntraMuscular once  insulin glargine Injectable (LANTUS) 12 Unit(s) SubCutaneous at bedtime  insulin lispro (HumaLOG) corrective regimen sliding scale   SubCutaneous Before meals and at bedtime  insulin lispro Injectable (HumaLOG) 7 Unit(s) SubCutaneous three times a day before meals  lidocaine   Patch 1 Patch Transdermal daily  metoprolol     tartrate 25 milliGRAM(s) Oral two times a day  multivitamin 1 Tablet(s) Oral daily  pantoprazole  Injectable 40 milliGRAM(s) IV Push daily  polyethylene glycol 3350 17 Gram(s) Oral two times a day PRN  predniSONE   Tablet 10 milliGRAM(s) Oral daily  saliva substitute (BIOTENE MOISTURIZING MOUTH SPRAY) 1 [place](s) Topical three times a day  senna Syrup 10 milliLiter(s) Oral at bedtime PRN  sodium chloride 0.65% Nasal 1 Spray(s) Both Nostrils three times a day  sodium chloride 0.9%. 500 milliLiter(s) IV Continuous <Continuous>  tiotropium 18 MICROgram(s) Capsule 1 Capsule(s) Inhalation daily      Labs:  CBC Full  -  ( 2017 10:54 )  WBC Count : 6.0 K/uL  Hemoglobin : 8.8 g/dL  Hematocrit : 25.9 %  Platelet Count - Automated : 191 K/uL  Mean Cell Volume : 105.0 fl  Mean Cell Hemoglobin : 35.4 pg  Mean Cell Hemoglobin Concentration : 33.9 gm/dL  Auto Neutrophil # : x  Auto Lymphocyte # : x  Auto Monocyte # : x  Auto Eosinophil # : x  Auto Basophil # : x  Auto Neutrophil % : x  Auto Lymphocyte % : x  Auto Monocyte % : x  Auto Eosinophil % : x  Auto Basophil % : x        139  |  99  |  28<H>  ----------------------------<  168<H>  4.4   |  31  |  0.83    Ca    8.1<L>      2017 10:54    TPro  6.6  /  Alb  2.3<L>  /  TBili  0.4  /  DBili  x   /  AST  36  /  ALT  44  /  AlkPhos  163<H>      CAPILLARY BLOOD GLUCOSE      POCT Blood Glucose.: 77 mg/dL (2017 21:13)  POCT Blood Glucose.: 169 mg/dL (2017 17:00)  POCT Blood Glucose.: 131 mg/dL (2017 11:58)    LIVER FUNCTIONS - ( 2017 10:54 )  Alb: 2.3 g/dL / Pro: 6.6 g/dL / ALK PHOS: 163 U/L / ALT: 44 U/L RC / AST: 36 U/L / GGT: x             Urinalysis Basic - ( 2017 00:45 )    Color: Yellow / Appearance: SL Turbid / S.013 / pH: x  Gluc: x / Ketone: Negative  / Bili: Negative / Urobili: Negative   Blood: x / Protein: 30 mg/dL / Nitrite: Negative   Leuk Esterase: Large / RBC: 5-10 /HPF / WBC >50 /HPF   Sq Epi: x / Non Sq Epi: Occasional /HPF / Bacteria: Few /HPF      Vitamin B12: -- 17 @ 23:09  Folate: -- 17 @ 23:09  Thyroid Function: -- 17 @ 23:09  Ammonia: 7 umol/L<L> [] 17 @ 23:09  Dilantin: -- 17 @ 23:09      Vitals:  Vital Signs Last 24 Hrs  T(C): 36.9 (01 Dec 2017 07:00), Max: 36.9 (01 Dec 2017 07:00)  T(F): 98.5 (01 Dec 2017 07:00), Max: 98.5 (01 Dec 2017 07:00)  HR: 89 (01 Dec 2017 07:00) (62 - 90)  BP: 116/74 (01 Dec 2017 07:00) (103/65 - 121/67)  BP(mean): --  RR: 18 (01 Dec 2017 07:00) (18 - 18)  SpO2: 98% (01 Dec 2017 07:00) (98% - 100%)    NEUROLOGICAL EXAM:    Mental status: Awake, alert, and in no apparent distress. Oriented to person, place - hospital and year, can answer questions, knew the president, voice is at normal strength  Language function is normal.    Cranial Nerves: Pupils were equal, round, reactive to light. Extraocular movements were intact. Visual field were full. Fundoscopic exam was deferred. Facial sensation was intact to light touch. There was no facial asymmetry. The palate was upgoing symmetrically and tongue was midline. Hearing acuity was intact to finger rub AU. Shoulder shrug was full bilaterally    Motor exam: Bulk and tone were normal. Strength was 5/5 in arms, legs 4/5 but left foot has 3/5 foot drop. Fine finger movements were symmetric and normal. There was no pronator drift    Reflexes: 2+ in the bilateral upper extremities. 0 in the bilateral lower extremities. Toes were downgoing bilaterally.     Sensation: difficult to assess.     Coordination: Finger-nose-finger no gross dymetria dysmetria.     Gait: defferred

## 2017-12-01 NOTE — PROGRESS NOTE ADULT - ASSESSMENT
77M with ILD on home O2, DM, GERd, admitted with dyspnea and treated for exacerbation of chronic lung disease with steroids and immunesuppression  complicated hospital course with septic shock, MICU stay requiring intubation and blood pressure support secondary to pseudomonas bacteremia / pneumonia  Now extubated, out of ICU, and off antibiotics  NGT placed, ongoing reassessment re: ability to take PO  oral ulcers now scabbed  11-29 acute mental status change after higher dose delayed baclofen but also with pyuria and suprapubic tenderness rapid improvement and no bacteria isolated from urine culture

## 2017-12-01 NOTE — PROGRESS NOTE ADULT - SUBJECTIVE AND OBJECTIVE BOX
Follow-up Pulm Progress Note  Amauri Jackson MD  [phone]      Events noted  Afebrile off antibiotics      Vital Signs Last 24 Hrs  T(C): 36.9 (01 Dec 2017 07:00), Max: 36.9 (01 Dec 2017 07:00)  T(F): 98.5 (01 Dec 2017 07:00), Max: 98.5 (01 Dec 2017 07:00)  HR: 89 (01 Dec 2017 07:00) (62 - 90)  BP: 116/74 (01 Dec 2017 07:00) (103/65 - 121/67)  BP(mean): --  RR: 18 (01 Dec 2017 07:00) (18 - 18)  SpO2: 98% (01 Dec 2017 07:00) (98% - 100%)    ABG - ( 30 Nov 2017 00:33 )  pH: 7.52  /  pCO2: 38    /  pO2: 168   / HCO3: 31    / Base Excess: 8.0   /  SaO2: 100                          8.8    6.0   )-----------( 191      ( 30 Nov 2017 10:54 )            25.9   11-30    139  |  99  |  28<H>  ----------------------------<  168<H>  4.4   |  31  |  0.83    Ca    8.1<L>      30 Nov 2017 10:54    TPro  6.6  /  Alb  2.3<L>  /  TBili  0.4  /  DBili  x   /  AST  36  /  ALT  44  /  AlkPhos  163<H>  11-30    CXR: unchanged reticular opacities: no gross new pulm edema  TTE: Mild AS, hyperdynamic LV, PA 40-50  Physical Examination:  PULM: No wheeze or rhonchi   CVS: Regular rate and rhythm, no murmurs, rubs, or gallops  ABD: Soft, non-tender  EXT:  LUE edema    RADIOLOGY REVIEWED  CXR: No change in bilateral reticular opacities    CT chest: see above    TTE: Follow-up Pulm Progress Note  Amauri Jackson MD  [phone]      Events noted  Afebrile off antibiotics  At baseline dyspnea, appears weak/lethargic yet interactive  MRI pending      Vital Signs Last 24 Hrs  T(C): 36.9 (01 Dec 2017 07:00), Max: 36.9 (01 Dec 2017 07:00)  T(F): 98.5 (01 Dec 2017 07:00), Max: 98.5 (01 Dec 2017 07:00)  HR: 89 (01 Dec 2017 07:00) (62 - 90)  BP: 116/74 (01 Dec 2017 07:00) (103/65 - 121/67)  BP(mean): --  RR: 18 (01 Dec 2017 07:00) (18 - 18)  SpO2: 98% (01 Dec 2017 07:00) (98% - 100%)    ABG - ( 30 Nov 2017 00:33 )  pH: 7.52  /  pCO2: 38    /  pO2: 168   / HCO3: 31    / Base Excess: 8.0   /  SaO2: 100                          8.8    6.0   )-----------( 191      ( 30 Nov 2017 10:54 )            25.9   11-30    139  |  99  |  28<H>  ----------------------------<  168<H>  4.4   |  31  |  0.83    Ca    8.1<L>      30 Nov 2017 10:54    TPro  6.6  /  Alb  2.3<L>  /  TBili  0.4  /  DBili  x   /  AST  36  /  ALT  44  /  AlkPhos  163<H>  11-30    CXR: unchanged reticular opacities: no gross new pulm edema  TTE: Mild AS, hyperdynamic LV, PA 40-50  Physical Examination:  PULM: No wheeze or rhonchi   CVS: Regular rate and rhythm, no murmurs, rubs, or gallops  ABD: Soft, non-tender  EXT:  LUE edema    RADIOLOGY REVIEWED  CXR: No change in bilateral reticular opacities    CT chest: see above    TTE:

## 2017-12-01 NOTE — SWALLOW BEDSIDE ASSESSMENT ADULT - SWALLOW EVAL: DIAGNOSIS
New order for bedside swallow evaluation received and appreciated. Chart reviewed and case d/w NP: Aditi. Pt s/p FEES x2 (most recently on 11/24) both with recommendations for NPO, with non-oral nutrition/hydration/medications. On 11/27 event note initiated as wife and son requesting for Mr. Toribio to eat despite results of FEEST and MD documented on 11/28: NG in place, patient failed FEEST but still wants PO, patient understands risks of aspiration. At this time pt is on PO diet per pt and family wishes. Given above dysphagia w/u to be deferred and order for exam to be d/cd. Please reconsult pending change in status.
New order for bedside swallow evaluation received and appreciated. Chart reviewed. Pt s/p recent FEES exam (11/17) which revealed impaired airway protection with the most conservative textures. Given above, pt will require repeat objective testing to determine candidacy for initiation pf PO diet. Tentative plan for repeat FEES exam at the end of the week (as clinically appropriate).
Pt presents with an oral and suspected pharyngeal dysphagia superimposed upon baseline tachypnea (above d/w MD: Alexis who reported pt with tachypnea at baseline and is cleared for exam). The swallow is marked by suspected uncontrolled loss, delayed pharyngeal swallow, reduced hyolaryngeal excursion, multiple swallows (suggestive of pharyngeal residue), increased RR and evidence of dyspnea post PO intake, and subtle throat clears post PO intake of teaspoon amounts of honey thick liquids suggestive of laryngeal penetration/aspiration.
Pt seen for re-evaluation and to determine candidacy for objective testing. Pt continues to present with an oral and suspected pharyngeal swallow superimposed upon baseline tachypnea (above was d/w MD: Alexis who reported pt with tachypnea at baseline and is cleared for exam). The swallow is marked by suspected uncontrolled loss, delayed pharyngeal swallow, and reduced hyolaryngeal excursion improvement in RR and dyspnea with PO intake noted compared with yesterday’s exam.

## 2017-12-01 NOTE — PROGRESS NOTE ADULT - SUBJECTIVE AND OBJECTIVE BOX
Patient is a 77y old  Male who presents with a chief complaint of SOB (2017 14:33)      SUBJECTIVE / OVERNIGHT EVENTS:    Patient seen and examined. more alert today. able to carry a conversation with me telling me his name and hospital. co pain.      Vital Signs Last 24 Hrs  T(C): 36.9 (01 Dec 2017 07:00), Max: 36.9 (01 Dec 2017 07:00)  T(F): 98.5 (01 Dec 2017 07:00), Max: 98.5 (01 Dec 2017 07:00)  HR: 89 (01 Dec 2017 07:00) (62 - 90)  BP: 116/74 (01 Dec 2017 07:00) (103/65 - 121/67)  BP(mean): --  RR: 18 (01 Dec 2017 07:00) (18 - 18)  SpO2: 98% (01 Dec 2017 07:00) (98% - 100%)  I&O's Summary    2017 07:01  -  01 Dec 2017 07:00  --------------------------------------------------------  IN: 120 mL / OUT: 875 mL / NET: -755 mL        PE:  GENERAL: NAD, aaox2  HEAD:  Atraumatic, Normocephalic  EYES: EOMI, PERRLA, conjunctiva and sclera clear  NECK: Supple, No JVD  CHEST/LUNG: CTABL, No wheeze  HEART: Regular rate and rhythm; + murmur  ABDOMEN: Soft, Nontender, Nondistended; Bowel sounds present  EXTREMITIES:  2+ Peripheral Pulses, No clubbing, cyanosis, or edema  SKIN: No rashes or lesions  NEURO: right foot drop, no focal deficits    LABS:                        8.8    6.0   )-----------( 191      ( 2017 10:54 )             25.9     11-30    139  |  99  |  28<H>  ----------------------------<  168<H>  4.4   |  31  |  0.83    Ca    8.1<L>      2017 10:54    TPro  6.6  /  Alb  2.3<L>  /  TBili  0.4  /  DBili  x   /  AST  36  /  ALT  44  /  AlkPhos  163<H>  11-30      CAPILLARY BLOOD GLUCOSE      POCT Blood Glucose.: 77 mg/dL (2017 21:13)  POCT Blood Glucose.: 169 mg/dL (2017 17:00)  POCT Blood Glucose.: 131 mg/dL (2017 11:58)  POCT Blood Glucose.: 192 mg/dL (2017 08:57)        Urinalysis Basic - ( 2017 00:45 )    Color: Yellow / Appearance: SL Turbid / S.013 / pH: x  Gluc: x / Ketone: Negative  / Bili: Negative / Urobili: Negative   Blood: x / Protein: 30 mg/dL / Nitrite: Negative   Leuk Esterase: Large / RBC: 5-10 /HPF / WBC >50 /HPF   Sq Epi: x / Non Sq Epi: Occasional /HPF / Bacteria: Few /HPF        RADIOLOGY & ADDITIONAL TESTS:    Imaging Personally Reviewed:  [x] YES  [ ] NO    Consultant(s) Notes Reviewed:  [x] YES  [ ] NO    MEDICATIONS  (STANDING):  aspirin  chewable 81 milliGRAM(s) Oral daily  BACItracin   Ointment 1 Application(s) Topical three times a day  baclofen 10 milliGRAM(s) Oral once  baclofen 20 milliGRAM(s) Oral three times a day  cefTRIAXone   IVPB      cefTRIAXone   IVPB 1 Gram(s) IV Intermittent every 24 hours  chlorhexidine 0.12% Liquid 15 milliLiter(s) Swish and Spit four times a day  digoxin     Tablet 0.125 milliGRAM(s) Oral daily  heparin  Injectable 5000 Unit(s) SubCutaneous every 8 hours  influenza   Vaccine 0.5 milliLiter(s) IntraMuscular once  insulin glargine Injectable (LANTUS) 12 Unit(s) SubCutaneous at bedtime  insulin lispro (HumaLOG) corrective regimen sliding scale   SubCutaneous Before meals and at bedtime  insulin lispro Injectable (HumaLOG) 7 Unit(s) SubCutaneous three times a day before meals  lidocaine   Patch 1 Patch Transdermal daily  metoprolol     tartrate 25 milliGRAM(s) Oral two times a day  multivitamin 1 Tablet(s) Oral daily  pantoprazole  Injectable 40 milliGRAM(s) IV Push daily  predniSONE   Tablet 10 milliGRAM(s) Oral daily  saliva substitute (BIOTENE MOISTURIZING MOUTH SPRAY) 1 [place](s) Topical three times a day  sodium chloride 0.65% Nasal 1 Spray(s) Both Nostrils three times a day  sodium chloride 0.9%. 500 milliLiter(s) (75 mL/Hr) IV Continuous <Continuous>  tiotropium 18 MICROgram(s) Capsule 1 Capsule(s) Inhalation daily    MEDICATIONS  (PRN):  bisacodyl Suppository 10 milliGRAM(s) Rectal daily PRN Constipation  polyethylene glycol 3350 17 Gram(s) Oral two times a day PRN Constipation  senna Syrup 10 milliLiter(s) Oral at bedtime PRN Constipation      Care Discussed with Consultants/Other Providers [x] YES  [ ] NO    HEALTH ISSUES - PROBLEM Dx:  Mental status change: Mental status change  Back pain, unspecified back location, unspecified back pain laterality, unspecified chronicity: Back pain, unspecified back location, unspecified back pain laterality, unspecified chronicity  Globus sensation: Globus sensation  Lip lesion: Lip lesion  Oral ulcer: Oral ulcer  CHRISTIANE (acute kidney injury): CHRISTIANE (acute kidney injury)  Bacteremia: Bacteremia  Neutropenia: Neutropenia  Need for prophylactic measure: Need for prophylactic measure  Oral thrush: Oral thrush  Nasal discomfort: Nasal discomfort  Gastroesophageal reflux disease without esophagitis: Gastroesophageal reflux disease without esophagitis  Hyperlipidemia, unspecified hyperlipidemia type: Hyperlipidemia, unspecified hyperlipidemia type  Type 2 diabetes mellitus without complication, without long-term current use of insulin: Type 2 diabetes mellitus without complication, without long-term current use of insulin  Coronary artery disease involving native coronary artery of native heart, angina presence unspecified: Coronary artery disease involving native coronary artery of native heart, angina presence unspecified  Hyperkalemia: Hyperkalemia  Lactic acidosis: Lactic acidosis  Transaminitis: Transaminitis  Idiopathic pulmonary fibrosis: Idiopathic pulmonary fibrosis  Shortness of breath: Shortness of breath

## 2017-12-01 NOTE — PROGRESS NOTE ADULT - PROBLEM SELECTOR PLAN 5
Improving  Care per ENT    Thank you for consulting us and involving us in the management of this most interesting and challenging case.     Please Call with any further questions
Improving  Care per ENT
-Per hematology transaminitis can be a side-effect of azathioprine  -LFTs down-trending, will continue to trend  -Hold statin.
-Per hematology transaminitis can be a side-effect of azathioprine  -LFTs down-trending, will continue to trend  -Hold statin.
likely azothiprine related trend value
-C/w ASA daily, losartan, and metoprolol.  -Hold statin in view of transaminitis.  -Will touch base with outpatient cardiologist
-related to azathioprine, can d/c aza if necessary
likely azothiprine related trend value

## 2017-12-01 NOTE — PROGRESS NOTE ADULT - SUBJECTIVE AND OBJECTIVE BOX
Patient seen this morning  Looks better today, sleeping and not in distress; arousable and coherent  No new complaints    MEDICATIONS  (STANDING):  aspirin  chewable 81 milliGRAM(s) Oral daily  BACItracin   Ointment 1 Application(s) Topical three times a day  baclofen 10 milliGRAM(s) Oral three times a day  chlorhexidine 0.12% Liquid 15 milliLiter(s) Swish and Spit four times a day  digoxin     Tablet 0.125 milliGRAM(s) Oral daily  heparin  Injectable 5000 Unit(s) SubCutaneous every 8 hours  influenza   Vaccine 0.5 milliLiter(s) IntraMuscular once  insulin glargine Injectable (LANTUS) 12 Unit(s) SubCutaneous at bedtime  insulin lispro (HumaLOG) corrective regimen sliding scale   SubCutaneous every 6 hours  lidocaine   Patch 1 Patch Transdermal daily  metoprolol     tartrate 25 milliGRAM(s) Oral two times a day  multivitamin 1 Tablet(s) Oral daily  pantoprazole  Injectable 40 milliGRAM(s) IV Push daily  predniSONE   Tablet 10 milliGRAM(s) Oral daily  saliva substitute (BIOTENE MOISTURIZING MOUTH SPRAY) 1 Calvin(s) Topical three times a day  sodium chloride 0.65% Nasal 1 Spray(s) Both Nostrils three times a day  sodium chloride 0.9% Bolus 250 milliLiter(s) IV Bolus once  tiotropium 18 MICROgram(s) Capsule 1 Capsule(s) Inhalation daily      VITAL:  Vital Signs Last 24 Hrs  T(C): 36.9 (01 Dec 2017 07:00), Max: 36.9 (01 Dec 2017 07:00)  T(F): 98.5 (01 Dec 2017 07:00), Max: 98.5 (01 Dec 2017 07:00)  HR: 89 (01 Dec 2017 07:00) (62 - 90)  BP: 116/74 (01 Dec 2017 07:00) (103/65 - 121/67)  BP(mean): --  RR: 18 (01 Dec 2017 07:00) (18 - 18)  SpO2: 98% (01 Dec 2017 07:00) (98% - 100%)  I&O's Summary    2017 07:01  -  01 Dec 2017 07:00  --------------------------------------------------------  IN: 120 mL / OUT: 875 mL / NET: -755 mL        PHYSICAL EXAM:    GA: uncomfortable and mild distress due to pain; mouth sores improving  LUNGS: CTABL  HEART: S1 and S2  ABD: BS+, soft, NT/ND  Ext: No peripheral edema  : + Monzon  Skin: No rashes  Access: Not applicable    LABS:                                   8.8    6.0   )-----------( 191      ( 2017 10:54 )             25.9         139  |  99  |  28<H>  ----------------------------<  168<H>  4.4   |  31  |  0.83    Ca    8.1<L>      2017 10:54    TPro  6.6  /  Alb  2.3<L>  /  TBili  0.4  /  DBili  x   /  AST  36  /  ALT  44  /  AlkPhos  163<H>      Urine Studies:  Urinalysis Basic - ( 2017 12:44 )    Color: Yellow / Appearance: Turbid / S.014 / pH: x  Gluc: x / Ketone: Negative  / Bili: Negative / Urobili: 1.0 mg/dL   Blood: x / Protein: 100 mg/dL / Nitrite: Negative   Leuk Esterase: Large / RBC: x / WBC x   Sq Epi: x / Non Sq Epi: x / Bacteria: x      RADIOLOGY & ADDITIONAL STUDIES:        ASSESSMENT  77 year old male with PMH of "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 He was treated for COPD exacerbation with steroids and immunosuppression Hospital course was complicated with septic shock, pseudomonas bacteremia/pneumonia requiring MICU stay, intubation and blood pressure support. Patient is extubated, out on the regular floor, off antibiotics. Patient with oral ulcers unable to swallow post NGT placement. CHRISTIANE,    1. CHRISTIANE likely prerenal+ obstructive - improved  2. Hypernatremia initially due to poor PO intake then Hyponatremia which improved with reduction in free water  3. Anemia likely multifactorial: acute illness, AOCD etc; H&H low but stable   4. Sepsis/pseudomonas bacteremia; plan per primary team  5. Acute retention: monzon in place - noted UA with significant WBCs and LE - treated with ceftriaxone for UTI  6. Hypoalbuminemia    RECOMMENDATIONS:  - encourage po intake  - protein supplement  - Keep monzon for now as patient failed voiding trials  - monitor ins and outs  - OOB to chair and PT as able  - Avoid nephrotoxins, agree on holding ACEIs as BP soft  - dose meds for GFR of 40 ml/min/1.73m2    Labs pending; please call with questions     Ju Davis MD  [facility]  [phone]

## 2017-12-01 NOTE — PROGRESS NOTE ADULT - PROBLEM SELECTOR PROBLEM 4
Lactic acidosis
Oral ulcer
Oral ulcer
Hyperkalemia
Lactic acidosis
Idiopathic pulmonary fibrosis
Idiopathic pulmonary fibrosis
Lactic acidosis

## 2017-12-01 NOTE — PROGRESS NOTE ADULT - ASSESSMENT
a/p - 79 yo M px with sepsis, acute hypoxic respiratory failure in unit.  he comes out of unit about one week ago, on limited po intake.  Complaining of low back pain, given baclofen 10 then 20 and became confused.  As per wife, he has improved but not to baseline acting "childlike"  exam was notable that he will not answer most questions, speaks very softly but follows commands.  Legs with weakness, left foot drop evident  24 hours later, mentation improved    ams - likely multifactorial delirium - can be meds, infection, hospitalization.  ct neg, doubt new stroke.    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  Needs PT, not clear how imaging of lower spine will change treatment as once mentation improves, needs vigourous PT    Dvt proplyaxis    d/w wife at bedside

## 2017-12-02 LAB
ANION GAP SERPL CALC-SCNC: 12 MMOL/L — SIGNIFICANT CHANGE UP (ref 5–17)
BUN SERPL-MCNC: 21 MG/DL — SIGNIFICANT CHANGE UP (ref 7–23)
CALCIUM SERPL-MCNC: 8.4 MG/DL — SIGNIFICANT CHANGE UP (ref 8.4–10.5)
CHLORIDE SERPL-SCNC: 103 MMOL/L — SIGNIFICANT CHANGE UP (ref 96–108)
CO2 SERPL-SCNC: 25 MMOL/L — SIGNIFICANT CHANGE UP (ref 22–31)
CREAT SERPL-MCNC: 0.8 MG/DL — SIGNIFICANT CHANGE UP (ref 0.5–1.3)
GLUCOSE BLDC GLUCOMTR-MCNC: 112 MG/DL — HIGH (ref 70–99)
GLUCOSE BLDC GLUCOMTR-MCNC: 118 MG/DL — HIGH (ref 70–99)
GLUCOSE BLDC GLUCOMTR-MCNC: 67 MG/DL — LOW (ref 70–99)
GLUCOSE BLDC GLUCOMTR-MCNC: 77 MG/DL — SIGNIFICANT CHANGE UP (ref 70–99)
GLUCOSE BLDC GLUCOMTR-MCNC: 87 MG/DL — SIGNIFICANT CHANGE UP (ref 70–99)
GLUCOSE SERPL-MCNC: 67 MG/DL — LOW (ref 70–99)
HCT VFR BLD CALC: 26.6 % — LOW (ref 39–50)
HGB BLD-MCNC: 8.5 G/DL — LOW (ref 13–17)
MAGNESIUM SERPL-MCNC: 1.9 MG/DL — SIGNIFICANT CHANGE UP (ref 1.6–2.6)
MCHC RBC-ENTMCNC: 32 GM/DL — SIGNIFICANT CHANGE UP (ref 32–36)
MCHC RBC-ENTMCNC: 33.6 PG — SIGNIFICANT CHANGE UP (ref 27–34)
MCV RBC AUTO: 105.1 FL — HIGH (ref 80–100)
NRBC # BLD: 1 /100 WBCS — HIGH (ref 0–0)
PHOSPHATE SERPL-MCNC: 3.1 MG/DL — SIGNIFICANT CHANGE UP (ref 2.5–4.5)
PLATELET # BLD AUTO: 223 K/UL — SIGNIFICANT CHANGE UP (ref 150–400)
POTASSIUM SERPL-MCNC: 4.7 MMOL/L — SIGNIFICANT CHANGE UP (ref 3.5–5.3)
POTASSIUM SERPL-SCNC: 4.7 MMOL/L — SIGNIFICANT CHANGE UP (ref 3.5–5.3)
RBC # BLD: 2.53 M/UL — LOW (ref 4.2–5.8)
RBC # FLD: 19.1 % — HIGH (ref 10.3–14.5)
SODIUM SERPL-SCNC: 140 MMOL/L — SIGNIFICANT CHANGE UP (ref 135–145)
WBC # BLD: 3.09 K/UL — LOW (ref 3.8–10.5)
WBC # FLD AUTO: 3.09 K/UL — LOW (ref 3.8–10.5)

## 2017-12-02 RX ORDER — SODIUM CHLORIDE 9 MG/ML
1000 INJECTION, SOLUTION INTRAVENOUS
Qty: 0 | Refills: 0 | Status: DISCONTINUED | OUTPATIENT
Start: 2017-12-02 | End: 2017-12-03

## 2017-12-02 RX ORDER — BACLOFEN 100 %
10 POWDER (GRAM) MISCELLANEOUS EVERY 6 HOURS
Qty: 0 | Refills: 0 | Status: DISCONTINUED | OUTPATIENT
Start: 2017-12-02 | End: 2017-12-03

## 2017-12-02 RX ORDER — MORPHINE SULFATE 50 MG/1
1 CAPSULE, EXTENDED RELEASE ORAL ONCE
Qty: 0 | Refills: 0 | Status: DISCONTINUED | OUTPATIENT
Start: 2017-12-02 | End: 2017-12-02

## 2017-12-02 RX ORDER — ONDANSETRON 8 MG/1
4 TABLET, FILM COATED ORAL EVERY 6 HOURS
Qty: 0 | Refills: 0 | Status: DISCONTINUED | OUTPATIENT
Start: 2017-12-02 | End: 2017-12-05

## 2017-12-02 RX ADMIN — Medication 1 SPRAY(S): at 06:09

## 2017-12-02 RX ADMIN — Medication 10 MILLIGRAM(S): at 18:16

## 2017-12-02 RX ADMIN — SODIUM CHLORIDE 75 MILLILITER(S): 9 INJECTION INTRAMUSCULAR; INTRAVENOUS; SUBCUTANEOUS at 18:16

## 2017-12-02 RX ADMIN — Medication 0.12 MILLIGRAM(S): at 06:08

## 2017-12-02 RX ADMIN — CHLORHEXIDINE GLUCONATE 15 MILLILITER(S): 213 SOLUTION TOPICAL at 06:08

## 2017-12-02 RX ADMIN — Medication 1 TABLET(S): at 13:24

## 2017-12-02 RX ADMIN — CHLORHEXIDINE GLUCONATE 15 MILLILITER(S): 213 SOLUTION TOPICAL at 13:24

## 2017-12-02 RX ADMIN — Medication 25 MILLIGRAM(S): at 06:09

## 2017-12-02 RX ADMIN — Medication 7 UNIT(S): at 18:15

## 2017-12-02 RX ADMIN — Medication 10 MILLIGRAM(S): at 06:08

## 2017-12-02 RX ADMIN — HEPARIN SODIUM 5000 UNIT(S): 5000 INJECTION INTRAVENOUS; SUBCUTANEOUS at 06:08

## 2017-12-02 RX ADMIN — Medication 1 APPLICATION(S): at 06:09

## 2017-12-02 RX ADMIN — HEPARIN SODIUM 5000 UNIT(S): 5000 INJECTION INTRAVENOUS; SUBCUTANEOUS at 20:46

## 2017-12-02 RX ADMIN — Medication 1 APPLICATION(S): at 13:25

## 2017-12-02 RX ADMIN — TIOTROPIUM BROMIDE 1 CAPSULE(S): 18 CAPSULE ORAL; RESPIRATORY (INHALATION) at 13:24

## 2017-12-02 RX ADMIN — Medication 7 UNIT(S): at 07:00

## 2017-12-02 RX ADMIN — Medication 7 UNIT(S): at 13:23

## 2017-12-02 RX ADMIN — HEPARIN SODIUM 5000 UNIT(S): 5000 INJECTION INTRAVENOUS; SUBCUTANEOUS at 18:17

## 2017-12-02 RX ADMIN — PANTOPRAZOLE SODIUM 40 MILLIGRAM(S): 20 TABLET, DELAYED RELEASE ORAL at 13:24

## 2017-12-02 RX ADMIN — Medication 10 MILLIGRAM(S): at 13:24

## 2017-12-02 RX ADMIN — CHLORHEXIDINE GLUCONATE 15 MILLILITER(S): 213 SOLUTION TOPICAL at 18:16

## 2017-12-02 RX ADMIN — Medication 10 MILLIGRAM(S): at 06:09

## 2017-12-02 RX ADMIN — Medication 1 SPRAY(S): at 20:46

## 2017-12-02 RX ADMIN — CHLORHEXIDINE GLUCONATE 15 MILLILITER(S): 213 SOLUTION TOPICAL at 00:33

## 2017-12-02 RX ADMIN — Medication 1 SPRAY(S): at 13:25

## 2017-12-02 RX ADMIN — SODIUM CHLORIDE 75 MILLILITER(S): 9 INJECTION, SOLUTION INTRAVENOUS at 22:00

## 2017-12-02 RX ADMIN — Medication 1 SPRAY(S): at 20:45

## 2017-12-02 RX ADMIN — Medication 25 MILLIGRAM(S): at 18:16

## 2017-12-02 RX ADMIN — INSULIN GLARGINE 12 UNIT(S): 100 INJECTION, SOLUTION SUBCUTANEOUS at 23:59

## 2017-12-02 RX ADMIN — Medication 81 MILLIGRAM(S): at 13:24

## 2017-12-02 RX ADMIN — LIDOCAINE 1 PATCH: 4 CREAM TOPICAL at 13:24

## 2017-12-02 RX ADMIN — MORPHINE SULFATE 1 MILLIGRAM(S): 50 CAPSULE, EXTENDED RELEASE ORAL at 00:32

## 2017-12-02 RX ADMIN — Medication 1 APPLICATION(S): at 20:45

## 2017-12-02 NOTE — PROGRESS NOTE ADULT - ASSESSMENT
Acute on chronic hypoxemic respiratory failure: clinically resoliving post extubation  LUE edema: r/o DVT  Pseudomonoas bacteremia with pneumonia: resolved  OP dysphagia  Oral/Lip ulcerations  CHRISTIANE - resolving  r/o new UTI, bacteremia  Back pain    Septic Shock - clinically resolved    REC  Observe off antibiotics  No need to resume imuran: would continue prednisone at 10 mg qd  PT  Oral care  DC planning for CODIE  Pain management  DC planning for CODIE

## 2017-12-02 NOTE — PROGRESS NOTE ADULT - SUBJECTIVE AND OBJECTIVE BOX
Follow-up Pulm Progress Note  Amauri Jackson MD  [phone]      Events noted  Afebrile off antibiotics  At baseline dyspnea, appears weak/lethargic yet interactive  MRI brain: chronic microvascular disease; no acute pathology    Vital Signs Last 24 Hrs  T(C): 36.9 (01 Dec 2017 07:00), Max: 36.9 (01 Dec 2017 07:00)  T(F): 98.5 (01 Dec 2017 07:00), Max: 98.5 (01 Dec 2017 07:00)  HR: 89 (01 Dec 2017 07:00) (62 - 90)  BP: 116/74 (01 Dec 2017 07:00) (103/65 - 121/67)  BP(mean): --  RR: 18 (01 Dec 2017 07:00) (18 - 18)  SpO2: 98% (01 Dec 2017 07:00) (98% - 100%)    ABG - ( 30 Nov 2017 00:33 )  pH: 7.52  /  pCO2: 38    /  pO2: 168   / HCO3: 31    / Base Excess: 8.0   /  SaO2: 100                          8.8    6.0   )-----------( 191      ( 30 Nov 2017 10:54 )            25.9   11-30    139  |  99  |  28<H>  ----------------------------<  168<H>  4.4   |  31  |  0.83    Ca    8.1<L>      30 Nov 2017 10:54    TPro  6.6  /  Alb  2.3<L>  /  TBili  0.4  /  DBili  x   /  AST  36  /  ALT  44  /  AlkPhos  163<H>  11-30    CXR: unchanged reticular opacities: no gross new pulm edema  TTE: Mild AS, hyperdynamic LV, PA 40-50  Physical Examination:  PULM: No wheeze or rhonchi   CVS: Regular rate and rhythm, no murmurs, rubs, or gallops  ABD: Soft, non-tender  EXT:  LUE edema    RADIOLOGY REVIEWED  CXR: No change in bilateral reticular opacities    CT chest: see above    TTE:

## 2017-12-02 NOTE — PROGRESS NOTE ADULT - ASSESSMENT
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 CT chest shows bilateral IPF  treated for Pulmonary fibrosis / Intersitial pulmonary dz exacerbation.  Pt developed ARF,  transferred to MICU w/ acute hypoxic respiratory failure sp intubation w/ septic shock 2/2 to pseudomonal bacteremia resolve.  Pt extubated, downgraded to floor. patient developed AMS. possible 2/2 delirium vs. infectious UTI.    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  - mental status improved  - appreciate ID recs, stop abx, urine growing yeast, no signs of infection  - appreciate neuro recs  - MRI no acute pathology, chronic microvascular dz  - pain control, monitor for sedation  - PT    # Idiopathic pulmonary fibrosis / Acute hypoxic expiratory failure   - improving, c/w steroids, hold imuran  - appreciate pulm recs    # CHRISTIANE / Hypernatremia  - appreciate renal recs, encourage oral intake     # Pseudomonas bacteremia  - resolved      # Lip lesion   - dry eschar from prolonged intubation, ENT appreciated, c/w bactroban    # Type 2 diabetes mellitus   - endo following    DVT ppx HSQ 77 year old male with PMH of "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 CT chest shows bilateral IPF  treated for Pulmonary fibrosis / Intersitial pulmonary dz exacerbation.  Pt developed ARF,  transferred to MICU w/ acute hypoxic respiratory failure sp intubation w/ septic shock 2/2 to pseudomonal bacteremia resolve.  Pt extubated, downgraded to floor. patient developed AMS. possible 2/2 delirium vs. infectious UTI.    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  - mental status improved  - appreciate ID recs, stop abx, urine growing yeast, no signs of infection  - appreciate neuro recs  - MRI no acute pathology, chronic microvascular dz  - pain control, monitor for sedation  - PT    # Idiopathic pulmonary fibrosis / Acute hypoxic expiratory failure   - improving, c/w steroids, hold imuran  - appreciate pulm recs    # CHRISTIANE / Hypernatremia  - appreciate renal recs, encourage oral intake     # Pseudomonas bacteremia  - resolved      # Lip lesion   - dry eschar from prolonged intubation, ENT appreciated, c/w bactroban    # Type 2 diabetes mellitus   - endo following    DVT ppx HSQ    dispo: rehab planning

## 2017-12-02 NOTE — PROGRESS NOTE ADULT - SUBJECTIVE AND OBJECTIVE BOX
Chief complaint  Patient is a 77y old  Male who presents with a chief complaint of SOB (04 Nov 2017 14:33)   Review of systems  Patient in bed, looks comfortable, no fever, no hypoglycemia.    Labs and Fingersticks    CAPILLARY BLOOD GLUCOSE    Anion Gap, Serum: 12 (12-02 @ 11:19)      Calcium, Total Serum: 8.4 (12-02 @ 11:19)          12-02    140  |  103  |  21  ----------------------------<  67<L>  4.7   |  25  |  0.80    Ca    8.4      02 Dec 2017 11:19  Phos  3.1     12-02  Mg     1.9     12-02                          8.5    3.09  )-----------( 223      ( 02 Dec 2017 11:19 )             26.6     Medications  MEDICATIONS  (STANDING):  aspirin  chewable 81 milliGRAM(s) Oral daily  BACItracin   Ointment 1 Application(s) Topical three times a day  baclofen 10 milliGRAM(s) Oral every 6 hours  chlorhexidine 0.12% Liquid 15 milliLiter(s) Swish and Spit four times a day  dextrose 5% + sodium chloride 0.9%. 1000 milliLiter(s) (75 mL/Hr) IV Continuous <Continuous>  digoxin     Tablet 0.125 milliGRAM(s) Oral daily  heparin  Injectable 5000 Unit(s) SubCutaneous every 8 hours  influenza   Vaccine 0.5 milliLiter(s) IntraMuscular once  insulin glargine Injectable (LANTUS) 12 Unit(s) SubCutaneous at bedtime  insulin lispro (HumaLOG) corrective regimen sliding scale   SubCutaneous Before meals and at bedtime  insulin lispro Injectable (HumaLOG) 7 Unit(s) SubCutaneous three times a day before meals  lidocaine   Patch 1 Patch Transdermal daily  metoprolol     tartrate 25 milliGRAM(s) Oral two times a day  multivitamin 1 Tablet(s) Oral daily  pantoprazole  Injectable 40 milliGRAM(s) IV Push daily  predniSONE   Tablet 10 milliGRAM(s) Oral daily  saliva substitute (BIOTENE MOISTURIZING MOUTH SPRAY) 1 [place](s) Topical three times a day  sodium chloride 0.65% Nasal 1 Spray(s) Both Nostrils three times a day  sodium chloride 0.9%. 500 milliLiter(s) (75 mL/Hr) IV Continuous <Continuous>  tiotropium 18 MICROgram(s) Capsule 1 Capsule(s) Inhalation daily      Physical Exam  General: Patient comfortable in bed  Vital Signs Last 12 Hrs  T(F): 97.8 (12-02-17 @ 21:43), Max: 98.4 (12-02-17 @ 16:44)  HR: 65 (12-02-17 @ 21:43) (65 - 73)  BP: 138/99 (12-02-17 @ 21:43) (122/78 - 138/99)  BP(mean): --  RR: 18 (12-02-17 @ 21:43) (18 - 18)  SpO2: 98% (12-02-17 @ 21:43) (98% - 100%)  Neck: No palpable thyroid nodules.  CVS: S1S2, No murmurs  Respiratory: No wheezing, no crepitations  GI: Abdomen soft, bowel sounds positive  Musculoskeletal: Positive edema lower extremities.   Skin: No skin rashes, no ecchymosis    Diagnostics    Thyroid Stimulating Hormone, Serum: AM Sched. Collection: 01-Dec-2017 06:00 (11-30 @ 10:30)  Free Thyroxine, Serum: AM Sched. Collection: 01-Dec-2017 06:00 (11-30 @ 10:30)

## 2017-12-02 NOTE — PROGRESS NOTE ADULT - SUBJECTIVE AND OBJECTIVE BOX
Patient is a 77y old  Male who presents with a chief complaint of SOB (04 Nov 2017 14:33)      SUBJECTIVE / OVERNIGHT EVENTS:    Patient seen and examined.       Vital Signs Last 24 Hrs  T(C): 36.4 (01 Dec 2017 21:47), Max: 37 (01 Dec 2017 15:47)  T(F): 97.5 (01 Dec 2017 21:47), Max: 98.6 (01 Dec 2017 15:47)  HR: 81 (02 Dec 2017 06:00) (81 - 104)  BP: 117/60 (02 Dec 2017 06:00) (103/61 - 117/60)  BP(mean): --  RR: 18 (01 Dec 2017 21:47) (18 - 18)  SpO2: 99% (01 Dec 2017 21:47) (99% - 99%)  I&O's Summary    01 Dec 2017 07:01  -  02 Dec 2017 07:00  --------------------------------------------------------  IN: 0 mL / OUT: 900 mL / NET: -900 mL        PE:  GENERAL: NAD, AAOx3  HEAD:  Atraumatic, Normocephalic  EYES: EOMI, PERRLA, conjunctiva and sclera clear  NECK: Supple, No JVD  CHEST/LUNG: CTABL, No wheeze  HEART: Regular rate and rhythm; + murmur  ABDOMEN: Soft, Nontender, Nondistended; Bowel sounds present  EXTREMITIES:  2+ Peripheral Pulses, No clubbing, cyanosis, or edema  SKIN: No rashes or lesions  NEURO: No focal deficits    LABS:                        8.8    6.0   )-----------( 191      ( 30 Nov 2017 10:54 )             25.9     11-30    139  |  99  |  28<H>  ----------------------------<  168<H>  4.4   |  31  |  0.83    Ca    8.1<L>      30 Nov 2017 10:54    TPro  6.6  /  Alb  2.3<L>  /  TBili  0.4  /  DBili  x   /  AST  36  /  ALT  44  /  AlkPhos  163<H>  11-30      CAPILLARY BLOOD GLUCOSE      POCT Blood Glucose.: 141 mg/dL (01 Dec 2017 20:59)  POCT Blood Glucose.: 143 mg/dL (01 Dec 2017 17:19)  POCT Blood Glucose.: 134 mg/dL (01 Dec 2017 12:11)  POCT Blood Glucose.: 85 mg/dL (01 Dec 2017 09:00)            RADIOLOGY & ADDITIONAL TESTS:    Imaging Personally Reviewed:  [x] YES  [ ] NO    Consultant(s) Notes Reviewed:  [x] YES  [ ] NO    MEDICATIONS  (STANDING):  aspirin  chewable 81 milliGRAM(s) Oral daily  BACItracin   Ointment 1 Application(s) Topical three times a day  baclofen 10 milliGRAM(s) Oral three times a day  chlorhexidine 0.12% Liquid 15 milliLiter(s) Swish and Spit four times a day  digoxin     Tablet 0.125 milliGRAM(s) Oral daily  heparin  Injectable 5000 Unit(s) SubCutaneous every 8 hours  influenza   Vaccine 0.5 milliLiter(s) IntraMuscular once  insulin glargine Injectable (LANTUS) 12 Unit(s) SubCutaneous at bedtime  insulin lispro (HumaLOG) corrective regimen sliding scale   SubCutaneous Before meals and at bedtime  insulin lispro Injectable (HumaLOG) 7 Unit(s) SubCutaneous three times a day before meals  lidocaine   Patch 1 Patch Transdermal daily  metoprolol     tartrate 25 milliGRAM(s) Oral two times a day  multivitamin 1 Tablet(s) Oral daily  pantoprazole  Injectable 40 milliGRAM(s) IV Push daily  predniSONE   Tablet 10 milliGRAM(s) Oral daily  saliva substitute (BIOTENE MOISTURIZING MOUTH SPRAY) 1 [place](s) Topical three times a day  sodium chloride 0.65% Nasal 1 Spray(s) Both Nostrils three times a day  sodium chloride 0.9%. 500 milliLiter(s) (75 mL/Hr) IV Continuous <Continuous>  tiotropium 18 MICROgram(s) Capsule 1 Capsule(s) Inhalation daily    MEDICATIONS  (PRN):  bisacodyl Suppository 10 milliGRAM(s) Rectal daily PRN Constipation  polyethylene glycol 3350 17 Gram(s) Oral two times a day PRN Constipation  senna Syrup 10 milliLiter(s) Oral at bedtime PRN Constipation      Care Discussed with Consultants/Other Providers [x] YES  [ ] NO    HEALTH ISSUES - PROBLEM Dx:  Mental status change: Mental status change  Back pain, unspecified back location, unspecified back pain laterality, unspecified chronicity: Back pain, unspecified back location, unspecified back pain laterality, unspecified chronicity  Globus sensation: Globus sensation  Lip lesion: Lip lesion  Oral ulcer: Oral ulcer  CHRISTIANE (acute kidney injury): CHRISTIANE (acute kidney injury)  Bacteremia: Bacteremia  Neutropenia: Neutropenia  Need for prophylactic measure: Need for prophylactic measure  Oral thrush: Oral thrush  Nasal discomfort: Nasal discomfort  Gastroesophageal reflux disease without esophagitis: Gastroesophageal reflux disease without esophagitis  Hyperlipidemia, unspecified hyperlipidemia type: Hyperlipidemia, unspecified hyperlipidemia type  Type 2 diabetes mellitus without complication, without long-term current use of insulin: Type 2 diabetes mellitus without complication, without long-term current use of insulin  Coronary artery disease involving native coronary artery of native heart, angina presence unspecified: Coronary artery disease involving native coronary artery of native heart, angina presence unspecified  Hyperkalemia: Hyperkalemia  Lactic acidosis: Lactic acidosis  Transaminitis: Transaminitis  Idiopathic pulmonary fibrosis: Idiopathic pulmonary fibrosis  Shortness of breath: Shortness of breath Patient is a 77y old  Male who presents with a chief complaint of SOB (04 Nov 2017 14:33)      SUBJECTIVE / OVERNIGHT EVENTS:    Patient seen and examined. alert. denies complaints. no pain at this time.      Vital Signs Last 24 Hrs  T(C): 36.4 (01 Dec 2017 21:47), Max: 37 (01 Dec 2017 15:47)  T(F): 97.5 (01 Dec 2017 21:47), Max: 98.6 (01 Dec 2017 15:47)  HR: 81 (02 Dec 2017 06:00) (81 - 104)  BP: 117/60 (02 Dec 2017 06:00) (103/61 - 117/60)  BP(mean): --  RR: 18 (01 Dec 2017 21:47) (18 - 18)  SpO2: 99% (01 Dec 2017 21:47) (99% - 99%)  I&O's Summary    01 Dec 2017 07:01  -  02 Dec 2017 07:00  --------------------------------------------------------  IN: 0 mL / OUT: 900 mL / NET: -900 mL        PE:  GENERAL: NAD, AAOx2  HEAD:  Atraumatic, Normocephalic, ecchymosis lower lip 2/2 intubation  EYES: EOMI, PERRLA, conjunctiva and sclera clear  NECK: Supple, No JVD  CHEST/LUNG: CTABL, No wheeze  HEART: Regular rate and rhythm; + murmur  ABDOMEN: Soft, Nontender, Nondistended; Bowel sounds present  EXTREMITIES:  2+ Peripheral Pulses, No clubbing, cyanosis, or edema  SKIN: No rashes or lesions  NEURO: No focal deficits, right foot drop    LABS:                        8.8    6.0   )-----------( 191      ( 30 Nov 2017 10:54 )             25.9     11-30    139  |  99  |  28<H>  ----------------------------<  168<H>  4.4   |  31  |  0.83    Ca    8.1<L>      30 Nov 2017 10:54    TPro  6.6  /  Alb  2.3<L>  /  TBili  0.4  /  DBili  x   /  AST  36  /  ALT  44  /  AlkPhos  163<H>  11-30      CAPILLARY BLOOD GLUCOSE      POCT Blood Glucose.: 141 mg/dL (01 Dec 2017 20:59)  POCT Blood Glucose.: 143 mg/dL (01 Dec 2017 17:19)  POCT Blood Glucose.: 134 mg/dL (01 Dec 2017 12:11)  POCT Blood Glucose.: 85 mg/dL (01 Dec 2017 09:00)            RADIOLOGY & ADDITIONAL TESTS:    Imaging Personally Reviewed:  [x] YES  [ ] NO    Consultant(s) Notes Reviewed:  [x] YES  [ ] NO    MEDICATIONS  (STANDING):  aspirin  chewable 81 milliGRAM(s) Oral daily  BACItracin   Ointment 1 Application(s) Topical three times a day  baclofen 10 milliGRAM(s) Oral three times a day  chlorhexidine 0.12% Liquid 15 milliLiter(s) Swish and Spit four times a day  digoxin     Tablet 0.125 milliGRAM(s) Oral daily  heparin  Injectable 5000 Unit(s) SubCutaneous every 8 hours  influenza   Vaccine 0.5 milliLiter(s) IntraMuscular once  insulin glargine Injectable (LANTUS) 12 Unit(s) SubCutaneous at bedtime  insulin lispro (HumaLOG) corrective regimen sliding scale   SubCutaneous Before meals and at bedtime  insulin lispro Injectable (HumaLOG) 7 Unit(s) SubCutaneous three times a day before meals  lidocaine   Patch 1 Patch Transdermal daily  metoprolol     tartrate 25 milliGRAM(s) Oral two times a day  multivitamin 1 Tablet(s) Oral daily  pantoprazole  Injectable 40 milliGRAM(s) IV Push daily  predniSONE   Tablet 10 milliGRAM(s) Oral daily  saliva substitute (BIOTENE MOISTURIZING MOUTH SPRAY) 1 [place](s) Topical three times a day  sodium chloride 0.65% Nasal 1 Spray(s) Both Nostrils three times a day  sodium chloride 0.9%. 500 milliLiter(s) (75 mL/Hr) IV Continuous <Continuous>  tiotropium 18 MICROgram(s) Capsule 1 Capsule(s) Inhalation daily    MEDICATIONS  (PRN):  bisacodyl Suppository 10 milliGRAM(s) Rectal daily PRN Constipation  polyethylene glycol 3350 17 Gram(s) Oral two times a day PRN Constipation  senna Syrup 10 milliLiter(s) Oral at bedtime PRN Constipation      Care Discussed with Consultants/Other Providers [x] YES  [ ] NO    HEALTH ISSUES - PROBLEM Dx:  Mental status change: Mental status change  Back pain, unspecified back location, unspecified back pain laterality, unspecified chronicity: Back pain, unspecified back location, unspecified back pain laterality, unspecified chronicity  Globus sensation: Globus sensation  Lip lesion: Lip lesion  Oral ulcer: Oral ulcer  CHRISTIANE (acute kidney injury): CHRISTIANE (acute kidney injury)  Bacteremia: Bacteremia  Neutropenia: Neutropenia  Need for prophylactic measure: Need for prophylactic measure  Oral thrush: Oral thrush  Nasal discomfort: Nasal discomfort  Gastroesophageal reflux disease without esophagitis: Gastroesophageal reflux disease without esophagitis  Hyperlipidemia, unspecified hyperlipidemia type: Hyperlipidemia, unspecified hyperlipidemia type  Type 2 diabetes mellitus without complication, without long-term current use of insulin: Type 2 diabetes mellitus without complication, without long-term current use of insulin  Coronary artery disease involving native coronary artery of native heart, angina presence unspecified: Coronary artery disease involving native coronary artery of native heart, angina presence unspecified  Hyperkalemia: Hyperkalemia  Lactic acidosis: Lactic acidosis  Transaminitis: Transaminitis  Idiopathic pulmonary fibrosis: Idiopathic pulmonary fibrosis  Shortness of breath: Shortness of breath

## 2017-12-03 LAB
GLUCOSE BLDC GLUCOMTR-MCNC: 123 MG/DL — HIGH (ref 70–99)
GLUCOSE BLDC GLUCOMTR-MCNC: 127 MG/DL — HIGH (ref 70–99)
GLUCOSE BLDC GLUCOMTR-MCNC: 147 MG/DL — HIGH (ref 70–99)
GLUCOSE BLDC GLUCOMTR-MCNC: 190 MG/DL — HIGH (ref 70–99)
GLUCOSE BLDC GLUCOMTR-MCNC: 203 MG/DL — HIGH (ref 70–99)

## 2017-12-03 RX ORDER — ACETAMINOPHEN 500 MG
1000 TABLET ORAL ONCE
Qty: 0 | Refills: 0 | Status: COMPLETED | OUTPATIENT
Start: 2017-12-03 | End: 2017-12-03

## 2017-12-03 RX ORDER — BACLOFEN 100 %
10 POWDER (GRAM) MISCELLANEOUS THREE TIMES A DAY
Qty: 0 | Refills: 0 | Status: DISCONTINUED | OUTPATIENT
Start: 2017-12-03 | End: 2017-12-03

## 2017-12-03 RX ORDER — ACETAMINOPHEN 500 MG
1000 TABLET ORAL ONCE
Qty: 0 | Refills: 0 | Status: COMPLETED | OUTPATIENT
Start: 2017-12-03 | End: 2017-12-04

## 2017-12-03 RX ADMIN — Medication 400 MILLIGRAM(S): at 10:45

## 2017-12-03 RX ADMIN — CHLORHEXIDINE GLUCONATE 15 MILLILITER(S): 213 SOLUTION TOPICAL at 05:45

## 2017-12-03 RX ADMIN — Medication 7 UNIT(S): at 09:15

## 2017-12-03 RX ADMIN — Medication 25 MILLIGRAM(S): at 05:45

## 2017-12-03 RX ADMIN — Medication 7 UNIT(S): at 17:35

## 2017-12-03 RX ADMIN — Medication 1 SPRAY(S): at 16:01

## 2017-12-03 RX ADMIN — Medication 1 SPRAY(S): at 21:30

## 2017-12-03 RX ADMIN — Medication 2: at 08:52

## 2017-12-03 RX ADMIN — Medication 4: at 12:43

## 2017-12-03 RX ADMIN — Medication 25 MILLIGRAM(S): at 17:27

## 2017-12-03 RX ADMIN — Medication 10 MILLIGRAM(S): at 15:55

## 2017-12-03 RX ADMIN — HEPARIN SODIUM 5000 UNIT(S): 5000 INJECTION INTRAVENOUS; SUBCUTANEOUS at 14:58

## 2017-12-03 RX ADMIN — Medication 7 UNIT(S): at 12:43

## 2017-12-03 RX ADMIN — Medication 1 SPRAY(S): at 05:46

## 2017-12-03 RX ADMIN — TIOTROPIUM BROMIDE 1 CAPSULE(S): 18 CAPSULE ORAL; RESPIRATORY (INHALATION) at 11:48

## 2017-12-03 RX ADMIN — Medication 10 MILLIGRAM(S): at 03:21

## 2017-12-03 RX ADMIN — Medication 1 TABLET(S): at 11:48

## 2017-12-03 RX ADMIN — Medication 1 SPRAY(S): at 15:56

## 2017-12-03 RX ADMIN — HEPARIN SODIUM 5000 UNIT(S): 5000 INJECTION INTRAVENOUS; SUBCUTANEOUS at 05:46

## 2017-12-03 RX ADMIN — Medication 0.12 MILLIGRAM(S): at 05:46

## 2017-12-03 RX ADMIN — Medication 1 SPRAY(S): at 21:38

## 2017-12-03 RX ADMIN — Medication 1 APPLICATION(S): at 14:55

## 2017-12-03 RX ADMIN — Medication 1 APPLICATION(S): at 05:45

## 2017-12-03 RX ADMIN — ONDANSETRON 4 MILLIGRAM(S): 8 TABLET, FILM COATED ORAL at 18:12

## 2017-12-03 RX ADMIN — CHLORHEXIDINE GLUCONATE 15 MILLILITER(S): 213 SOLUTION TOPICAL at 11:47

## 2017-12-03 RX ADMIN — PANTOPRAZOLE SODIUM 40 MILLIGRAM(S): 20 TABLET, DELAYED RELEASE ORAL at 11:47

## 2017-12-03 RX ADMIN — INSULIN GLARGINE 12 UNIT(S): 100 INJECTION, SOLUTION SUBCUTANEOUS at 21:37

## 2017-12-03 RX ADMIN — Medication 81 MILLIGRAM(S): at 11:48

## 2017-12-03 RX ADMIN — LIDOCAINE 1 PATCH: 4 CREAM TOPICAL at 11:47

## 2017-12-03 RX ADMIN — Medication 1 APPLICATION(S): at 21:29

## 2017-12-03 RX ADMIN — Medication 10 MILLIGRAM(S): at 05:45

## 2017-12-03 RX ADMIN — CHLORHEXIDINE GLUCONATE 15 MILLILITER(S): 213 SOLUTION TOPICAL at 21:29

## 2017-12-03 RX ADMIN — CHLORHEXIDINE GLUCONATE 15 MILLILITER(S): 213 SOLUTION TOPICAL at 17:27

## 2017-12-03 RX ADMIN — HEPARIN SODIUM 5000 UNIT(S): 5000 INJECTION INTRAVENOUS; SUBCUTANEOUS at 21:29

## 2017-12-03 NOTE — PROVIDER CONTACT NOTE (OTHER) - ACTION/TREATMENT ORDERED:
NP Barbra aware. Patient has 24g IV . NP ordered D5 0.9NS in order to bring up patient Blood glucose.
Robitussin 100 mg Q 6 hours, PRN for cough
MD made aware. No further recommendation at this time
Will continue to monitor

## 2017-12-03 NOTE — PROGRESS NOTE ADULT - ASSESSMENT
Assessment  DMT2: 77y Male with DM T2 with hyperglycemia on insulin, blood sugars in acceptable range, no hypoglycemia,  eating meals, compliant with low carb diet.  HTN: Controlled, On med.  CAD: On medications, monitored.

## 2017-12-03 NOTE — CHART NOTE - NSCHARTNOTEFT_GEN_A_CORE
MEDICINE NP    Notified by RN patient with temperature of 103F and change of mental status. Seen and examined patient at bedside. Patient is alert, NAD. Denies HA, CP, SOB, cough, N/V, or abd pain.    VITAL SIGNS:  T(C): 36.6 (12-02-17 @ 21:43), Max: 36.9 (12-02-17 @ 08:56)  HR: 71 (12-03-17 @ 05:30) (65 - 73)  BP: 146/84 (12-03-17 @ 05:30) (122/78 - 146/84)  RR: 18 (12-02-17 @ 21:43) (18 - 18)  SpO2: 98% (12-02-17 @ 21:43) (98% - 100%)  Wt(kg): --      LABORATORY:                          8.5    3.09  )-----------( 223      ( 02 Dec 2017 11:19 )             26.6       12-02    140  |  103  |  21  ----------------------------<  67<L>  4.7   |  25  |  0.80    Ca    8.4      02 Dec 2017 11:19  Phos  3.1     12-02  Mg     1.9     12-02            MICROBIOLOGY:           RADIOLOGY:          PHYSICAL EXAM:    Constitutional: AOx3. NAD.    Respiratory: clear lungs bilaterally. No wheezing, rhonchi, or crackles.    Cardiovascular: S1 S2. No murmurs.    Gastrointestinal: BS X4 active. soft. nontender.    Extremities/Vascular: +2 pulses bilaterally. No BLE edema.      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 The patient reports requiring more oxygen with ambulation and becoming very SOB and fatigued with ambulation, making it very difficult for him to walk. He reports chronic cough.   He reports that about 1-2 weeks ago he developed burning/skin irritation under his nose after using vaseline with the nasal cannula. He saw a dermatologist who prescribed mupirocin and mometasone topicals which helped. He was also prescribed clotrimazole lozenges for some ?tongue irritation. He hasn't started taking it yet.   He denies HA, dizziness, CP, N/V, diarrhea, fevers, sore throat, constipation, LE edema, or abdominal pain.   The patient reports that he went to his cardiologist about a week ago and reportedly an echocardiogram at that time was normal.   Received vancomycin and zosyn and 3LNS in the ED. (01 Nov 2017 13:52)        Now with temp. of 103F   IV Tylenol 1000mg and cooling measures in place.  -BC x2, UA  -CXR  Continuous monitoring in place. Will endorse to  primary team in the Am.   Kevin Belle FN-BC # [id number]

## 2017-12-03 NOTE — PROGRESS NOTE ADULT - SUBJECTIVE AND OBJECTIVE BOX
Chief complaint  Patient is a 77y old  Male who presents with a chief complaint of SOB (04 Nov 2017 14:33)   Review of systems  Patient in bed, looks comfortable, no fever,  no hypoglycemia.    Labs and Fingersticks    CAPILLARY BLOOD GLUCOSE    Anion Gap, Serum: 12 (12-02 @ 11:19)      Calcium, Total Serum: 8.4 (12-02 @ 11:19)          12-02    140  |  103  |  21  ----------------------------<  67<L>  4.7   |  25  |  0.80    Ca    8.4      02 Dec 2017 11:19  Phos  3.1     12-02  Mg     1.9     12-02                          8.5    3.09  )-----------( 223      ( 02 Dec 2017 11:19 )             26.6     Medications  MEDICATIONS  (STANDING):  acetaminophen  IVPB. 1000 milliGRAM(s) IV Intermittent once  aspirin  chewable 81 milliGRAM(s) Oral daily  BACItracin   Ointment 1 Application(s) Topical three times a day  chlorhexidine 0.12% Liquid 15 milliLiter(s) Swish and Spit four times a day  digoxin     Tablet 0.125 milliGRAM(s) Oral daily  heparin  Injectable 5000 Unit(s) SubCutaneous every 8 hours  influenza   Vaccine 0.5 milliLiter(s) IntraMuscular once  insulin glargine Injectable (LANTUS) 12 Unit(s) SubCutaneous at bedtime  insulin lispro (HumaLOG) corrective regimen sliding scale   SubCutaneous Before meals and at bedtime  insulin lispro Injectable (HumaLOG) 7 Unit(s) SubCutaneous three times a day before meals  lidocaine   Patch 1 Patch Transdermal daily  metoprolol     tartrate 25 milliGRAM(s) Oral two times a day  multivitamin 1 Tablet(s) Oral daily  pantoprazole  Injectable 40 milliGRAM(s) IV Push daily  predniSONE   Tablet 10 milliGRAM(s) Oral daily  saliva substitute (BIOTENE MOISTURIZING MOUTH SPRAY) 1 [place](s) Topical three times a day  sodium chloride 0.65% Nasal 1 Spray(s) Both Nostrils three times a day  sodium chloride 0.9%. 500 milliLiter(s) (75 mL/Hr) IV Continuous <Continuous>  tiotropium 18 MICROgram(s) Capsule 1 Capsule(s) Inhalation daily      Physical Exam  General: Patient comfortable in bed  Vital Signs Last 12 Hrs  T(F): 97.6 (12-03-17 @ 22:10), Max: 98 (12-03-17 @ 16:17)  HR: 68 (12-03-17 @ 22:10) (68 - 84)  BP: 147/88 (12-03-17 @ 22:10) (125/75 - 147/88)  BP(mean): --  RR: 18 (12-03-17 @ 22:10) (18 - 18)  SpO2: 100% (12-03-17 @ 22:10) (98% - 100%)  Neck: No palpable thyroid nodules.  CVS: S1S2, No murmurs  Respiratory: No wheezing, no crepitations  GI: Abdomen soft, bowel sounds positive  Musculoskeletal: Positive edema lower extremities.   Skin: No skin rashes, no ecchymosis    Diagnostics    Thyroid Stimulating Hormone, Serum: AM Sched. Collection: 01-Dec-2017 06:00 (11-30 @ 10:30)  Free Thyroxine, Serum: AM Sched. Collection: 01-Dec-2017 06:00 (11-30 @ 10:30)

## 2017-12-03 NOTE — PROGRESS NOTE ADULT - ASSESSMENT
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 CT chest shows bilateral IPF  treated for Pulmonary fibrosis / Intersitial pulmonary dz exacerbation.  Pt developed ARF,  transferred to MICU w/ acute hypoxic respiratory failure sp intubation w/ septic shock 2/2 to pseudomonal bacteremia resolve.  Pt extubated, downgraded to floor. patient developed AMS. possible 2/2 delirium vs. infectious UTI.    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  - mental status improved  - appreciate ID recs, stop abx, urine growing yeast, no signs of infection  - appreciate neuro recs  - MRI no acute pathology, chronic microvascular dz  - pain control, baclofen frequency increased, family wants to avoid heavy sedatives, avoid opioids, monitor for sedation  - PT    # Idiopathic pulmonary fibrosis / Acute hypoxic expiratory failure   - improving, c/w steroids, hold imuran  - appreciate pulm recs    # CHRISTIANE / Hypernatremia  - appreciate renal recs, encourage oral intake     # Pseudomonas bacteremia  - resolved      # Lip lesion   - dry eschar from prolonged intubation, ENT appreciated, c/w bactroban    # Type 2 diabetes mellitus   - endo following    DVT ppx HSQ    dispo: rehab planning

## 2017-12-03 NOTE — PROGRESS NOTE ADULT - SUBJECTIVE AND OBJECTIVE BOX
Patient is a 77y old  Male who presents with a chief complaint of SOB (04 Nov 2017 14:33)      SUBJECTIVE / OVERNIGHT EVENTS:    Patient seen and examined. patient DID NOT have 103 fever overnight. this was confirmed with patient, RN and NP. patient states he feels much better today. baclofen frequency was increased yday.      Vital Signs Last 24 Hrs  T(C): 36.5 (03 Dec 2017 08:00), Max: 36.9 (02 Dec 2017 08:56)  T(F): 97.7 (03 Dec 2017 08:00), Max: 98.4 (02 Dec 2017 08:56)  HR: 71 (03 Dec 2017 05:30) (65 - 73)  BP: 146/84 (03 Dec 2017 05:30) (122/78 - 146/84)  BP(mean): --  RR: 18 (02 Dec 2017 21:43) (18 - 18)  SpO2: 98% (02 Dec 2017 21:43) (98% - 100%)  I&O's Summary    02 Dec 2017 07:01  -  03 Dec 2017 07:00  --------------------------------------------------------  IN: 0 mL / OUT: 751 mL / NET: -751 mL        PE:  GENERAL: NAD, AAOx2  HEAD:  Atraumatic, Normocephalic, ecchymosis lower lip 2/2 intubation  EYES: EOMI, PERRLA, conjunctiva and sclera clear  NECK: Supple, No JVD  CHEST/LUNG: CTABL, No wheeze  HEART: Regular rate and rhythm; + murmur  ABDOMEN: Soft, Nontender, Nondistended; Bowel sounds present  EXTREMITIES:  2+ Peripheral Pulses, No clubbing, cyanosis, or edema  SKIN: No rashes or lesions  NEURO: No focal deficits, right foot drop    LABS:                        8.5    3.09  )-----------( 223      ( 02 Dec 2017 11:19 )             26.6     12-02    140  |  103  |  21  ----------------------------<  67<L>  4.7   |  25  |  0.80    Ca    8.4      02 Dec 2017 11:19  Phos  3.1     12-02  Mg     1.9     12-02        CAPILLARY BLOOD GLUCOSE      POCT Blood Glucose.: 127 mg/dL (02 Dec 2017 23:55)  POCT Blood Glucose.: 118 mg/dL (02 Dec 2017 22:12)  POCT Blood Glucose.: 67 mg/dL (02 Dec 2017 21:41)  POCT Blood Glucose.: 87 mg/dL (02 Dec 2017 16:53)  POCT Blood Glucose.: 112 mg/dL (02 Dec 2017 12:20)            RADIOLOGY & ADDITIONAL TESTS:    Imaging Personally Reviewed:  [x] YES  [ ] NO    Consultant(s) Notes Reviewed:  [x] YES  [ ] NO    MEDICATIONS  (STANDING):  aspirin  chewable 81 milliGRAM(s) Oral daily  BACItracin   Ointment 1 Application(s) Topical three times a day  baclofen 10 milliGRAM(s) Oral every 6 hours  chlorhexidine 0.12% Liquid 15 milliLiter(s) Swish and Spit four times a day  digoxin     Tablet 0.125 milliGRAM(s) Oral daily  heparin  Injectable 5000 Unit(s) SubCutaneous every 8 hours  influenza   Vaccine 0.5 milliLiter(s) IntraMuscular once  insulin glargine Injectable (LANTUS) 12 Unit(s) SubCutaneous at bedtime  insulin lispro (HumaLOG) corrective regimen sliding scale   SubCutaneous Before meals and at bedtime  insulin lispro Injectable (HumaLOG) 7 Unit(s) SubCutaneous three times a day before meals  lidocaine   Patch 1 Patch Transdermal daily  metoprolol     tartrate 25 milliGRAM(s) Oral two times a day  multivitamin 1 Tablet(s) Oral daily  pantoprazole  Injectable 40 milliGRAM(s) IV Push daily  predniSONE   Tablet 10 milliGRAM(s) Oral daily  saliva substitute (BIOTENE MOISTURIZING MOUTH SPRAY) 1 [place](s) Topical three times a day  sodium chloride 0.65% Nasal 1 Spray(s) Both Nostrils three times a day  sodium chloride 0.9%. 500 milliLiter(s) (75 mL/Hr) IV Continuous <Continuous>  tiotropium 18 MICROgram(s) Capsule 1 Capsule(s) Inhalation daily    MEDICATIONS  (PRN):  bisacodyl Suppository 10 milliGRAM(s) Rectal daily PRN Constipation  ondansetron   Disintegrating Tablet 4 milliGRAM(s) Oral every 6 hours PRN Nausea and/or Vomiting  polyethylene glycol 3350 17 Gram(s) Oral two times a day PRN Constipation  senna Syrup 10 milliLiter(s) Oral at bedtime PRN Constipation      Care Discussed with Consultants/Other Providers [x] YES  [ ] NO    HEALTH ISSUES - PROBLEM Dx:  Mental status change: Mental status change  Back pain, unspecified back location, unspecified back pain laterality, unspecified chronicity: Back pain, unspecified back location, unspecified back pain laterality, unspecified chronicity  Globus sensation: Globus sensation  Lip lesion: Lip lesion  Oral ulcer: Oral ulcer  CHRISTIANE (acute kidney injury): CHRISTIANE (acute kidney injury)  Bacteremia: Bacteremia  Neutropenia: Neutropenia  Need for prophylactic measure: Need for prophylactic measure  Oral thrush: Oral thrush  Nasal discomfort: Nasal discomfort  Gastroesophageal reflux disease without esophagitis: Gastroesophageal reflux disease without esophagitis  Hyperlipidemia, unspecified hyperlipidemia type: Hyperlipidemia, unspecified hyperlipidemia type  Type 2 diabetes mellitus without complication, without long-term current use of insulin: Type 2 diabetes mellitus without complication, without long-term current use of insulin  Coronary artery disease involving native coronary artery of native heart, angina presence unspecified: Coronary artery disease involving native coronary artery of native heart, angina presence unspecified  Hyperkalemia: Hyperkalemia  Lactic acidosis: Lactic acidosis  Transaminitis: Transaminitis  Idiopathic pulmonary fibrosis: Idiopathic pulmonary fibrosis  Shortness of breath: Shortness of breath

## 2017-12-04 LAB
ALBUMIN SERPL ELPH-MCNC: 2 G/DL — LOW (ref 3.3–5)
ALP SERPL-CCNC: 120 U/L — SIGNIFICANT CHANGE UP (ref 40–120)
ALT FLD-CCNC: 25 U/L RC — SIGNIFICANT CHANGE UP (ref 10–45)
ANION GAP SERPL CALC-SCNC: 10 MMOL/L — SIGNIFICANT CHANGE UP (ref 5–17)
AST SERPL-CCNC: 24 U/L — SIGNIFICANT CHANGE UP (ref 10–40)
BILIRUB SERPL-MCNC: 0.3 MG/DL — SIGNIFICANT CHANGE UP (ref 0.2–1.2)
BUN SERPL-MCNC: 15 MG/DL — SIGNIFICANT CHANGE UP (ref 7–23)
CALCIUM SERPL-MCNC: 7.9 MG/DL — LOW (ref 8.4–10.5)
CHLORIDE SERPL-SCNC: 106 MMOL/L — SIGNIFICANT CHANGE UP (ref 96–108)
CK MB CFR SERPL CALC: 3.9 NG/ML — SIGNIFICANT CHANGE UP (ref 0–6.7)
CK SERPL-CCNC: 19 U/L — LOW (ref 30–200)
CO2 SERPL-SCNC: 24 MMOL/L — SIGNIFICANT CHANGE UP (ref 22–31)
CREAT SERPL-MCNC: 0.67 MG/DL — SIGNIFICANT CHANGE UP (ref 0.5–1.3)
GLUCOSE BLDC GLUCOMTR-MCNC: 113 MG/DL — HIGH (ref 70–99)
GLUCOSE BLDC GLUCOMTR-MCNC: 126 MG/DL — HIGH (ref 70–99)
GLUCOSE BLDC GLUCOMTR-MCNC: 176 MG/DL — HIGH (ref 70–99)
GLUCOSE BLDC GLUCOMTR-MCNC: 232 MG/DL — HIGH (ref 70–99)
GLUCOSE BLDC GLUCOMTR-MCNC: 55 MG/DL — LOW (ref 70–99)
GLUCOSE BLDC GLUCOMTR-MCNC: 61 MG/DL — LOW (ref 70–99)
GLUCOSE BLDC GLUCOMTR-MCNC: 61 MG/DL — LOW (ref 70–99)
GLUCOSE BLDC GLUCOMTR-MCNC: 63 MG/DL — LOW (ref 70–99)
GLUCOSE BLDC GLUCOMTR-MCNC: 64 MG/DL — LOW (ref 70–99)
GLUCOSE BLDC GLUCOMTR-MCNC: 69 MG/DL — LOW (ref 70–99)
GLUCOSE BLDC GLUCOMTR-MCNC: 71 MG/DL — SIGNIFICANT CHANGE UP (ref 70–99)
GLUCOSE BLDC GLUCOMTR-MCNC: 74 MG/DL — SIGNIFICANT CHANGE UP (ref 70–99)
GLUCOSE BLDC GLUCOMTR-MCNC: 86 MG/DL — SIGNIFICANT CHANGE UP (ref 70–99)
GLUCOSE BLDC GLUCOMTR-MCNC: 91 MG/DL — SIGNIFICANT CHANGE UP (ref 70–99)
GLUCOSE SERPL-MCNC: 178 MG/DL — HIGH (ref 70–99)
MAGNESIUM SERPL-MCNC: 1.6 MG/DL — SIGNIFICANT CHANGE UP (ref 1.6–2.6)
PHOSPHATE SERPL-MCNC: 2.2 MG/DL — LOW (ref 2.5–4.5)
POTASSIUM SERPL-MCNC: 4.3 MMOL/L — SIGNIFICANT CHANGE UP (ref 3.5–5.3)
POTASSIUM SERPL-SCNC: 4.3 MMOL/L — SIGNIFICANT CHANGE UP (ref 3.5–5.3)
PROT SERPL-MCNC: 5.6 G/DL — LOW (ref 6–8.3)
SODIUM SERPL-SCNC: 140 MMOL/L — SIGNIFICANT CHANGE UP (ref 135–145)
TROPONIN T SERPL-MCNC: 0.04 NG/ML — SIGNIFICANT CHANGE UP (ref 0–0.06)

## 2017-12-04 PROCEDURE — 93010 ELECTROCARDIOGRAM REPORT: CPT

## 2017-12-04 RX ORDER — DEXTROSE 50 % IN WATER 50 %
25 SYRINGE (ML) INTRAVENOUS ONCE
Qty: 0 | Refills: 0 | Status: DISCONTINUED | OUTPATIENT
Start: 2017-12-04 | End: 2017-12-05

## 2017-12-04 RX ORDER — DEXTROSE 50 % IN WATER 50 %
1 SYRINGE (ML) INTRAVENOUS ONCE
Qty: 0 | Refills: 0 | Status: COMPLETED | OUTPATIENT
Start: 2017-12-04 | End: 2017-12-04

## 2017-12-04 RX ORDER — GLUCAGON INJECTION, SOLUTION 0.5 MG/.1ML
1 INJECTION, SOLUTION SUBCUTANEOUS ONCE
Qty: 0 | Refills: 0 | Status: DISCONTINUED | OUTPATIENT
Start: 2017-12-04 | End: 2017-12-05

## 2017-12-04 RX ORDER — DEXTROSE 50 % IN WATER 50 %
1 SYRINGE (ML) INTRAVENOUS ONCE
Qty: 0 | Refills: 0 | Status: DISCONTINUED | OUTPATIENT
Start: 2017-12-04 | End: 2017-12-05

## 2017-12-04 RX ORDER — INSULIN LISPRO 100/ML
VIAL (ML) SUBCUTANEOUS AT BEDTIME
Qty: 0 | Refills: 0 | Status: DISCONTINUED | OUTPATIENT
Start: 2017-12-04 | End: 2017-12-05

## 2017-12-04 RX ORDER — DEXTROSE 50 % IN WATER 50 %
12.5 SYRINGE (ML) INTRAVENOUS ONCE
Qty: 0 | Refills: 0 | Status: DISCONTINUED | OUTPATIENT
Start: 2017-12-04 | End: 2017-12-05

## 2017-12-04 RX ORDER — BACLOFEN 100 %
10 POWDER (GRAM) MISCELLANEOUS EVERY 8 HOURS
Qty: 0 | Refills: 0 | Status: DISCONTINUED | OUTPATIENT
Start: 2017-12-04 | End: 2017-12-05

## 2017-12-04 RX ORDER — FAMOTIDINE 10 MG/ML
20 INJECTION INTRAVENOUS ONCE
Qty: 0 | Refills: 0 | Status: COMPLETED | OUTPATIENT
Start: 2017-12-04 | End: 2017-12-04

## 2017-12-04 RX ORDER — SODIUM CHLORIDE 9 MG/ML
1000 INJECTION, SOLUTION INTRAVENOUS
Qty: 0 | Refills: 0 | Status: DISCONTINUED | OUTPATIENT
Start: 2017-12-04 | End: 2017-12-05

## 2017-12-04 RX ORDER — INSULIN GLARGINE 100 [IU]/ML
8 INJECTION, SOLUTION SUBCUTANEOUS AT BEDTIME
Qty: 0 | Refills: 0 | Status: DISCONTINUED | OUTPATIENT
Start: 2017-12-04 | End: 2017-12-05

## 2017-12-04 RX ORDER — INSULIN LISPRO 100/ML
VIAL (ML) SUBCUTANEOUS
Qty: 0 | Refills: 0 | Status: DISCONTINUED | OUTPATIENT
Start: 2017-12-04 | End: 2017-12-05

## 2017-12-04 RX ADMIN — HEPARIN SODIUM 5000 UNIT(S): 5000 INJECTION INTRAVENOUS; SUBCUTANEOUS at 06:17

## 2017-12-04 RX ADMIN — Medication 1 SPRAY(S): at 06:18

## 2017-12-04 RX ADMIN — Medication 25 MILLIGRAM(S): at 06:18

## 2017-12-04 RX ADMIN — HEPARIN SODIUM 5000 UNIT(S): 5000 INJECTION INTRAVENOUS; SUBCUTANEOUS at 12:48

## 2017-12-04 RX ADMIN — CHLORHEXIDINE GLUCONATE 15 MILLILITER(S): 213 SOLUTION TOPICAL at 17:36

## 2017-12-04 RX ADMIN — Medication 1 SPRAY(S): at 21:57

## 2017-12-04 RX ADMIN — Medication 1 SPRAY(S): at 12:50

## 2017-12-04 RX ADMIN — Medication 1 SPRAY(S): at 21:35

## 2017-12-04 RX ADMIN — Medication 4: at 12:48

## 2017-12-04 RX ADMIN — HEPARIN SODIUM 5000 UNIT(S): 5000 INJECTION INTRAVENOUS; SUBCUTANEOUS at 21:34

## 2017-12-04 RX ADMIN — Medication 1 SPRAY(S): at 13:37

## 2017-12-04 RX ADMIN — TIOTROPIUM BROMIDE 1 CAPSULE(S): 18 CAPSULE ORAL; RESPIRATORY (INHALATION) at 12:48

## 2017-12-04 RX ADMIN — CHLORHEXIDINE GLUCONATE 15 MILLILITER(S): 213 SOLUTION TOPICAL at 23:40

## 2017-12-04 RX ADMIN — Medication 10 MILLIGRAM(S): at 06:16

## 2017-12-04 RX ADMIN — CHLORHEXIDINE GLUCONATE 15 MILLILITER(S): 213 SOLUTION TOPICAL at 12:48

## 2017-12-04 RX ADMIN — Medication 1 APPLICATION(S): at 06:16

## 2017-12-04 RX ADMIN — SODIUM CHLORIDE 75 MILLILITER(S): 9 INJECTION INTRAMUSCULAR; INTRAVENOUS; SUBCUTANEOUS at 12:47

## 2017-12-04 RX ADMIN — CHLORHEXIDINE GLUCONATE 15 MILLILITER(S): 213 SOLUTION TOPICAL at 06:15

## 2017-12-04 RX ADMIN — Medication 10 MILLIGRAM(S): at 17:36

## 2017-12-04 RX ADMIN — PANTOPRAZOLE SODIUM 40 MILLIGRAM(S): 20 TABLET, DELAYED RELEASE ORAL at 12:50

## 2017-12-04 RX ADMIN — Medication 25 MILLIGRAM(S): at 17:36

## 2017-12-04 RX ADMIN — Medication 1 APPLICATION(S): at 21:35

## 2017-12-04 RX ADMIN — Medication 1 DOSE(S): at 17:15

## 2017-12-04 RX ADMIN — Medication 81 MILLIGRAM(S): at 12:47

## 2017-12-04 RX ADMIN — INSULIN GLARGINE 8 UNIT(S): 100 INJECTION, SOLUTION SUBCUTANEOUS at 21:35

## 2017-12-04 RX ADMIN — Medication 0.12 MILLIGRAM(S): at 06:15

## 2017-12-04 RX ADMIN — LIDOCAINE 1 PATCH: 4 CREAM TOPICAL at 12:48

## 2017-12-04 RX ADMIN — Medication 7 UNIT(S): at 12:49

## 2017-12-04 RX ADMIN — FAMOTIDINE 20 MILLIGRAM(S): 10 INJECTION INTRAVENOUS at 21:27

## 2017-12-04 RX ADMIN — Medication 1 TABLET(S): at 12:49

## 2017-12-04 RX ADMIN — Medication 400 MILLIGRAM(S): at 00:40

## 2017-12-04 RX ADMIN — Medication 1 APPLICATION(S): at 12:49

## 2017-12-04 RX ADMIN — Medication 10 MILLIGRAM(S): at 09:00

## 2017-12-04 NOTE — CHART NOTE - NSCHARTNOTESELECT_GEN_ALL_CORE
Event Note/CP
Nutrition Services
Event Note
Medicine NP/Event Note
Nutrition Services
Overnight Events
RRT Note/Event Note
Transfer Note

## 2017-12-04 NOTE — CHART NOTE - NSCHARTNOTEFT_GEN_A_CORE
Pt with PMH of "asbestos related lung disease" admitted with shortness of breath, CT scan shows IPF S/P treatment, pt developed ARF and acute hypoxic respiratory failure with septic shock due to pseudomonal bacteremia pt intubated and transferred to MICU, pt now extubated and on floor S/P FEES 11/17 and 11/24 recommended NPO with non-oral nutrition/hydration, pt and family wish to initiate pleasure feeds and accept aspiration risk at this time, plan for dysphagia 1 diet with nectar thickened liquids, pt with recent AMS now resolved. NGT discontinued.     Source: Patient [ x]    Family [ ]     other [ ]    Diet : Dysphagia 1 diet with nectar thickened fluids, Consistent carbohydrate diet with evening snack, glucerna 1.2 Flako @ 60 mL/h-noted pt no longer has enteral access for tube feeding      Patient reports [ ] nausea  [ ] vomiting [ ] diarrhea [ ] constipation  [ ]chewing problems [ ] swallowing issues  [ ] other: No N+V, pt reports fecal incontinence, last BM today     PO intake:  < 50% [ x] 50-75% [ ]   % [ ]  other : Pt reports decreased appetite as he feels overwhelmed by quantity of foods on his plate, per flowsheets pt documented to be taking ~25% of meals, pt estimates 50%. Pt states his wife provides encouragement and he will take ~2 glucerna shakes daily      Current Weight: No new wt  % Weight Change    Pertinent Medications: MEDICATIONS  (STANDING):  aspirin  chewable 81 milliGRAM(s) Oral daily  BACItracin   Ointment 1 Application(s) Topical three times a day  chlorhexidine 0.12% Liquid 15 milliLiter(s) Swish and Spit four times a day  digoxin     Tablet 0.125 milliGRAM(s) Oral daily  heparin  Injectable 5000 Unit(s) SubCutaneous every 8 hours  influenza   Vaccine 0.5 milliLiter(s) IntraMuscular once  insulin glargine Injectable (LANTUS) 12 Unit(s) SubCutaneous at bedtime  insulin lispro (HumaLOG) corrective regimen sliding scale   SubCutaneous Before meals and at bedtime  insulin lispro Injectable (HumaLOG) 7 Unit(s) SubCutaneous three times a day before meals  lidocaine   Patch 1 Patch Transdermal daily  metoprolol     tartrate 25 milliGRAM(s) Oral two times a day  multivitamin 1 Tablet(s) Oral daily  pantoprazole  Injectable 40 milliGRAM(s) IV Push daily  predniSONE   Tablet 10 milliGRAM(s) Oral daily  saliva substitute (BIOTENE MOISTURIZING MOUTH SPRAY) 1 [place](s) Topical three times a day  sodium chloride 0.65% Nasal 1 Spray(s) Both Nostrils three times a day  sodium chloride 0.9%. 500 milliLiter(s) (75 mL/Hr) IV Continuous <Continuous>  tiotropium 18 MICROgram(s) Capsule 1 Capsule(s) Inhalation daily    MEDICATIONS  (PRN):  baclofen 10 milliGRAM(s) Oral every 8 hours PRN pain  bisacodyl Suppository 10 milliGRAM(s) Rectal daily PRN Constipation  ondansetron   Disintegrating Tablet 4 milliGRAM(s) Oral every 6 hours PRN Nausea and/or Vomiting  polyethylene glycol 3350 17 Gram(s) Oral two times a day PRN Constipation  senna Syrup 10 milliLiter(s) Oral at bedtime PRN Constipation    Pertinent Labs:  POCT BG 12/3: 123-203, 12/4: -pt with episode of hypoglycemia today, endocrinology following to adjust insulin    Skin: No edema, skin free of pressure injury     Estimated Needs:   [ x] no change since previous assessment  [ ] recalculated:       Previous Nutrition Diagnosis:      [x ] Malnutrition (moderate)       Nutrition Diagnosis is [ x] ongoing  [ ] resolved [ ] not applicable          New Nutrition Diagnosis: [x ] not applicable       Interventions:     Recommend    [ ] Change Diet To:    [ ] Nutrition Supplement    [ ] Nutrition Support    [ ] Other:     1. Continue consistent carbohydrate diet with evening snack, diet texture deferred to medical team and pt/family wishes-currently dysphagia 1 diet with nectar thickened liquids, pt and family accept aspiration risk  2. Continue glucerna 3 x daily as ordered thickened to appropriate consistency  3. If no plan to continue enteral nutrition, consider discontinuing order for Glucerna 1.2 Flako @ 60 mL/h x 24 h  4. Continue to encourage po intake and obtain/honor food preferences as able        Monitoring and Evaluation:     [ x] PO intake [x ] Tolerance to diet prescription [ x] weights [ x] follow up per protocol    [ ] other:

## 2017-12-04 NOTE — PROGRESS NOTE ADULT - ASSESSMENT
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 CT chest shows bilateral IPF  treated for Pulmonary fibrosis / Intersitial pulmonary dz exacerbation.  Pt developed ARF,  transferred to MICU w/ acute hypoxic respiratory failure sp intubation w/ septic shock 2/2 to pseudomonal bacteremia resolve.  Pt extubated, downgraded to floor. patient developed AMS. possible 2/2 delirium vs. infectious UTI.    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  - mental status improved  - appreciate ID recs, stop abx, urine growing yeast, no signs of infection  - appreciate neuro recs  - MRI no acute pathology, chronic microvascular dz  - pain control, baclofen 10mg TID PRN, family wants to avoid heavy sedatives, avoid opioids, monitor for sedation  - can give IV tylenol for breakthrough pain  - PT OOB    # Idiopathic pulmonary fibrosis / Acute hypoxic expiratory failure   - improving, c/w steroids, hold imuran  - appreciate pulm recs    # CHRISTIANE / Hypernatremia  - appreciate renal recs, encourage oral intake     # Pseudomonas bacteremia  - resolved      # Lip lesion   - dry eschar from prolonged intubation, ENT appreciated, c/w bactroban    # Type 2 diabetes mellitus   - endo following    DVT ppx HSQ    dispo: rehab planning  discussed with wife at bedside, JULIEN

## 2017-12-04 NOTE — PROGRESS NOTE ADULT - PROBLEM SELECTOR PLAN 2
On medications, monitored and followed up by primary/cardiology team
-Significantly hyperglycemic since admission (secondary to steroids)  -Yesterday anion gap increased, b-hydroxybutyrate slightly increased to 1.6  -started on basal/bolus insulin (patient not on insulin at home) and given 125 mL IV NS as there was a concern for impending DKA  -This AM blood sugar is high although still elevated (low 300s); anion gap has reduced  -increased insulin dosing to 17U lantis qhs and 6U humalog w/meals  -Stopped NS as patient at risk for volume overload due to CHF (EF 50%)  -If volume overloaded can give lasix
Continue with Dysphagia I diet  Consider PEG in future if tube feeds persist
On medications, monitored and followed up by primary/cardiology team
Pulmonary involved and coordinating with physicians at [facility].    Thank you for consulting us and involving us in the management of this most interesting and challenging case.     Please Call with any further questions
c/w basal bolus adjust based on sugars
resolved
-Significantly hyperglycemic since admission (secondary to steroids)  -Blood sugars continue to be increased overnight and in AM, now between 170-250  -increased insulin dosing to 40U lantis qhs and 15U humalog w/meals  -Will continue to adjust insulin as needed.
-Uncertain etiology; echo of liver unrevealing  -LFTs down-trending, will continue to trend  -Hold statin.  -Will continue to attempt to find outpatient records
S/P Abx Rx
S/P Abx Rx
c/w basal bolus adjust based on sugars
-Significantly hyperglycemic since admission (secondary to steroids)  -Blood sugars continue to be increased overnight and in AM (369 overnight)  -increased insulin dosing to 27U lantis qhs and 9U humalog w/meals  -Will continue to adjust insulin as needed

## 2017-12-04 NOTE — PROGRESS NOTE ADULT - PROBLEM SELECTOR PLAN 1
Will continue current insulin regimen for now. Will continue monitoring FS, log, will Follow up.  Patient counseled for compliance with consistent low carb diet.
-UIP on CT chest, likely current dyspnea represents an interstitial lung disease flare  -Solumedrol 30mg IV twice daily per pulmonary recs  -C/w azathioprine 150mg in am and 100mg in pm (home doses) for now, per pulmonology can d/c if needed to increase WBC count  -Duonebs Q6H standing. Hold home dose of Qvar while on nebs.   -C/w O2 via NC to keep O2sat > 90%.  -Pulmonary recs appreciated.    -No concern for infectious process and no role for ABx at this point  -Will check CXR, TTE today to assess RV function as IPF may increase right heart pressure 2/2 pulm hypertension
Continue meropenem for now  The blood isolate is sensitive and patient improving on curren ttherapy  given clinical improvement and now extubated off pressors, do not need to change antibx regimen currently  documented clearance.  Family's main concrn is the patient receiving adequate 'sustenance' and I discussed this with MICU attending.
Continue meropenem for now  The sputum is not resistant and sensitive to gent  given clinical improvement and now extubated off pressors, do not need to change antibx regimen currently as pt received a dose on   I discussed with the ICU team if any clinical decompensation, can continue aminoglycoside with close observation of kidney function
Continue with oral care:  dry eschar from trauma/recent intubation  -copious amounts of bacitracin to keep area moist at all times  -tooth brush and tooth paste at bedside, allow pt to swish and spit to keep oral cavity moist  -Chlorhexidine swish and spit QID  -humidified nasal O2  -Nasal saline spray b/l nares TID for nasal dryness.  f/u out pt. in 2-3 weeks with dr. iggy carpenter [phone]
No compelling evidence suggesting that this is being driven by an acute infectious process. Recommend no abx at this point
S/P Abx Rx
Will continue current insulin regimen for now. Will continue monitoring FS, log, will Follow up.  Patient counseled for compliance with consistent low carb diet.
Will need to f/u with ENT in 2-3 weeks to ensure completely healed. Patient should follow up in ENT office as an outpatient. May see Dr. Graf or Oni. Call [phone]. -tooth brush and tooth paste at bedside, allow pt to swish and spit to keep oral cavity moist  -Chlorhexidine swish and spit QID  -humidified nasal O2  -Nasal saline spray b/l nares TID for nasal dryness
c/w bronchodilators steroids and azothioprine based on WBC counts improved, pulm f/u pending
-UIP on CT chest, likely current dyspnea represents an interstitial lung disease flare  -Solumedrol 30mg IV twice daily per pulmonary recs  -C/w azathioprine 150mg in am and 100mg in pm (home doses) for now, discuss with pulmonary regarding adjusting/changing.  -Duonebs Q6H standing. Hold home dose of Qvar while on nebs.   -C/w O2 via NC to keep O2sat > 90%.  -Pulmonary recs appreciated.    -RVP negative, urine legionella negative; fungitell pending  -Cultures NGTD  -ID not suspicious for infectious process; no indication for ABx  -Attempting to reach outpatient pulmonologist at [facility] and cardiologist
-UIP on CT chest, likely current dyspnea represents an interstitial lung disease flare  -Solumedrol 30mg IV twice daily, azathioprine 150mg in am/ 100mg in pm  -Duonebs Q6H standing. Hold home dose of Qvar while on nebs.   -C/w O2 via NC to keep O2sat > 90%.  -No concern for infectious process and no role for ABx at this point  -repeat cxr today
Highest concerns for medicine reaction and in this context do not think this is due to an acute infectious process.  -stopped abx, recommend removing monzon with scheduled straight cath and attempt to transition off of need.
Unclear etiology but concerns for medicine reaction or acute urinary infection with chronic monzon now with localizing symptoms and abn UA. Recommend ceftriaxone pending micro and clarification of clinical picture.
c/w bronchodilators steroids and azothioprine based on WBC count above 1.4, pulm f/u pending
-UIP on CT chest, likely current dyspnea represents an interstitial lung disease flare  -Solumedrol 30mg IV twice daily per pulmonary recs  -C/w azathioprine 150mg in am and 100mg in pm (home doses) for now, per pulmonology can d/c if needed to to increase WBC count  -Duonebs Q6H standing. Hold home dose of Qvar while on nebs.   -C/w O2 via NC to keep O2sat > 90%.  -No concern for infectious process and no role for ABx at this point  -CXR displayed basilar atelectasis which is not changed from prior Xray

## 2017-12-04 NOTE — PROGRESS NOTE ADULT - PROBLEM SELECTOR PROBLEM 1
Idiopathic pulmonary fibrosis
Type 2 diabetes mellitus without complication, without long-term current use of insulin
Bacteremia
Idiopathic pulmonary fibrosis
Lip lesion
Lip lesion
Shortness of breath
Type 2 diabetes mellitus without complication, without long-term current use of insulin
Idiopathic pulmonary fibrosis
Idiopathic pulmonary fibrosis
Mental status change
Mental status change
Type 2 diabetes mellitus without complication, without long-term current use of insulin
Idiopathic pulmonary fibrosis
Idiopathic pulmonary fibrosis

## 2017-12-04 NOTE — PROGRESS NOTE ADULT - PROBLEM SELECTOR PROBLEM 2
Coronary artery disease involving native coronary artery of native heart, angina presence unspecified
Type 2 diabetes mellitus without complication, without long-term current use of insulin
CHRISTIANE (acute kidney injury)
Coronary artery disease involving native coronary artery of native heart, angina presence unspecified
Globus sensation
Idiopathic pulmonary fibrosis
Type 2 diabetes mellitus without complication, without long-term current use of insulin
Transaminitis
Type 2 diabetes mellitus without complication, without long-term current use of insulin
Bacteremia
Bacteremia
Type 2 diabetes mellitus without complication, without long-term current use of insulin
Type 2 diabetes mellitus without complication, without long-term current use of insulin

## 2017-12-04 NOTE — PROGRESS NOTE ADULT - PROBLEM SELECTOR PLAN 3
-hematology consulted, appreciate recommendations  -likely this is a consequence of azathioprine dosing (which is also likely causing the macrocytic anemia); per pulmonology, can d/c the morning azathioprine dose to improve WBC if needed
On meds primary team following up
-hematology consulted, appreciate recommendations  -likely this is a consequence of azathioprine dosing (which is also likely causing the macrocytic anemia)
As per pulmonary
On meds primary team following up
daily cbc no signs of infection heme f/u
treatment per ICU team
treatment per ICU team
-Initially down-trended to 4.7 but now increased to 6.5  -Etiology remains unclear  -Trend lactate.   -IVFs re-started at 125 mL/hr
-hematology consulted, appreciate recommendations  -likely this is a consequence of azathioprine dosing (which is also likely causing the macrocytic anemia); per pulmonology, can d/c the morning azathioprine dose to improve WBC if needed.
resolved
resolved
daily cbc no signs of infection heme f/u

## 2017-12-04 NOTE — PROGRESS NOTE ADULT - ASSESSMENT
a/p - 77 yo M px with sepsis, acute hypoxic respiratory failure in unit.  he comes out of unit about one week ago, on limited po intake.  Complaining of low back pain, given baclofen 10 then 20 and became confused.  As per wife, he has improved but not to baseline acting "childlike"  exam was notable that he will not answer most questions, speaks very softly but follows commands.  Legs with weakness, left foot drop evident  24 hours later, mentation improved    ams - likely multifactorial delirium - can be meds, infection, hospitalization.  ct neg, doubt new stroke.    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  Needs PT, not clear how imaging of lower spine will change treatment as once mentation improves, needs vigourous PT    Dvt proplyaxis    no further inpt neuro workup, reconsult prn

## 2017-12-04 NOTE — PROGRESS NOTE ADULT - PROBLEM SELECTOR PROBLEM 3
Shortness of breath
Neutropenia
Idiopathic pulmonary fibrosis
Neutropenia
Shortness of breath
Lactic acidosis
Neutropenia
CHRISTIANE (acute kidney injury)
CHRISTIANE (acute kidney injury)
Neutropenia
Neutropenia

## 2017-12-04 NOTE — PROGRESS NOTE ADULT - SUBJECTIVE AND OBJECTIVE BOX
Patient is a 77y old  Male who presents with a chief complaint of SOB (04 Nov 2017 14:33)      SUBJECTIVE / OVERNIGHT EVENTS:    Patient seen and examined. states pain is controlled. after discussion with son and wife, they agree with baclofan PRN TID. no acute events.      Vital Signs Last 24 Hrs  T(C): 36.3 (04 Dec 2017 07:25), Max: 36.7 (03 Dec 2017 16:17)  T(F): 97.3 (04 Dec 2017 07:25), Max: 98 (03 Dec 2017 16:17)  HR: 75 (04 Dec 2017 07:25) (68 - 84)  BP: 122/68 (04 Dec 2017 07:25) (122/68 - 147/88)  BP(mean): --  RR: 18 (04 Dec 2017 07:25) (18 - 18)  SpO2: 100% (04 Dec 2017 07:25) (98% - 100%)  I&O's Summary    03 Dec 2017 07:01  -  04 Dec 2017 07:00  --------------------------------------------------------  IN: 1450 mL / OUT: 1350 mL / NET: 100 mL    04 Dec 2017 07:01  -  04 Dec 2017 12:22  --------------------------------------------------------  IN: 240 mL / OUT: 0 mL / NET: 240 mL        PE:  GENERAL: NAD, AAOx2  HEAD:  Atraumatic, Normocephalic, ecchymosis lower lip 2/2 intubation  EYES: EOMI, PERRLA, conjunctiva and sclera clear  NECK: Supple, No JVD  CHEST/LUNG: CTABL, No wheeze  HEART: Regular rate and rhythm; + murmur  ABDOMEN: Soft, Nontender, Nondistended; Bowel sounds present  EXTREMITIES:  2+ Peripheral Pulses, No clubbing, cyanosis, or edema  SKIN: No rashes or lesions  NEURO: No focal deficits, right foot drop    LABS:            CAPILLARY BLOOD GLUCOSE      POCT Blood Glucose.: 232 mg/dL (04 Dec 2017 11:57)  POCT Blood Glucose.: 126 mg/dL (04 Dec 2017 09:36)  POCT Blood Glucose.: 91 mg/dL (04 Dec 2017 09:15)  POCT Blood Glucose.: 74 mg/dL (04 Dec 2017 08:58)  POCT Blood Glucose.: 64 mg/dL (04 Dec 2017 08:15)  POCT Blood Glucose.: 69 mg/dL (04 Dec 2017 08:13)  POCT Blood Glucose.: 147 mg/dL (03 Dec 2017 21:53)  POCT Blood Glucose.: 123 mg/dL (03 Dec 2017 16:46)  POCT Blood Glucose.: 203 mg/dL (03 Dec 2017 12:24)            RADIOLOGY & ADDITIONAL TESTS:    Imaging Personally Reviewed:  [x] YES  [ ] NO    Consultant(s) Notes Reviewed:  [x] YES  [ ] NO    MEDICATIONS  (STANDING):  aspirin  chewable 81 milliGRAM(s) Oral daily  BACItracin   Ointment 1 Application(s) Topical three times a day  chlorhexidine 0.12% Liquid 15 milliLiter(s) Swish and Spit four times a day  digoxin     Tablet 0.125 milliGRAM(s) Oral daily  heparin  Injectable 5000 Unit(s) SubCutaneous every 8 hours  influenza   Vaccine 0.5 milliLiter(s) IntraMuscular once  insulin glargine Injectable (LANTUS) 12 Unit(s) SubCutaneous at bedtime  insulin lispro (HumaLOG) corrective regimen sliding scale   SubCutaneous Before meals and at bedtime  insulin lispro Injectable (HumaLOG) 7 Unit(s) SubCutaneous three times a day before meals  lidocaine   Patch 1 Patch Transdermal daily  metoprolol     tartrate 25 milliGRAM(s) Oral two times a day  multivitamin 1 Tablet(s) Oral daily  pantoprazole  Injectable 40 milliGRAM(s) IV Push daily  predniSONE   Tablet 10 milliGRAM(s) Oral daily  saliva substitute (BIOTENE MOISTURIZING MOUTH SPRAY) 1 [place](s) Topical three times a day  sodium chloride 0.65% Nasal 1 Spray(s) Both Nostrils three times a day  sodium chloride 0.9%. 500 milliLiter(s) (75 mL/Hr) IV Continuous <Continuous>  tiotropium 18 MICROgram(s) Capsule 1 Capsule(s) Inhalation daily    MEDICATIONS  (PRN):  baclofen 10 milliGRAM(s) Oral every 8 hours PRN pain  bisacodyl Suppository 10 milliGRAM(s) Rectal daily PRN Constipation  ondansetron   Disintegrating Tablet 4 milliGRAM(s) Oral every 6 hours PRN Nausea and/or Vomiting  polyethylene glycol 3350 17 Gram(s) Oral two times a day PRN Constipation  senna Syrup 10 milliLiter(s) Oral at bedtime PRN Constipation      Care Discussed with Consultants/Other Providers [x] YES  [ ] NO    HEALTH ISSUES - PROBLEM Dx:  Mental status change: Mental status change  Back pain, unspecified back location, unspecified back pain laterality, unspecified chronicity: Back pain, unspecified back location, unspecified back pain laterality, unspecified chronicity  Globus sensation: Globus sensation  Lip lesion: Lip lesion  Oral ulcer: Oral ulcer  CHRISTIANE (acute kidney injury): CHRISTIANE (acute kidney injury)  Bacteremia: Bacteremia  Neutropenia: Neutropenia  Need for prophylactic measure: Need for prophylactic measure  Oral thrush: Oral thrush  Nasal discomfort: Nasal discomfort  Gastroesophageal reflux disease without esophagitis: Gastroesophageal reflux disease without esophagitis  Hyperlipidemia, unspecified hyperlipidemia type: Hyperlipidemia, unspecified hyperlipidemia type  Type 2 diabetes mellitus without complication, without long-term current use of insulin: Type 2 diabetes mellitus without complication, without long-term current use of insulin  Coronary artery disease involving native coronary artery of native heart, angina presence unspecified: Coronary artery disease involving native coronary artery of native heart, angina presence unspecified  Hyperkalemia: Hyperkalemia  Lactic acidosis: Lactic acidosis  Transaminitis: Transaminitis  Idiopathic pulmonary fibrosis: Idiopathic pulmonary fibrosis  Shortness of breath: Shortness of breath

## 2017-12-04 NOTE — PROVIDER CONTACT NOTE (CHANGE IN STATUS NOTIFICATION) - ACTION/TREATMENT ORDERED:
Follow hypoglycemic protocol, adjust Lantus/correctional scale, pre-meal
follow hypoglycemic protocol
insert monzon

## 2017-12-04 NOTE — CHART NOTE - NSCHARTNOTEFT_GEN_A_CORE
MEDICINE PA     **Note entered later than exam**       Event Summary: Notified by RN that patient complaining of chest pain. Pt evaluated at bedside by me. States that the pain 5/10 substernal dull/achey non radiating pain that started approximately 30 mins ago. Pt was laying in bed at the time. No alleviating/aggravating factors identifiable. Patient mentions that he ate dextrose gel prior to onset of CP, thinks it may be gas pain.           Vital Signs Last 24 Hrs  T(C): 36.6 (04 Dec 2017 21:54), Max: 37 (04 Dec 2017 19:48)  T(F): 97.9 (04 Dec 2017 21:54), Max: 98.6 (04 Dec 2017 19:48)  HR: 71 (04 Dec 2017 21:54) (71 - 82)  BP: 116/71 (04 Dec 2017 21:54) (104/60 - 136/74)  RR: 16 (04 Dec 2017 21:54) (16 - 18)  SpO2: 99% (04 Dec 2017 21:54) (99% - 100%)    Physical Assessment:  General: Awake, No acute distress.   Neurology: A&Ox3, nonfocal  CV: +S1S2, RRR.  No peripheral edema  Respiratory: Even, unlabored.  CTA B/L.    Abdomen:  +BS.  Soft, NT, ND.  No palpable mass  MSK: CINDY x4.   Skin: Warm and Dry         A/P:                Jaleesa Green PA-C   [facility] MEDICINE PA     **Note entered later than exam**       Event Summary: Notified by RN that patient complaining of chest pain. Pt evaluated at bedside by me. States that the pain 5/10 substernal dull/achey non radiating pain that started approximately 30 mins ago. Pt was laying in bed at the time. No alleviating/aggravating factors identifiable. Patient mentions that he ate dextrose gel prior to onset of CP, thinks it may be gas pain. Denies diaphoresis, palpitations, jaw pain, abdominal pain, nausea/ vomitting, hemoptysis, pleuritic pain, cough.     Vital Signs Last 24 Hrs  T(C): 36.6 (04 Dec 2017 21:54), Max: 37 (04 Dec 2017 19:48)  T(F): 97.9 (04 Dec 2017 21:54), Max: 98.6 (04 Dec 2017 19:48)  HR: 71 (04 Dec 2017 21:54) (71 - 82)  BP: 116/71 (04 Dec 2017 21:54) (104/60 - 136/74)  RR: 16 (04 Dec 2017 21:54) (16 - 18)  SpO2: 99% (04 Dec 2017 21:54) (99% - 100%)    Physical Assessment:  General: Awake, No acute distress.   Neurology: A&O, nonfocal  CV: +S1S2, +murmur. No pedal edema   Respiratory: Even, unlabored.     Abdomen:   Soft, NT, ND.    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 CT chest shows bilateral IPF  treated for Pulmonary fibrosis / Intersitial pulmonary dz exacerbation.  Hospital course complicated by ARF,  transferred to MICU w/ acute hypoxic respiratory failure sp intubation w/ septic shock 2/2 to pseudomonal bacteremia- now resolved.  Pt extubated, downgraded to floor, acutely presenting with chest pain  1. Chest pain 2/2 ?GERD vs. r/o ACS   - CEx1 negative. Follow up second set of cardiac enzymes.   - EKG showing sinus rhythm @ 75-80 bpm unchanged from previous.   - Pepcid 20 mg x1   - ASA load if CP continues/enzymes/ekg change   - Primary team to follow in AM     Jaleesa Green PA-C   [facility]ra [id number]

## 2017-12-04 NOTE — PROGRESS NOTE ADULT - SUBJECTIVE AND OBJECTIVE BOX
Admitting Diagnosis:  Wheezing (R06.2): WHEEZING      HPI:  This is a 77y year old Male with the below past medical history who presents with the chief complaint of altered mental status.  Has pmhx of 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 Pt presented with SOB, required intubation for pseudomonas bacteremia, came out of MICU last week.  Has been seen by pain management for his low back pain.  he was given baclofen yesterday first 10mg then 20mg.  Then yesterday afternoon he was noted to be confused.  This am, wife says he is speaking to her and makes sense, but acting child like.  He cannot provide much detail.  the wife says he is not yet at baseline.  She also notes that his legs have gotten weaker since leaving the micu, he has lost weight.     This am, he has improved, he says feels better    Past Medical History:  Asbestos exposure (Z77.090)  Kidney stones (N20.0)  Coronary artery disease involving native coronary artery of native heart, angina presence unspecified (I25.10): S/p CABG 2016  Cataract (366.9)  Rotator cuff rupture, right (840.4)  GERD (gastroesophageal reflux disease) (530.81)  Hyperlipemia (272.4)  Diabetes mellitus (250.00)      Past Surgical History:  Encounter for removal of ureteral stent (Z46.6): Had a ureteral stent for kidney stones. Now removed.  S/P CABG (coronary artery bypass graft) (Z95.1)  S/P rotator cuff surgery (Z98.89)  S/P lens implant (V43.1): 2000 &amp; 2012  S/P appendectomy (V45.89): over 50 years ago      Social History:  No toxic habits    Family History:  FAMILY HISTORY:  Family history of breast cancer (Mother)  Family history of liver disease (Sibling)  Family history of diabetes mellitus      Allergies:  Vaseline (Pruritus; Urticaria)      ROS:  Constitutional: Patient offers no complaints of fevers or significant weight loss  Ears, Nose, Mouth and Throat: The patient presents with no abnormalities of the head, ears, eyes, nose or throat  Skin: Patient offers no concerns of new rashes or lesions  Respiratory: The patient presents with no abnormalities of the respiratory tract  Cardiovascular: The patient presents with no cardiac abnormalities  Gastrointestinal: The patient presents with no abnormalities of the GI system  Genitourinary: The patient presents with no dysuria, hematuria or frequent urination  Neurological: See HPI  Endocrine: Patient offers no complaints of excessive thirst, urination, or heat/cold intolerance    Advanced care planning reviewed and noted in the chart.        Medications:  aspirin  chewable 81 milliGRAM(s) Oral daily  BACItracin   Ointment 1 Application(s) Topical three times a day  bisacodyl Suppository 10 milliGRAM(s) Rectal daily PRN  chlorhexidine 0.12% Liquid 15 milliLiter(s) Swish and Spit four times a day  digoxin     Tablet 0.125 milliGRAM(s) Oral daily  heparin  Injectable 5000 Unit(s) SubCutaneous every 8 hours  influenza   Vaccine 0.5 milliLiter(s) IntraMuscular once  insulin glargine Injectable (LANTUS) 12 Unit(s) SubCutaneous at bedtime  insulin lispro (HumaLOG) corrective regimen sliding scale   SubCutaneous Before meals and at bedtime  insulin lispro Injectable (HumaLOG) 7 Unit(s) SubCutaneous three times a day before meals  lidocaine   Patch 1 Patch Transdermal daily  metoprolol     tartrate 25 milliGRAM(s) Oral two times a day  multivitamin 1 Tablet(s) Oral daily  ondansetron   Disintegrating Tablet 4 milliGRAM(s) Oral every 6 hours PRN  pantoprazole  Injectable 40 milliGRAM(s) IV Push daily  polyethylene glycol 3350 17 Gram(s) Oral two times a day PRN  predniSONE   Tablet 10 milliGRAM(s) Oral daily  saliva substitute (BIOTENE MOISTURIZING MOUTH SPRAY) 1 [place](s) Topical three times a day  senna Syrup 10 milliLiter(s) Oral at bedtime PRN  sodium chloride 0.65% Nasal 1 Spray(s) Both Nostrils three times a day  sodium chloride 0.9%. 500 milliLiter(s) IV Continuous <Continuous>  tiotropium 18 MICROgram(s) Capsule 1 Capsule(s) Inhalation daily      Labs:  CBC Full  -  ( 02 Dec 2017 11:19 )  WBC Count : 3.09 K/uL  Hemoglobin : 8.5 g/dL  Hematocrit : 26.6 %  Platelet Count - Automated : 223 K/uL  Mean Cell Volume : 105.1 fl  Mean Cell Hemoglobin : 33.6 pg  Mean Cell Hemoglobin Concentration : 32.0 gm/dL  Auto Neutrophil # : x  Auto Lymphocyte # : x  Auto Monocyte # : x  Auto Eosinophil # : x  Auto Basophil # : x  Auto Neutrophil % : x  Auto Lymphocyte % : x  Auto Monocyte % : x  Auto Eosinophil % : x  Auto Basophil % : x    12-02    140  |  103  |  21  ----------------------------<  67<L>  4.7   |  25  |  0.80    Ca    8.4      02 Dec 2017 11:19  Phos  3.1     12-02  Mg     1.9     12-02      CAPILLARY BLOOD GLUCOSE      POCT Blood Glucose.: 147 mg/dL (03 Dec 2017 21:53)  POCT Blood Glucose.: 123 mg/dL (03 Dec 2017 16:46)  POCT Blood Glucose.: 203 mg/dL (03 Dec 2017 12:24)  POCT Blood Glucose.: 190 mg/dL (03 Dec 2017 08:29)              Vitals:  Vital Signs Last 24 Hrs  T(C): 36.3 (04 Dec 2017 07:25), Max: 36.9 (03 Dec 2017 09:53)  T(F): 97.3 (04 Dec 2017 07:25), Max: 98.4 (03 Dec 2017 09:53)  HR: 75 (04 Dec 2017 07:25) (68 - 84)  BP: 122/68 (04 Dec 2017 07:25) (119/73 - 147/88)  BP(mean): --  RR: 18 (04 Dec 2017 07:25) (18 - 18)  SpO2: 100% (04 Dec 2017 07:25) (97% - 100%)    NEUROLOGICAL EXAM:    Mental status: Awake, alert, and in no apparent distress. Oriented to person, place - hospital and year, can answer questions, knew the president, voice is at normal strength  Language function is normal.    Cranial Nerves: Pupils were equal, round, reactive to light. Extraocular movements were intact. Visual field were full. Fundoscopic exam was deferred. Facial sensation was intact to light touch. There was no facial asymmetry. The palate was upgoing symmetrically and tongue was midline. Hearing acuity was intact to finger rub AU. Shoulder shrug was full bilaterally    Motor exam: Bulk and tone were normal. Strength was 5/5 in arms, legs 4/5 but left foot has 3/5 foot drop. Fine finger movements were symmetric and normal. There was no pronator drift    Reflexes: 2+ in the bilateral upper extremities. 0 in the bilateral lower extremities. Toes were downgoing bilaterally.     Sensation: difficult to assess.     Coordination: Finger-nose-finger no gross dymetria dysmetria.     Gait: defferred     < from: MR Head w/ IV Cont (12.01.17 @ 11:29) >    EXAM:  MR BRAIN IC                            PROCEDURE DATE:  12/01/2017            INTERPRETATION:  INDICATIONS:  Altered mental status. Diabetes, CABG,   interstitial lung disease, GERD.    TECHNIQUE:  Multiplanar imaging was performed using T1 weighted, T2   weighted and FLAIR sequences.  Diffusion weighted and SWI images were   also obtained.  Following intravenous gadolinium, multiplanar T1 weighted   images were performed. 6 cc Gadavist were administered. 4 cc were   discarded.      COMPARISON EXAMINATION:  Brain CT from 11/29/2017.      FINDINGS:    VENTRICLES AND SULCI:  Normal.  INTRA-AXIAL:  Moderate to severe periventricular and subcortical white   matter T2 and FLAIR signal hyperintensity consistent with chronic   microvascularischemic changes. No acute hemorrhage, mass effect, or   evidence of acute cerebral ischemia. No abnormal enhancement.  EXTRA-AXIAL:  No mass or collection.  VISUALIZED SINUSES:  Normal.  VISUALIZED MASTOIDS:  Left greater than right mastoid effusion.  CALVARIUM:  Normal.   CAROTID FLOW VOIDS:  Present.  MISCELLANEOUS:  None.    IMPRESSION:  No acute intracranial hemorrhage or infarction. Moderate to   severe chronic microvascular ischemic changes. Left mastoid effusion.                TALA OLIVERA M.D., RADIOLOGY RESIDENT  This document has been electronically signed.  BROOKE DAVIES M.D., ATTENDING RADIOLOGIST  This document has been electronically signed. Dec  1 2017 12:09PM                < end of copied text >

## 2017-12-05 VITALS
SYSTOLIC BLOOD PRESSURE: 133 MMHG | HEART RATE: 89 BPM | OXYGEN SATURATION: 99 % | DIASTOLIC BLOOD PRESSURE: 82 MMHG | TEMPERATURE: 98 F | RESPIRATION RATE: 18 BRPM

## 2017-12-05 LAB
CK MB CFR SERPL CALC: 3.4 NG/ML — SIGNIFICANT CHANGE UP (ref 0–6.7)
CK SERPL-CCNC: 17 U/L — LOW (ref 30–200)
CULTURE RESULTS: SIGNIFICANT CHANGE UP
CULTURE RESULTS: SIGNIFICANT CHANGE UP
GLUCOSE BLDC GLUCOMTR-MCNC: 115 MG/DL — HIGH (ref 70–99)
GLUCOSE BLDC GLUCOMTR-MCNC: 273 MG/DL — HIGH (ref 70–99)
HCT VFR BLD CALC: 24.5 % — LOW (ref 39–50)
HGB BLD-MCNC: 8.1 G/DL — LOW (ref 13–17)
MCHC RBC-ENTMCNC: 33 GM/DL — SIGNIFICANT CHANGE UP (ref 32–36)
MCHC RBC-ENTMCNC: 35.1 PG — HIGH (ref 27–34)
MCV RBC AUTO: 106 FL — HIGH (ref 80–100)
PLATELET # BLD AUTO: 282 K/UL — SIGNIFICANT CHANGE UP (ref 150–400)
RBC # BLD: 2.3 M/UL — LOW (ref 4.2–5.8)
RBC # FLD: 17 % — HIGH (ref 10.3–14.5)
SPECIMEN SOURCE: SIGNIFICANT CHANGE UP
SPECIMEN SOURCE: SIGNIFICANT CHANGE UP
TROPONIN T SERPL-MCNC: 0.04 NG/ML — SIGNIFICANT CHANGE UP (ref 0–0.06)
WBC # BLD: 2.6 K/UL — LOW (ref 3.8–10.5)
WBC # FLD AUTO: 2.6 K/UL — LOW (ref 3.8–10.5)

## 2017-12-05 RX ORDER — SENNA PLUS 8.6 MG/1
10 TABLET ORAL
Qty: 0 | Refills: 0 | COMMUNITY
Start: 2017-12-05

## 2017-12-05 RX ORDER — ONDANSETRON 8 MG/1
1 TABLET, FILM COATED ORAL
Qty: 0 | Refills: 0 | COMMUNITY
Start: 2017-12-05

## 2017-12-05 RX ORDER — CLOTRIMAZOLE 10 MG
1 TROCHE MUCOUS MEMBRANE
Qty: 0 | Refills: 0 | COMMUNITY

## 2017-12-05 RX ORDER — SALIVA SUBSTITUTE COMB NO.11 351 MG
1 POWDER IN PACKET (EA) MUCOUS MEMBRANE
Qty: 0 | Refills: 0 | COMMUNITY
Start: 2017-12-05

## 2017-12-05 RX ORDER — CHLORHEXIDINE GLUCONATE 213 G/1000ML
15 SOLUTION TOPICAL
Qty: 0 | Refills: 0 | COMMUNITY
Start: 2017-12-05

## 2017-12-05 RX ORDER — POLYETHYLENE GLYCOL 3350 17 G/17G
17 POWDER, FOR SOLUTION ORAL
Qty: 0 | Refills: 0 | COMMUNITY
Start: 2017-12-05

## 2017-12-05 RX ORDER — ASPIRIN/CALCIUM CARB/MAGNESIUM 324 MG
1 TABLET ORAL
Qty: 0 | Refills: 0 | COMMUNITY
Start: 2017-12-05

## 2017-12-05 RX ORDER — AZATHIOPRINE 100 MG/1
0 TABLET ORAL
Qty: 0 | Refills: 0 | COMMUNITY

## 2017-12-05 RX ORDER — BACITRACIN ZINC 500 UNIT/G
1 OINTMENT IN PACKET (EA) TOPICAL
Qty: 0 | Refills: 0 | COMMUNITY
Start: 2017-12-05

## 2017-12-05 RX ORDER — METOPROLOL TARTRATE 50 MG
1 TABLET ORAL
Qty: 0 | Refills: 0 | COMMUNITY
Start: 2017-12-05

## 2017-12-05 RX ORDER — PREGABALIN 225 MG/1
0 CAPSULE ORAL
Qty: 0 | Refills: 0 | COMMUNITY

## 2017-12-05 RX ORDER — SODIUM CHLORIDE 0.65 %
1 AEROSOL, SPRAY (ML) NASAL
Qty: 0 | Refills: 0 | COMMUNITY
Start: 2017-12-05

## 2017-12-05 RX ORDER — LOSARTAN POTASSIUM 100 MG/1
1 TABLET, FILM COATED ORAL
Qty: 0 | Refills: 0 | COMMUNITY

## 2017-12-05 RX ORDER — DIGOXIN 250 MCG
1 TABLET ORAL
Qty: 0 | Refills: 0 | COMMUNITY
Start: 2017-12-05

## 2017-12-05 RX ORDER — MOMETASONE FUROATE 50 UG/1
1 SPRAY NASAL
Qty: 0 | Refills: 0 | COMMUNITY

## 2017-12-05 RX ORDER — ASPIRIN/CALCIUM CARB/MAGNESIUM 324 MG
1 TABLET ORAL
Qty: 0 | Refills: 0 | COMMUNITY

## 2017-12-05 RX ORDER — LIDOCAINE 4 G/100G
1 CREAM TOPICAL
Qty: 0 | Refills: 0 | COMMUNITY
Start: 2017-12-05

## 2017-12-05 RX ORDER — MUPIROCIN 20 MG/G
1 OINTMENT TOPICAL
Qty: 0 | Refills: 0 | COMMUNITY

## 2017-12-05 RX ORDER — METOPROLOL TARTRATE 50 MG
1 TABLET ORAL
Qty: 0 | Refills: 0 | COMMUNITY

## 2017-12-05 RX ORDER — BACLOFEN 100 %
1 POWDER (GRAM) MISCELLANEOUS
Qty: 0 | Refills: 0 | COMMUNITY
Start: 2017-12-05

## 2017-12-05 RX ADMIN — CHLORHEXIDINE GLUCONATE 15 MILLILITER(S): 213 SOLUTION TOPICAL at 13:06

## 2017-12-05 RX ADMIN — Medication 3: at 13:02

## 2017-12-05 RX ADMIN — Medication 1 APPLICATION(S): at 05:52

## 2017-12-05 RX ADMIN — Medication 81 MILLIGRAM(S): at 13:05

## 2017-12-05 RX ADMIN — Medication 1 SPRAY(S): at 05:52

## 2017-12-05 RX ADMIN — TIOTROPIUM BROMIDE 1 CAPSULE(S): 18 CAPSULE ORAL; RESPIRATORY (INHALATION) at 13:05

## 2017-12-05 RX ADMIN — HEPARIN SODIUM 5000 UNIT(S): 5000 INJECTION INTRAVENOUS; SUBCUTANEOUS at 13:06

## 2017-12-05 RX ADMIN — POLYETHYLENE GLYCOL 3350 17 GRAM(S): 17 POWDER, FOR SOLUTION ORAL at 15:24

## 2017-12-05 RX ADMIN — Medication 30 MILLILITER(S): at 13:10

## 2017-12-05 RX ADMIN — Medication 1 TABLET(S): at 13:04

## 2017-12-05 RX ADMIN — Medication 10 MILLIGRAM(S): at 10:07

## 2017-12-05 RX ADMIN — Medication 1 SPRAY(S): at 05:53

## 2017-12-05 RX ADMIN — CHLORHEXIDINE GLUCONATE 15 MILLILITER(S): 213 SOLUTION TOPICAL at 05:51

## 2017-12-05 RX ADMIN — Medication 10 MILLIGRAM(S): at 05:52

## 2017-12-05 RX ADMIN — Medication 1 SPRAY(S): at 14:50

## 2017-12-05 RX ADMIN — Medication 0.12 MILLIGRAM(S): at 05:52

## 2017-12-05 RX ADMIN — Medication 25 MILLIGRAM(S): at 05:52

## 2017-12-05 RX ADMIN — SODIUM CHLORIDE 75 MILLILITER(S): 9 INJECTION INTRAMUSCULAR; INTRAVENOUS; SUBCUTANEOUS at 05:52

## 2017-12-05 RX ADMIN — HEPARIN SODIUM 5000 UNIT(S): 5000 INJECTION INTRAVENOUS; SUBCUTANEOUS at 05:52

## 2017-12-05 RX ADMIN — Medication 1 SPRAY(S): at 13:07

## 2017-12-05 RX ADMIN — Medication 10 MILLIGRAM(S): at 01:48

## 2017-12-05 RX ADMIN — PANTOPRAZOLE SODIUM 40 MILLIGRAM(S): 20 TABLET, DELAYED RELEASE ORAL at 13:06

## 2017-12-05 RX ADMIN — LIDOCAINE 1 PATCH: 4 CREAM TOPICAL at 06:39

## 2017-12-05 RX ADMIN — Medication 1 APPLICATION(S): at 13:07

## 2017-12-05 RX ADMIN — LIDOCAINE 1 PATCH: 4 CREAM TOPICAL at 13:07

## 2017-12-05 NOTE — PROGRESS NOTE ADULT - NSHPATTENDINGPLANDISCUSS_GEN_ALL_CORE
Wife, medical housestaff
Patient, family, medical housestaff
Wife, medical housestaff
MICU team and patients family
MICU team and patients family
residents and fellow
MICU team and patients family
resident and fellow
resident and fellow
NP on the floor and all consultants
NP on the floor and all consultants

## 2017-12-05 NOTE — PROGRESS NOTE ADULT - ASSESSMENT
Acute on chronic hypoxemic respiratory failure: clinically resoliving post extubation  LUE edema: r/o DVT  Pseudomonoas bacteremia with pneumonia: resolved  OP dysphagia  Oral/Lip ulcerations  CHRISTIANE - resolving  r/o new UTI, bacteremia  Back pain    Septic Shock - clinically resolved    REC  Observe off antibiotics  No need to resume imuran: would continue prednisone at 10 mg qd  PT  Oral care  Pain management  DC planning for [facility]

## 2017-12-05 NOTE — PROGRESS NOTE ADULT - PROVIDER SPECIALTY LIST ADULT
Cardiology
ENT
ENT
Endocrinology
Heme/Onc
Infectious Disease
Internal Medicine
MICU
Nephrology
Neurology
Pulmonology
Internal Medicine
Pulmonology
Internal Medicine
Internal Medicine
MICU
MICU
Neurology
Pulmonology
Cardiology
Endocrinology
Infectious Disease
Infectious Disease
Endocrinology
Internal Medicine

## 2017-12-05 NOTE — PROGRESS NOTE ADULT - SUBJECTIVE AND OBJECTIVE BOX
Follow-up Pulm Progress Note  Amauri Jackson MD  [phone]      Events noted  Afebrile off antibiotics  More alert today - no new resp issues overnight  MRI brain: chronic microvascular disease; no acute pathology    Vital Signs Last 24 Hrs  T(C): 36.9 (05 Dec 2017 07:27), Max: 37 (04 Dec 2017 19:48)  T(F): 98.5 (05 Dec 2017 07:27), Max: 98.6 (04 Dec 2017 19:48)  HR: 73 (05 Dec 2017 07:27) (71 - 82)  BP: 113/70 (05 Dec 2017 07:27) (104/60 - 116/89)  BP(mean): --  RR: 18 (05 Dec 2017 07:27) (16 - 18)  SpO2: 95% (05 Dec 2017 07:27) (95% - 100%)                       8.1    2.6   )-----------( 282      ( 05 Dec 2017 09:07 )             24.5     12-04    140  |  106  |  15  ----------------------------<  178<H>  4.3   |  24  |  0.67    Ca    7.9<L>      04 Dec 2017 21:06  Phos  2.2     12-04  Mg     1.6     12-04    TPro  5.6<L>  /  Alb  2.0<L>  /  TBili  0.3  /  DBili  x   /  AST  24  /  ALT  25  /  AlkPhos  120  12-04    CXR: unchanged reticular opacities: no gross new pulm edema  TTE: Mild AS, hyperdynamic LV, PA 40-50  Physical Examination:  PULM: No wheeze or rhonchi   CVS: Regular rate and rhythm, no murmurs, rubs, or gallops  ABD: Soft, non-tender  EXT:  LUE edema    RADIOLOGY REVIEWED  CXR: No change in bilateral reticular opacities    CT chest: see above    TTE:

## 2017-12-12 ENCOUNTER — EMERGENCY (EMERGENCY)
Facility: HOSPITAL | Age: 77
LOS: 1 days | Discharge: HOME | End: 2017-12-12
Attending: EMERGENCY MEDICINE | Admitting: EMERGENCY MEDICINE
Payer: MEDICARE

## 2017-12-12 VITALS
HEART RATE: 77 BPM | SYSTOLIC BLOOD PRESSURE: 106 MMHG | RESPIRATION RATE: 16 BRPM | DIASTOLIC BLOOD PRESSURE: 63 MMHG | OXYGEN SATURATION: 98 %

## 2017-12-12 DIAGNOSIS — Z95.1 PRESENCE OF AORTOCORONARY BYPASS GRAFT: Chronic | ICD-10-CM

## 2017-12-12 DIAGNOSIS — Z46.6 ENCOUNTER FOR FITTING AND ADJUSTMENT OF URINARY DEVICE: Chronic | ICD-10-CM

## 2017-12-12 DIAGNOSIS — Z98.89 OTHER SPECIFIED POSTPROCEDURAL STATES: Chronic | ICD-10-CM

## 2017-12-12 LAB
ALBUMIN SERPL ELPH-MCNC: 2.4 G/DL — LOW (ref 3.3–5)
ALP SERPL-CCNC: 105 U/L — SIGNIFICANT CHANGE UP (ref 40–120)
ALT FLD-CCNC: 31 U/L RC — SIGNIFICANT CHANGE UP (ref 10–45)
ANION GAP SERPL CALC-SCNC: 11 MMOL/L — SIGNIFICANT CHANGE UP (ref 5–17)
ANION GAP SERPL CALC-SCNC: 15 MMOL/L — SIGNIFICANT CHANGE UP (ref 5–17)
APPEARANCE UR: ABNORMAL
APTT BLD: 27.1 SEC — LOW (ref 27.5–37.4)
AST SERPL-CCNC: 62 U/L — HIGH (ref 10–40)
BASOPHILS # BLD AUTO: 0.1 K/UL — SIGNIFICANT CHANGE UP (ref 0–0.2)
BILIRUB SERPL-MCNC: 0.2 MG/DL — SIGNIFICANT CHANGE UP (ref 0.2–1.2)
BILIRUB UR-MCNC: NEGATIVE — SIGNIFICANT CHANGE UP
BUN SERPL-MCNC: 18 MG/DL — SIGNIFICANT CHANGE UP (ref 7–23)
BUN SERPL-MCNC: 19 MG/DL — SIGNIFICANT CHANGE UP (ref 7–23)
CALCIUM SERPL-MCNC: 8.5 MG/DL — SIGNIFICANT CHANGE UP (ref 8.4–10.5)
CALCIUM SERPL-MCNC: 8.6 MG/DL — SIGNIFICANT CHANGE UP (ref 8.4–10.5)
CHLORIDE SERPL-SCNC: 95 MMOL/L — LOW (ref 96–108)
CHLORIDE SERPL-SCNC: 97 MMOL/L — SIGNIFICANT CHANGE UP (ref 96–108)
CO2 SERPL-SCNC: 23 MMOL/L — SIGNIFICANT CHANGE UP (ref 22–31)
CO2 SERPL-SCNC: 25 MMOL/L — SIGNIFICANT CHANGE UP (ref 22–31)
COLOR SPEC: YELLOW — SIGNIFICANT CHANGE UP
CREAT SERPL-MCNC: 0.8 MG/DL — SIGNIFICANT CHANGE UP (ref 0.5–1.3)
CREAT SERPL-MCNC: 0.82 MG/DL — SIGNIFICANT CHANGE UP (ref 0.5–1.3)
CRP SERPL-MCNC: 5.1 MG/DL — HIGH (ref 0–0.4)
DIFF PNL FLD: ABNORMAL
EOSINOPHIL # BLD AUTO: 0 K/UL — SIGNIFICANT CHANGE UP (ref 0–0.5)
GLUCOSE SERPL-MCNC: 122 MG/DL — HIGH (ref 70–99)
GLUCOSE SERPL-MCNC: 190 MG/DL — HIGH (ref 70–99)
GLUCOSE UR QL: NEGATIVE — SIGNIFICANT CHANGE UP
HCT VFR BLD CALC: 31.5 % — LOW (ref 39–50)
HGB BLD-MCNC: 10.2 G/DL — LOW (ref 13–17)
INR BLD: 0.96 RATIO — SIGNIFICANT CHANGE UP (ref 0.88–1.16)
KETONES UR-MCNC: NEGATIVE — SIGNIFICANT CHANGE UP
LEUKOCYTE ESTERASE UR-ACNC: ABNORMAL
LYMPHOCYTES # BLD AUTO: 2.4 K/UL — SIGNIFICANT CHANGE UP (ref 1–3.3)
LYMPHOCYTES # BLD AUTO: 21 % — SIGNIFICANT CHANGE UP (ref 13–44)
MCHC RBC-ENTMCNC: 32.4 GM/DL — SIGNIFICANT CHANGE UP (ref 32–36)
MCHC RBC-ENTMCNC: 34.7 PG — HIGH (ref 27–34)
MCV RBC AUTO: 107 FL — HIGH (ref 80–100)
MONOCYTES # BLD AUTO: 0.4 K/UL — SIGNIFICANT CHANGE UP (ref 0–0.9)
MONOCYTES NFR BLD AUTO: 3 % — SIGNIFICANT CHANGE UP (ref 2–14)
NEUTROPHILS # BLD AUTO: 7 K/UL — SIGNIFICANT CHANGE UP (ref 1.8–7.4)
NEUTROPHILS NFR BLD AUTO: 74 % — SIGNIFICANT CHANGE UP (ref 43–77)
NITRITE UR-MCNC: NEGATIVE — SIGNIFICANT CHANGE UP
PH UR: 7 — SIGNIFICANT CHANGE UP (ref 5–8)
PLATELET # BLD AUTO: 278 K/UL — SIGNIFICANT CHANGE UP (ref 150–400)
POTASSIUM SERPL-MCNC: 6.4 MMOL/L — CRITICAL HIGH (ref 3.5–5.3)
POTASSIUM SERPL-MCNC: 7.2 MMOL/L — CRITICAL HIGH (ref 3.5–5.3)
POTASSIUM SERPL-SCNC: 6.4 MMOL/L — CRITICAL HIGH (ref 3.5–5.3)
POTASSIUM SERPL-SCNC: 7.2 MMOL/L — CRITICAL HIGH (ref 3.5–5.3)
PROT SERPL-MCNC: 6.7 G/DL — SIGNIFICANT CHANGE UP (ref 6–8.3)
PROT UR-MCNC: 30 MG/DL
PROTHROM AB SERPL-ACNC: 10.4 SEC — SIGNIFICANT CHANGE UP (ref 9.8–12.7)
RBC # BLD: 2.95 M/UL — LOW (ref 4.2–5.8)
RBC # FLD: 16.4 % — HIGH (ref 10.3–14.5)
RBC CASTS # UR COMP ASSIST: ABNORMAL /HPF (ref 0–2)
SODIUM SERPL-SCNC: 133 MMOL/L — LOW (ref 135–145)
SODIUM SERPL-SCNC: 133 MMOL/L — LOW (ref 135–145)
SP GR SPEC: 1.02 — SIGNIFICANT CHANGE UP (ref 1.01–1.02)
UROBILINOGEN FLD QL: NEGATIVE — SIGNIFICANT CHANGE UP
WBC # BLD: 10 K/UL — SIGNIFICANT CHANGE UP (ref 3.8–10.5)
WBC # FLD AUTO: 10 K/UL — SIGNIFICANT CHANGE UP (ref 3.8–10.5)
WBC UR QL: >50 /HPF (ref 0–5)

## 2017-12-12 PROCEDURE — 99285 EMERGENCY DEPT VISIT HI MDM: CPT

## 2017-12-12 PROCEDURE — 72141 MRI NECK SPINE W/O DYE: CPT | Mod: 26

## 2017-12-12 RX ORDER — ACETAMINOPHEN 500 MG
1000 TABLET ORAL ONCE
Qty: 0 | Refills: 0 | Status: COMPLETED | OUTPATIENT
Start: 2017-12-12 | End: 2017-12-12

## 2017-12-12 RX ORDER — BACLOFEN 100 %
10 POWDER (GRAM) MISCELLANEOUS ONCE
Qty: 0 | Refills: 0 | Status: COMPLETED | OUTPATIENT
Start: 2017-12-12 | End: 2017-12-12

## 2017-12-12 RX ADMIN — Medication 400 MILLIGRAM(S): at 17:23

## 2017-12-12 NOTE — ED PROVIDER NOTE - ATTENDING CONTRIBUTION TO CARE
pt is a 78 y/o male who presents from [facility] for progressive leg weakness concern for epidural abscss, no f/c, but deconditioned, recent intubation and sepsis, pulm fibrosis, cad, dm, gerd.  pt had an mri ordered which can not be done today, labs, sed rate, spine consult.

## 2017-12-12 NOTE — CONSULT NOTE ADULT - ATTENDING COMMENTS
I was not informed of this pt, but w/u seems reasonable.    Antony Barnes MD  Spinal Surgery  [facility], [address], [facility] [address]  [place] [address]  P [phone]

## 2017-12-12 NOTE — CONSULT NOTE ADULT - SUBJECTIVE AND OBJECTIVE BOX
Pt is a 78 yo M presenting to the Ed from rehab after a [facility] hospital admittion with an extensive MICU stay complicated by bacteremia and extensive intubation and immobilization.  Developed urosepsis at one point, has been weak and with back pain since intubation, has not been able to ambulate since then, but feels in past couple of days at rehab BL LE weakness has "gotten worse", and LLE worse than RLE so came to ED for eval.  No leg N/T/pain, no fevers/chills.  No bowel/bladder or saddle symptoms.    PMH:  MEWS Score  Asbestos exposure  Kidney stones  Coronary artery disease involving native coronary artery of native heart, angina presence unspecified  Cataract  Rotator cuff rupture, right  GERD (gastroesophageal reflux disease)  Hyperlipemia  Diabetes mellitus  Encounter for removal of ureteral stent  S/P CABG (coronary artery bypass graft)  S/P rotator cuff surgery  S/P lens implant  S/P appendectomy      Exam: 77y yo Male in NAD, AAOx3  BL LE: 3+/5 IP/Q/HS, 4/5 TA/EHL/GS SILT  BL UE: 4/5 delt/biceps/triceps/WE/WF/intrinsics. SILT  negative arora's  normal reflexes  negative babinski  no clonus  no saddle anesthesia

## 2017-12-12 NOTE — ED PROVIDER NOTE - NS ED ROS FT
GENERAL: No fever or chills, EYES: no change in vision, HEENT: no trouble swallowing or speaking, CARDIAC: no chest pain, PULMONARY: no cough or SOB, GI: no abdominal pain, no nausea, no vomiting, no diarrhea or constipation, : No changes in urination, SKIN: no rashes, NEURO: no headache,  MSK: lower back pain and BLLE weakness ~Kamron Mei M.D. Resident

## 2017-12-12 NOTE — ED ADULT NURSE NOTE - OBJECTIVE STATEMENT
78 y/o male A&Ox4, PMH DMII, CAD, GERD, cataracts, rotator cuff repair, recent discharge after intubation [id number], BIBEMS from [facility] for uncontrolled back pain. Pt was treated for sepsis and d/c to [facility] where he developed back pain and BLE sensitivity. As per EMS, physical therapist recommended pt come to ED for further evaluation. As per wife bedside, PT at rehab believes pt's back pain is "not muscular but more spinal related." Pt has Villagran in place and draining from rehab. Upon further assessment, airway patent and intact, denies chest pain/SOB. ABD soft nontender, denies n/v/d. Skin intact. Peripheral pulses strong and normal baseline sensation present x4. Safety and comfort measures maintained.

## 2017-12-12 NOTE — ED ADULT NURSE REASSESSMENT NOTE - NS ED NURSE REASSESS COMMENT FT1
Pt resting comfortably with family at bedside, diaper and linens changed. Pt pending lab work and results

## 2017-12-12 NOTE — ED ADULT NURSE NOTE - PSH
Encounter for removal of ureteral stent  Had a ureteral stent for kidney stones. Now removed.  S/P appendectomy  over 50 years ago  S/P CABG (coronary artery bypass graft)    S/P lens implant  2000 & 2012  S/P rotator cuff surgery

## 2017-12-12 NOTE — ED PROVIDER NOTE - PROGRESS NOTE DETAILS
Arcelia: Patient signed out to f/u MRI and spine consult. Patient MRI performed  prelim read shows no cord compression and no concern for epidural abscess. Patient c/o pain to LE b/l. added on cpk to labs. repeat potassium not hemolyzed and is 5.0.  Spoke with family at length about results. UA also positive with indwelling monzon. no fever here. no concern for infection.  will wait for culture results. Family agrees with plan to send patient back to rehab.

## 2017-12-12 NOTE — CONSULT NOTE ADULT - ASSESSMENT
A: 76 yo Male with BL LE weakness, back pain, acute on chronic    -- Likely due to prolonged immobilization/hospitalization rather than epidural abscess based on chronicity of symptoms, physical exam findings  -- Follow up MRI C/T/L spine  -- Pain control as needed, PT  -- Will d/w attg Dr. Barnes

## 2017-12-12 NOTE — ED PROVIDER NOTE - OBJECTIVE STATEMENT
78 yo M PMHx pulmonary fibrosis with recent ICU admission, intubation and hospital course c/b pseudomonas sepsis. Pt was discharged to [facility] and was unable to participate in PT due to lower back pain which developed about a day after he was extubated in the ICU (around Dec 17). He was sent to the ER from rehab for concern for lower extremity weakness and lower back pain. He has not ambulated since he was in the hospital but he states his lower extremity weakness has worsened. He has a monzon and denies any perineal or genital area numbness. Denies fevers/chills, CP/palpitations, abd pain, N/V.

## 2017-12-12 NOTE — ED PROVIDER NOTE - PHYSICAL EXAMINATION
Gen: AAOx3, non-toxic  Head: NCAT  HEENT: EOMI, oral mucosa moist, normal conjunctiva  Lung: CTAB, no respiratory distress, no wheezes/rhonchi/rales B/L, speaking in full sentences  CV: RRR, no murmurs, rubs or gallops, +2 radial pulses bilaterally  Abd: soft, NTND, no guarding  MSK: no visible deformities  Neuro: No focal sensory or motor deficits, L-spine tenderness, unable to lift legs off of the bed, able to dorsi and plantar flex bilaterally  Skin: Warm, well perfused, no rash, Stage I decubitus ulcer to sacrum with dressing  Psych: normal affect.   ~Kamron Mei M.D. Resident

## 2017-12-13 VITALS
SYSTOLIC BLOOD PRESSURE: 144 MMHG | RESPIRATION RATE: 20 BRPM | DIASTOLIC BLOOD PRESSURE: 81 MMHG | HEART RATE: 78 BPM | OXYGEN SATURATION: 100 % | TEMPERATURE: 98 F

## 2017-12-13 LAB
ANION GAP SERPL CALC-SCNC: 7 MMOL/L — SIGNIFICANT CHANGE UP (ref 5–17)
BUN SERPL-MCNC: 16 MG/DL — SIGNIFICANT CHANGE UP (ref 7–23)
CALCIUM SERPL-MCNC: 8.3 MG/DL — LOW (ref 8.4–10.5)
CHLORIDE SERPL-SCNC: 99 MMOL/L — SIGNIFICANT CHANGE UP (ref 96–108)
CO2 SERPL-SCNC: 30 MMOL/L — SIGNIFICANT CHANGE UP (ref 22–31)
CREAT SERPL-MCNC: 0.71 MG/DL — SIGNIFICANT CHANGE UP (ref 0.5–1.3)
GLUCOSE SERPL-MCNC: 85 MG/DL — SIGNIFICANT CHANGE UP (ref 70–99)
POTASSIUM SERPL-MCNC: 5 MMOL/L — SIGNIFICANT CHANGE UP (ref 3.5–5.3)
POTASSIUM SERPL-SCNC: 5 MMOL/L — SIGNIFICANT CHANGE UP (ref 3.5–5.3)
SODIUM SERPL-SCNC: 136 MMOL/L — SIGNIFICANT CHANGE UP (ref 135–145)

## 2017-12-13 PROCEDURE — 72148 MRI LUMBAR SPINE W/O DYE: CPT | Mod: 26

## 2017-12-13 PROCEDURE — 72148 MRI LUMBAR SPINE W/O DYE: CPT

## 2017-12-13 PROCEDURE — 85610 PROTHROMBIN TIME: CPT

## 2017-12-13 PROCEDURE — 86140 C-REACTIVE PROTEIN: CPT

## 2017-12-13 PROCEDURE — 72146 MRI CHEST SPINE W/O DYE: CPT

## 2017-12-13 PROCEDURE — 80048 BASIC METABOLIC PNL TOTAL CA: CPT

## 2017-12-13 PROCEDURE — 81001 URINALYSIS AUTO W/SCOPE: CPT

## 2017-12-13 PROCEDURE — 72146 MRI CHEST SPINE W/O DYE: CPT | Mod: 26

## 2017-12-13 PROCEDURE — 93005 ELECTROCARDIOGRAM TRACING: CPT

## 2017-12-13 PROCEDURE — 82550 ASSAY OF CK (CPK): CPT

## 2017-12-13 PROCEDURE — 85730 THROMBOPLASTIN TIME PARTIAL: CPT

## 2017-12-13 PROCEDURE — 80053 COMPREHEN METABOLIC PANEL: CPT

## 2017-12-13 PROCEDURE — 99284 EMERGENCY DEPT VISIT MOD MDM: CPT | Mod: 25

## 2017-12-13 PROCEDURE — 87086 URINE CULTURE/COLONY COUNT: CPT

## 2017-12-13 PROCEDURE — 85027 COMPLETE CBC AUTOMATED: CPT

## 2017-12-13 PROCEDURE — 72141 MRI NECK SPINE W/O DYE: CPT

## 2017-12-13 PROCEDURE — 96374 THER/PROPH/DIAG INJ IV PUSH: CPT | Mod: XU

## 2017-12-13 RX ADMIN — Medication 10 MILLIGRAM(S): at 00:41

## 2017-12-14 LAB
CULTURE RESULTS: SIGNIFICANT CHANGE UP
SPECIMEN SOURCE: SIGNIFICANT CHANGE UP

## 2017-12-15 NOTE — ED POST DISCHARGE NOTE - OTHER COMMUNICATION
spoke with nurse LEONARDO at [facility] and made the nurse aware of results, she will pass along to providers. - Lisa Fenton PA-C

## 2017-12-19 PROCEDURE — 84478 ASSAY OF TRIGLYCERIDES: CPT

## 2017-12-19 PROCEDURE — 82746 ASSAY OF FOLIC ACID SERUM: CPT

## 2017-12-19 PROCEDURE — 83935 ASSAY OF URINE OSMOLALITY: CPT

## 2017-12-19 PROCEDURE — 70450 CT HEAD/BRAIN W/O DYE: CPT

## 2017-12-19 PROCEDURE — 83605 ASSAY OF LACTIC ACID: CPT

## 2017-12-19 PROCEDURE — 87324 CLOSTRIDIUM AG IA: CPT

## 2017-12-19 PROCEDURE — 83880 ASSAY OF NATRIURETIC PEPTIDE: CPT

## 2017-12-19 PROCEDURE — 80074 ACUTE HEPATITIS PANEL: CPT

## 2017-12-19 PROCEDURE — 84132 ASSAY OF SERUM POTASSIUM: CPT

## 2017-12-19 PROCEDURE — 83036 HEMOGLOBIN GLYCOSYLATED A1C: CPT

## 2017-12-19 PROCEDURE — 97162 PT EVAL MOD COMPLEX 30 MIN: CPT

## 2017-12-19 PROCEDURE — 82550 ASSAY OF CK (CPK): CPT

## 2017-12-19 PROCEDURE — 80048 BASIC METABOLIC PNL TOTAL CA: CPT

## 2017-12-19 PROCEDURE — 85027 COMPLETE CBC AUTOMATED: CPT

## 2017-12-19 PROCEDURE — 87633 RESP VIRUS 12-25 TARGETS: CPT

## 2017-12-19 PROCEDURE — 94640 AIRWAY INHALATION TREATMENT: CPT

## 2017-12-19 PROCEDURE — 80202 ASSAY OF VANCOMYCIN: CPT

## 2017-12-19 PROCEDURE — 71275 CT ANGIOGRAPHY CHEST: CPT

## 2017-12-19 PROCEDURE — 86738 MYCOPLASMA ANTIBODY: CPT

## 2017-12-19 PROCEDURE — 87186 SC STD MICRODIL/AGAR DIL: CPT

## 2017-12-19 PROCEDURE — 84300 ASSAY OF URINE SODIUM: CPT

## 2017-12-19 PROCEDURE — 80076 HEPATIC FUNCTION PANEL: CPT

## 2017-12-19 PROCEDURE — 82330 ASSAY OF CALCIUM: CPT

## 2017-12-19 PROCEDURE — 82009 KETONE BODYS QUAL: CPT

## 2017-12-19 PROCEDURE — 93971 EXTREMITY STUDY: CPT

## 2017-12-19 PROCEDURE — 85014 HEMATOCRIT: CPT

## 2017-12-19 PROCEDURE — 81003 URINALYSIS AUTO W/O SCOPE: CPT

## 2017-12-19 PROCEDURE — 96375 TX/PRO/DX INJ NEW DRUG ADDON: CPT

## 2017-12-19 PROCEDURE — 31720 CLEARANCE OF AIRWAYS: CPT

## 2017-12-19 PROCEDURE — 87040 BLOOD CULTURE FOR BACTERIA: CPT

## 2017-12-19 PROCEDURE — 86235 NUCLEAR ANTIGEN ANTIBODY: CPT

## 2017-12-19 PROCEDURE — 71260 CT THORAX DX C+: CPT

## 2017-12-19 PROCEDURE — 86431 RHEUMATOID FACTOR QUANT: CPT

## 2017-12-19 PROCEDURE — 85730 THROMBOPLASTIN TIME PARTIAL: CPT

## 2017-12-19 PROCEDURE — 70552 MRI BRAIN STEM W/DYE: CPT

## 2017-12-19 PROCEDURE — 97112 NEUROMUSCULAR REEDUCATION: CPT

## 2017-12-19 PROCEDURE — 86850 RBC ANTIBODY SCREEN: CPT

## 2017-12-19 PROCEDURE — 87486 CHLMYD PNEUM DNA AMP PROBE: CPT

## 2017-12-19 PROCEDURE — 93005 ELECTROCARDIOGRAM TRACING: CPT

## 2017-12-19 PROCEDURE — 36600 WITHDRAWAL OF ARTERIAL BLOOD: CPT

## 2017-12-19 PROCEDURE — 92610 EVALUATE SWALLOWING FUNCTION: CPT

## 2017-12-19 PROCEDURE — 83690 ASSAY OF LIPASE: CPT

## 2017-12-19 PROCEDURE — 95951: CPT

## 2017-12-19 PROCEDURE — 82150 ASSAY OF AMYLASE: CPT

## 2017-12-19 PROCEDURE — 82962 GLUCOSE BLOOD TEST: CPT

## 2017-12-19 PROCEDURE — 99285 EMERGENCY DEPT VISIT HI MDM: CPT | Mod: 25

## 2017-12-19 PROCEDURE — 82436 ASSAY OF URINE CHLORIDE: CPT

## 2017-12-19 PROCEDURE — 80162 ASSAY OF DIGOXIN TOTAL: CPT

## 2017-12-19 PROCEDURE — 84145 PROCALCITONIN (PCT): CPT

## 2017-12-19 PROCEDURE — 82435 ASSAY OF BLOOD CHLORIDE: CPT

## 2017-12-19 PROCEDURE — 86901 BLOOD TYPING SEROLOGIC RH(D): CPT

## 2017-12-19 PROCEDURE — A9585: CPT

## 2017-12-19 PROCEDURE — 85610 PROTHROMBIN TIME: CPT

## 2017-12-19 PROCEDURE — 71045 X-RAY EXAM CHEST 1 VIEW: CPT

## 2017-12-19 PROCEDURE — 82947 ASSAY GLUCOSE BLOOD QUANT: CPT

## 2017-12-19 PROCEDURE — 87798 DETECT AGENT NOS DNA AMP: CPT

## 2017-12-19 PROCEDURE — 97166 OT EVAL MOD COMPLEX 45 MIN: CPT

## 2017-12-19 PROCEDURE — 97110 THERAPEUTIC EXERCISES: CPT

## 2017-12-19 PROCEDURE — 82607 VITAMIN B-12: CPT

## 2017-12-19 PROCEDURE — 97530 THERAPEUTIC ACTIVITIES: CPT

## 2017-12-19 PROCEDURE — 94002 VENT MGMT INPAT INIT DAY: CPT

## 2017-12-19 PROCEDURE — 87581 M.PNEUMON DNA AMP PROBE: CPT

## 2017-12-19 PROCEDURE — 94660 CPAP INITIATION&MGMT: CPT

## 2017-12-19 PROCEDURE — 86900 BLOOD TYPING SEROLOGIC ABO: CPT

## 2017-12-19 PROCEDURE — 87449 NOS EACH ORGANISM AG IA: CPT

## 2017-12-19 PROCEDURE — 72100 X-RAY EXAM L-S SPINE 2/3 VWS: CPT

## 2017-12-19 PROCEDURE — 86036 ANCA SCREEN EACH ANTIBODY: CPT

## 2017-12-19 PROCEDURE — 82553 CREATINE MB FRACTION: CPT

## 2017-12-19 PROCEDURE — 95819 EEG AWAKE AND ASLEEP: CPT

## 2017-12-19 PROCEDURE — 82570 ASSAY OF URINE CREATININE: CPT

## 2017-12-19 PROCEDURE — 87150 DNA/RNA AMPLIFIED PROBE: CPT

## 2017-12-19 PROCEDURE — 84100 ASSAY OF PHOSPHORUS: CPT

## 2017-12-19 PROCEDURE — 81001 URINALYSIS AUTO W/SCOPE: CPT

## 2017-12-19 PROCEDURE — 96374 THER/PROPH/DIAG INJ IV PUSH: CPT

## 2017-12-19 PROCEDURE — 82803 BLOOD GASES ANY COMBINATION: CPT

## 2017-12-19 PROCEDURE — 83550 IRON BINDING TEST: CPT

## 2017-12-19 PROCEDURE — 80061 LIPID PANEL: CPT

## 2017-12-19 PROCEDURE — 86022 PLATELET ANTIBODIES: CPT

## 2017-12-19 PROCEDURE — 87070 CULTURE OTHR SPECIMN AEROBIC: CPT

## 2017-12-19 PROCEDURE — 87640 STAPH A DNA AMP PROBE: CPT

## 2017-12-19 PROCEDURE — 84133 ASSAY OF URINE POTASSIUM: CPT

## 2017-12-19 PROCEDURE — 94799 UNLISTED PULMONARY SVC/PX: CPT

## 2017-12-19 PROCEDURE — 87205 SMEAR GRAM STAIN: CPT

## 2017-12-19 PROCEDURE — 76700 US EXAM ABDOM COMPLETE: CPT

## 2017-12-19 PROCEDURE — 74177 CT ABD & PELVIS W/CONTRAST: CPT

## 2017-12-19 PROCEDURE — 94003 VENT MGMT INPAT SUBQ DAY: CPT

## 2017-12-19 PROCEDURE — 82565 ASSAY OF CREATININE: CPT

## 2017-12-19 PROCEDURE — 84295 ASSAY OF SERUM SODIUM: CPT

## 2017-12-19 PROCEDURE — 76775 US EXAM ABDO BACK WALL LIM: CPT

## 2017-12-19 PROCEDURE — 76705 ECHO EXAM OF ABDOMEN: CPT

## 2017-12-19 PROCEDURE — C8929: CPT

## 2017-12-19 PROCEDURE — 82010 KETONE BODYS QUAN: CPT

## 2017-12-19 PROCEDURE — 86038 ANTINUCLEAR ANTIBODIES: CPT

## 2017-12-19 PROCEDURE — 82140 ASSAY OF AMMONIA: CPT

## 2017-12-19 PROCEDURE — 84466 ASSAY OF TRANSFERRIN: CPT

## 2017-12-19 PROCEDURE — 83735 ASSAY OF MAGNESIUM: CPT

## 2017-12-19 PROCEDURE — 82728 ASSAY OF FERRITIN: CPT

## 2017-12-19 PROCEDURE — 84443 ASSAY THYROID STIM HORMONE: CPT

## 2017-12-19 PROCEDURE — 87086 URINE CULTURE/COLONY COUNT: CPT

## 2017-12-19 PROCEDURE — P9045: CPT

## 2017-12-19 PROCEDURE — 80053 COMPREHEN METABOLIC PANEL: CPT

## 2017-12-19 PROCEDURE — 97164 PT RE-EVAL EST PLAN CARE: CPT

## 2017-12-19 PROCEDURE — 84484 ASSAY OF TROPONIN QUANT: CPT

## 2017-12-20 ENCOUNTER — FORM ENCOUNTER (OUTPATIENT)
Age: 77
End: 2017-12-20

## 2017-12-22 ENCOUNTER — INPATIENT (INPATIENT)
Facility: HOSPITAL | Age: 77
LOS: 2 days | DRG: 871 | End: 2017-12-25
Attending: SPECIALIST | Admitting: SPECIALIST
Payer: MEDICARE

## 2017-12-22 VITALS — TEMPERATURE: 104 F

## 2017-12-22 DIAGNOSIS — Z98.89 OTHER SPECIFIED POSTPROCEDURAL STATES: Chronic | ICD-10-CM

## 2017-12-22 DIAGNOSIS — Z46.6 ENCOUNTER FOR FITTING AND ADJUSTMENT OF URINARY DEVICE: Chronic | ICD-10-CM

## 2017-12-22 DIAGNOSIS — Z95.1 PRESENCE OF AORTOCORONARY BYPASS GRAFT: Chronic | ICD-10-CM

## 2017-12-22 DIAGNOSIS — N28.9 DISORDER OF KIDNEY AND URETER, UNSPECIFIED: ICD-10-CM

## 2017-12-22 LAB
ANION GAP SERPL CALC-SCNC: 17 MMOL/L — SIGNIFICANT CHANGE UP (ref 5–17)
ANISOCYTOSIS BLD QL: SLIGHT — SIGNIFICANT CHANGE UP
APPEARANCE UR: ABNORMAL
APTT BLD: 30.3 SEC — SIGNIFICANT CHANGE UP (ref 27.5–37.4)
BACTERIA # UR AUTO: ABNORMAL /HPF
BASE EXCESS BLDV CALC-SCNC: -2.7 MMOL/L — LOW (ref -2–2)
BASE EXCESS BLDV CALC-SCNC: -4.3 MMOL/L — LOW (ref -2–2)
BASOPHILS # BLD AUTO: 0 K/UL — SIGNIFICANT CHANGE UP (ref 0–0.2)
BILIRUB UR-MCNC: NEGATIVE — SIGNIFICANT CHANGE UP
BLD GP AB SCN SERPL QL: NEGATIVE — SIGNIFICANT CHANGE UP
BUN SERPL-MCNC: 33 MG/DL — HIGH (ref 7–23)
CA-I SERPL-SCNC: 1.11 MMOL/L — LOW (ref 1.12–1.3)
CA-I SERPL-SCNC: 1.17 MMOL/L — SIGNIFICANT CHANGE UP (ref 1.12–1.3)
CALCIUM SERPL-MCNC: 8.1 MG/DL — LOW (ref 8.4–10.5)
CHLORIDE BLDV-SCNC: 110 MMOL/L — HIGH (ref 96–108)
CHLORIDE BLDV-SCNC: 111 MMOL/L — HIGH (ref 96–108)
CHLORIDE SERPL-SCNC: 103 MMOL/L — SIGNIFICANT CHANGE UP (ref 96–108)
CK MB BLD-MCNC: 2.3 % — SIGNIFICANT CHANGE UP (ref 0–3.5)
CK MB CFR SERPL CALC: 1.9 NG/ML — SIGNIFICANT CHANGE UP (ref 0–6.7)
CK SERPL-CCNC: 82 U/L — SIGNIFICANT CHANGE UP (ref 30–200)
CO2 BLDV-SCNC: 19 MMOL/L — LOW (ref 22–30)
CO2 BLDV-SCNC: 22 MMOL/L — SIGNIFICANT CHANGE UP (ref 22–30)
CO2 SERPL-SCNC: 18 MMOL/L — LOW (ref 22–31)
COLOR SPEC: YELLOW — SIGNIFICANT CHANGE UP
COMMENT - URINE: SIGNIFICANT CHANGE UP
CREAT SERPL-MCNC: 1.36 MG/DL — HIGH (ref 0.5–1.3)
DIFF PNL FLD: ABNORMAL
EOSINOPHIL # BLD AUTO: 0 K/UL — SIGNIFICANT CHANGE UP (ref 0–0.5)
GAS PNL BLDV: 139 MMOL/L — SIGNIFICANT CHANGE UP (ref 136–145)
GAS PNL BLDV: 141 MMOL/L — SIGNIFICANT CHANGE UP (ref 136–145)
GAS PNL BLDV: SIGNIFICANT CHANGE UP
GLUCOSE BLDV-MCNC: 100 MG/DL — HIGH (ref 70–99)
GLUCOSE BLDV-MCNC: 126 MG/DL — HIGH (ref 70–99)
GLUCOSE SERPL-MCNC: 117 MG/DL — HIGH (ref 70–99)
GLUCOSE UR QL: NEGATIVE — SIGNIFICANT CHANGE UP
HCO3 BLDV-SCNC: 18 MMOL/L — LOW (ref 21–29)
HCO3 BLDV-SCNC: 21 MMOL/L — SIGNIFICANT CHANGE UP (ref 21–29)
HCT VFR BLD CALC: 37.5 % — LOW (ref 39–50)
HCT VFR BLDA CALC: 29 % — LOW (ref 39–50)
HCT VFR BLDA CALC: 30 % — LOW (ref 39–50)
HGB BLD CALC-MCNC: 9.4 G/DL — LOW (ref 13–17)
HGB BLD CALC-MCNC: 9.7 G/DL — LOW (ref 13–17)
HGB BLD-MCNC: 11.7 G/DL — LOW (ref 13–17)
HYPOCHROMIA BLD QL: SLIGHT — SIGNIFICANT CHANGE UP
INR BLD: 1.21 RATIO — HIGH (ref 0.88–1.16)
KETONES UR-MCNC: ABNORMAL
LACTATE BLDV-MCNC: 5.5 MMOL/L — CRITICAL HIGH (ref 0.7–2)
LACTATE BLDV-MCNC: 6.2 MMOL/L — CRITICAL HIGH (ref 0.7–2)
LEUKOCYTE ESTERASE UR-ACNC: ABNORMAL
LIDOCAIN IGE QN: 16 U/L — SIGNIFICANT CHANGE UP (ref 7–60)
LYMPHOCYTES # BLD AUTO: 1.1 K/UL — SIGNIFICANT CHANGE UP (ref 1–3.3)
LYMPHOCYTES # BLD AUTO: 9 % — LOW (ref 13–44)
MACROCYTES BLD QL: SIGNIFICANT CHANGE UP
MCHC RBC-ENTMCNC: 31.3 GM/DL — LOW (ref 32–36)
MCHC RBC-ENTMCNC: 34.8 PG — HIGH (ref 27–34)
MCV RBC AUTO: 111 FL — HIGH (ref 80–100)
MICROCYTES BLD QL: SLIGHT — SIGNIFICANT CHANGE UP
MONOCYTES # BLD AUTO: 0.5 K/UL — SIGNIFICANT CHANGE UP (ref 0–0.9)
MONOCYTES NFR BLD AUTO: 3 % — SIGNIFICANT CHANGE UP (ref 2–14)
MYELOCYTES NFR BLD: 1 % — HIGH (ref 0–0)
NEUTROPHILS # BLD AUTO: 14.5 K/UL — HIGH (ref 1.8–7.4)
NEUTROPHILS NFR BLD AUTO: 84 % — HIGH (ref 43–77)
NEUTS BAND # BLD: 2 % — SIGNIFICANT CHANGE UP (ref 0–8)
NITRITE UR-MCNC: NEGATIVE — SIGNIFICANT CHANGE UP
NT-PROBNP SERPL-SCNC: 5981 PG/ML — HIGH (ref 0–300)
OVALOCYTES BLD QL SMEAR: SLIGHT — SIGNIFICANT CHANGE UP
PCO2 BLDV: 27 MMHG — LOW (ref 35–50)
PCO2 BLDV: 36 MMHG — SIGNIFICANT CHANGE UP (ref 35–50)
PH BLDV: 7.39 — SIGNIFICANT CHANGE UP (ref 7.35–7.45)
PH BLDV: 7.45 — SIGNIFICANT CHANGE UP (ref 7.35–7.45)
PH UR: 5.5 — SIGNIFICANT CHANGE UP (ref 5–8)
PLAT MORPH BLD: NORMAL — SIGNIFICANT CHANGE UP
PLATELET # BLD AUTO: 227 K/UL — SIGNIFICANT CHANGE UP (ref 150–400)
PO2 BLDV: 40 MMHG — SIGNIFICANT CHANGE UP (ref 25–45)
PO2 BLDV: 72 MMHG — HIGH (ref 25–45)
POLYCHROMASIA BLD QL SMEAR: SLIGHT — SIGNIFICANT CHANGE UP
POTASSIUM BLDV-SCNC: 4.7 MMOL/L — SIGNIFICANT CHANGE UP (ref 3.5–5)
POTASSIUM BLDV-SCNC: 4.9 MMOL/L — SIGNIFICANT CHANGE UP (ref 3.5–5)
POTASSIUM SERPL-MCNC: 7.1 MMOL/L — CRITICAL HIGH (ref 3.5–5.3)
POTASSIUM SERPL-SCNC: 7.1 MMOL/L — CRITICAL HIGH (ref 3.5–5.3)
PROT UR-MCNC: 300 MG/DL
PROTHROM AB SERPL-ACNC: 13.2 SEC — HIGH (ref 9.8–12.7)
RBC # BLD: 3.38 M/UL — LOW (ref 4.2–5.8)
RBC # FLD: 15 % — HIGH (ref 10.3–14.5)
RBC BLD AUTO: ABNORMAL
RBC CASTS # UR COMP ASSIST: ABNORMAL /HPF (ref 0–2)
RH IG SCN BLD-IMP: POSITIVE — SIGNIFICANT CHANGE UP
SAO2 % BLDV: 69 % — SIGNIFICANT CHANGE UP (ref 67–88)
SAO2 % BLDV: 95 % — HIGH (ref 67–88)
SODIUM SERPL-SCNC: 138 MMOL/L — SIGNIFICANT CHANGE UP (ref 135–145)
SP GR SPEC: 1.02 — SIGNIFICANT CHANGE UP (ref 1.01–1.02)
TOXIC GRANULES BLD QL SMEAR: PRESENT — SIGNIFICANT CHANGE UP
TROPONIN T SERPL-MCNC: 0.08 NG/ML — HIGH (ref 0–0.06)
UROBILINOGEN FLD QL: NEGATIVE — SIGNIFICANT CHANGE UP
VARIANT LYMPHS # BLD: 1 % — SIGNIFICANT CHANGE UP (ref 0–6)
WBC # BLD: 16.1 K/UL — HIGH (ref 3.8–10.5)
WBC # FLD AUTO: 16.1 K/UL — HIGH (ref 3.8–10.5)
WBC UR QL: >50 /HPF (ref 0–5)

## 2017-12-22 PROCEDURE — 93010 ELECTROCARDIOGRAM REPORT: CPT

## 2017-12-22 PROCEDURE — 71010: CPT | Mod: 26

## 2017-12-22 PROCEDURE — 99223 1ST HOSP IP/OBS HIGH 75: CPT

## 2017-12-22 PROCEDURE — 99285 EMERGENCY DEPT VISIT HI MDM: CPT | Mod: 25

## 2017-12-22 PROCEDURE — 71250 CT THORAX DX C-: CPT | Mod: 26

## 2017-12-22 PROCEDURE — 74174 CTA ABD&PLVS W/CONTRAST: CPT | Mod: 26

## 2017-12-22 RX ORDER — METRONIDAZOLE 500 MG
500 TABLET ORAL ONCE
Qty: 0 | Refills: 0 | Status: COMPLETED | OUTPATIENT
Start: 2017-12-22 | End: 2017-12-22

## 2017-12-22 RX ORDER — VANCOMYCIN HCL 1 G
1000 VIAL (EA) INTRAVENOUS ONCE
Qty: 0 | Refills: 0 | Status: COMPLETED | OUTPATIENT
Start: 2017-12-22 | End: 2017-12-22

## 2017-12-22 RX ORDER — ACETAMINOPHEN 500 MG
1000 TABLET ORAL ONCE
Qty: 0 | Refills: 0 | Status: COMPLETED | OUTPATIENT
Start: 2017-12-22 | End: 2017-12-22

## 2017-12-22 RX ORDER — SODIUM CHLORIDE 9 MG/ML
1000 INJECTION INTRAMUSCULAR; INTRAVENOUS; SUBCUTANEOUS ONCE
Qty: 0 | Refills: 0 | Status: COMPLETED | OUTPATIENT
Start: 2017-12-22 | End: 2017-12-22

## 2017-12-22 RX ORDER — BACLOFEN 100 %
10 POWDER (GRAM) MISCELLANEOUS ONCE
Qty: 0 | Refills: 0 | Status: COMPLETED | OUTPATIENT
Start: 2017-12-22 | End: 2017-12-22

## 2017-12-22 RX ORDER — ONDANSETRON 8 MG/1
4 TABLET, FILM COATED ORAL ONCE
Qty: 0 | Refills: 0 | Status: COMPLETED | OUTPATIENT
Start: 2017-12-22 | End: 2017-12-22

## 2017-12-22 RX ORDER — PHENYLEPHRINE HYDROCHLORIDE 10 MG/ML
0.5 INJECTION INTRAVENOUS
Qty: 80 | Refills: 0 | Status: DISCONTINUED | OUTPATIENT
Start: 2017-12-22 | End: 2017-12-25

## 2017-12-22 RX ORDER — PIPERACILLIN AND TAZOBACTAM 4; .5 G/20ML; G/20ML
3.38 INJECTION, POWDER, LYOPHILIZED, FOR SOLUTION INTRAVENOUS ONCE
Qty: 0 | Refills: 0 | Status: COMPLETED | OUTPATIENT
Start: 2017-12-22 | End: 2017-12-22

## 2017-12-22 RX ORDER — SODIUM CHLORIDE 9 MG/ML
3 INJECTION INTRAMUSCULAR; INTRAVENOUS; SUBCUTANEOUS ONCE
Qty: 0 | Refills: 0 | Status: COMPLETED | OUTPATIENT
Start: 2017-12-22 | End: 2017-12-22

## 2017-12-22 RX ADMIN — SODIUM CHLORIDE 4000 MILLILITER(S): 9 INJECTION INTRAMUSCULAR; INTRAVENOUS; SUBCUTANEOUS at 16:36

## 2017-12-22 RX ADMIN — PHENYLEPHRINE HYDROCHLORIDE 13.12 MICROGRAM(S)/KG/MIN: 10 INJECTION INTRAVENOUS at 23:59

## 2017-12-22 RX ADMIN — SODIUM CHLORIDE 4000 MILLILITER(S): 9 INJECTION INTRAMUSCULAR; INTRAVENOUS; SUBCUTANEOUS at 20:18

## 2017-12-22 RX ADMIN — SODIUM CHLORIDE 3 MILLILITER(S): 9 INJECTION INTRAMUSCULAR; INTRAVENOUS; SUBCUTANEOUS at 16:17

## 2017-12-22 RX ADMIN — Medication 10 MILLIGRAM(S): at 20:15

## 2017-12-22 RX ADMIN — Medication 400 MILLIGRAM(S): at 16:35

## 2017-12-22 RX ADMIN — Medication 250 MILLIGRAM(S): at 20:18

## 2017-12-22 RX ADMIN — Medication 100 MILLIGRAM(S): at 20:18

## 2017-12-22 RX ADMIN — ONDANSETRON 4 MILLIGRAM(S): 8 TABLET, FILM COATED ORAL at 20:24

## 2017-12-22 RX ADMIN — PIPERACILLIN AND TAZOBACTAM 200 GRAM(S): 4; .5 INJECTION, POWDER, LYOPHILIZED, FOR SOLUTION INTRAVENOUS at 17:42

## 2017-12-22 RX ADMIN — Medication 400 MILLIGRAM(S): at 22:42

## 2017-12-22 RX ADMIN — SODIUM CHLORIDE 1000 MILLILITER(S): 9 INJECTION INTRAMUSCULAR; INTRAVENOUS; SUBCUTANEOUS at 23:50

## 2017-12-22 NOTE — ED ADULT NURSE REASSESSMENT NOTE - NS ED NURSE REASSESS COMMENT FT1
pt had 1 IV in place prior to receiving report  3 additional IVs placed  fluids "wide open" as per MD Elliott  pt BP low, possibility of starting additional medications

## 2017-12-22 NOTE — ED ADULT NURSE REASSESSMENT NOTE - TEMPLATE LIST FOR HEAD TO TOE ASSESSMENT
General Recommend patient remain on ASA 81 mg per AHA guidelines. Recommend patient utilize Tylenol rather than Advil for knee pain. Patient is at an increased risk of bleeding due to underlying proctitis/colitis. Follow with PCP for further evaluation regarding knee pain.     Called patient to discuss. No answer, left a message for a return call.

## 2017-12-22 NOTE — ED PROVIDER NOTE - CARE PLAN
Principal Discharge DX:	Ureteral mass  Secondary Diagnosis:	Sepsis  Secondary Diagnosis:	Urinary tract infection

## 2017-12-22 NOTE — ED PROVIDER NOTE - OBJECTIVE STATEMENT
Attending note. Patient was seen in critical care room C. patient was brought to the ER because of change of mental status fever. Patient was extended care facility for rehabilitation status post pneumonia. Patient's wife spoke to the patient last evening and complained of lower back pain. Patient has had a Villagran catheter. Patient is unable to provide history. Attending note. Patient was seen in critical care room C. patient was brought to the ER because of change of mental status fever. Patient was extended care facility for rehabilitation status post pneumonia. Patient's wife spoke to the patient last evening and complained of lower back pain. Patient has had a Villagran catheter. Patient is unable to provide history.    PCP: Dr. Deep Millan ([facility])  No urologist

## 2017-12-22 NOTE — CONSULT NOTE ADULT - ATTENDING COMMENTS
Pt seen and examined. 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 Pt presented to ED today from rehab with AMS and fever. He has had issues with recent back pain. CT today reveals B hydro (known R UPJO without known sequelae remains unchanged and new L hydro with urinary extrvasation. Pt with presumed urosepsis and on pressors in ED with elevated lactate. Pt needs emergent decompression of uppertracts to aid in tx of infection. With above issues, he is not a surgical candidate and to ensure maximal drainage, B PCN are recommended. IR has agreed. Spoke with ER attending and MICU attending regarding pts critcally ill status. Spoke with patients family regarding risks of worsening hemodynamic instability with decompression. Will follow. 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 CT positive for Lt Pneumothorax and pneumomediastinum.   - Not a surgical candidate; seen and followed by Urology  - Family maintain DNR/DNI with no aggressive intervention   - Family declined IR intervention after risk-benefit discussion  - Admit to ICU for hemodynamic stability on pressor support, pain control and ABx plans  - Palliative consult in AM     Critical Care Time = 45 min

## 2017-12-22 NOTE — ED ADULT NURSE NOTE - OBJECTIVE STATEMENT
77y male arrived to ED fever and AMS. Patient brought by EMS from rehab center s/p pneumonia (patient was hospitalized at [facility]). Patient unable to explain PMHX or current complaints, wife endorses chronic lower back pain. Villagran catheter in place from facility. Upon arrival patient incontinent of bowel, gross diarrhea.

## 2017-12-22 NOTE — ED PROVIDER NOTE - PHYSICAL EXAMINATION
Attending note. Patient is awake but is not able to answer questions patient is not following commands. Pupils are 33 mm. Patient has dry mucous membranes. Lungs are clear without wheezes rales or rhonchi. Abdomen is soft and nontender. Nondistended. There is no guarding or rebound. There is no CVA tenderness. Patient is moving all extremities. Neurologic exam is limited due to mental status. Attending note. Patient is awake but is not able to answer questions patient is not following commands. Pupils are 3 mm. Patient has dry mucous membranes. Lungs are clear without wheezes rales or rhonchi. Abdomen is soft and nontender. Nondistended. There is no guarding or rebound. There is no CVA tenderness. Patient is moving all extremities. Neurologic exam is limited due to mental status.

## 2017-12-22 NOTE — ED PROVIDER NOTE - MEDICAL DECISION MAKING DETAILS
attending note-also mental status with tactile fever rule out pneumoni or urinary tract infection. Or possible respiratory virus. Consider lumbar puncture if no source identified, antibiotic

## 2017-12-22 NOTE — ED PROVIDER NOTE - PROGRESS NOTE DETAILS
Patient with worsening lactate and abdominal findings concerned.  - Siva Kevin MD Patient with elevated lactate and patient with withdrawal to pain upon palpation to the abdomen. Unclear if the pain is from the back vs deep abdomen, plan to get CT Angio Abd/Pelvis to evaluate for ischemia and intraabdominal pathology.  - Siva Kevin MD   - Siva Kevin MD Still with tachycardia despite antibiotics with fluids, will need ICU consult. Will evaluate for SICU vs MICU.  - Siva Kevin MD General surgery consulted, but no acute interventions from them, urology is the appropriate service. Currently, patient is likely better served in the SICU if bilateral nephrostomies are required, also spoke with MICU attending Dr. Thony Toure for appropriate placement for this patient. Urology attending will speak with MICU attending regarding where this patient will be hospitalized.  - Siva Kevin MD To be admitted to the MICU after discussion between the attendings, will also start neosynephrine for hypotension.  - Siva Kevin MD Patient with elevated lactate and patient with withdrawal to pain upon palpation to the abdomen. Unclear if the pain is from the back vs deep abdomen, patient does not have a reliable physical exam, plan to get CT Angio Abd/Pelvis to evaluate for ischemia and intraabdominal pathology.  - Siva Kevin MD   - Siva Kevin MD

## 2017-12-23 DIAGNOSIS — I25.10 ATHEROSCLEROTIC HEART DISEASE OF NATIVE CORONARY ARTERY WITHOUT ANGINA PECTORIS: ICD-10-CM

## 2017-12-23 DIAGNOSIS — G93.40 ENCEPHALOPATHY, UNSPECIFIED: ICD-10-CM

## 2017-12-23 DIAGNOSIS — N17.9 ACUTE KIDNEY FAILURE, UNSPECIFIED: ICD-10-CM

## 2017-12-23 DIAGNOSIS — J93.9 PNEUMOTHORAX, UNSPECIFIED: ICD-10-CM

## 2017-12-23 DIAGNOSIS — Z51.5 ENCOUNTER FOR PALLIATIVE CARE: ICD-10-CM

## 2017-12-23 DIAGNOSIS — J84.9 INTERSTITIAL PULMONARY DISEASE, UNSPECIFIED: ICD-10-CM

## 2017-12-23 DIAGNOSIS — E11.9 TYPE 2 DIABETES MELLITUS WITHOUT COMPLICATIONS: ICD-10-CM

## 2017-12-23 DIAGNOSIS — N39.0 URINARY TRACT INFECTION, SITE NOT SPECIFIED: ICD-10-CM

## 2017-12-23 DIAGNOSIS — N28.89 OTHER SPECIFIED DISORDERS OF KIDNEY AND URETER: ICD-10-CM

## 2017-12-23 LAB
-  CANDIDA ALBICANS: SIGNIFICANT CHANGE UP
ALBUMIN SERPL ELPH-MCNC: 2.2 G/DL — LOW (ref 3.3–5)
ALP SERPL-CCNC: 93 U/L — SIGNIFICANT CHANGE UP (ref 40–120)
ALT FLD-CCNC: 20 U/L RC — SIGNIFICANT CHANGE UP (ref 10–45)
ANION GAP SERPL CALC-SCNC: 19 MMOL/L — HIGH (ref 5–17)
APTT BLD: 32.2 SEC — SIGNIFICANT CHANGE UP (ref 27.5–37.4)
AST SERPL-CCNC: 34 U/L — SIGNIFICANT CHANGE UP (ref 10–40)
BASE EXCESS BLDV CALC-SCNC: -10.8 MMOL/L — LOW (ref -2–2)
BILIRUB SERPL-MCNC: 0.3 MG/DL — SIGNIFICANT CHANGE UP (ref 0.2–1.2)
BUN SERPL-MCNC: 30 MG/DL — HIGH (ref 7–23)
CA-I SERPL-SCNC: 1.09 MMOL/L — LOW (ref 1.12–1.3)
CALCIUM SERPL-MCNC: 7.3 MG/DL — LOW (ref 8.4–10.5)
CHLORIDE BLDV-SCNC: 116 MMOL/L — HIGH (ref 96–108)
CHLORIDE SERPL-SCNC: 109 MMOL/L — HIGH (ref 96–108)
CO2 BLDV-SCNC: 18 MMOL/L — LOW (ref 22–30)
CO2 SERPL-SCNC: 15 MMOL/L — LOW (ref 22–31)
CREAT SERPL-MCNC: 1.27 MG/DL — SIGNIFICANT CHANGE UP (ref 0.5–1.3)
ENTEROCOC DNA BLD POS QL NAA+NON-PROBE: SIGNIFICANT CHANGE UP
GAS PNL BLDV: 142 MMOL/L — SIGNIFICANT CHANGE UP (ref 136–145)
GAS PNL BLDV: SIGNIFICANT CHANGE UP
GAS PNL BLDV: SIGNIFICANT CHANGE UP
GLUCOSE BLDC GLUCOMTR-MCNC: 101 MG/DL — HIGH (ref 70–99)
GLUCOSE BLDC GLUCOMTR-MCNC: 109 MG/DL — HIGH (ref 70–99)
GLUCOSE BLDC GLUCOMTR-MCNC: 130 MG/DL — HIGH (ref 70–99)
GLUCOSE BLDC GLUCOMTR-MCNC: 49 MG/DL — LOW (ref 70–99)
GLUCOSE BLDC GLUCOMTR-MCNC: 96 MG/DL — SIGNIFICANT CHANGE UP (ref 70–99)
GLUCOSE BLDC GLUCOMTR-MCNC: 97 MG/DL — SIGNIFICANT CHANGE UP (ref 70–99)
GLUCOSE BLDV-MCNC: 39 MG/DL — CRITICAL LOW (ref 70–99)
GLUCOSE SERPL-MCNC: 38 MG/DL — CRITICAL LOW (ref 70–99)
GRAM STN FLD: SIGNIFICANT CHANGE UP
GRAM STN FLD: SIGNIFICANT CHANGE UP
HCO3 BLDV-SCNC: 17 MMOL/L — LOW (ref 21–29)
HCT VFR BLD CALC: 32.8 % — LOW (ref 39–50)
HCT VFR BLDA CALC: 31 % — LOW (ref 39–50)
HGB BLD CALC-MCNC: 10.1 G/DL — LOW (ref 13–17)
HGB BLD-MCNC: 10.2 G/DL — LOW (ref 13–17)
INR BLD: 1.16 RATIO — SIGNIFICANT CHANGE UP (ref 0.88–1.16)
LACTATE BLDV-MCNC: 8.3 MMOL/L — CRITICAL HIGH (ref 0.7–2)
MCHC RBC-ENTMCNC: 31.2 GM/DL — LOW (ref 32–36)
MCHC RBC-ENTMCNC: 35.2 PG — HIGH (ref 27–34)
MCV RBC AUTO: 113 FL — HIGH (ref 80–100)
METHOD TYPE: SIGNIFICANT CHANGE UP
OTHER CELLS CSF MANUAL: 4 ML/DL — LOW (ref 18–22)
PCO2 BLDV: 48 MMHG — SIGNIFICANT CHANGE UP (ref 35–50)
PH BLDV: 7.17 — CRITICAL LOW (ref 7.35–7.45)
PLATELET # BLD AUTO: 218 K/UL — SIGNIFICANT CHANGE UP (ref 150–400)
PO2 BLDV: 27 MMHG — SIGNIFICANT CHANGE UP (ref 25–45)
POTASSIUM BLDV-SCNC: 4.1 MMOL/L — SIGNIFICANT CHANGE UP (ref 3.5–5)
POTASSIUM SERPL-MCNC: 4.5 MMOL/L — SIGNIFICANT CHANGE UP (ref 3.5–5.3)
POTASSIUM SERPL-SCNC: 4.5 MMOL/L — SIGNIFICANT CHANGE UP (ref 3.5–5.3)
PROT SERPL-MCNC: 6.4 G/DL — SIGNIFICANT CHANGE UP (ref 6–8.3)
PROTHROM AB SERPL-ACNC: 12.7 SEC — SIGNIFICANT CHANGE UP (ref 9.8–12.7)
RAPID RVP RESULT: SIGNIFICANT CHANGE UP
RBC # BLD: 2.91 M/UL — LOW (ref 4.2–5.8)
RBC # FLD: 14.8 % — HIGH (ref 10.3–14.5)
SAO2 % BLDV: 26 % — LOW (ref 67–88)
SODIUM SERPL-SCNC: 143 MMOL/L — SIGNIFICANT CHANGE UP (ref 135–145)
SPECIMEN SOURCE: SIGNIFICANT CHANGE UP
SPECIMEN SOURCE: SIGNIFICANT CHANGE UP
WBC # BLD: 20.7 K/UL — HIGH (ref 3.8–10.5)
WBC # FLD AUTO: 20.7 K/UL — HIGH (ref 3.8–10.5)

## 2017-12-23 PROCEDURE — 71010: CPT | Mod: 26

## 2017-12-23 PROCEDURE — 99291 CRITICAL CARE FIRST HOUR: CPT

## 2017-12-23 PROCEDURE — 99223 1ST HOSP IP/OBS HIGH 75: CPT | Mod: GC

## 2017-12-23 PROCEDURE — 99232 SBSQ HOSP IP/OBS MODERATE 35: CPT

## 2017-12-23 RX ORDER — MICAFUNGIN SODIUM 100 MG/1
150 INJECTION, POWDER, LYOPHILIZED, FOR SOLUTION INTRAVENOUS EVERY 24 HOURS
Qty: 0 | Refills: 0 | Status: DISCONTINUED | OUTPATIENT
Start: 2017-12-24 | End: 2017-12-25

## 2017-12-23 RX ORDER — HEPARIN SODIUM 5000 [USP'U]/ML
5000 INJECTION INTRAVENOUS; SUBCUTANEOUS EVERY 8 HOURS
Qty: 0 | Refills: 0 | Status: DISCONTINUED | OUTPATIENT
Start: 2017-12-23 | End: 2017-12-24

## 2017-12-23 RX ORDER — HYDROMORPHONE HYDROCHLORIDE 2 MG/ML
0.5 INJECTION INTRAMUSCULAR; INTRAVENOUS; SUBCUTANEOUS
Qty: 0 | Refills: 0 | Status: DISCONTINUED | OUTPATIENT
Start: 2017-12-23 | End: 2017-12-23

## 2017-12-23 RX ORDER — LINEZOLID 600 MG/300ML
600 INJECTION, SOLUTION INTRAVENOUS EVERY 12 HOURS
Qty: 0 | Refills: 0 | Status: DISCONTINUED | OUTPATIENT
Start: 2017-12-24 | End: 2017-12-25

## 2017-12-23 RX ORDER — HYDROMORPHONE HYDROCHLORIDE 2 MG/ML
1 INJECTION INTRAMUSCULAR; INTRAVENOUS; SUBCUTANEOUS EVERY 4 HOURS
Qty: 0 | Refills: 0 | Status: DISCONTINUED | OUTPATIENT
Start: 2017-12-23 | End: 2017-12-23

## 2017-12-23 RX ORDER — DEXTROSE 50 % IN WATER 50 %
50 SYRINGE (ML) INTRAVENOUS ONCE
Qty: 0 | Refills: 0 | Status: COMPLETED | OUTPATIENT
Start: 2017-12-23 | End: 2017-12-23

## 2017-12-23 RX ORDER — SODIUM CHLORIDE 9 MG/ML
1000 INJECTION INTRAMUSCULAR; INTRAVENOUS; SUBCUTANEOUS ONCE
Qty: 0 | Refills: 0 | Status: COMPLETED | OUTPATIENT
Start: 2017-12-23 | End: 2017-12-23

## 2017-12-23 RX ORDER — MICAFUNGIN SODIUM 100 MG/1
150 INJECTION, POWDER, LYOPHILIZED, FOR SOLUTION INTRAVENOUS ONCE
Qty: 0 | Refills: 0 | Status: COMPLETED | OUTPATIENT
Start: 2017-12-23 | End: 2017-12-23

## 2017-12-23 RX ORDER — NOREPINEPHRINE BITARTRATE/D5W 8 MG/250ML
0.01 PLASTIC BAG, INJECTION (ML) INTRAVENOUS
Qty: 8 | Refills: 0 | Status: DISCONTINUED | OUTPATIENT
Start: 2017-12-23 | End: 2017-12-25

## 2017-12-23 RX ORDER — ACETAMINOPHEN 500 MG
1000 TABLET ORAL ONCE
Qty: 0 | Refills: 0 | Status: COMPLETED | OUTPATIENT
Start: 2017-12-23 | End: 2017-12-23

## 2017-12-23 RX ORDER — HYDROMORPHONE HYDROCHLORIDE 2 MG/ML
1 INJECTION INTRAMUSCULAR; INTRAVENOUS; SUBCUTANEOUS ONCE
Qty: 0 | Refills: 0 | Status: DISCONTINUED | OUTPATIENT
Start: 2017-12-23 | End: 2017-12-23

## 2017-12-23 RX ORDER — HYDROCORTISONE 20 MG
50 TABLET ORAL EVERY 8 HOURS
Qty: 0 | Refills: 0 | Status: DISCONTINUED | OUTPATIENT
Start: 2017-12-23 | End: 2017-12-24

## 2017-12-23 RX ORDER — ONDANSETRON 8 MG/1
4 TABLET, FILM COATED ORAL EVERY 4 HOURS
Qty: 0 | Refills: 0 | Status: DISCONTINUED | OUTPATIENT
Start: 2017-12-23 | End: 2017-12-25

## 2017-12-23 RX ORDER — HYDROMORPHONE HYDROCHLORIDE 2 MG/ML
0.5 INJECTION INTRAMUSCULAR; INTRAVENOUS; SUBCUTANEOUS EVERY 4 HOURS
Qty: 0 | Refills: 0 | Status: DISCONTINUED | OUTPATIENT
Start: 2017-12-23 | End: 2017-12-23

## 2017-12-23 RX ORDER — SODIUM BICARBONATE 1 MEQ/ML
50 SYRINGE (ML) INTRAVENOUS ONCE
Qty: 0 | Refills: 0 | Status: COMPLETED | OUTPATIENT
Start: 2017-12-23 | End: 2017-12-23

## 2017-12-23 RX ORDER — PIPERACILLIN AND TAZOBACTAM 4; .5 G/20ML; G/20ML
3.38 INJECTION, POWDER, LYOPHILIZED, FOR SOLUTION INTRAVENOUS EVERY 12 HOURS
Qty: 0 | Refills: 0 | Status: DISCONTINUED | OUTPATIENT
Start: 2017-12-23 | End: 2017-12-23

## 2017-12-23 RX ORDER — MORPHINE SULFATE 50 MG/1
1 CAPSULE, EXTENDED RELEASE ORAL
Qty: 0 | Refills: 0 | Status: DISCONTINUED | OUTPATIENT
Start: 2017-12-23 | End: 2017-12-23

## 2017-12-23 RX ORDER — LINEZOLID 600 MG/300ML
INJECTION, SOLUTION INTRAVENOUS
Qty: 0 | Refills: 0 | Status: DISCONTINUED | OUTPATIENT
Start: 2017-12-23 | End: 2017-12-25

## 2017-12-23 RX ORDER — SODIUM CHLORIDE 9 MG/ML
1000 INJECTION, SOLUTION INTRAVENOUS
Qty: 0 | Refills: 0 | Status: DISCONTINUED | OUTPATIENT
Start: 2017-12-23 | End: 2017-12-25

## 2017-12-23 RX ORDER — LINEZOLID 600 MG/300ML
600 INJECTION, SOLUTION INTRAVENOUS ONCE
Qty: 0 | Refills: 0 | Status: COMPLETED | OUTPATIENT
Start: 2017-12-23 | End: 2017-12-23

## 2017-12-23 RX ORDER — INFLUENZA VIRUS VACCINE 15; 15; 15; 15 UG/.5ML; UG/.5ML; UG/.5ML; UG/.5ML
0.5 SUSPENSION INTRAMUSCULAR ONCE
Qty: 0 | Refills: 0 | Status: DISCONTINUED | OUTPATIENT
Start: 2017-12-23 | End: 2017-12-24

## 2017-12-23 RX ORDER — INSULIN LISPRO 100/ML
VIAL (ML) SUBCUTANEOUS EVERY 6 HOURS
Qty: 0 | Refills: 0 | Status: DISCONTINUED | OUTPATIENT
Start: 2017-12-23 | End: 2017-12-24

## 2017-12-23 RX ORDER — GENTAMICIN SULFATE 40 MG/ML
70 VIAL (ML) INJECTION ONCE
Qty: 0 | Refills: 0 | Status: COMPLETED | OUTPATIENT
Start: 2017-12-23 | End: 2017-12-23

## 2017-12-23 RX ORDER — MEROPENEM 1 G/30ML
1000 INJECTION INTRAVENOUS EVERY 8 HOURS
Qty: 0 | Refills: 0 | Status: DISCONTINUED | OUTPATIENT
Start: 2017-12-23 | End: 2017-12-25

## 2017-12-23 RX ORDER — LIDOCAINE HCL 20 MG/ML
10 VIAL (ML) INJECTION ONCE
Qty: 0 | Refills: 0 | Status: COMPLETED | OUTPATIENT
Start: 2017-12-23 | End: 2017-12-23

## 2017-12-23 RX ORDER — HYDROMORPHONE HYDROCHLORIDE 2 MG/ML
0.5 INJECTION INTRAMUSCULAR; INTRAVENOUS; SUBCUTANEOUS ONCE
Qty: 0 | Refills: 0 | Status: DISCONTINUED | OUTPATIENT
Start: 2017-12-23 | End: 2017-12-25

## 2017-12-23 RX ORDER — MICAFUNGIN SODIUM 100 MG/1
INJECTION, POWDER, LYOPHILIZED, FOR SOLUTION INTRAVENOUS
Qty: 0 | Refills: 0 | Status: DISCONTINUED | OUTPATIENT
Start: 2017-12-23 | End: 2017-12-25

## 2017-12-23 RX ORDER — HYDROMORPHONE HYDROCHLORIDE 2 MG/ML
0.5 INJECTION INTRAMUSCULAR; INTRAVENOUS; SUBCUTANEOUS
Qty: 0 | Refills: 0 | Status: DISCONTINUED | OUTPATIENT
Start: 2017-12-23 | End: 2017-12-24

## 2017-12-23 RX ORDER — HYDROMORPHONE HYDROCHLORIDE 2 MG/ML
1 INJECTION INTRAMUSCULAR; INTRAVENOUS; SUBCUTANEOUS EVERY 4 HOURS
Qty: 0 | Refills: 0 | Status: DISCONTINUED | OUTPATIENT
Start: 2017-12-23 | End: 2017-12-24

## 2017-12-23 RX ADMIN — Medication 50 MILLIGRAM(S): at 21:30

## 2017-12-23 RX ADMIN — Medication 50 MILLIGRAM(S): at 05:23

## 2017-12-23 RX ADMIN — HYDROMORPHONE HYDROCHLORIDE 1 MILLIGRAM(S): 2 INJECTION INTRAMUSCULAR; INTRAVENOUS; SUBCUTANEOUS at 22:55

## 2017-12-23 RX ADMIN — SODIUM CHLORIDE 1000 MILLILITER(S): 9 INJECTION INTRAMUSCULAR; INTRAVENOUS; SUBCUTANEOUS at 03:00

## 2017-12-23 RX ADMIN — Medication 50 MILLIGRAM(S): at 13:51

## 2017-12-23 RX ADMIN — Medication 10 MILLILITER(S): at 10:48

## 2017-12-23 RX ADMIN — HYDROMORPHONE HYDROCHLORIDE 1 MILLIGRAM(S): 2 INJECTION INTRAMUSCULAR; INTRAVENOUS; SUBCUTANEOUS at 02:45

## 2017-12-23 RX ADMIN — MEROPENEM 100 MILLIGRAM(S): 1 INJECTION INTRAVENOUS at 16:22

## 2017-12-23 RX ADMIN — HYDROMORPHONE HYDROCHLORIDE 1 MILLIGRAM(S): 2 INJECTION INTRAMUSCULAR; INTRAVENOUS; SUBCUTANEOUS at 22:37

## 2017-12-23 RX ADMIN — SODIUM CHLORIDE 2000 MILLILITER(S): 9 INJECTION INTRAMUSCULAR; INTRAVENOUS; SUBCUTANEOUS at 00:42

## 2017-12-23 RX ADMIN — Medication 50 MILLILITER(S): at 03:37

## 2017-12-23 RX ADMIN — PIPERACILLIN AND TAZOBACTAM 25 GRAM(S): 4; .5 INJECTION, POWDER, LYOPHILIZED, FOR SOLUTION INTRAVENOUS at 05:24

## 2017-12-23 RX ADMIN — HEPARIN SODIUM 5000 UNIT(S): 5000 INJECTION INTRAVENOUS; SUBCUTANEOUS at 21:30

## 2017-12-23 RX ADMIN — MICAFUNGIN SODIUM 107.5 MILLIGRAM(S): 100 INJECTION, POWDER, LYOPHILIZED, FOR SOLUTION INTRAVENOUS at 17:44

## 2017-12-23 RX ADMIN — HEPARIN SODIUM 5000 UNIT(S): 5000 INJECTION INTRAVENOUS; SUBCUTANEOUS at 05:23

## 2017-12-23 RX ADMIN — HYDROMORPHONE HYDROCHLORIDE 0.5 MILLIGRAM(S): 2 INJECTION INTRAMUSCULAR; INTRAVENOUS; SUBCUTANEOUS at 11:02

## 2017-12-23 RX ADMIN — Medication 400 MILLIGRAM(S): at 21:38

## 2017-12-23 RX ADMIN — HYDROMORPHONE HYDROCHLORIDE 0.5 MILLIGRAM(S): 2 INJECTION INTRAMUSCULAR; INTRAVENOUS; SUBCUTANEOUS at 16:00

## 2017-12-23 RX ADMIN — HYDROMORPHONE HYDROCHLORIDE 1 MILLIGRAM(S): 2 INJECTION INTRAMUSCULAR; INTRAVENOUS; SUBCUTANEOUS at 03:15

## 2017-12-23 RX ADMIN — HEPARIN SODIUM 5000 UNIT(S): 5000 INJECTION INTRAVENOUS; SUBCUTANEOUS at 13:51

## 2017-12-23 RX ADMIN — HYDROMORPHONE HYDROCHLORIDE 0.5 MILLIGRAM(S): 2 INJECTION INTRAMUSCULAR; INTRAVENOUS; SUBCUTANEOUS at 19:45

## 2017-12-23 RX ADMIN — HYDROMORPHONE HYDROCHLORIDE 0.5 MILLIGRAM(S): 2 INJECTION INTRAMUSCULAR; INTRAVENOUS; SUBCUTANEOUS at 19:28

## 2017-12-23 RX ADMIN — HYDROMORPHONE HYDROCHLORIDE 0.5 MILLIGRAM(S): 2 INJECTION INTRAMUSCULAR; INTRAVENOUS; SUBCUTANEOUS at 15:47

## 2017-12-23 RX ADMIN — HYDROMORPHONE HYDROCHLORIDE 0.5 MILLIGRAM(S): 2 INJECTION INTRAMUSCULAR; INTRAVENOUS; SUBCUTANEOUS at 11:15

## 2017-12-23 RX ADMIN — PHENYLEPHRINE HYDROCHLORIDE 13.12 MICROGRAM(S)/KG/MIN: 10 INJECTION INTRAVENOUS at 03:23

## 2017-12-23 RX ADMIN — Medication 200 MILLIGRAM(S): at 10:47

## 2017-12-23 RX ADMIN — SODIUM CHLORIDE 50 MILLILITER(S): 9 INJECTION, SOLUTION INTRAVENOUS at 05:58

## 2017-12-23 RX ADMIN — LINEZOLID 300 MILLIGRAM(S): 600 INJECTION, SOLUTION INTRAVENOUS at 18:50

## 2017-12-23 RX ADMIN — Medication 50 MILLIEQUIVALENT(S): at 04:22

## 2017-12-23 NOTE — H&P ADULT - PROBLEM SELECTOR PLAN 1
--Urosepsis in the setting of perforated ureter and urosepsis  --s/p 5L IVF  --currently on phenylephrine for MAP >65  --lactate is elevated, will trend  -- --Urosepsis in the setting of perforated ureter and urosepsis  --s/p 5L IVF  --currently on phenylephrine for MAP >65  --lactate is elevated, will trend  --continue with Zosyn, renally dosed  --after prolonged discussion with the patient's family, he would not want to proceed with risky procedures that have a low likelihood of prolonging his life, particularly if the quality of his life is very low. --Urosepsis in the setting of perforated ureter and urosepsis  --s/p 5L IVF  --currently on phenylephrine for MAP >65  --lactate is elevated, will trend  --continue with Zosyn, renally dosed  --after prolonged discussion with the patient's family, he would not want to proceed with risky procedures that have a low likelihood of prolonging his life, particularly if the quality of his life is very low.  --Continue stress dose steroids as patient on chronic prednisone for ILD.

## 2017-12-23 NOTE — CONSULT NOTE ADULT - ASSESSMENT
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 RLL consolidation likely resolving RLL pneumonia seen on 11/17 CT. No acute change in CT re Fibrosis.Currently oxygenation adequately on nasal O2 with stable ILD  Perforated ureter with hydronephrosis  Sepisis with hypotension - on enosynephrine  CAD    REC    Continue abx  Plan for nephrostomy deferred due to hemodynamic status at this time  Continue hydrocortisone (stress dose steroids) given chronic prednisone   Volume loading per MICU   Check US cardiac output (hx CAD)  Pain management  Prognosis grave
78 yo male with ruptured urether
77 year old male with urosepsis, bilateral hydronephrosis    -bilateral nephrostomy tube placement by IR for decompression  -broad spectrum antibiotics  -f/u cultures  -monitor I's and O's  -supportive care by ICU

## 2017-12-23 NOTE — H&P ADULT - ASSESSMENT
77M PMH CAD s/p CABG (2016), ILD on 3L NC, recent admission for pseudomonal pneumonia requiring prolonged intubation presents from [facility] with AMS and fever, found to have ruptured L ureter with concomitant septic shock from urosepsis. Patient with very poor prognosis and likely with terminal infection.

## 2017-12-23 NOTE — H&P ADULT - NSHPLABSRESULTS_GEN_ALL_CORE
LABS:             11.7   16.1  )-----------( 227      ( 22 Dec 2017 16:42 )             37.5     138  |  103  |  33<H>  ----------------------------<  117<H>  7.1<HH>   |  18<L>  |  1.36<H>    Ca    8.1<L>      22 Dec 2017 18:21    TPro  6.7  /  Alb  2.1<L>  /  TBili  0.3  /  DBili  x   /  AST  80<H>  /  ALT  24  /  AlkPhos  91  12-    PT/INR - ( 22 Dec 2017 18:09 )   PT: 13.2 sec;   INR: 1.21 ratio         PTT - ( 22 Dec 2017 18:09 )  PTT:30.3 sec  CARDIAC MARKERS ( 22 Dec 2017 16:42 )  x     / 0.08 ng/mL / 82 U/L / x     / 1.9 ng/mL    Urinalysis Basic - ( 22 Dec 2017 16:42 )    Color: Yellow / Appearance: Turbid / S.019 / pH: x  Gluc: x / Ketone: Trace  / Bili: Negative / Urobili: Negative   Blood: x / Protein: 300 mg/dL / Nitrite: Negative   Leuk Esterase: Large / RBC: 5-10 /HPF / WBC >50 /HPF   Sq Epi: x / Non Sq Epi: x / Bacteria: Many /HPF      RADIOLOGY & ADDITIONAL TESTS:    Imaging Personally Reviewed:  CT chest/abdomen/pelvis    IMPRESSION:   CHEST:  Pneumomediastinum. Left pneumothorax. Interstitial lung disease.     ABDOMEN AND PELVIS:  No evidence of mesenteric ischemia.    Perforated proximal left ureter at the level of the UP junction with   loculated extravasated contrast extending anterior to the left psoas   muscle.. Benign versus malignant stricture is not excluded. Resultant   moderate left hydronephrosis. Findings discussed by Dr. Ceron at   2017 9:22PM with Dr. Kevin, who indicated that the communication was   understood.    Right ureteropelvic junction obstruction with moderate to severe   hydronephrosis, unchanged.    Consultant(s) Notes Reviewed:  Urology    Care Discussed with Consultants/Other Providers: Interventional Radiology, Urology

## 2017-12-23 NOTE — H&P ADULT - PROBLEM SELECTOR PLAN 2
--patient with severe lung disease and small pneumothorax on CT  --he is at high risk for further progression of his pneumothorax as well as further pulmonary complications.  --the patient's family has expressed wishes that he should be DNR/DNI.

## 2017-12-23 NOTE — H&P ADULT - PROBLEM SELECTOR PLAN 8
--Extensive discussion with family (wife and son at bedside) regarding patient's very poor prognosis and the fact this is likely a terminal event. They endorsed that the patient has previously expressed wishes to not undergo aggressive procedures that are high risk with little hope of promoting the quality of his care. Therefore, the patient's family has opted to not pursue percutaneous nephrostomy tubes. He is not a surgical candidate. They request that other interventions such as pressors and antibiotics are continued at this time in order to give the patient and his family more time. Efforts will be made to maximize his comfort.

## 2017-12-23 NOTE — H&P ADULT - FAMILY HISTORY
Family history of diabetes mellitus     Family history of breast cancer     Sibling  Still living? No  Family history of liver disease, Age at diagnosis: Age Unknown

## 2017-12-23 NOTE — PROCEDURE NOTE - NSPROCDETAILS_GEN_ALL_CORE
sterile technique, old cysto removed, new tube inserted/a urinary catheter insertion kit was used for all insertion materials/kit not available for this size catheter

## 2017-12-23 NOTE — ED ADULT NURSE REASSESSMENT NOTE - NS ED NURSE REASSESS COMMENT FT1
Patient admitted to MICU pending bed availability. Patient going to IR first before going to MICU, report given to LYNDA Quinteros of IR, pending transport. Family at bedside.

## 2017-12-23 NOTE — ED ADULT NURSE REASSESSMENT NOTE - NS ED NURSE REASSESS COMMENT FT1
pt not going to IR'  pt to go to MICU   No MICU beds available  logistics "finding appropriate ICU bed"

## 2017-12-23 NOTE — H&P ADULT - PROBLEM SELECTOR PLAN 4
--patient with severe lung disease from asbestos exposure, requires 3L NC O2 at baseline  --at high risk for progression of pneumothorax and other complications  --oxygen therapy PRN

## 2017-12-23 NOTE — PROGRESS NOTE ADULT - ASSESSMENT
77 year old male with urosepsis, bilateral hydronephrosis    -f/u cultures  -monitor I's and O's  -supportive care by ICU  -Family declining surgical/procedural intervention  -No  intervention at this time, will sign off  -Please reconsult as necessary or if family requests intervention

## 2017-12-23 NOTE — H&P ADULT - HISTORY OF PRESENT ILLNESS
77M PMH CAD s/p CABG (2016), ILD on 3L NC, recent admission for pseudomonal pneumonia requiring prolonged intubation presents from [facility] with AMS and fever. Has had a monzon catheter in place. Per family at bedside, he has had worsening confusion/delirium over the past few days. The patient's only complaint is back pain/lower abdominal pain which is moderate to severe, constant. Also with fevers and chills. 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 Urology evaluated patient and he is not a surgical candidate. Patient became progressively more septic, hypotensive. Requiring 5L IVF and pressors.     Patient initially recommended to have b/l percutaneous nephrostomies placed by IR in attempt to decompress obstruction. After extensive discussion with patient's family and specialists, the family came to the conclusion that the high risk procedure would not be in keeping with the patient's expressed wishes for his goals of care.

## 2017-12-23 NOTE — PROVIDER CONTACT NOTE (OTHER) - SITUATION
received call from Lab, patient has a (+) blood culture.  gram (+) cocci in pairs and chains.  MICU md notified

## 2017-12-23 NOTE — CONSULT NOTE ADULT - PROBLEM SELECTOR PROBLEM 3
How Severe Is Your Skin Lesion?: moderate
Has Your Skin Lesion Been Treated?: not been treated
Is This A New Presentation, Or A Follow-Up?: Skin Lesion
Encounter for palliative care

## 2017-12-23 NOTE — H&P ADULT - NSHPREVIEWOFSYSTEMS_GEN_ALL_CORE
Review of Systems:   CONSTITUTIONAL: No fever, weight loss, or fatigue  EYES: No eye pain, visual disturbances, or discharge  ENMT:  No difficulty hearing, tinnitus, vertigo; No sinus or throat pain  NECK: No pain or stiffness  BREASTS: No pain, masses, or nipple discharge  RESPIRATORY: No cough, wheezing, chills or hemoptysis; No shortness of breath  CARDIOVASCULAR: No chest pain, palpitations, dizziness, or leg swelling  GASTROINTESTINAL: No abdominal or epigastric pain. No nausea, vomiting, or hematemesis; No diarrhea or constipation. No melena or hematochezia.  GENITOURINARY: No dysuria, frequency, hematuria, or incontinence  NEUROLOGICAL: No headaches, memory loss, loss of strength, numbness, or tremors  SKIN: No itching, burning, rashes, or lesions   LYMPH NODES: No enlarged glands  ENDOCRINE: No heat or cold intolerance; No hair loss  MUSCULOSKELETAL: No joint pain or swelling; No muscle, back, or extremity pain  PSYCHIATRIC: No depression, anxiety, mood swings, or difficulty sleeping  HEME/LYMPH: No easy bruising, or bleeding gums  ALLERY AND IMMUNOLOGIC: No hives or eczema  [ ] all other systems negative or as per HPI    [X ] UNABLE TO OBTAIN REVIEW OF SYSTEMS DUE TO ALTERED MENTAL STATUS

## 2017-12-23 NOTE — H&P ADULT - NSHPPHYSICALEXAM_GEN_ALL_CORE
Vital Signs Last 24 Hrs  T(C): 40.2 (12-22-17 @ 22:41)  T(F): 104.3 (12-22-17 @ 22:41), Max: 104.3 (12-22-17 @ 22:41)  HR: 124 (12-23-17 @ 00:49) (118 - 138)  BP: 100/60 (12-23-17 @ 00:49)  BP(mean): --  RR: 22 (12-23-17 @ 00:45) (20 - 25)  SpO2: 96% (12-23-17 @ 00:45) (95% - 100%)  Wt(kg): --    PHYSICAL EXAM:  GENERAL: NAD, well-developed  HEAD:  Atraumatic, Normocephalic  EYES: EOMI, PERRLA, conjunctiva and sclera clear  NECK: Supple, No JVD  CHEST/LUNG: Clear to auscultation bilaterally; No wheeze  HEART: Regular rate and rhythm; No murmurs, rubs, or gallops  ABDOMEN: Soft, Nontender, Nondistended; Bowel sounds present  EXTREMITIES:  2+ Peripheral Pulses, No clubbing, cyanosis, or edema  PSYCH: AAOx3  NEUROLOGY: non-focal  SKIN: No rashes or lesions Vital Signs Last 24 Hrs  T(C): 40.2 (12-22-17 @ 22:41)  T(F): 104.3 (12-22-17 @ 22:41), Max: 104.3 (12-22-17 @ 22:41)  HR: 124 (12-23-17 @ 00:49) (118 - 138)  BP: 100/60 (12-23-17 @ 00:49)  BP(mean): --  RR: 22 (12-23-17 @ 00:45) (20 - 25)  SpO2: 96% (12-23-17 @ 00:45) (95% - 100%)  Wt(kg): --    PHYSICAL EXAM:  GENERAL: in moderate distress due to pain, acute on chronically ill  HEAD:  Atraumatic, Normocephalic  EYES: EOMI, PERRLA, conjunctiva and sclera clear  NECK: Supple, No JVD  CHEST/LUNG: Clear to auscultation bilaterally; No wheeze  HEART: tachycardic, regular; No murmurs, rubs, or gallops  ABDOMEN: Soft, TTP diffusely, Nondistended; Bowel sounds present  EXTREMITIES:  2+ Peripheral Pulses, No clubbing, cyanosis, or edema  PSYCH: AAOx1  NEUROLOGY: non-focal  SKIN: No rashes or lesions

## 2017-12-23 NOTE — PROCEDURE NOTE - NSURITECHNIQUE_GU_A_CORE
The catheter was secured with a securement device (e.g. StatLock)/The collection bag is below the level of the patient and urinary bladder/The site was cleaned with soap/water and sterile solution (betadine)/The urinary drainage system is closed at the end of the procedure/All applicable medical record documentation is completed/Proper hand hygiene was performed/Sterile gloves were worn for the duration of the procedure/A sterile drape was used to cover all adjacent areas/The catheter was appropriately lubricated

## 2017-12-23 NOTE — H&P ADULT - PROBLEM SELECTOR PLAN 7
--obstructive uropathy with b/l hydronephrosis  --patient is not a candidate for surgical interventions and family has decided against pursuing percutaneous nephrostomy tubes  --c/w monzon catheter and IVF

## 2017-12-23 NOTE — H&P ADULT - PROBLEM SELECTOR PLAN 3
--perforation of ureter with associated urosepsis and CHRISTIAEN  --patient is not a surgical candidate as per urology; instead recommended for IR guided percutaneous nephrostomy tube. --perforation of ureter with associated urosepsis and CHRISTIANE  --patient is not a surgical candidate as per urology; instead recommended for IR guided percutaneous nephrostomy tube.  --after extensive discussion with the patient's family members and the subspecialists, he would not want to proceed with risky procedures that have a low likelihood of prolonging his life, particularly if the quality of his life is very low.  --Pain control and comfort measures.

## 2017-12-23 NOTE — H&P ADULT - ATTENDING COMMENTS
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 CT positive for Lt Pneumothorax and pneumomediastinum.   - Not a surgical candidate; seen and followed by Urology  - Family maintain DNR/DNI with no aggressive intervention   - Family declined IR intervention after risk-benefit discussion  - Admit to ICU for hemodynamic stability on pressor support, pain control and ABx plans  - Palliative consult in AM     Critical Care Time = 45 min

## 2017-12-23 NOTE — PROGRESS NOTE ADULT - SUBJECTIVE AND OBJECTIVE BOX
GOC discussion had with family overnight, family does not with to move forward with bilateral nephrostomy tubes for urinary drainage.    T(F): 99.2, Max: 104.3 (12-22-17 @ 22:41)  HR: 106  BP: 94/50  SpO2: 100%    OUT:    Voided: 635    Gen NAD  Abd: Soft, NT/ND    12-23 @ 03:17  WBC 20.7  / Hct 32.8  / SCr 1.27     12-22 @ 18:21  WBC --    / Hct --    / SCr 1.36

## 2017-12-23 NOTE — CONSULT NOTE ADULT - PROBLEM SELECTOR RECOMMENDATION 3
Family wishes to continue pressors and escalate pressors if needed  however, they do not want intubation or cpr.    goals are for ultimately comfort but not ready to withdraw care  emotional and spiritual support provided

## 2017-12-23 NOTE — H&P ADULT - NSHPSOCIALHISTORY_GEN_ALL_CORE
Previously lived at home with wife, but has been hospitalized and in [facility] over the past month. Family denies toxic habits.

## 2017-12-23 NOTE — CONSULT NOTE ADULT - SUBJECTIVE AND OBJECTIVE BOX
PULMONARY CONSULT  Amauri Jackson MD  [phone]    Initial HPI on admission:  HPI: Patient well known to me  77M PMH CAD s/p CABG (2016), ILD on 3L NC, recent admission for pseudomonal pneumonia requiring prolonged intubation presents from [facility] with AMS and fever. Has had a monzon catheter in place. Per family at bedside, he has had worsening confusion/delirium over the past few days. The patient's only complaint is back pain/lower abdominal pain which is moderate to severe, constant. Also with fevers and chills. 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 Urology evaluated patient and he is not a surgical candidate. Patient became progressively more septic, hypotensive. Requiring 5L IVF and pressors.   Patient has history of ILD - unclear etiology: NSIP vs chronic hypersenstivity pneumonitis. He was DC home on low dose prednisone post prior admission, and has been on chronic steroids, home O2, an azothiaprin e - latter DC's during last admission. He was DC home on prednisone 10    Patient initially recommended to have b/l percutaneous nephrostomies placed by IR in attempt to decompress obstruction. After extensive discussion with patient's family and specialists, the family came to the conclusion that the high risk procedure would not be in keeping with the patient's expressed wishes for his goals of care. (23 Dec 2017 01:05)    PAST MEDICAL & SURGICAL HISTORY:  Asbestos exposure  Kidney stones  Coronary artery disease involving native coronary artery of native heart, angina presence unspecified: S/p CABG 2016  Cataract  Rotator cuff rupture, right  GERD (gastroesophageal reflux disease)  Hyperlipemia  Diabetes mellitus  Encounter for removal of ureteral stent: Had a ureteral stent for kidney stones. Now removed.  S/P CABG (coronary artery bypass graft)  S/P rotator cuff surgery  S/P lens implant:  &amp;   S/P appendectomy: over 50 years ago    Allergies    Vaseline (Pruritus; Urticaria)    Intolerances      FAMILY HISTORY:  Family history of breast cancer  Family history of liver disease (Sibling)  Family history of diabetes mellitus    Social history: , spouse    Review of Systems: Review of Systems:   	CONSTITUTIONAL: No fever, weight loss, or fatigue  	EYES: No eye pain, visual disturbances, or discharge  	ENMT:  No difficulty hearing, tinnitus, vertigo; No sinus or throat pain  	NECK: No pain or stiffness  	BREASTS: No pain, masses, or nipple discharge  	RESPIRATORY: No cough, wheezing, chills or hemoptysis; No shortness of breath  	CARDIOVASCULAR: No chest pain, palpitations, dizziness, or leg swelling  	GASTROINTESTINAL: No abdominal or epigastric pain. No nausea, vomiting, or hematemesis; No diarrhea or constipation. No melena or hematochezia.  	GENITOURINARY: No dysuria, frequency, hematuria, or incontinence  	NEUROLOGICAL: No headaches, memory loss, loss of strength, numbness, or tremors  	SKIN: No itching, burning, rashes, or lesions   	LYMPH NODES: No enlarged glands  	ENDOCRINE: No heat or cold intolerance; No hair loss  	MUSCULOSKELETAL: No joint pain or swelling; No muscle, back, or extremity pain  	PSYCHIATRIC: No depression, anxiety, mood swings, or difficulty sleeping  	HEME/LYMPH: No easy bruising, or bleeding gums  	ALLERY AND IMMUNOLOGIC: No hives or eczema  	[ ] all other systems negative or as per HPI    Medications:  MEDICATIONS  (STANDING):  acetaminophen  IVPB 1000 milliGRAM(s) IV Intermittent once  dextrose 5% + sodium chloride 0.45%. 1000 milliLiter(s) (50 mL/Hr) IV Continuous <Continuous>  heparin  Injectable 5000 Unit(s) SubCutaneous every 8 hours  hydrocortisone sodium succinate Injectable 50 milliGRAM(s) IV Push every 8 hours  influenza   Vaccine 0.5 milliLiter(s) IntraMuscular once  insulin lispro (HumaLOG) corrective regimen sliding scale   SubCutaneous every 6 hours  meropenem IVPB 1000 milliGRAM(s) IV Intermittent every 8 hours  norepinephrine Infusion 0.01 MICROgram(s)/kG/Min (1.326 mL/Hr) IV Continuous <Continuous>  phenylephrine    Infusion 0.5 MICROgram(s)/kG/Min (13.125 mL/Hr) IV Continuous <Continuous>    MEDICATIONS  (PRN):  HYDROmorphone  Injectable 0.5 milliGRAM(s) IV Push every 4 hours PRN moderate to severe pain  ondansetron Injectable 4 milliGRAM(s) IV Push every 4 hours PRN Nausea and/or Vomiting    Vital Signs Last 24 Hrs  T(C): 37.3 (23 Dec 2017 08:05), Max: 40.2 (22 Dec 2017 22:41)  T(F): 99.2 (23 Dec 2017 08:05), Max: 104.3 (22 Dec 2017 22:41)  HR: 106 (23 Dec 2017 10:15) (88 - 148)  BP: 94/50 (23 Dec 2017 10:15) (75/47 - 163/120)  BP(mean): 70 (23 Dec 2017 10:15) (56 - 103)  RR: 16 (23 Dec 2017 10:15) (9 - 39)  SpO2: 100% (23 Dec 2017 10:15) (76% - 100%)     @ 07:01  -   @ 07:00  --------------------------------------------------------  IN: 1685 mL / OUT: 535 mL / NET: 1150 mL      LABS:                        10.2   20.7  )-----------( 218      ( 23 Dec 2017 03:17 )             32.8         143  |  109<H>  |  30<H>  ----------------------------<  38<LL>  4.5   |  15<L>  |  1.27    Ca    7.3<L>      23 Dec 2017 03:17    TPro  6.4  /  Alb  2.2<L>  /  TBili  0.3  /  DBili  x   /  AST  34  /  ALT  20  /  AlkPhos  93  12-23      PT/INR - ( 23 Dec 2017 03:17 )   PT: 12.7 sec;   INR: 1.16 ratio         PTT - ( 23 Dec 2017 03:17 )  PTT:32.2 sec  Urinalysis Basic - ( 22 Dec 2017 16:42 )    Color: Yellow / Appearance: Turbid / S.019 / pH: x  Gluc: x / Ketone: Trace  / Bili: Negative / Urobili: Negative   Blood: x / Protein: 300 mg/dL / Nitrite: Negative   Leuk Esterase: Large / RBC: 5-10 /HPF / WBC >50 /HPF   Sq Epi: x / Non Sq Epi: x / Bacteria: Many /HPF    Serum Pro-Brain Natriuretic Peptide: 5981 pg/mL (17 @ 18:21)      CULTURES:    Physical Examination:    General: Delirious: c/o abdminal pain. On neosynephrine IV drip for hypotenision      HEENT: Pupils equal, reactive to light.  Symmetric.    PULM: Basilar crackles; no wheeze (limited exam)    CVS: Regular rate and rhythm, no murmurs, rubs, or gallops    ABD: Soft, nondistended, nontender, normoactive bowel sounds, no masses    EXT:Trace to 1+ LE edema    SKIN: Warm and well perfused, no rashes noted.    NEURO: Alert, oriented, interactive, nonfocal    RADIOLOGY REVIEWED PERSONALLY  CXR:    CT chestAbd: perforated L ureter with leak; mod L hydronephrosis; Bilateral reticular opcities, interlobular septal thickening, traction bronchiectasis, with increased RLL density  (decrease since  CT with more dense RLL consolidation c/w PA pneumonia at time.  TTE:
HPI:  77M PMH CAD s/p CABG (2016), ILD on 3L NC, recent admission for pseudomonal pneumonia requiring prolonged intubation presents from [facility] with AMS and fever. Has had a monzon catheter in place. Per family at bedside, he has had worsening confusion/delirium over the past few days. The patient's only complaint is back pain/lower abdominal pain which is moderate to severe, constant. Also with fevers and chills. 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 Urology evaluated patient and he is not a surgical candidate. Patient became progressively more septic, hypotensive. Requiring 5L IVF and pressors.     Patient initially recommended to have b/l percutaneous nephrostomies placed by IR in attempt to decompress obstruction. After extensive discussion with patient's family and specialists, the family came to the conclusion that the high risk procedure would not be in keeping with the patient's expressed wishes for his goals of care. (23 Dec 2017 01:05)      Pt seen in ccu.  unresponsive.  appears comfortable.  family at [facility]      PERTINENT [facility] REVIEWED:  [ x] YES [ ] NO           SOCIAL HISTORY:   Significant other/partner:  [x ] YES  [ ] NO               Children:  [x ] YES  [ ] NO                   [employer]ist/Spirituality:  Substance hx:  [ ] YES   [x ] NO                   Tobacco hx:  [ ] YES  [ ] NO                       Alcohol hx: [ ] YES  [x NO         Home Opioid hx:  [ ] YES  [ x] NO x  Living Situation: [ ] Home  [ ] Long term care  [x ] Rehab [ ] Other    FAMILY HISTORY:  FAMILY HISTORY:  Family history of breast cancer  Family history of liver disease (Sibling)  Family history of diabetes mellitus    [ x] Family history non-contributory     BASELINE (I)ADLs (prior to admission):  [place]: [ ] total  [ ] moderate [x ] dependent    ADVANCE DIRECTIVES:    DNR [x ] YES [ ] NO                            [ ] Completed  MOLST  [ ] YES [ ] NO                      [ ] Completed  Health Care Proxy [ ] YES  [ ] NO   [ ] Completed  Living Will  [ ] YES [ ] NO             [ ] Surrogate  [ ] HCP  [ ] Guardian:                                                                  Phone#:    ALLERGIES:   Allergies    Vaseline (Pruritus; Urticaria)    Intolerances        MEDICATIONS:   MEDICATIONS  (STANDING):  acetaminophen  IVPB 1000 milliGRAM(s) IV Intermittent once  dextrose 5% + sodium chloride 0.45%. 1000 milliLiter(s) (50 mL/Hr) IV Continuous <Continuous>  heparin  Injectable 5000 Unit(s) SubCutaneous every 8 hours  hydrocortisone sodium succinate Injectable 50 milliGRAM(s) IV Push every 8 hours  influenza   Vaccine 0.5 milliLiter(s) IntraMuscular once  insulin lispro (HumaLOG) corrective regimen sliding scale   SubCutaneous every 6 hours  meropenem IVPB 1000 milliGRAM(s) IV Intermittent every 8 hours  norepinephrine Infusion 0.01 MICROgram(s)/kG/Min (1.326 mL/Hr) IV Continuous <Continuous>  phenylephrine    Infusion 0.5 MICROgram(s)/kG/Min (13.125 mL/Hr) IV Continuous <Continuous>    MEDICATIONS  (PRN):  HYDROmorphone  Injectable 0.5 milliGRAM(s) IV Push every 4 hours PRN moderate to severe pain  ondansetron Injectable 4 milliGRAM(s) IV Push every 4 hours PRN Nausea and/or Vomiting      PRESENT SYMPTOMS:  Source: [ ] Patient   [ ] Family   [ x] Team     Pain:                        [x ] No [ ] Yes             [ ] Mild [ ] Moderate [ ] Severe    Onset -  Location -  Duration -  Character -  Alleviating/Aggravating -  Radiation -  Timing -      Dyspnea:                [ x] No [ ] Yes             [ ] Mild [ ] Moderate [ ] Severe    Anxiety:                  [x ] No [ ] Yes             [ ] Mild [ ] Moderate [ ] Severe    Fatigue:                  [ ] No [ xYes             [ ] Mild [ ] Moderate [ x] Severe    Nausea:                  [x ] No [ ] Yes             [ ] Mild [ ] Moderate [ ] Severe    Loss of appetite:   [ ] No [ x] Yes             [ ] Mild [ ] Moderate [x ] Severe    Constipation:        [x ] No [ ] Yes             [ ] Mild [ ] Moderate [ ] Severe    Other Symptoms:  [ ] All other review of systems negative   [ x] Unable to obtain due to poor mentation     Karnofsky Performance Score/Palliative Performance Status Version 2:   10      %    PHYSICAL EXAM:  Vital Signs Last 24 Hrs  T(C): 37.3 (23 Dec 2017 08:05), Max: 40.2 (22 Dec 2017 22:41)  T(F): 99.2 (23 Dec 2017 08:05), Max: 104.3 (22 Dec 2017 22:41)  HR: 86 (23 Dec 2017 12:30) (86 - 148)  BP: 110/69 (23 Dec 2017 12:30) (75/47 - 163/120)  BP(mean): 81 (23 Dec 2017 12:30) (56 - 103)  RR: 11 (23 Dec 2017 12:30) (9 - 39)  SpO2: 100% (23 Dec 2017 12:30) (76% - 100%) I&O's Summary    22 Dec 2017 07:01  -  23 Dec 2017 07:00  --------------------------------------------------------  IN: 1685 mL / OUT: 535 mL / NET: 1150 mL    23 Dec 2017 07:01  -  23 Dec 2017 13:23  --------------------------------------------------------  IN: 870 mL / OUT: 260 mL / NET: 610 mL        General:  [ ] Alert  [ ] Oriented x      [x ] Lethargic  [ ] Agitated   [ ] Cachexia   [x ] Unarousable  [ ] Verbal  [ ] Non-Verbal    HEENT:  [ ] Normal   [ x] Dry mouth   [ ] ET Tube    [ ] Trach  [ ] Oral lesions    Lungs:   [ x] Clear [ ] Tachypnea  [ ] Audible excessive secretions   [ ] Rhonchi        [ ] Right [ ] Left [ ] Bilateral  [ ] Crackles        [ ] Right [ ] Left [ ] Bilateral  [ ] Wheezing     [ ] Right [ ] Left [ ] Bilateral    Cardiovascular:  [ ] Regular [ ] Irregular [x ] Tachycardia   [ ] Bradycardia  [ ] Murmur [ ] Other    Abdomen: [ x] Soft  x[ ] Distended   [ ] +BS  [x ] Non tender [ ] Tender  [ ]PEG   [ ]OGT/ NGT   Last BM:       Genitourinary: [ ] Normal [ ] Incontinent   [ ] Oliguria/Anuria   [x ] Monzon    Musculoskeletal:  [ ] Normal   [ ] Weakness  [x ] Bedbound/Wheelchair bound [ ] Edema    Neurological: [ ] No focal deficits  [ ] Cognitive impairment  [ ] Dysphagia [ ] Dysarthria [ ] Paresis [x ] Other encephalopathy    Skin: [x ] Normal   [ ] Pressure ulcer(s)                  [ ] Rash    LABS:  [ ] Critical Care time for unstable patient with organ failure.  Total Time:                 minutes  12-    143  |  109<H>  |  30<H>  ----------------------------<  38<LL>  4.5   |  15<L>  |  1.27    Ca    7.3<L>      23 Dec 2017 03:17    TPro  6.4  /  Alb  2.2<L>  /  TBili  0.3  /  DBili  x   /  AST  34  /  ALT  20  /  AlkPhos  93  -    PT/INR - ( 23 Dec 2017 03:17 )   PT: 12.7 sec;   INR: 1.16 ratio         PTT - ( 23 Dec 2017 03:17 )  PTT:32.2 sec  Urinalysis Basic - ( 22 Dec 2017 16:42 )    Color: Yellow / Appearance: Turbid / S.019 / pH: x  Gluc: x / Ketone: Trace  / Bili: Negative / Urobili: Negative   Blood: x / Protein: 300 mg/dL / Nitrite: Negative   Leuk Esterase: Large / RBC: 5-10 /HPF / WBC >50 /HPF   Sq Epi: x / Non Sq Epi: x / Bacteria: Many /HPF        Shock: [ ] Septic [ ] Cardiogenic [ ] Neurologic [ ] Hypovolemic  Vasopressors x   Inotrophs x     Protein Calorie Malnutrition: [ ] Mild [ ] Moderate [ ] Severe  Oral Intake: [ ] Unable/mouth care only [ ] Minimal [ ] Moderate [ ] Full Capability  Diet: [ ] NPO [ ] Tube feeds [ ] TPN [ ] Other     RADIOLOGY & ADDITIONAL STUDIES:    REFERRALS:   [ ] Chaplaincy  [ ] Hospice  [ ] Child Life  [ ] Social Work  [ ] Case management [ ] Holistic Therapy
Urology PA Consult Note    HPI:  This is a 77y old Male who presents from rehab with a 1 day history of altered mental status.  Patient has been having back pain since extubation during his MICU admission last month, which has been worsening since he has been in rehab.  He is currently confused, and all of history was obtained from his family at bedside.  Has a chronic monzon catheter draining purulent urine, which was changed and upsized at bedside.  Of note, he was in the MICU last month for hypoxia secondary to pneumonia, and was intubated for a prolonged period.  Also has a history of chronic right hydronephrosis, and has had a procedure on that side -- he had a ureteral stent at one time.      PAST MEDICAL & SURGICAL HISTORY:  Asbestos exposure  Kidney stones  Coronary artery disease involving native coronary artery of native heart, angina presence unspecified: S/p CABG 2016  Cataract  Rotator cuff rupture, right  GERD (gastroesophageal reflux disease)  Hyperlipemia  Diabetes mellitus  Encounter for removal of ureteral stent: Had a ureteral stent for kidney stones. Now removed.  S/P CABG (coronary artery bypass graft)  S/P rotator cuff surgery  S/P lens implant:  &amp;   S/P appendectomy: over 50 years ago      FAMILY HISTORY:  Family history of breast cancer  Family history of liver disease (Sibling)  Family history of diabetes mellitus      SOCIAL HISTORY:   no smoking history    phenylephrine    Infusion 0.5 MICROgram(s)/kG/Min IV Continuous <Continuous>  piperacillin/tazobactam IVPB. 3.375 Gram(s) IV Intermittent every 12 hours      Allergies    Vaseline (Pruritus; Urticaria)          REVIEW OF SYSTEMS: Pertinent positives and negatives as stated in HPI, otherwise negative    Vital signs  T(C): 40.2 (17 @ 22:41), Max: 40.2 (17 @ 22:41)  HR: 120 (17 @ 01:10)  BP: 99/57 (17 @ 01:10)  SpO2: 98% (17 @ 01:10)  Wt(kg): --    I&O's Summary      Physical Exam  Gen: currently confused  Pulm: CTA bilaterally  CV: S1S2, RRR  Abd: Soft, NT/ND  : Circumcised, no lesions.  No discharge or blood at urethral meatus.  Testes descended bilaterally.  Ext: No edema present b/l    LABS:                            11.7   16.1  )-----------( 227      ( 22 Dec 2017 16:42 )             37.5         138  |  103  |  33<H>  ----------------------------<  117<H>  7.1<HH>   |  18<L>  |  1.36<H>    Ca    8.1<L>      22 Dec 2017 18:21    TPro  6.7  /  Alb  2.1<L>  /  TBili  0.3  /  DBili  x   /  AST  80<H>  /  ALT  24  /  AlkPhos  91      PT/INR - ( 22 Dec 2017 18:09 )   PT: 13.2 sec;   INR: 1.21 ratio         PTT - ( 22 Dec 2017 18:09 )  PTT:30.3 sec  Urinalysis Basic - ( 22 Dec 2017 16:42 )    Color: Yellow / Appearance: Turbid / S.019 / pH: x  Gluc: x / Ketone: Trace  / Bili: Negative / Urobili: Negative   Blood: x / Protein: 300 mg/dL / Nitrite: Negative   Leuk Esterase: Large / RBC: 5-10 /HPF / WBC >50 /HPF   Sq Epi: x / Non Sq Epi: x / Bacteria: Many /HPF        Urine culture: pending results  Blood culture: pending results    Imaging:    CT: CHEST:  Pneumomediastinum. Left pneumothorax. Interstitial lung disease.     ABDOMEN AND PELVIS:  No evidence of mesenteric ischemia.    Perforated proximal left ureter at the level of the UP junction with   loculated extravasated contrast extending anterior to the left psoas   muscle.. Benign versus malignant stricture is not excluded. Resultant   moderate left hydronephrosis. Findings discussed by Dr. Ceron at   2017 9:22PM with Dr. Kevin, who indicated that the communication was   understood.    Right ureteropelvic junction obstruction with moderate to severe   hydronephrosis, unchanged.

## 2017-12-24 LAB
-  AMIKACIN: SIGNIFICANT CHANGE UP
-  AMPICILLIN/SULBACTAM: SIGNIFICANT CHANGE UP
-  AMPICILLIN: SIGNIFICANT CHANGE UP
-  AZTREONAM: SIGNIFICANT CHANGE UP
-  CANDIDA ALBICANS: SIGNIFICANT CHANGE UP
-  CEFAZOLIN: SIGNIFICANT CHANGE UP
-  CEFEPIME: SIGNIFICANT CHANGE UP
-  CEFOXITIN: SIGNIFICANT CHANGE UP
-  CEFTAZIDIME: SIGNIFICANT CHANGE UP
-  CEFTRIAXONE: SIGNIFICANT CHANGE UP
-  CIPROFLOXACIN: SIGNIFICANT CHANGE UP
-  ERTAPENEM: SIGNIFICANT CHANGE UP
-  GENTAMICIN: SIGNIFICANT CHANGE UP
-  IMIPENEM: SIGNIFICANT CHANGE UP
-  LEVOFLOXACIN: SIGNIFICANT CHANGE UP
-  MEROPENEM: SIGNIFICANT CHANGE UP
-  NITROFURANTOIN: SIGNIFICANT CHANGE UP
-  PIPERACILLIN/TAZOBACTAM: SIGNIFICANT CHANGE UP
-  TOBRAMYCIN: SIGNIFICANT CHANGE UP
-  TRIMETHOPRIM/SULFAMETHOXAZOLE: SIGNIFICANT CHANGE UP
ALBUMIN SERPL ELPH-MCNC: 1.6 G/DL — LOW (ref 3.3–5)
ALP SERPL-CCNC: 91 U/L — SIGNIFICANT CHANGE UP (ref 40–120)
ALT FLD-CCNC: 18 U/L RC — SIGNIFICANT CHANGE UP (ref 10–45)
ANION GAP SERPL CALC-SCNC: 14 MMOL/L — SIGNIFICANT CHANGE UP (ref 5–17)
AST SERPL-CCNC: 28 U/L — SIGNIFICANT CHANGE UP (ref 10–40)
BILIRUB SERPL-MCNC: 0.2 MG/DL — SIGNIFICANT CHANGE UP (ref 0.2–1.2)
BUN SERPL-MCNC: 25 MG/DL — HIGH (ref 7–23)
CALCIUM SERPL-MCNC: 5.2 MG/DL — CRITICAL LOW (ref 8.4–10.5)
CHLORIDE SERPL-SCNC: 119 MMOL/L — HIGH (ref 96–108)
CO2 SERPL-SCNC: 15 MMOL/L — LOW (ref 22–31)
CREAT SERPL-MCNC: 0.95 MG/DL — SIGNIFICANT CHANGE UP (ref 0.5–1.3)
CULTURE RESULTS: SIGNIFICANT CHANGE UP
GLUCOSE BLDC GLUCOMTR-MCNC: 131 MG/DL — HIGH (ref 70–99)
GLUCOSE BLDC GLUCOMTR-MCNC: 175 MG/DL — HIGH (ref 70–99)
GLUCOSE BLDC GLUCOMTR-MCNC: 182 MG/DL — HIGH (ref 70–99)
GLUCOSE SERPL-MCNC: 101 MG/DL — HIGH (ref 70–99)
HCT VFR BLD CALC: 25.6 % — LOW (ref 39–50)
HGB BLD-MCNC: 8 G/DL — LOW (ref 13–17)
MAGNESIUM SERPL-MCNC: 1.2 MG/DL — LOW (ref 1.6–2.6)
MCHC RBC-ENTMCNC: 31.1 GM/DL — LOW (ref 32–36)
MCHC RBC-ENTMCNC: 35.6 PG — HIGH (ref 27–34)
MCV RBC AUTO: 114 FL — HIGH (ref 80–100)
METHOD TYPE: SIGNIFICANT CHANGE UP
METHOD TYPE: SIGNIFICANT CHANGE UP
ORGANISM # SPEC MICROSCOPIC CNT: SIGNIFICANT CHANGE UP
ORGANISM # SPEC MICROSCOPIC CNT: SIGNIFICANT CHANGE UP
PHOSPHATE SERPL-MCNC: 2.5 MG/DL — SIGNIFICANT CHANGE UP (ref 2.5–4.5)
PLATELET # BLD AUTO: 124 K/UL — LOW (ref 150–400)
POTASSIUM SERPL-MCNC: 2.8 MMOL/L — CRITICAL LOW (ref 3.5–5.3)
POTASSIUM SERPL-SCNC: 2.8 MMOL/L — CRITICAL LOW (ref 3.5–5.3)
PROT SERPL-MCNC: 4.9 G/DL — LOW (ref 6–8.3)
RBC # BLD: 2.24 M/UL — LOW (ref 4.2–5.8)
RBC # FLD: 14.8 % — HIGH (ref 10.3–14.5)
SODIUM SERPL-SCNC: 148 MMOL/L — HIGH (ref 135–145)
SPECIMEN SOURCE: SIGNIFICANT CHANGE UP
WBC # BLD: 16.7 K/UL — HIGH (ref 3.8–10.5)
WBC # FLD AUTO: 16.7 K/UL — HIGH (ref 3.8–10.5)

## 2017-12-24 PROCEDURE — 99291 CRITICAL CARE FIRST HOUR: CPT

## 2017-12-24 PROCEDURE — 99233 SBSQ HOSP IP/OBS HIGH 50: CPT | Mod: GC

## 2017-12-24 RX ORDER — CALCIUM GLUCONATE 100 MG/ML
2 VIAL (ML) INTRAVENOUS ONCE
Qty: 0 | Refills: 0 | Status: COMPLETED | OUTPATIENT
Start: 2017-12-24 | End: 2017-12-24

## 2017-12-24 RX ORDER — MAGNESIUM SULFATE 500 MG/ML
2 VIAL (ML) INJECTION ONCE
Qty: 0 | Refills: 0 | Status: COMPLETED | OUTPATIENT
Start: 2017-12-24 | End: 2017-12-24

## 2017-12-24 RX ORDER — ROBINUL 0.2 MG/ML
0.4 INJECTION INTRAMUSCULAR; INTRAVENOUS EVERY 4 HOURS
Qty: 0 | Refills: 0 | Status: DISCONTINUED | OUTPATIENT
Start: 2017-12-24 | End: 2017-12-25

## 2017-12-24 RX ORDER — POTASSIUM CHLORIDE 20 MEQ
10 PACKET (EA) ORAL
Qty: 0 | Refills: 0 | Status: COMPLETED | OUTPATIENT
Start: 2017-12-24 | End: 2017-12-24

## 2017-12-24 RX ORDER — HYDROMORPHONE HYDROCHLORIDE 2 MG/ML
0.5 INJECTION INTRAMUSCULAR; INTRAVENOUS; SUBCUTANEOUS
Qty: 0 | Refills: 0 | Status: DISCONTINUED | OUTPATIENT
Start: 2017-12-24 | End: 2017-12-25

## 2017-12-24 RX ORDER — HYDROMORPHONE HYDROCHLORIDE 2 MG/ML
1 INJECTION INTRAMUSCULAR; INTRAVENOUS; SUBCUTANEOUS ONCE
Qty: 0 | Refills: 0 | Status: DISCONTINUED | OUTPATIENT
Start: 2017-12-24 | End: 2017-12-24

## 2017-12-24 RX ORDER — HYDROMORPHONE HYDROCHLORIDE 2 MG/ML
0.5 INJECTION INTRAMUSCULAR; INTRAVENOUS; SUBCUTANEOUS
Qty: 100 | Refills: 0 | Status: DISCONTINUED | OUTPATIENT
Start: 2017-12-24 | End: 2017-12-25

## 2017-12-24 RX ORDER — MAGNESIUM SULFATE 500 MG/ML
2 VIAL (ML) INJECTION ONCE
Qty: 0 | Refills: 0 | Status: DISCONTINUED | OUTPATIENT
Start: 2017-12-24 | End: 2017-12-24

## 2017-12-24 RX ADMIN — Medication 100 MILLIEQUIVALENT(S): at 05:47

## 2017-12-24 RX ADMIN — Medication 50 MILLIGRAM(S): at 05:54

## 2017-12-24 RX ADMIN — HEPARIN SODIUM 5000 UNIT(S): 5000 INJECTION INTRAVENOUS; SUBCUTANEOUS at 05:54

## 2017-12-24 RX ADMIN — HYDROMORPHONE HYDROCHLORIDE 1 MILLIGRAM(S): 2 INJECTION INTRAMUSCULAR; INTRAVENOUS; SUBCUTANEOUS at 20:45

## 2017-12-24 RX ADMIN — Medication 50 GRAM(S): at 05:22

## 2017-12-24 RX ADMIN — LINEZOLID 300 MILLIGRAM(S): 600 INJECTION, SOLUTION INTRAVENOUS at 18:25

## 2017-12-24 RX ADMIN — HYDROMORPHONE HYDROCHLORIDE 0.5 MILLIGRAM(S): 2 INJECTION INTRAMUSCULAR; INTRAVENOUS; SUBCUTANEOUS at 19:34

## 2017-12-24 RX ADMIN — Medication 100 MILLIEQUIVALENT(S): at 10:12

## 2017-12-24 RX ADMIN — Medication 50 MILLIGRAM(S): at 13:39

## 2017-12-24 RX ADMIN — MICAFUNGIN SODIUM 107.5 MILLIGRAM(S): 100 INJECTION, POWDER, LYOPHILIZED, FOR SOLUTION INTRAVENOUS at 16:45

## 2017-12-24 RX ADMIN — ROBINUL 0.4 MILLIGRAM(S): 0.2 INJECTION INTRAMUSCULAR; INTRAVENOUS at 21:26

## 2017-12-24 RX ADMIN — Medication 100 MILLIEQUIVALENT(S): at 07:00

## 2017-12-24 RX ADMIN — HEPARIN SODIUM 5000 UNIT(S): 5000 INJECTION INTRAVENOUS; SUBCUTANEOUS at 13:39

## 2017-12-24 RX ADMIN — ROBINUL 0.4 MILLIGRAM(S): 0.2 INJECTION INTRAMUSCULAR; INTRAVENOUS at 18:01

## 2017-12-24 RX ADMIN — PHENYLEPHRINE HYDROCHLORIDE 13.12 MICROGRAM(S)/KG/MIN: 10 INJECTION INTRAVENOUS at 13:38

## 2017-12-24 RX ADMIN — HYDROMORPHONE HYDROCHLORIDE 1 MILLIGRAM(S): 2 INJECTION INTRAMUSCULAR; INTRAVENOUS; SUBCUTANEOUS at 02:50

## 2017-12-24 RX ADMIN — ROBINUL 0.4 MILLIGRAM(S): 0.2 INJECTION INTRAMUSCULAR; INTRAVENOUS at 13:38

## 2017-12-24 RX ADMIN — HYDROMORPHONE HYDROCHLORIDE 0.5 MILLIGRAM(S): 2 INJECTION INTRAMUSCULAR; INTRAVENOUS; SUBCUTANEOUS at 14:21

## 2017-12-24 RX ADMIN — HYDROMORPHONE HYDROCHLORIDE 0.5 MILLIGRAM(S): 2 INJECTION INTRAMUSCULAR; INTRAVENOUS; SUBCUTANEOUS at 20:15

## 2017-12-24 RX ADMIN — SODIUM CHLORIDE 50 MILLILITER(S): 9 INJECTION, SOLUTION INTRAVENOUS at 21:30

## 2017-12-24 RX ADMIN — SODIUM CHLORIDE 50 MILLILITER(S): 9 INJECTION, SOLUTION INTRAVENOUS at 13:38

## 2017-12-24 RX ADMIN — HYDROMORPHONE HYDROCHLORIDE 1 MILLIGRAM(S): 2 INJECTION INTRAMUSCULAR; INTRAVENOUS; SUBCUTANEOUS at 05:53

## 2017-12-24 RX ADMIN — SODIUM CHLORIDE 50 MILLILITER(S): 9 INJECTION, SOLUTION INTRAVENOUS at 00:22

## 2017-12-24 RX ADMIN — HYDROMORPHONE HYDROCHLORIDE 0.5 MILLIGRAM(S): 2 INJECTION INTRAMUSCULAR; INTRAVENOUS; SUBCUTANEOUS at 15:05

## 2017-12-24 RX ADMIN — MEROPENEM 100 MILLIGRAM(S): 1 INJECTION INTRAVENOUS at 00:16

## 2017-12-24 RX ADMIN — HYDROMORPHONE HYDROCHLORIDE 1 MILLIGRAM(S): 2 INJECTION INTRAMUSCULAR; INTRAVENOUS; SUBCUTANEOUS at 10:20

## 2017-12-24 RX ADMIN — HYDROMORPHONE HYDROCHLORIDE 1 MILLIGRAM(S): 2 INJECTION INTRAMUSCULAR; INTRAVENOUS; SUBCUTANEOUS at 13:55

## 2017-12-24 RX ADMIN — HYDROMORPHONE HYDROCHLORIDE 1 MILLIGRAM(S): 2 INJECTION INTRAMUSCULAR; INTRAVENOUS; SUBCUTANEOUS at 06:15

## 2017-12-24 RX ADMIN — Medication 200 GRAM(S): at 06:56

## 2017-12-24 RX ADMIN — MEROPENEM 100 MILLIGRAM(S): 1 INJECTION INTRAVENOUS at 17:53

## 2017-12-24 RX ADMIN — HYDROMORPHONE HYDROCHLORIDE 1 MILLIGRAM(S): 2 INJECTION INTRAMUSCULAR; INTRAVENOUS; SUBCUTANEOUS at 10:12

## 2017-12-24 RX ADMIN — LINEZOLID 300 MILLIGRAM(S): 600 INJECTION, SOLUTION INTRAVENOUS at 05:55

## 2017-12-24 RX ADMIN — HYDROMORPHONE HYDROCHLORIDE 1 MILLIGRAM(S): 2 INJECTION INTRAMUSCULAR; INTRAVENOUS; SUBCUTANEOUS at 13:38

## 2017-12-24 RX ADMIN — HYDROMORPHONE HYDROCHLORIDE 1 MILLIGRAM(S): 2 INJECTION INTRAMUSCULAR; INTRAVENOUS; SUBCUTANEOUS at 02:31

## 2017-12-24 RX ADMIN — HYDROMORPHONE HYDROCHLORIDE 1 MILLIGRAM(S): 2 INJECTION INTRAMUSCULAR; INTRAVENOUS; SUBCUTANEOUS at 20:30

## 2017-12-24 RX ADMIN — MEROPENEM 100 MILLIGRAM(S): 1 INJECTION INTRAVENOUS at 10:12

## 2017-12-24 NOTE — PROVIDER CONTACT NOTE (CRITICAL VALUE NOTIFICATION) - RECOMMENDATIONS
Continue to monitor for s/s hypo/hyperglycemia. Follow hypoglycemia protocol
replete K and Ca
bicarb, d50w
continue abx.

## 2017-12-24 NOTE — PROVIDER CONTACT NOTE (CRITICAL VALUE NOTIFICATION) - SITUATION
Glucose=38
pt new admission with urosepsis, febrile, AMS.
pt uroseptic, ruptured ureter. on abx.
second set of blood cultures.

## 2017-12-24 NOTE — PROGRESS NOTE ADULT - PROBLEM SELECTOR PLAN 3
supportive care  dilaudid could be change to dilaudid gtt at 0.5mg/hr with 1mg Q 1 hours prn  added robinol atc for secretions

## 2017-12-24 NOTE — PROGRESS NOTE ADULT - SUBJECTIVE AND OBJECTIVE BOX
Patient is a 77y old  Male who presents with a chief complaint of REAGAN (23 Dec 2017 02:57)      24 hour events: ***    REVIEW OF SYSTEMS:  Constitutional: [ ] fevers [ ] chills [ ] weight loss [ ] weight gain  HEENT: [ ] dry eyes [ ] eye irritation [ ] postnasal drip [ ] nasal congestion  CV: [ ] chest pain [ ] orthopnea [ ] palpitations [ ] murmur  Resp: [ ] cough [ ] shortness of breath [ ] dyspnea [ ] wheezing [ ] sputum [ ] hemoptysis  GI: [ ] nausea [ ] vomiting [ ] diarrhea [ ] constipation [ ] abd pain [ ] dysphagia   : [ ] dysuria [ ] nocturia [ ] hematuria [ ] increased urinary frequency  Musculoskeletal: [ ] back pain [ ] myalgias [ ] arthralgias [ ] fracture  Skin: [ ] rash [ ] itch  Neurological: [ ] headache [ ] dizziness [ ] syncope [ ] weakness [ ] numbness  Psychiatric: [ ] anxiety [ ] depression  Endocrine: [ ] diabetes [ ] thyroid problem  Hematologic/Lymphatic: [ ] anemia [ ] bleeding problem  Allergic/Immunologic: [ ] itchy eyes [ ] nasal discharge [ ] hives [ ] angioedema  [ ] All other systems negative  [ ] Unable to assess ROS because ________    OBJECTIVE:  ICU Vital Signs Last 24 Hrs  T(C): 37.4 (24 Dec 2017 05:00), Max: 39.4 (23 Dec 2017 21:30)  T(F): 99.4 (24 Dec 2017 05:00), Max: 102.9 (23 Dec 2017 21:30)  HR: 96 (24 Dec 2017 06:00) (62 - 136)  BP: 110/66 (24 Dec 2017 06:00) (81/59 - 154/121)  BP(mean): 85 (24 Dec 2017 06:00) (68 - 146)  ABP: --  ABP(mean): --  RR: 10 (24 Dec 2017 06:00) (6 - 41)  SpO2: 94% (24 Dec 2017 06:00) (87% - 100%)         @ 07:01  -   @ 07:00  --------------------------------------------------------  IN: 3503 mL / OUT: 1035 mL / NET: 2468 mL      CAPILLARY BLOOD GLUCOSE      POCT Blood Glucose.: 131 mg/dL (24 Dec 2017 05:55)      PHYSICAL EXAM:  GENERAL: NAD, well-developed  HEAD:  Atraumatic, Normocephalic  EYES: EOMI, PERRLA, conjunctiva and sclera clear  NECK: Supple, No JVD, Thyroid not palpable, non tender, Trachea midline  CHEST/LUNG: ( )ETT in place, ( )Tracheostomy in place, ( )no chest deformity,  ( )  Normal expansion/effort/palpation,  ( )Normal percussion/auscultation,  Clear to auscultation bilaterally; No wheeze  HEART: Regular rate and rhythm; No murmurs, rubs, or gallops, ( ) No JVD, ( )Normal Pulses, ( )Edema   ABDOMEN: Soft, Nontender, Nondistended; Bowel sounds presen, ( ) No Masses, (  ) No organomegaly,  (  ) Non-tender normal BS   : Sanches in Place, Voiding freely  Musculoskeletal/EXTREMITIES:(  ) Normal strength, movement, and tone, (  ) No focal atropy, (  ) Normal ROM, (  ) Normal digits and nails,  2+ Peripheral Pulses, No clubbing, cyanosis, or edema  PSYCH: AAOx3, (  ) Normal mood/affect/judgment/insight, (  ) Intact memory,   NEUROLOGY: non-focal, exam  SKIN: No rashes or lesions      LINES:    HOSPITAL MEDICATIONS:  MEDICATIONS  (STANDING):  dextrose 5% + sodium chloride 0.45%. 1000 milliLiter(s) (50 mL/Hr) IV Continuous <Continuous>  heparin  Injectable 5000 Unit(s) SubCutaneous every 8 hours  hydrocortisone sodium succinate Injectable 50 milliGRAM(s) IV Push every 8 hours  HYDROmorphone  Injectable 0.5 milliGRAM(s) IV Push once  HYDROmorphone  Injectable 1 milliGRAM(s) IV Push every 4 hours  influenza   Vaccine 0.5 milliLiter(s) IntraMuscular once  insulin lispro (HumaLOG) corrective regimen sliding scale   SubCutaneous every 6 hours  linezolid  IVPB 600 milliGRAM(s) IV Intermittent every 12 hours  linezolid  IVPB      meropenem IVPB 1000 milliGRAM(s) IV Intermittent every 8 hours  micafungin IVPB      micafungin IVPB 150 milliGRAM(s) IV Intermittent every 24 hours  norepinephrine Infusion 0.01 MICROgram(s)/kG/Min (1.326 mL/Hr) IV Continuous <Continuous>  phenylephrine    Infusion 0.5 MICROgram(s)/kG/Min (13.125 mL/Hr) IV Continuous <Continuous>  potassium chloride  10 mEq/100 mL IVPB 10 milliEquivalent(s) IV Intermittent every 1 hour    MEDICATIONS  (PRN):  HYDROmorphone  Injectable 0.5 milliGRAM(s) IV Push every 2 hours PRN Breakthrough dyspnea/pain  ondansetron Injectable 4 milliGRAM(s) IV Push every 4 hours PRN Nausea and/or Vomiting      LABS:                        8.0    16.7  )-----------( 124      ( 24 Dec 2017 03:26 )             25.6     Hgb Trend: 8.0<--, 10.2<--, 11.7<--, 9.8<--, 9.0<--  12-24    148<H>  |  119<H>  |  25<H>  ----------------------------<  101<H>  2.8<LL>   |  15<L>  |  0.95    Ca    5.2<LL>      24 Dec 2017 03:26  Phos  2.5     12-24  Mg     1.2     12-24    TPro  4.9<L>  /  Alb  1.6<L>  /  TBili  0.2  /  DBili  x   /  AST  28  /  ALT  18  /  AlkPhos  91  12-24    Creatinine Trend: 0.95<--, 1.27<--, 1.36<--, 1.13<--, 1.09<--, 0.83<--  PT/INR - ( 23 Dec 2017 03:17 )   PT: 12.7 sec;   INR: 1.16 ratio         PTT - ( 23 Dec 2017 03:17 )  PTT:32.2 sec  Urinalysis Basic - ( 22 Dec 2017 16:42 )    Color: Yellow / Appearance: Turbid / S.019 / pH: x  Gluc: x / Ketone: Trace  / Bili: Negative / Urobili: Negative   Blood: x / Protein: 300 mg/dL / Nitrite: Negative   Leuk Esterase: Large / RBC: 5-10 /HPF / WBC >50 /HPF   Sq Epi: x / Non Sq Epi: x / Bacteria: Many /HPF        Venous Blood Gas:   @ 03:15  7.17/48/27//  VBG Lactate: 8.3  Venous Blood Gas:   @ 22:04  7.45/27/72/18/95  VBG Lactate: 5.5  Venous Blood Gas:   @ 18:09  7.39/36/40//69  VBG Lactate: 6.2      EKG:    MICROBIOLOGY:     RADIOLOGY:  [ ] Reviewed and interpreted by me    EKG:  MICROBIOLOGY:     Radiology: ***    Bedside lung ultrasound: ***    Bedside ECHO: ***    EKG:    CENTRAL LINE: Y/N          DATE INSERTED:              REMOVE: Y/N    SANCHES: Y/N                        DATE INSERTED:              REMOVE: Y/N    A-LINE: Y/N                       DATE INSERTED:              REMOVE: Y/N    GLOBAL ISSUE/BEST PRACTICE:  Analgesia:  Sedation:  HOB elevation: yes  Stress ulcer prophylaxis:  VTE prophylaxis:  Glycemic control:  Nutrition:    CODE STATUS: ***  [facility] discussion: Y Patient is a 77y old  Male who presents with a chief complaint of REAGAN (23 Dec 2017 02:57)      24 hour events: ***  PRN dilaudid changed to standing dilaudid overnight for agonal breathing.     REVIEW OF SYSTEMS:  Constitutional: [ ] fevers [ ] chills [ ] weight loss [ ] weight gain  HEENT: [ ] dry eyes [ ] eye irritation [ ] postnasal drip [ ] nasal congestion  CV: [ ] chest pain [ ] orthopnea [ ] palpitations [ ] murmur  Resp: [ ] cough [ ] shortness of breath [ ] dyspnea [ ] wheezing [ ] sputum [ ] hemoptysis  GI: [ ] nausea [ ] vomiting [ ] diarrhea [ ] constipation [ ] abd pain [ ] dysphagia   : [ ] dysuria [ ] nocturia [ ] hematuria [ ] increased urinary frequency  Musculoskeletal: [ ] back pain [ ] myalgias [ ] arthralgias [ ] fracture  Skin: [ ] rash [ ] itch  Neurological: [ ] headache [ ] dizziness [ ] syncope [ ] weakness [ ] numbness  Psychiatric: [ ] anxiety [ ] depression  Endocrine: [ ] diabetes [ ] thyroid problem  Hematologic/Lymphatic: [ ] anemia [ ] bleeding problem  Allergic/Immunologic: [ ] itchy eyes [ ] nasal discharge [ ] hives [ ] angioedema  [ ] All other systems negative  [x ] Unable to assess ROS because patient has encephalopathy  OBJECTIVE:  ICU Vital Signs Last 24 Hrs  T(C): 37.4 (24 Dec 2017 05:00), Max: 39.4 (23 Dec 2017 21:30)  T(F): 99.4 (24 Dec 2017 05:00), Max: 102.9 (23 Dec 2017 21:30)  HR: 96 (24 Dec 2017 06:00) (62 - 136)  BP: 110/66 (24 Dec 2017 06:00) (81/59 - 154/121)  BP(mean): 85 (24 Dec 2017 06:00) (68 - 146)  ABP: --  ABP(mean): --  RR: 10 (24 Dec 2017 06:00) (6 - 41)  SpO2: 94% (24 Dec 2017 06:00) (87% - 100%)         @ 07:01  -  12 @ 07:00  --------------------------------------------------------  IN: 3503 mL / OUT: 1035 mL / NET: 2468 mL      CAPILLARY BLOOD GLUCOSE      POCT Blood Glucose.: 131 mg/dL (24 Dec 2017 05:55)      PHYSICAL EXAM:  GENERAL: in moderate distress due to pain, acute on chronically ill  HEAD:  Atraumatic, Normocephalic  EYES: EOMI, PERRLA, conjunctiva and sclera clear  NECK: Supple, No JVD  CHEST/LUNG: Clear to auscultation bilaterally; No wheeze  HEART: tachycardic, regular; No murmurs, rubs, or gallops  ABDOMEN: Soft, TTP diffusely, Nondistended; Bowel sounds present  EXTREMITIES:  2+ Peripheral Pulses, No clubbing, cyanosis, or edema  PSYCH: AAOx1  NEUROLOGY: non-focal  SKIN: No rashes or lesions      LINES:    HOSPITAL MEDICATIONS:  MEDICATIONS  (STANDING):  dextrose 5% + sodium chloride 0.45%. 1000 milliLiter(s) (50 mL/Hr) IV Continuous <Continuous>  heparin  Injectable 5000 Unit(s) SubCutaneous every 8 hours  hydrocortisone sodium succinate Injectable 50 milliGRAM(s) IV Push every 8 hours  HYDROmorphone  Injectable 0.5 milliGRAM(s) IV Push once  HYDROmorphone  Injectable 1 milliGRAM(s) IV Push every 4 hours  influenza   Vaccine 0.5 milliLiter(s) IntraMuscular once  insulin lispro (HumaLOG) corrective regimen sliding scale   SubCutaneous every 6 hours  linezolid  IVPB 600 milliGRAM(s) IV Intermittent every 12 hours  linezolid  IVPB      meropenem IVPB 1000 milliGRAM(s) IV Intermittent every 8 hours  micafungin IVPB      micafungin IVPB 150 milliGRAM(s) IV Intermittent every 24 hours  norepinephrine Infusion 0.01 MICROgram(s)/kG/Min (1.326 mL/Hr) IV Continuous <Continuous>  phenylephrine    Infusion 0.5 MICROgram(s)/kG/Min (13.125 mL/Hr) IV Continuous <Continuous>  potassium chloride  10 mEq/100 mL IVPB 10 milliEquivalent(s) IV Intermittent every 1 hour    MEDICATIONS  (PRN):  HYDROmorphone  Injectable 0.5 milliGRAM(s) IV Push every 2 hours PRN Breakthrough dyspnea/pain  ondansetron Injectable 4 milliGRAM(s) IV Push every 4 hours PRN Nausea and/or Vomiting      LABS:                        8.0    16.7  )-----------( 124      ( 24 Dec 2017 03:26 )             25.6     Hgb Trend: 8.0<--, 10.2<--, 11.7<--, 9.8<--, 9.0<--  12-24    148<H>  |  119<H>  |  25<H>  ----------------------------<  101<H>  2.8<LL>   |  15<L>  |  0.95    Ca    5.2<LL>      24 Dec 2017 03:26  Phos  2.5     12-24  Mg     1.2     -24    TPro  4.9<L>  /  Alb  1.6<L>  /  TBili  0.2  /  DBili  x   /  AST  28  /  ALT  18  /  AlkPhos  91  24    Creatinine Trend: 0.95<--, 1.27<--, 1.36<--, 1.13<--, 1.09<--, 0.83<--  PT/INR - ( 23 Dec 2017 03:17 )   PT: 12.7 sec;   INR: 1.16 ratio         PTT - ( 23 Dec 2017 03:17 )  PTT:32.2 sec  Urinalysis Basic - ( 22 Dec 2017 16:42 )    Color: Yellow / Appearance: Turbid / S.019 / pH: x  Gluc: x / Ketone: Trace  / Bili: Negative / Urobili: Negative   Blood: x / Protein: 300 mg/dL / Nitrite: Negative   Leuk Esterase: Large / RBC: 5-10 /HPF / WBC >50 /HPF   Sq Epi: x / Non Sq Epi: x / Bacteria: Many /HPF        Venous Blood Gas:   @ 03:15  7.17/48/27//  VBG Lactate: 8.3  Venous Blood Gas:   @ 22:04  7.45/27/72/18/95  VBG Lactate: 5.5  Venous Blood Gas:   @ 18:09  7.39/36/40/21/69  VBG Lactate: 6.2      EKG:    MICROBIOLOGY:     RADIOLOGY:  [ ] Reviewed and interpreted by me    EKG:  MICROBIOLOGY:     Radiology: ***    Bedside lung ultrasound: ***    Bedside ECHO: ***    EKG:    CENTRAL LINE: Y/N          DATE INSERTED:              REMOVE: Y/N    SANCHES: Y/N                        DATE INSERTED:              REMOVE: Y/N    A-LINE: Y/N                       DATE INSERTED:              REMOVE: Y/N    GLOBAL ISSUE/BEST PRACTICE:  Analgesia:  Sedation:  HOB elevation: yes  Stress ulcer prophylaxis:  VTE prophylaxis:  Glycemic control:  Nutrition:    CODE STATUS: ***  [facility] discussion: Y Patient is a 77y old  Male who presents with a chief complaint of REAGAN (23 Dec 2017 02:57)      24 hour events: ***  PRN dilaudid changed to standing dilaudid PRN overnight for agonal breathing.     REVIEW OF SYSTEMS:  Constitutional: [ ] fevers [ ] chills [ ] weight loss [ ] weight gain  HEENT: [ ] dry eyes [ ] eye irritation [ ] postnasal drip [ ] nasal congestion  CV: [ ] chest pain [ ] orthopnea [ ] palpitations [ ] murmur  Resp: [ ] cough [ ] shortness of breath [ ] dyspnea [ ] wheezing [ ] sputum [ ] hemoptysis  GI: [ ] nausea [ ] vomiting [ ] diarrhea [ ] constipation [ ] abd pain [ ] dysphagia   : [ ] dysuria [ ] nocturia [ ] hematuria [ ] increased urinary frequency  Musculoskeletal: [ ] back pain [ ] myalgias [ ] arthralgias [ ] fracture  Skin: [ ] rash [ ] itch  Neurological: [ ] headache [ ] dizziness [ ] syncope [ ] weakness [ ] numbness  Psychiatric: [ ] anxiety [ ] depression  Endocrine: [ ] diabetes [ ] thyroid problem  Hematologic/Lymphatic: [ ] anemia [ ] bleeding problem  Allergic/Immunologic: [ ] itchy eyes [ ] nasal discharge [ ] hives [ ] angioedema  [ ] All other systems negative  [x ] Unable to assess ROS because patient has encephalopathy    OBJECTIVE:  ICU Vital Signs Last 24 Hrs  T(C): 37.4 (24 Dec 2017 05:00), Max: 39.4 (23 Dec 2017 21:30)  T(F): 99.4 (24 Dec 2017 05:00), Max: 102.9 (23 Dec 2017 21:30)  HR: 96 (24 Dec 2017 06:00) (62 - 136)  BP: 110/66 (24 Dec 2017 06:00) (81/59 - 154/121)  BP(mean): 85 (24 Dec 2017 06:00) (68 - 146)  ABP: --  ABP(mean): --  RR: 10 (24 Dec 2017 06:00) (6 - 41)  SpO2: 94% (24 Dec 2017 06:00) (87% - 100%)         @ 07:01  -   @ 07:00  --------------------------------------------------------  IN: 3503 mL / OUT: 1035 mL / NET: 2468 mL      CAPILLARY BLOOD GLUCOSE      POCT Blood Glucose.: 131 mg/dL (24 Dec 2017 05:55)      PHYSICAL EXAM:  GENERAL: in moderate distress due to pain, acute on chronically ill  HEAD:  Atraumatic, Normocephalic  EYES: EOMI, PERRLA, conjunctiva and sclera clear  NECK: Supple, No JVD  CHEST/LUNG: b/l rhonchi  HEART: tachycardic, regular; No murmurs, rubs, or gallops  ABDOMEN: Soft, Nondistended; Bowel sounds present  EXTREMITIES:  2+ Peripheral Pulses, No clubbing, cyanosis, or edema  PSYCH: AAOx1  NEUROLOGY: non-focal  SKIN: No rashes or lesions      LINES:    HOSPITAL MEDICATIONS:  MEDICATIONS  (STANDING):  dextrose 5% + sodium chloride 0.45%. 1000 milliLiter(s) (50 mL/Hr) IV Continuous <Continuous>  heparin  Injectable 5000 Unit(s) SubCutaneous every 8 hours  hydrocortisone sodium succinate Injectable 50 milliGRAM(s) IV Push every 8 hours  HYDROmorphone  Injectable 0.5 milliGRAM(s) IV Push once  HYDROmorphone  Injectable 1 milliGRAM(s) IV Push every 4 hours  influenza   Vaccine 0.5 milliLiter(s) IntraMuscular once  insulin lispro (HumaLOG) corrective regimen sliding scale   SubCutaneous every 6 hours  linezolid  IVPB 600 milliGRAM(s) IV Intermittent every 12 hours  linezolid  IVPB      meropenem IVPB 1000 milliGRAM(s) IV Intermittent every 8 hours  micafungin IVPB      micafungin IVPB 150 milliGRAM(s) IV Intermittent every 24 hours  norepinephrine Infusion 0.01 MICROgram(s)/kG/Min (1.326 mL/Hr) IV Continuous <Continuous>  phenylephrine    Infusion 0.5 MICROgram(s)/kG/Min (13.125 mL/Hr) IV Continuous <Continuous>  potassium chloride  10 mEq/100 mL IVPB 10 milliEquivalent(s) IV Intermittent every 1 hour    MEDICATIONS  (PRN):  HYDROmorphone  Injectable 0.5 milliGRAM(s) IV Push every 2 hours PRN Breakthrough dyspnea/pain  ondansetron Injectable 4 milliGRAM(s) IV Push every 4 hours PRN Nausea and/or Vomiting      LABS:                        8.0    16.7  )-----------( 124      ( 24 Dec 2017 03:26 )             25.6     Hgb Trend: 8.0<--, 10.2<--, 11.7<--, 9.8<--, 9.0<--  12-24    148<H>  |  119<H>  |  25<H>  ----------------------------<  101<H>  2.8<LL>   |  15<L>  |  0.95    Ca    5.2<LL>      24 Dec 2017 03:26  Phos  2.5     12-24  Mg     1.2     12-24    TPro  4.9<L>  /  Alb  1.6<L>  /  TBili  0.2  /  DBili  x   /  AST  28  /  ALT  18  /  AlkPhos  91  12-24    Creatinine Trend: 0.95<--, 1.27<--, 1.36<--, 1.13<--, 1.09<--, 0.83<--  PT/INR - ( 23 Dec 2017 03:17 )   PT: 12.7 sec;   INR: 1.16 ratio         PTT - ( 23 Dec 2017 03:17 )  PTT:32.2 sec  Urinalysis Basic - ( 22 Dec 2017 16:42 )    Color: Yellow / Appearance: Turbid / S.019 / pH: x  Gluc: x / Ketone: Trace  / Bili: Negative / Urobili: Negative   Blood: x / Protein: 300 mg/dL / Nitrite: Negative   Leuk Esterase: Large / RBC: 5-10 /HPF / WBC >50 /HPF   Sq Epi: x / Non Sq Epi: x / Bacteria: Many /HPF        Venous Blood Gas:   @ 03:15  7.17/48/27//  VBG Lactate: 8.3  Venous Blood Gas:   @ 22:04  7.45/27/72/18/95  VBG Lactate: 5.5  Venous Blood Gas:   @ 18:09  7.39/36/40/21/69  VBG Lactate: 6.2      EKG:    MICROBIOLOGY:     RADIOLOGY:  [ ] Reviewed and interpreted by me    EKG:  MICROBIOLOGY:     Radiology: ***    Bedside lung ultrasound: ***    Bedside ECHO: ***    EKG:    CENTRAL LINE: Y/N          DATE INSERTED:              REMOVE: Y/N    SANCHES: Y/N                        DATE INSERTED:              REMOVE: Y/N    A-LINE: Y/N                       DATE INSERTED:              REMOVE: Y/N    GLOBAL ISSUE/BEST PRACTICE:  Analgesia:  Sedation:  HOB elevation: yes  Stress ulcer prophylaxis:  VTE prophylaxis:  Glycemic control:  Nutrition:    CODE STATUS: ***  [facility] discussion: Y

## 2017-12-24 NOTE — PROVIDER CONTACT NOTE (CRITICAL VALUE NOTIFICATION) - ACTION/TREATMENT ORDERED:
Continue to monitor for s/s hypo/hyperglycemia. Follow hypoglycemia protocol
replete K and Ca
1 amp bicarb, 1 amp d50W
continue abx

## 2017-12-24 NOTE — PROGRESS NOTE ADULT - PROBLEM SELECTOR PLAN 4
emotional support provided to family  they still are struggling with adjustment of pressors  I have explained tragectory of disease and that pt is actively dying  they will continue to discuss goals

## 2017-12-24 NOTE — PROGRESS NOTE ADULT - ATTENDING COMMENTS
On call attending. Pt seen and examined. Agree with above and edited as appropriate. No intervention desired at this time. Spoke with wife and children at patient bedside. Family wants abx and observation and will consider intervention if there is clinical improvement.
Pt seen and examined, agree with above. 77 M with multiple medical problems now admitted with septic shock, VRE bacteremia, candidemia 2/2 a perforated ureter and urosepsis, acute resp failure with hypoxia. Remains on vasopressor support and antibiotic coverage with Zyvox, Meropenem and Micafungin. Not a candidate for surgical repair per urology. I had an extensive discussion with the patients wife and son at bedside. They had opted not to pursue perc nephrostomy. They would like to continue pressor support for now but agree to aggressive pain management. They are considering pursuing comfort measures but would like some more time to discuss this amongst themselves. Remains DNR/ DNI. Palliative care input appreciated.   - Critical Care Time spent: 40 mins

## 2017-12-24 NOTE — PROGRESS NOTE ADULT - ASSESSMENT
77M PMH CAD s/p CABG (2016), ILD on 3L NC, recent admission for pseudomonal pneumonia requiring prolonged intubation presents from [facility] with AMS and fever, found to have ruptured L ureter with concomitant septic shock from urosepsis. Patient with very poor prognosis and likely with terminal infection.     #Neuro  -metabolic encephalopathy 2/2 sepsis due to ruptured ureter    #CV  -hypotensive 2/2 sepsis due to ruptured ureter  -c/w levophed and phenylephrine gtt to maintain MAP>60    #Pulm  -patient with severe lung disease and small pneumothorax on CT 77M PMH CAD s/p CABG (2016), ILD on 3L NC, recent admission for pseudomonal pneumonia requiring prolonged intubation presents from [facility] with AMS and fever, found to have ruptured L ureter with concomitant septic shock from urosepsis. Patient with very poor prognosis and likely with terminal infection.     #Neuro  -metabolic encephalopathy 2/2 sepsis due to ruptured ureter    #CV  -hypotensive 2/2 sepsis due to ruptured ureter  -c/w levophed and phenylephrine gtt to maintain MAP>65  -hold any antiplatelet medications and antihypertensives    #Pulm  -patient with severe lung disease and small pneumothorax on CT  -c/w oxygen therapy PRN    #ID  -urosepsis in the setting of perforated ureter  -c/w pressor support with levophed and phenylephrine and titrate for MAP>65  -Bcx grew VRE and candida abican  -c/w linezolid, meropenem and micafungin    #GI  -keep NPO    #endocrine  -FS at goal  -c/w ISS    #renal  -perforation of ureter with associated urosepsis  -patient is not a surgical candidate as per urology; instead recommended for IR guided percutaneous nephrostomy tube  -after extensive discussion with the patient's family members and the subsepcialists, decision was made by family not to proceed with risky procedures including nephrostomy tube  -pain control and comfort measures  #GOC  -after extensive discussion with the family regarding trajectory of the disease and various options, family agreeable with plan  to attempt to wean patient off pressor and then not to restart pressor even if BP deteriorates again

## 2017-12-24 NOTE — PROGRESS NOTE ADULT - SUBJECTIVE AND OBJECTIVE BOX
INTERVAL HPI/OVERNIGHT EVENTS:  Pt unresponsive.  +oral secretions.  Family at bedside.  Tearful    Allergies    Vaseline (Pruritus; Urticaria)    Intolerances        ADVANCE DIRECTIVES:    DNR: [ ] NO [ x] YES (Date) MOLST [ ] NO [ ] YES (Date)    MEDICATIONS  (STANDING):  dextrose 5% + sodium chloride 0.45%. 1000 milliLiter(s) (50 mL/Hr) IV Continuous <Continuous>  glycopyrrolate Injectable 0.4 milliGRAM(s) IV Push every 4 hours  heparin  Injectable 5000 Unit(s) SubCutaneous every 8 hours  hydrocortisone sodium succinate Injectable 50 milliGRAM(s) IV Push every 8 hours  HYDROmorphone  Injectable 0.5 milliGRAM(s) IV Push once  HYDROmorphone  Injectable 1 milliGRAM(s) IV Push every 4 hours  influenza   Vaccine 0.5 milliLiter(s) IntraMuscular once  insulin lispro (HumaLOG) corrective regimen sliding scale   SubCutaneous every 6 hours  linezolid  IVPB 600 milliGRAM(s) IV Intermittent every 12 hours  linezolid  IVPB      meropenem IVPB 1000 milliGRAM(s) IV Intermittent every 8 hours  micafungin IVPB      micafungin IVPB 150 milliGRAM(s) IV Intermittent every 24 hours  norepinephrine Infusion 0.01 MICROgram(s)/kG/Min (1.326 mL/Hr) IV Continuous <Continuous>  phenylephrine    Infusion 0.5 MICROgram(s)/kG/Min (13.125 mL/Hr) IV Continuous <Continuous>    MEDICATIONS  (PRN):  HYDROmorphone  Injectable 0.5 milliGRAM(s) IV Push every 2 hours PRN Breakthrough dyspnea/pain  ondansetron Injectable 4 milliGRAM(s) IV Push every 4 hours PRN Nausea and/or Vomiting      PRESENT SYMPTOMS:  Source: [ ] Patient   [x ] Family   [x ] Team     Pain:                        [ x] No [ ] Yes             [ ] Mild [ ] Moderate [ ] Severe    Onset -  Location -  Duration -  Character -  Alleviating/Aggravating -  Radiation -  Timing -    Dyspnea:                [ ] No [x ] Yes             [ ] Mild [x ] Moderate [ ] Severe    Anxiety:                  [x ] No [ ] Yes             [ ] Mild [ ] Moderate [ ] Severe    Fatigue:                  [ ] No [ x] Yes             [ ] Mild [ ] Moderate [ ] Severe    Nausea:                  [x ] No [ ] Yes             [ ] Mild [ ] Moderate [ ] Severe    Loss of appetite:   [ ] No [x ] Yes             [ ] Mild [ ] Moderate [ ] Severe    Constipation:        [ ] No [ ] Yes             [ ] Mild [ ] Moderate [ ] Severe    Other Symptoms:  [ ] All other review of systems negative   [ x] Unable to obtain due to poor mentation     Karnofsky Performance Score/Palliative Performance Status Version 2:      10   %    PHYSICAL EXAM:  Vital Signs Last 24 Hrs  T(C): 37.9 (24 Dec 2017 07:36), Max: 39.4 (23 Dec 2017 21:30)  T(F): 100.2 (24 Dec 2017 07:36), Max: 102.9 (23 Dec 2017 21:30)  HR: 76 (24 Dec 2017 10:23) (62 - 126)  BP: 131/80 (24 Dec 2017 10:23) (81/59 - 154/121)  BP(mean): 96 (24 Dec 2017 10:23) (69 - 146)  RR: 12 (24 Dec 2017 10:23) (6 - 110)  SpO2: 100% (24 Dec 2017 10:23) (90% - 100%) I&O's Summary    23 Dec 2017 07:01  -  24 Dec 2017 07:00  --------------------------------------------------------  IN: 3579.5 mL / OUT: 1055 mL / NET: 2524.5 mL    24 Dec 2017 07:01  -  24 Dec 2017 11:37  --------------------------------------------------------  IN: 499.5 mL / OUT: 175 mL / NET: 324.5 mL        General:  [ ] Alert  [ ] Oriented x      [ ] Lethargic  [ ] Agitated   [ ] Cachexia   [x ] Unarousable  [ ] Verbal  [x ] Non-Verbal    HEENT:  [ ] Normal   [x ] Dry mouth   [ ] ET Tube    [ ] Trach  [ ] Oral lesions    Lungs:   [ ] Clear [ ] Tachypnea  [x ] Audible excessive secretions   [ ] Rhonchi        [ ] Right [ ] Left [ ] Bilateral  [ ] Crackles        [ ] Right [ ] Left [ ] Bilateral  [ ] Wheezing     [ ] Right [ ] Left [ ] Bilateral    Cardiovascular:  [ ] Regular [ ] Irregular [ x] Tachycardia   [ ] Bradycardia  [ ] Murmur [ ] Other    Abdomen: [ ] Soft  [ x] Distended   [ ] +BS  [ ] Non tender [ ] Tender  [ ]PEG   [ ]OGT/ NGT   Last BM:       Genitourinary: [ ] Normal [ ] Incontinent   [ ] Oliguria/Anuria   [x ] Villagran    Musculoskeletal:  [ ] Normal   [ ] Weakness  [x ] Bedbound/Wheelchair bound [ ] Edema    Neurological: [ ] No focal deficits  [ ] Cognitive impairment  [ ] Dysphagia [ ] Dysarthria [ ] Paresis [x ] Other encephalopathy    Skin: [ x] Normal   [ ] Pressure ulcer(s)                  [ ] Rash    LABS:  [ ] Critical Care time for unstable patient with organ failure.  Total Time:                 minutes  12-24    148<H>  |  119<H>  |  25<H>  ----------------------------<  101<H>  2.8<LL>   |  15<L>  |  0.95    Ca    5.2<LL>      24 Dec 2017 03:26  Phos  2.5     12-24  Mg     1.2     12-24    TPro  4.9<L>  /  Alb  1.6<L>  /  TBili  0.2  /  DBili  x   /  AST  28  /  ALT  18  /  AlkPhos  91  12-24    PT/INR - ( 23 Dec 2017 03:17 )   PT: 12.7 sec;   INR: 1.16 ratio         PTT - ( 23 Dec 2017 03:17 )  PTT:32.2 sec  Urinalysis Basic - ( 22 Dec 2017 16:42 )    Color: Yellow / Appearance: Turbid / S.019 / pH: x  Gluc: x / Ketone: Trace  / Bili: Negative / Urobili: Negative   Blood: x / Protein: 300 mg/dL / Nitrite: Negative   Leuk Esterase: Large / RBC: 5-10 /HPF / WBC >50 /HPF   Sq Epi: x / Non Sq Epi: x / Bacteria: Many /HPF        Shock: [ ] Septic [ ] Cardiogenic [ ] Neurologic [ ] Hypovolemic  Vasopressors x   Inotrophs x     Oral Intake: [ ] Unable/mouth care only [ ] Minimal [ ] Moderate [ ] Full Capability  Diet: [ ] NPO [ ] Tube feeds [ ] TPN [ ] Other     RADIOLOGY & ADDITIONAL STUDIES:    REFERRALS:   [ ] Chaplaincy  [ ] Hospice  [ ] Child Life  [ ] Social Work  [ ] Case management [ ] Holistic Therapy       RADIOLOGY & ADDITIONAL STUDIES:

## 2017-12-24 NOTE — PROGRESS NOTE ADULT - PROBLEM SELECTOR PLAN 1
no surgical intervention  abx as tolerated however, do not think this will change course of pt's medical condition

## 2017-12-24 NOTE — CHART NOTE - NSCHARTNOTEFT_GEN_A_CORE
MICU Transfer Note    Transfer from: MICU    Transfer to: (  ) Medicine    (  ) Telemetry     (   ) RCU        (    ) Palliative         (   ) Stroke Unit          (   ) __________________    Accepting Physician:  Signout given to:     MICU COURSE:  77M PMH CAD s/p CABG (2016), ILD on 3L NC, recent admission for pseudomonal pneumonia requiring prolonged intubation presents from [facility] with AMS and fever. Has had a monzon catheter in place. Per family at bedside, he has had worsening confusion/delirium over the past few days. The patient's only complaint is back pain/lower abdominal pain which is moderate to severe, constant. Also with fevers and chills. 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 Urology evaluated patient and he is not a surgical candidate. Patient became progressively more septic, hypotensive. Requiring 5L IVF and pressors.     Patient initially recommended to have b/l percutaneous nephrostomies placed by IR in attempt to decompress obstruction. After extensive discussion with patient's family and specialists, the family came to the conclusion that the high risk procedure would not be in keeping with the patient's expressed wishes for his goals of care.     Patient was transferred to MICU. levophed and phenylephrine started for pressure support. One dose of gentamycin was given and patient continued on micafungin and meropenem. Bcx grew VRE and candida albican and abx was changed to linezolid and meropenem with micafungin. Dilaudid PRN started for dyspnea and pain. Extensive discussion held with the family regarding the trajectory of the disease and the family indicated full comfort care and to cap the pressors. Patient will transfer to PCU for further care.         ASSESSMENT & PLAN:     77M PMH CAD s/p CABG (2016), ILD on 3L NC, recent admission for pseudomonal pneumonia requiring prolonged intubation presents from [facility] with AMS and fever, found to have ruptured L ureter with concomitant septic shock from urosepsis. Patient with very poor prognosis and likely with terminal infection.     #Neuro  -metabolic encephalopathy 2/2 sepsis due to ruptured ureter    #CV  -hypotensive 2/2 sepsis due to ruptured ureter  -c/w levophed and phenylephrine gtt to maintain MAP>65  -hold any antiplatelet medications and antihypertensives    #Pulm  -patient with severe lung disease and small pneumothorax on CT  -c/w oxygen therapy PRN    #ID  -urosepsis in the setting of perforated ureter  -c/w pressor support with levophed and phenylephrine and titrate for MAP>65  -Bcx grew VRE and candida albican  -c/w linezolid, meropenem and micafungin    #GI  -keep NPO    #endocrine  -FS at goal  -c/w ISS    #renal  -perforation of ureter with associated urosepsis  -patient is not a surgical candidate as per urology; instead recommended for IR guided percutaneous nephrostomy tube  -after extensive discussion with the patient's family members and the subspecialists, decision was made by family not to proceed with risky procedures including nephrostomy tube  -pain control and comfort measures    #GOC  -extensive discussion held with family regarding trajectory of the illness and family indicated full comfort care and to cap pressor for the patient        FOR FOLLOW UP:  -c/w dilaudid gtt and robinol PRN for dyspnea/secretion  -c/w abx for bacteremia

## 2017-12-24 NOTE — PROVIDER CONTACT NOTE (CRITICAL VALUE NOTIFICATION) - BACKGROUND
Pt admitted for urosepsis, MICU border patient. Pt is DNR/DNI.
pt admitted with AMS and fever. uroseptic.
see H&P
see H&P

## 2017-12-25 VITALS — RESPIRATION RATE: 1 BRPM

## 2017-12-25 LAB
-  AMPICILLIN: SIGNIFICANT CHANGE UP
-  GENTAMICIN SYNERGY: SIGNIFICANT CHANGE UP
-  STREPTOMYCIN SYNERGY: SIGNIFICANT CHANGE UP
-  VANCOMYCIN: SIGNIFICANT CHANGE UP
CULTURE RESULTS: SIGNIFICANT CHANGE UP
METHOD TYPE: SIGNIFICANT CHANGE UP
ORGANISM # SPEC MICROSCOPIC CNT: SIGNIFICANT CHANGE UP
SPECIMEN SOURCE: SIGNIFICANT CHANGE UP

## 2017-12-25 PROCEDURE — 74174 CTA ABD&PLVS W/CONTRAST: CPT

## 2017-12-25 PROCEDURE — 87633 RESP VIRUS 12-25 TARGETS: CPT

## 2017-12-25 PROCEDURE — 82947 ASSAY GLUCOSE BLOOD QUANT: CPT

## 2017-12-25 PROCEDURE — 82330 ASSAY OF CALCIUM: CPT

## 2017-12-25 PROCEDURE — 85014 HEMATOCRIT: CPT

## 2017-12-25 PROCEDURE — 86850 RBC ANTIBODY SCREEN: CPT

## 2017-12-25 PROCEDURE — 83605 ASSAY OF LACTIC ACID: CPT

## 2017-12-25 PROCEDURE — 96375 TX/PRO/DX INJ NEW DRUG ADDON: CPT | Mod: XU

## 2017-12-25 PROCEDURE — 87186 SC STD MICRODIL/AGAR DIL: CPT

## 2017-12-25 PROCEDURE — 87798 DETECT AGENT NOS DNA AMP: CPT

## 2017-12-25 PROCEDURE — 96374 THER/PROPH/DIAG INJ IV PUSH: CPT | Mod: XU

## 2017-12-25 PROCEDURE — 71045 X-RAY EXAM CHEST 1 VIEW: CPT

## 2017-12-25 PROCEDURE — 82435 ASSAY OF BLOOD CHLORIDE: CPT

## 2017-12-25 PROCEDURE — 86901 BLOOD TYPING SEROLOGIC RH(D): CPT

## 2017-12-25 PROCEDURE — 85730 THROMBOPLASTIN TIME PARTIAL: CPT

## 2017-12-25 PROCEDURE — 83690 ASSAY OF LIPASE: CPT

## 2017-12-25 PROCEDURE — 80048 BASIC METABOLIC PNL TOTAL CA: CPT

## 2017-12-25 PROCEDURE — 84484 ASSAY OF TROPONIN QUANT: CPT

## 2017-12-25 PROCEDURE — 82550 ASSAY OF CK (CPK): CPT

## 2017-12-25 PROCEDURE — 82553 CREATINE MB FRACTION: CPT

## 2017-12-25 PROCEDURE — 85027 COMPLETE CBC AUTOMATED: CPT

## 2017-12-25 PROCEDURE — 83880 ASSAY OF NATRIURETIC PEPTIDE: CPT

## 2017-12-25 PROCEDURE — 82962 GLUCOSE BLOOD TEST: CPT

## 2017-12-25 PROCEDURE — 71250 CT THORAX DX C-: CPT

## 2017-12-25 PROCEDURE — 84295 ASSAY OF SERUM SODIUM: CPT

## 2017-12-25 PROCEDURE — 84132 ASSAY OF SERUM POTASSIUM: CPT

## 2017-12-25 PROCEDURE — 96376 TX/PRO/DX INJ SAME DRUG ADON: CPT | Mod: XU

## 2017-12-25 PROCEDURE — 87150 DNA/RNA AMPLIFIED PROBE: CPT

## 2017-12-25 PROCEDURE — 80053 COMPREHEN METABOLIC PANEL: CPT

## 2017-12-25 PROCEDURE — 87086 URINE CULTURE/COLONY COUNT: CPT

## 2017-12-25 PROCEDURE — 84100 ASSAY OF PHOSPHORUS: CPT

## 2017-12-25 PROCEDURE — 93005 ELECTROCARDIOGRAM TRACING: CPT | Mod: XU

## 2017-12-25 PROCEDURE — 81001 URINALYSIS AUTO W/SCOPE: CPT

## 2017-12-25 PROCEDURE — 86900 BLOOD TYPING SEROLOGIC ABO: CPT

## 2017-12-25 PROCEDURE — 87581 M.PNEUMON DNA AMP PROBE: CPT

## 2017-12-25 PROCEDURE — 82803 BLOOD GASES ANY COMBINATION: CPT

## 2017-12-25 PROCEDURE — 87486 CHLMYD PNEUM DNA AMP PROBE: CPT

## 2017-12-25 PROCEDURE — 83735 ASSAY OF MAGNESIUM: CPT

## 2017-12-25 PROCEDURE — 87181 SC STD AGAR DILUTION PER AGT: CPT

## 2017-12-25 PROCEDURE — 51702 INSERT TEMP BLADDER CATH: CPT

## 2017-12-25 PROCEDURE — 74176 CT ABD & PELVIS W/O CONTRAST: CPT

## 2017-12-25 PROCEDURE — 99285 EMERGENCY DEPT VISIT HI MDM: CPT | Mod: 25

## 2017-12-25 PROCEDURE — 85610 PROTHROMBIN TIME: CPT

## 2017-12-25 PROCEDURE — 87040 BLOOD CULTURE FOR BACTERIA: CPT

## 2017-12-25 RX ORDER — HYDROMORPHONE HYDROCHLORIDE 2 MG/ML
1 INJECTION INTRAMUSCULAR; INTRAVENOUS; SUBCUTANEOUS
Qty: 100 | Refills: 0 | Status: DISCONTINUED | OUTPATIENT
Start: 2017-12-25 | End: 2017-12-25

## 2017-12-25 RX ADMIN — ROBINUL 0.4 MILLIGRAM(S): 0.2 INJECTION INTRAMUSCULAR; INTRAVENOUS at 06:05

## 2017-12-25 RX ADMIN — ROBINUL 0.4 MILLIGRAM(S): 0.2 INJECTION INTRAMUSCULAR; INTRAVENOUS at 01:59

## 2017-12-25 RX ADMIN — LINEZOLID 300 MILLIGRAM(S): 600 INJECTION, SOLUTION INTRAVENOUS at 06:07

## 2017-12-25 RX ADMIN — MEROPENEM 100 MILLIGRAM(S): 1 INJECTION INTRAVENOUS at 01:58

## 2017-12-25 NOTE — DISCHARGE NOTE FOR THE EXPIRED PATIENT - HOSPITAL COURSE
77M PMH CAD s/p CABG (2016), ILD on 3L NC, recent admission for pseudomonal pneumonia requiring prolonged intubation presents from [facility] with AMS and fever. Has had a monzon catheter in place. Per family at bedside, he has had worsening confusion/delirium over the past few days. The patient's only complaint is back pain/lower abdominal pain which is moderate to severe, constant. Also with fevers and chills. 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 Urology evaluated patient and he is not a surgical candidate. Patient became progressively more septic, hypotensive. Requiring 5L IVF and pressors.     Patient initially recommended to have b/l percutaneous nephrostomies placed by IR in attempt to decompress obstruction. After extensive discussion with patient's family and specialists, the family came to the conclusion that the high risk procedure would not be in keeping with the patient's expressed wishes for his goals of care.     Patient was transferred to MICU. levophed and phenylephrine started for pressure support. One dose of gentamycin was given and patient continued on micafungin and meropenem. Bcx grew VRE and candida albican and abx was changed to linezolid and meropenem with micafungin. Dilaudid PRN started for dyspnea and pain. Extensive discussion held with the family regarding the trajectory of the disease and the family indicated full comfort care and to cap the pressors. Patient will transfer to PCU for further care. This am the patient was found to have blown pupils, not responsive and decrease HR. The patient was found with no pulse, no BP on 8:24. Family had been notified of deteriorating status and is on the way to the hospital. Patient did not have any gag, corneal reflex, no audible hs, no pulse and pronounced dead 8:24. 77M PMH CAD s/p CABG (2016), ILD on 3L NC, recent admission for pseudomonal pneumonia requiring prolonged intubation presents from [facility] with AMS and fever. Has had a monzon catheter in place. Per family at bedside, he has had worsening confusion/delirium over the past few days. The patient's only complaint is back pain/lower abdominal pain which is moderate to severe, constant. Also with fevers and chills. 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 Urology evaluated patient and he is not a surgical candidate. Patient became progressively more septic, hypotensive. Requiring 5L IVF and pressors.     Patient initially recommended to have b/l percutaneous nephrostomies placed by IR in attempt to decompress obstruction. After extensive discussion with patient's family and specialists, the family came to the conclusion that the high risk procedure would not be in keeping with the patient's expressed wishes for his goals of care.     Patient was transferred to MICU. levophed and phenylephrine started for pressure support. One dose of gentamycin was given and patient continued on micafungin and meropenem. Bcx grew VRE and candida albican and abx was changed to linezolid and meropenem with micafungin. Dilaudid PRN started for dyspnea and pain. Extensive discussion held with the family regarding the trajectory of the disease and the family indicated full comfort care and to cap the pressors. Patient will transfer to PCU for further care. This am the patient was found to have blown pupils, not responsive and decrease HR. The patient was found with no pulse, no BP on 8:24. Family had been notified of deteriorating status and is on the way to the hospital. Patient did not have any gag, corneal reflex, no audible hs, no pulse and pronounced dead 8:24. Family is requesting an autopsy.

## 2017-12-26 LAB
CULTURE RESULTS: SIGNIFICANT CHANGE UP
CULTURE RESULTS: SIGNIFICANT CHANGE UP
GRAM STN FLD: SIGNIFICANT CHANGE UP
ORGANISM # SPEC MICROSCOPIC CNT: SIGNIFICANT CHANGE UP
ORGANISM # SPEC MICROSCOPIC CNT: SIGNIFICANT CHANGE UP

## 2017-12-27 LAB
-  FLUCONAZOLE: SIGNIFICANT CHANGE UP
CULTURE RESULTS: SIGNIFICANT CHANGE UP
METHOD TYPE: SIGNIFICANT CHANGE UP
ORGANISM # SPEC MICROSCOPIC CNT: SIGNIFICANT CHANGE UP
SPECIMEN SOURCE: SIGNIFICANT CHANGE UP

## 2017-12-28 ENCOUNTER — RESULT REVIEW (OUTPATIENT)
Age: 77
End: 2017-12-28

## 2017-12-28 LAB — CULTURE RESULTS: SIGNIFICANT CHANGE UP

## 2018-07-09 NOTE — ED ADULT TRIAGE NOTE - NS ED NOTE AC HIGH RISK COUNTRIES
The patient was here for a vision care examination. There were no untoward findings noted. Repeat evaluation in one to two years is indicated. No

## 2018-08-07 NOTE — ED PROVIDER NOTE - CROS ED ROS STATEMENT
Acute OM  6 weeks total Rx  Recheck vanco level am  montor creatinine  Continue Zosyn  Redose Vancomycin if tomorrows level is <= 15  PICC and likely MEHUL once otherwise stabilized  Local care per podiatry. all other ROS negative except as per HPI

## 2019-03-19 NOTE — ED ADULT NURSE NOTE - NS ED NOTE ABUSE RESPONSE YN
[place]  OPERATIVE REPORT    Name:  Love Gee  MR#:  [id number]  :  1954  ACCOUNT #:  [de-identified]  DATE OF SERVICE:  2019    PREOPERATIVE DIAGNOSES:  Dysphagia, hoarseness, history of hypopharyngeal squamous cell carcinoma. POSTOPERATIVE DIAGNOSES:  Dysphagia, hoarseness, history of hypopharyngeal squamous cell carcinoma. PROCEDURE PERFORMED:  Direct laryngoscopy with biopsy, esophagoscopy with biopsy, and bronchoscopy. SURGEON:  Tavo Epstein. DO. [place]    ASSISTANT:  None. ANESTHESIA:  General.    COMPLICATIONS:  None. ESTIMATED BLOOD LOSS:  Less than 5 mL. HISTORY:  This is a 68-year-old male. He is very well known to me. I have been seeing him for several years. He has a history of hypopharyngeal squamous cell carcinoma who has been undergoing serial procedures, trying to debulk the hypopharyngeal fullness. Last  was found to have squamous cell carcinoma, what was felt to be a recurrence of squamous cell carcinoma. Multiple biopsies were done in the area along with some lasering. There was no residual cancer after that was done and the patient was scheduled to even have a total laryngectomy prior to holding off on that given the signs there was no cancer left. So he has been doing about the same. He does have some issues with his swallow. He has issues with his voice. He is breathing okay, but because of the recent history of early recurrence, it is my recommendation for an another panendoscopy to be done to evaluate the area and ensure there is no obvious recurrence. The procedure, risks, and benefits were discussed with him in the office, all questions were answered, and he is agreeable to the surgery. SURGERY DETAILS:  The patient was identified in the preoperative waiting area, where he underwent general anesthesia. The bed was turned 90 degrees. A tooth guard was placed over the upper teeth.   An esophagoscope was used first and I was able to easily enter the esophagus. There was minimal acid reflux. There was some erythema near the cervical esophagus, so once the esophagoscope was in the whole way some acid reflux was sucked out the distal esophagus. I slowly removed the scope and again it appeared to be completely normal except for the erythema near the cervical esophageal opening, so biopsies were done of the cervical esophagus. The laryngoscope was used next. Tongue, base of tongue, oral cavity, oropharynx all appeared to be completely normal.  Epiglottis had postsurgical appearance, very abnormal appearing, but at the same time mucosa was pink and smooth. Getting around that hypopharyngeal area there again is a supraglottis where there is usually a lot of redundant folds and what is his \"normal\" mucosa. So although in the past I have been able to easily identify almost immediately the true vocal cords, there is build up of some of that redundant tissue again, which is covering the true vocal cords, but there is still an open passageway going into the larynx. So the previous biopsy site which was the left supraglottis, inspection of that area did not reveal any leukoplakia. There are some areas of mild erythroplakia, but no obvious mass or tumor. The patient was placed in suspension. I was able to place a 0-degree scope through the true vocal cords. 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 No other masses or lesions. So I did go back out with visualization of the larynx and going where the previous biopsies were done, I did 5 or 6 small biopsies on the area and also in the left supraglottis. At that point, the patient was awakened and he was taken to the postoperative recovery room in a stable condition. When the tooth guard was removed, there was no sign of any trauma to the teeth, lips or gums.         [address], DO      [id number]  D:  03/18/2019 12:59  T: 03/18/2019 22:39  JOB #:  [id number] Yes

## 2019-07-01 NOTE — CONSULT NOTE ADULT - CARDIOVASCULAR
Pharmacy faxed in a request for prior authorization on:    Medication: Albuterol Sulfate Nebulizer Solution  Dosage: 2.5 mg/3 ml (0.083 %)  Quantity requested:  375 ml  Pharmacy for prescription has been selected.    Initiation of prior authorization needed.   negative Regular rate & rhythm, normal S1, S2; no murmurs, gallops or rubs; no S3, S4

## 2019-08-15 NOTE — CHART NOTE - NSCHARTNOTEFT_GEN_A_CORE
Extensive discussion with family at bedside, would like to change Dilaudid to standing vs prn for better pain control. Will change to 1mg q4h with 0.5 mg q2h for breakthrough pain/discomfort alert and awake

## 2020-02-24 NOTE — ED ADULT TRIAGE NOTE - NS ED NURSE BANDS TYPE
02/24/20 1437   Discharge Assessment   Assessment Type Discharge Planning Assessment   Confirmed/corrected address and phone number on facesheet? Yes   Assessment information obtained from? Patient   Expected Length of Stay (days) 2   Communicated expected length of stay with patient/caregiver yes   Prior to hospitilization cognitive status: Alert/Oriented   Prior to hospitalization functional status: Independent   Current cognitive status: Alert/Oriented   Current Functional Status: Independent   Lives With spouse;child(charmaine), dependent   Able to Return to Prior Arrangements yes   Is patient able to care for self after discharge? Yes   Who are your caregiver(s) and their phone number(s)? spouse or mother Naila Garvin [phone]   Patient's perception of discharge disposition home or selfcare   Patient currently being followed by outpatient case management? No   Patient currently receives any other outside agency services? No   Equipment Currently Used at Home none   Discharge Plan A Home with family   DME Needed Upon Discharge  none   Patient resting in bed. She lives at home with her spouse & 2 children ages 4 & 13. Denies any discharge needs at this time.   Name band;

## 2020-04-10 NOTE — PROGRESS NOTE ADULT - SUBJECTIVE AND OBJECTIVE BOX
infectious diseases progress note:    MADDIE MALLOY is a 77y y. o. Male patient    Patient reports: doing better, 'as per wife back at baseline'    ROS:    EYES:  Negative  blurry vision or double vision  GASTROINTESTINAL:  Negative for nausea, vomiting, diarrhea  -otherwise negative except for subjective    Allergies    Vaseline (Pruritus; Urticaria)    Intolerances        ANTIBIOTICS/RELEVANT:  antimicrobials    immunologic:  influenza   Vaccine 0.5 milliLiter(s) IntraMuscular once    OTHER:  aspirin  chewable 81 milliGRAM(s) Oral daily  BACItracin   Ointment 1 Application(s) Topical three times a day  baclofen 10 milliGRAM(s) Oral three times a day  bisacodyl Suppository 10 milliGRAM(s) Rectal daily PRN  chlorhexidine 0.12% Liquid 15 milliLiter(s) Swish and Spit four times a day  digoxin     Tablet 0.125 milliGRAM(s) Oral daily  heparin  Injectable 5000 Unit(s) SubCutaneous every 8 hours  insulin glargine Injectable (LANTUS) 12 Unit(s) SubCutaneous at bedtime  insulin lispro (HumaLOG) corrective regimen sliding scale   SubCutaneous Before meals and at bedtime  insulin lispro Injectable (HumaLOG) 7 Unit(s) SubCutaneous three times a day before meals  lidocaine   Patch 1 Patch Transdermal daily  metoprolol     tartrate 25 milliGRAM(s) Oral two times a day  multivitamin 1 Tablet(s) Oral daily  pantoprazole  Injectable 40 milliGRAM(s) IV Push daily  polyethylene glycol 3350 17 Gram(s) Oral two times a day PRN  predniSONE   Tablet 10 milliGRAM(s) Oral daily  saliva substitute (BIOTENE MOISTURIZING MOUTH SPRAY) 1 [place](s) Topical three times a day  senna Syrup 10 milliLiter(s) Oral at bedtime PRN  sodium chloride 0.65% Nasal 1 Spray(s) Both Nostrils three times a day  sodium chloride 0.9%. 500 milliLiter(s) IV Continuous <Continuous>  tiotropium 18 MICROgram(s) Capsule 1 Capsule(s) Inhalation daily      Objective:  Vital Signs Last 24 Hrs  T(C): 36.9 (01 Dec 2017 07:00), Max: 36.9 (01 Dec 2017 07:00)  T(F): 98.5 (01 Dec 2017 07:00), Max: 98.5 (01 Dec 2017 07:00)  HR: 89 (01 Dec 2017 07:00) (62 - 90)  BP: 116/74 (01 Dec 2017 07:00) (103/65 - 121/67)  BP(mean): --  RR: 18 (01 Dec 2017 07:00) (18 - 18)  SpO2: 98% (01 Dec 2017 07:00) (98% - 100%)    T(C): 36.9 (12-01-17 @ 07:00), Max: 37.2 (11-29-17 @ 22:10)  T(C): 36.9 (12-01-17 @ 07:00), Max: 37.2 (11-29-17 @ 22:10)  T(C): 36.9 (12-01-17 @ 07:00), Max: 37.2 (11-29-17 @ 22:10)    PHYSICAL EXAM:  Constitutional:Well-developed, well nourished  Eyes:PERRLA, EOMI  Ear/Nose/Throat: oropharynx normal, crusted lower lip	  Neck:no JVD, no lymphadenopathy, supple  Respiratory: no accessory muscle use  Cardiovascular:RRR,   Gastrointestinal:soft, NT  Extremities:no clubbing, no cyanosis, edema absent      LABS:                        8.8    6.0   )-----------( 191      ( 30 Nov 2017 10:54 )             25.9       6.0 11-30 @ 10:54  5.76 11-29 @ 07:19  7.8 11-28 @ 14:38  8.19 11-28 @ 08:55  7.63 11-27 @ 08:45  5.54 11-26 @ 09:29  4.73 11-25 @ 07:54      11-30    139  |  99  |  28<H>  ----------------------------<  168<H>  4.4   |  31  |  0.83    Ca    8.1<L>      30 Nov 2017 10:54    TPro  6.6  /  Alb  2.3<L>  /  TBili  0.4  /  DBili  x   /  AST  36  /  ALT  44  /  AlkPhos  163<H>  11-30        MICROBIOLOGY:      Culture - Urine (11.30.17 @ 02:32)    Specimen Source: .Urine Catheterized    Culture Results:   >100,000 CFU/ml Yeast-like cells, presumptively not Lilian albicans        RADIOLOGY & ADDITIONAL STUDIES: No

## 2020-06-28 NOTE — CONSULT NOTE ADULT - SUBJECTIVE AND OBJECTIVE BOX
Benefits, risks, and possible complications of procedure explained to patient/caregiver who verbalized understanding and gave verbal consent. Chief Complaint: AF with RVR. Currently intubated.     HPI: Patient is intubated and unable to give history. History is as per chart. Patient with CAD s/p CABG 2016, chronic lung disease presented with 2-3 weeks of worsening dyspnea. Started with exertion. Associated with fatigue. Echocardiogram done as outpatient was normal by report. Patient subsequently intubated for hypoxic resp failure. Also being treated for PNA and septic shock. In this setting patient with AF with RVR. At time of evaluation rates are better controlled. This seemed to occur with the adjustment of his sedation.     PMH:   Asbestos exposure  Kidney stones  Coronary artery disease involving native coronary artery of native heart, angina presence unspecified  Cataract  Rotator cuff rupture, right  GERD (gastroesophageal reflux disease)  Hyperlipemia  Diabetes mellitus    PSH:   Encounter for removal of ureteral stent  S/P CABG (coronary artery bypass graft)  S/P rotator cuff surgery  S/P lens implant  S/P appendectomy    Family History:  FAMILY HISTORY:  Family history of breast cancer (Mother)  Family history of liver disease (Sibling)  Family history of diabetes mellitus  No significant family history    Allergies:  No Known Allergies    Social History:  Smoking: NO active.   Alcohol:  Drugs:    Medications:  ALBUTerol/ipratropium for Nebulization 3 milliLiter(s) Nebulizer every 6 hours  aspirin  chewable 81 milliGRAM(s) Oral daily  azithromycin  IVPB 500 milliGRAM(s) IV Intermittent every 24 hours  digoxin  Injectable 0.25 milliGRAM(s) IV Push every 8 hours  fentaNYL   Infusion 1 MICROgram(s)/kG/Hr IV Continuous <Continuous>  hydrocortisone sodium succinate Injectable 100 milliGRAM(s) IV Push every 8 hours  influenza   Vaccine 0.5 milliLiter(s) IntraMuscular once  insulin lispro (HumaLOG) corrective regimen sliding scale   SubCutaneous every 6 hours  meropenem IVPB 1000 milliGRAM(s) IV Intermittent every 8 hours  midazolam Infusion 1 mG/Hr IV Continuous <Continuous>  norepinephrine Infusion 0.01 MICROgram(s)/kG/Min IV Continuous <Continuous>  pantoprazole  Injectable 40 milliGRAM(s) IV Push daily  phenylephrine    Infusion 0.4 MICROgram(s)/kG/Min IV Continuous <Continuous>  propofol Infusion 5 MICROgram(s)/kG/Min IV Continuous <Continuous>  senna Syrup 10 milliLiter(s) Oral at bedtime  sodium bicarbonate  Infusion 0.186 mEq/kG/Hr IV Continuous <Continuous>      Cardiovascular Diagnostic Testing:  EC/7 Sinus tach @ 126 bpm. NS-ST-T changes.     Echo: < from: TTE with Doppler (w/Cont) (17 @ 12:15) >  1. Calcified aortic valve with decreased opening. Peak  transaortic valve gradient equals 19 mm Hg, mean  transaortic valve gradient equals 10 mm Hg, aortic valve  velocity time integral equals 41 cm, consistent with mild  aortic stenosis.  2. Normal left ventricular internal dimensions and wall  thicknesses.  3. Endocardium not well visualized; grossly normal to  hyperdynamic left ventricular systolic function.  Endocardial visualization enhanced with intravenous  injection of echo contrast (Definity).    < end of copied text >      Stress Testing:    Cath: < from: Cardiac Cath Lab - Adult (16 @ 11:00) >  LM:   --  Mid left main: There was a 60 % stenosis.  LAD:   --  Proximal LAD: There was a 95 % stenosis.  --  Mid LAD: There was a 70 % stenosis.  CX:   --  Ostial circumflex: There was a 70 % stenosis.  RCA:   --  Proximal RCA: There was a 60 % stenosis.  --  Mid RCA: There was a 30 % stenosis.  --  RPL1: There was a 70 % stenosis.    < end of copied text >      Imaging: < from: CT Angio Chest w/ IV Cont (10.31.17 @ 20:00) >  No pulmonary embolism.     Diffuse bilateral interstitial lung disease, most likely idiopathic   pulmonary fibrosis with a UIP pattern.      < end of copied text >      Labs:                        7.6    0.9   )-----------( 98       ( 2017 04:35 )             23.7         145  |  103  |  29<H>  ----------------------------<  220<H>  5.3   |  20<L>  |  1.22    Ca    8.2<L>      2017 04:35  Phos  3.3       Mg     1.7         TPro  4.9<L>  /  Alb  2.0<L>  /  TBili  2.6<H>  /  DBili  x   /  AST  233<H>  /  ALT  250<H>  /  AlkPhos  42      PT/INR - ( 2017 05:58 )   PT: 16.6 sec;   INR: 1.51 ratio         PTT - ( 2017 05:58 )  PTT:47.5 sec  CARDIAC MARKERS ( 2017 00:41 )  x     / x     / 78 U/L / x     / x      CARDIAC MARKERS ( 2017 16:26 )  x     / <0.01 ng/mL / x     / x     / x      CARDIAC MARKERS ( 2017 21:29 )  x     / 0.01 ng/mL / 236 U/L / x     / 3.1 ng/mL        Total Cholesterol: --  LDL: --  HDL: --  T          Physical Exam:  T(C): 36.7 (17 @ 16:00), Max: 36.7 (17 @ 04:00)  HR: 90 (17 @ 17:30) (82 - 122)  BP: --  RR: 28 (17 @ 17:30) (25 - 53)  SpO2: 99% (17 17:30) (94% - 100%)  Wt(kg): --     @ 07:01  -   @ 07:00  --------------------------------------------------------  IN: 4160.6 mL / OUT: 2160 mL / NET: 2000.6 mL     @ 07:01  -   @ 17:42  --------------------------------------------------------  IN: 1431 mL / OUT: 775 mL / NET: 656 mL      Daily     Daily Weight in k.7 (2017 04:00)

## 2020-08-04 NOTE — ED PROVIDER NOTE - NS ED MD DISPO ISOLATION TYPES
None Plan per surgery: - Likely to benefit from a seton for fistula control, will tentatively plan for EUA and possible seton this admission.  F/U TSH, A1C, Lipid P, TTE. F/U MRI Lumbar spine.  Continue Zosyn IV and Vancomycin IV added BID. 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

## 2020-08-27 NOTE — PROGRESS NOTE ADULT - SUBJECTIVE AND OBJECTIVE BOX
infectious diseases progress note:    MADDIE MALLOY is a 77y y. o. Male patient    Patient with limited verbal on Bipap    ROS:    limited    Allergies    No Known Allergies    Intolerances        ANTIBIOTICS/RELEVANT:  antimicrobials  meropenem IVPB 1000 milliGRAM(s) IV Intermittent every 8 hours    immunologic:  influenza   Vaccine 0.5 milliLiter(s) IntraMuscular once    OTHER:  aspirin  chewable 81 milliGRAM(s) Oral daily  digoxin  Injectable 0.125 milliGRAM(s) IV Push daily  heparin  Injectable 5000 Unit(s) SubCutaneous every 8 hours  insulin lispro (HumaLOG) corrective regimen sliding scale   SubCutaneous every 6 hours  levalbuterol Inhalation 0.63 milliGRAM(s) Inhalation every 6 hours  methylPREDNISolone sodium succinate Injectable 10 milliGRAM(s) IV Push every 12 hours  tiotropium 18 MICROgram(s) Capsule 1 Capsule(s) Inhalation daily      Objective:  Last 24-Vital Signs Last 24 Hrs  T(C): 36.8 (16 Nov 2017 04:00), Max: 37.1 (15 Nov 2017 11:00)  T(F): 98.2 (16 Nov 2017 04:00), Max: 98.7 (15 Nov 2017 11:00)  HR: 83 (16 Nov 2017 08:09) (67 - 106)  BP: 140/69 (16 Nov 2017 06:00) (120/68 - 192/89)  BP(mean): 97 (16 Nov 2017 06:00) (87 - 135)  RR: 35 (16 Nov 2017 06:00) (17 - 50)  SpO2: 93% (16 Nov 2017 08:09) (91% - 100%)    T(C): 36.8 (11-16-17 @ 04:00), Max: 37.2 (11-14-17 @ 11:00)  T(F): 98.2 (11-16-17 @ 04:00), Max: 98.9 (11-14-17 @ 11:00)  T(C): 36.8 (11-16-17 @ 04:00), Max: 37.2 (11-14-17 @ 11:00)  T(F): 98.2 (11-16-17 @ 04:00), Max: 98.9 (11-14-17 @ 11:00)  T(C): 36.8 (11-16-17 @ 04:00), Max: 37.3 (11-12-17 @ 20:00)  T(F): 98.2 (11-16-17 @ 04:00), Max: 99.1 (11-12-17 @ 20:00)    PHYSICAL EXAM:  Constitutional:Well-developed, well nourished  Eyes:PERRLA, EOMI  Ear/Nose/Throat: pt on Bipap	  Neck:no JVD, no lymphadenopathy, supple  Respiratory: no accessory muscle use, lung fields symetrical  Cardiovascular:RRR, normal S1, S2 no m/r/g  Gastrointestinal:soft, NT, no HSM, BS-normal  Extremities:no clubbing, no cyanosis, edema absent  Neuro-patient alert, oriented and appropriate  Skin-no sig lesions      LABS:                        9.3    7.8   )-----------( 288      ( 16 Nov 2017 02:34 )             27.8       WBC 7.8  11-16 @ 02:34  WBC 8.4  11-15 @ 02:43  WBC 6.9  11-14 @ 03:07  WBC 4.8  11-13 @ 03:31  WBC 4.1  11-12 @ 02:42  WBC 3.1  11-11 @ 02:41  WBC 1.9  11-10 @ 02:50  WBC 1.4  11-09 @ 19:03      11-16    147<H>  |  109<H>  |  37<H>  ----------------------------<  163<H>  5.1   |  26  |  1.07    Ca    8.4      16 Nov 2017 08:29  Phos  3.5     11-16  Mg     2.3     11-16    TPro  5.8<L>  /  Alb  2.6<L>  /  TBili  1.4<H>  /  DBili  x   /  AST  129<H>  /  ALT  206<H>  /  AlkPhos  449<H>  11-16    PT/INR - ( 16 Nov 2017 02:34 )   PT: 10.7 sec;   INR: 0.99 ratio         PTT - ( 16 Nov 2017 02:34 )  PTT:31.0 sec        MICROBIOLOGY:        RADIOLOGY & ADDITIONAL STUDIES: V-Y Flap Text: The defect edges were debeveled with a #15 scalpel blade.  Given the location of the defect, shape of the defect and the proximity to free margins a V-Y flap was deemed most appropriate.  Using a sterile surgical marker, an appropriate advancement flap was drawn incorporating the defect and placing the expected incisions within the relaxed skin tension lines where possible.    The area thus outlined was incised deep to adipose tissue with a #15 scalpel blade.  The skin margins were undermined to an appropriate distance in all directions utilizing iris scissors.

## 2021-12-02 NOTE — EEG REPORT - HYPERVENTILATION WAS NOT PERFORMED
Detail Level: Detailed
Quality 110: Preventive Care And Screening: Influenza Immunization: Influenza Immunization not Administered for Documented Reasons.
Statement Selected

## 2022-07-05 NOTE — PROGRESS NOTE ADULT - SUBJECTIVE AND OBJECTIVE BOX
Chief complaint  Patient is a 77y old  Male who presents with a chief complaint of SOB (04 Nov 2017 14:33)   Review of systems  Patient in bed, looks comfortable, no fever, no hypoglycemia.    Labs and Fingersticks    CAPILLARY BLOOD GLUCOSE                      Medications  MEDICATIONS  (STANDING):  aspirin  chewable 81 milliGRAM(s) Oral daily  BACItracin   Ointment 1 Application(s) Topical three times a day  chlorhexidine 0.12% Liquid 15 milliLiter(s) Swish and Spit four times a day  digoxin     Tablet 0.125 milliGRAM(s) Oral daily  heparin  Injectable 5000 Unit(s) SubCutaneous every 8 hours  influenza   Vaccine 0.5 milliLiter(s) IntraMuscular once  insulin glargine Injectable (LANTUS) 12 Unit(s) SubCutaneous at bedtime  insulin lispro (HumaLOG) corrective regimen sliding scale   SubCutaneous Before meals and at bedtime  insulin lispro Injectable (HumaLOG) 7 Unit(s) SubCutaneous three times a day before meals  lidocaine   Patch 1 Patch Transdermal daily  metoprolol     tartrate 25 milliGRAM(s) Oral two times a day  multivitamin 1 Tablet(s) Oral daily  pantoprazole  Injectable 40 milliGRAM(s) IV Push daily  predniSONE   Tablet 10 milliGRAM(s) Oral daily  saliva substitute (BIOTENE MOISTURIZING MOUTH SPRAY) 1 [place](s) Topical three times a day  sodium chloride 0.65% Nasal 1 Spray(s) Both Nostrils three times a day  sodium chloride 0.9%. 500 milliLiter(s) (75 mL/Hr) IV Continuous <Continuous>  tiotropium 18 MICROgram(s) Capsule 1 Capsule(s) Inhalation daily      Physical Exam  General: Patient comfortable in bed  Vital Signs Last 12 Hrs  T(F): 97.4 (12-04-17 @ 16:03), Max: 97.4 (12-04-17 @ 16:03)  HR: 82 (12-04-17 @ 16:03) (75 - 82)  BP: 104/60 (12-04-17 @ 16:03) (104/60 - 136/74)  BP(mean): --  RR: 16 (12-04-17 @ 16:03) (16 - 18)  SpO2: 100% (12-04-17 @ 16:03) (100% - 100%)  Neck: No palpable thyroid nodules.  CVS: S1S2, No murmurs  Respiratory: No wheezing, no crepitations  GI: Abdomen soft, bowel sounds positive  Musculoskeletal: Positive edema lower extremities.   Skin: No skin rashes, no ecchymosis    Diagnostics    Thyroid Stimulating Hormone, Serum: AM Sched. Collection: 01-Dec-2017 06:00  Cancel Reason: Lab Operations Cancel (11-30 @ 10:30)  Free Thyroxine, Serum: AM Sched. Collection: 01-Dec-2017 06:00 (11-30 @ 10:30) [No Acute Distress] : no acute distress [de-identified] : tearful  [de-identified] : see HPI, paroxysmal AFib now being managed

## 2022-07-12 NOTE — PATIENT PROFILE ADULT. - PRO INTERPRETER NEED 2
Jack Fragoso is requesting a rx for   olopatadine (PATADAY) 0.2 % SOLN ophthalmic solution      OV 5/3/22  Next office visit   8/3/2022
English

## 2023-06-20 NOTE — PROGRESS NOTE ADULT - ASSESSMENT
IPF - acute exacerbation with refractory dyspnea/hypoxemia  Etiology unclear: possible chronic hypersensitivity pneumonitis  No evidence PNA  Transient increase dyspnea assoc with IV NS - improved SOB today possible due to fluid DC vs steroid response  Gluscose intolerance on solumedrol    REC    Continue Solumedrol 30 q 12  Continue imuran - no objection to dose reduction or hold per heme  TTE for PA pressure and LV/RV function  Repeat CXR today  Prognosis poor 4 = No assist / stand by assistance

## 2023-09-26 NOTE — ED ADULT TRIAGE NOTE - MEANS OF ARRIVAL
09/27/23                            Laurent Gómez  [address] [facility] [id number]    To Whom It May Concern:    This is to certify Laurent Gómez was evaluated with Derrell Ramesh MD on 09/26/2023. Laurent can return to work on 09/30/2023 and can work 4-6 hours per day for the following 3 weeks.      RESTRICTIONS: He is to limit any excessive bending and should not be lifting more than 25lbs.         Electronically signed by:  Derrell Ramesh MD  [facility]  [id number] [facility] [address]  Dept Phone: [phone]        stretcher

## 2023-12-05 NOTE — PROGRESS NOTE ADULT - SUBJECTIVE AND OBJECTIVE BOX
Chief complaint  Patient is a 77y old  Male who presents with a chief complaint of SOB (04 Nov 2017 14:33)   Review of systems  Patient in bed, looks comfortable, no fever, no hypoglycemia.    Labs and Fingersticks    CAPILLARY BLOOD GLUCOSE    Anion Gap, Serum: 9 (11-30 @ 10:54)  Anion Gap, Serum: 9 (11-29 @ 23:08)      Calcium, Total Serum: 8.1 <L> (11-30 @ 10:54)  Calcium, Total Serum: 8.7 (11-29 @ 23:08)  Albumin, Serum: 2.3 <L> (11-30 @ 10:54)    Alanine Aminotransferase (ALT/SGPT): 44 (11-30 @ 10:54)  Alkaline Phosphatase, Serum: 163 <H> (11-30 @ 10:54)  Aspartate Aminotransferase (AST/SGOT): 36 (11-30 @ 10:54)        11-30    139  |  99  |  28<H>  ----------------------------<  168<H>  4.4   |  31  |  0.83    Ca    8.1<L>      30 Nov 2017 10:54    TPro  6.6  /  Alb  2.3<L>  /  TBili  0.4  /  DBili  x   /  AST  36  /  ALT  44  /  AlkPhos  163<H>  11-30                        8.8    6.0   )-----------( 191      ( 30 Nov 2017 10:54 )             25.9     Medications  MEDICATIONS  (STANDING):  aspirin  chewable 81 milliGRAM(s) Oral daily  BACItracin   Ointment 1 Application(s) Topical three times a day  baclofen 10 milliGRAM(s) Oral three times a day  chlorhexidine 0.12% Liquid 15 milliLiter(s) Swish and Spit four times a day  digoxin     Tablet 0.125 milliGRAM(s) Oral daily  heparin  Injectable 5000 Unit(s) SubCutaneous every 8 hours  influenza   Vaccine 0.5 milliLiter(s) IntraMuscular once  insulin glargine Injectable (LANTUS) 12 Unit(s) SubCutaneous at bedtime  insulin lispro (HumaLOG) corrective regimen sliding scale   SubCutaneous Before meals and at bedtime  insulin lispro Injectable (HumaLOG) 7 Unit(s) SubCutaneous three times a day before meals  lidocaine   Patch 1 Patch Transdermal daily  metoprolol     tartrate 25 milliGRAM(s) Oral two times a day  multivitamin 1 Tablet(s) Oral daily  pantoprazole  Injectable 40 milliGRAM(s) IV Push daily  predniSONE   Tablet 10 milliGRAM(s) Oral daily  saliva substitute (BIOTENE MOISTURIZING MOUTH SPRAY) 1 [place](s) Topical three times a day  sodium chloride 0.65% Nasal 1 Spray(s) Both Nostrils three times a day  sodium chloride 0.9%. 500 milliLiter(s) (75 mL/Hr) IV Continuous <Continuous>  tiotropium 18 MICROgram(s) Capsule 1 Capsule(s) Inhalation daily      Physical Exam  General: Patient comfortable in bed  Vital Signs Last 12 Hrs  T(F): 98.6 (12-01-17 @ 15:47), Max: 98.6 (12-01-17 @ 15:47)  HR: 104 (12-01-17 @ 15:47) (89 - 104)  BP: 106/67 (12-01-17 @ 15:47) (106/67 - 121/67)  BP(mean): --  RR: 18 (12-01-17 @ 15:47) (18 - 18)  SpO2: 99% (12-01-17 @ 15:47) (98% - 100%)  Neck: No palpable thyroid nodules.  CVS: S1S2, No murmurs  Respiratory: No wheezing, no crepitations  GI: Abdomen soft, bowel sounds positive  Musculoskeletal: Positive edema lower extremities.   Skin: No skin rashes, no ecchymosis    Diagnostics    Thyroid Stimulating Hormone, Serum: AM Sched. Collection: 01-Dec-2017 06:00 (11-30 @ 10:30)  Free Thyroxine, Serum: AM Sched. Collection: 01-Dec-2017 06:00 (11-30 @ 10:30) Medical Necessity Information: It is in your best interest to select a reason for this procedure from the list below. All of these items fulfill various CMS LCD requirements except the new and changing color options. Render Post-Care Instructions In Note?: no Show Aperture Variable?: Yes Consent: The patient's consent was obtained including but not limited to risks of crusting, scabbing, blistering, scarring, darker or lighter pigmentary change, recurrence, incomplete removal and infection. Medical Necessity Clause: This procedure was medically necessary because the lesions that were treated were: Post-Care Instructions: I reviewed with the patient in detail post-care instructions. Patient is to wear sunprotection, and avoid picking at any of the treated lesions. Pt may apply Vaseline to crusted or scabbing areas. Detail Level: Detailed Spray Paint Text: The liquid nitrogen was applied to the skin utilizing a spray paint frosting technique.

## 2024-03-06 NOTE — CONSULT NOTE ADULT - ENMT
Patient earns best by video and mother learns best by all methods No oral lesions; no gross abnormalities

## 2024-12-08 NOTE — ED ADULT NURSE NOTE - CAS TRG GEN SKIN CONDITION
Multiple areas of skin breakdown.  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  Currently does not appear overtly infected.   Wound culture send in ED and pending  Wound care consult  Turn reposition q 1-2 hours    Warm